# Patient Record
Sex: MALE | Race: WHITE | NOT HISPANIC OR LATINO | URBAN - METROPOLITAN AREA
[De-identification: names, ages, dates, MRNs, and addresses within clinical notes are randomized per-mention and may not be internally consistent; named-entity substitution may affect disease eponyms.]

---

## 2017-02-01 ENCOUNTER — OUTPATIENT (OUTPATIENT)
Dept: OUTPATIENT SERVICES | Facility: HOSPITAL | Age: 37
LOS: 1 days | End: 2017-02-01
Payer: MEDICAID

## 2017-02-01 DIAGNOSIS — Z96.653 PRESENCE OF ARTIFICIAL KNEE JOINT, BILATERAL: Chronic | ICD-10-CM

## 2017-02-01 DIAGNOSIS — Z96.649 PRESENCE OF UNSPECIFIED ARTIFICIAL HIP JOINT: Chronic | ICD-10-CM

## 2017-02-13 DIAGNOSIS — R69 ILLNESS, UNSPECIFIED: ICD-10-CM

## 2017-03-01 ENCOUNTER — OUTPATIENT (OUTPATIENT)
Dept: OUTPATIENT SERVICES | Facility: HOSPITAL | Age: 37
LOS: 1 days | End: 2017-03-01

## 2017-03-01 DIAGNOSIS — Z96.653 PRESENCE OF ARTIFICIAL KNEE JOINT, BILATERAL: Chronic | ICD-10-CM

## 2017-03-01 DIAGNOSIS — Z96.649 PRESENCE OF UNSPECIFIED ARTIFICIAL HIP JOINT: Chronic | ICD-10-CM

## 2017-03-09 DIAGNOSIS — R69 ILLNESS, UNSPECIFIED: ICD-10-CM

## 2018-05-11 ENCOUNTER — OUTPATIENT (OUTPATIENT)
Dept: OUTPATIENT SERVICES | Facility: HOSPITAL | Age: 38
LOS: 1 days | End: 2018-05-11
Payer: MEDICARE

## 2018-05-11 ENCOUNTER — RESULT REVIEW (OUTPATIENT)
Age: 38
End: 2018-05-11

## 2018-05-11 ENCOUNTER — APPOINTMENT (OUTPATIENT)
Dept: ULTRASOUND IMAGING | Facility: CLINIC | Age: 38
End: 2018-05-11
Payer: MEDICARE

## 2018-05-11 DIAGNOSIS — Z96.649 PRESENCE OF UNSPECIFIED ARTIFICIAL HIP JOINT: Chronic | ICD-10-CM

## 2018-05-11 DIAGNOSIS — Z96.653 PRESENCE OF ARTIFICIAL KNEE JOINT, BILATERAL: Chronic | ICD-10-CM

## 2018-05-11 DIAGNOSIS — E04.1 NONTOXIC SINGLE THYROID NODULE: ICD-10-CM

## 2018-05-11 PROCEDURE — 88173 CYTOPATH EVAL FNA REPORT: CPT

## 2018-05-11 PROCEDURE — 76942 ECHO GUIDE FOR BIOPSY: CPT | Mod: 26

## 2018-05-11 PROCEDURE — 10022: CPT | Mod: XS

## 2018-05-11 PROCEDURE — 88173 CYTOPATH EVAL FNA REPORT: CPT | Mod: 26

## 2018-05-11 PROCEDURE — 60100 BIOPSY OF THYROID: CPT

## 2018-05-11 PROCEDURE — 76942 ECHO GUIDE FOR BIOPSY: CPT

## 2018-05-11 PROCEDURE — 10022: CPT

## 2018-05-30 ENCOUNTER — EMERGENCY (EMERGENCY)
Facility: HOSPITAL | Age: 38
LOS: 1 days | Discharge: HOME | End: 2018-05-30
Attending: EMERGENCY MEDICINE
Payer: MEDICARE

## 2018-05-30 VITALS
HEART RATE: 74 BPM | HEIGHT: 69 IN | DIASTOLIC BLOOD PRESSURE: 113 MMHG | OXYGEN SATURATION: 97 % | RESPIRATION RATE: 18 BRPM | SYSTOLIC BLOOD PRESSURE: 160 MMHG | TEMPERATURE: 99 F | WEIGHT: 240.08 LBS

## 2018-05-30 DIAGNOSIS — Z96.653 PRESENCE OF ARTIFICIAL KNEE JOINT, BILATERAL: Chronic | ICD-10-CM

## 2018-05-30 DIAGNOSIS — Z96.649 PRESENCE OF UNSPECIFIED ARTIFICIAL HIP JOINT: Chronic | ICD-10-CM

## 2018-05-30 PROCEDURE — 99283 EMERGENCY DEPT VISIT LOW MDM: CPT

## 2018-05-30 RX ORDER — VALSARTAN 80 MG/1
1 TABLET ORAL
Qty: 10 | Refills: 0
Start: 2018-05-30 | End: 2018-06-08

## 2018-05-30 RX ORDER — VALSARTAN 80 MG/1
320 TABLET ORAL ONCE
Qty: 0 | Refills: 0 | Status: COMPLETED | OUTPATIENT
Start: 2018-05-30 | End: 2018-05-30

## 2018-05-30 RX ORDER — VALSARTAN 80 MG/1
160 TABLET ORAL ONCE
Qty: 0 | Refills: 0 | Status: COMPLETED | OUTPATIENT
Start: 2018-05-30 | End: 2018-05-30

## 2018-05-30 RX ADMIN — Medication 0.3 MILLIGRAM(S): at 20:09

## 2018-05-30 RX ADMIN — VALSARTAN 320 MILLIGRAM(S): 80 TABLET ORAL at 20:09

## 2018-05-30 RX ADMIN — VALSARTAN 160 MILLIGRAM(S): 80 TABLET ORAL at 20:09

## 2018-05-30 NOTE — ED PROVIDER NOTE - OBJECTIVE STATEMENT
39 y/o M c/o high blood pressure.  Patient states that he normally takes 320 mg qd of valsartan and 0.3mg tid of clonidine.  Patient has an appointment with Dr. Araujo for next Thursday.  He states that he ran out of his medications because he was handling a family emergency in [place] and couldn't get to the doctor.  Patient's last dose of medications was yesterday.  Patient c/o slight headache, 4/10.  Denies chest pain, visual changes, nausea/vomiting or any neurological deficits. 37 y/o M c/o high blood pressure.  Patient states that he normally takes 320 mg qd of valsartan and 0.3mg tid of clonidine.  Patient has an appointment with Dr. Araujo for next Thursday.  He states that he ran out of his medications because he was handling a family emergency in [place] and couldn't get to the doctor.  Patient's last dose of medications was yesterday.  Patient c/o slight headache, 4/10, not unusual for him with or without highblood pressure and unrelated to medications.  Denies chest pain, visual changes, nausea/vomiting or any neurological deficits. No edema, dyspnea, NIX, orthopnea, changes in urinary quality or frequency. Declining pain rx.

## 2018-05-30 NOTE — ED PROVIDER NOTE - PROGRESS NOTE DETAILS
feels improved after receiving BP meds. I have advised the patient on the usual course of this illness, an appropriate schedule for follow-up, and concerning signs and symptoms that should prompt return to the emergency department. I answered all questions to the best of my ability. The patient is stable for discharge.

## 2018-05-30 NOTE — ED PROVIDER NOTE - ATTENDING CONTRIBUTION TO CARE
ATTENDING MD: I, Karl Figueredo, have seen and evaluated this patient with the advance practice clinician.  I have discussed the history, exam ,and aspects of care with the APC.  I have reviewed the APC's note. I agree with the findings  unless other wise stated in the HPI, PE, MDM, and progress notes.     GEN: NAD, A & O x 4  HEAD/EYES: NCAT, PERRL, EOMI, anicteric sclerae, no conjunctival pallor  ENT: mucus membranes moist, oropharynx WNL, trachea midline, no JVD  RESP: lungs CTA with equal breath sounds bilaterally, chest wall nontender and atraumatic  CV: heart with reg rhythm S1, S2, no murmur; distal pulses intact and symmetric bilaterally  ABDOMEN: normoactive bowel sounds, soft, nondistended, nontender, no palpable masses  MSK: extremities atraumatic and nontender, no edema, no asymmetry.   SKIN: warm, dry, no rash, no bruising, no cyanosis. color appropriate for ethnicity  NEURO: Normal mentation, GCS15, CNII-XII are intact, strength is 5/5 throughout upper and lower extremities symmetric bilaterally, Sensation is intact to light touch throughout trunk and extremities symmetric bilaterally, Cerebellar function is intact by finger-nose-finger, 2+DTRs symmetric bilaterally, Babinski is downgoing bilaterally, Negative Romberg, Normal Gait  PSYCH: Affect appropriate     MDM: Pt presents for asymptomatic high blood pressure. reassuring exam. will givehome rx and prescribe for f/u. no concern for mild headache typical of usual headaches and not bothering patient in absence of other neurologic findings, siobhan. as pt states he would not have presented for it.

## 2018-06-19 ENCOUNTER — EMERGENCY (EMERGENCY)
Facility: HOSPITAL | Age: 38
LOS: 1 days | Discharge: HOME | End: 2018-06-19
Attending: EMERGENCY MEDICINE
Payer: MEDICARE

## 2018-06-19 VITALS
TEMPERATURE: 98 F | HEART RATE: 87 BPM | RESPIRATION RATE: 16 BRPM | DIASTOLIC BLOOD PRESSURE: 61 MMHG | OXYGEN SATURATION: 95 % | SYSTOLIC BLOOD PRESSURE: 101 MMHG

## 2018-06-19 VITALS — HEIGHT: 69 IN | WEIGHT: 229.94 LBS

## 2018-06-19 DIAGNOSIS — Z96.653 PRESENCE OF ARTIFICIAL KNEE JOINT, BILATERAL: Chronic | ICD-10-CM

## 2018-06-19 DIAGNOSIS — Z96.649 PRESENCE OF UNSPECIFIED ARTIFICIAL HIP JOINT: Chronic | ICD-10-CM

## 2018-06-19 PROCEDURE — 99283 EMERGENCY DEPT VISIT LOW MDM: CPT

## 2018-06-19 NOTE — ED PROVIDER NOTE - MEDICAL DECISION MAKING DETAILS
counseled pt on need for bp check before taking clinidine reviewed charts does have h/o htn return to ed for intractable HA, persistent vomiting, or new onset motor/sensory deficits stressed improtance pcp follow up

## 2018-06-19 NOTE — ED PROVIDER NOTE - OBJECTIVE STATEMENT
pt presents requesting bp medications denies fever. denies HA or neck pain. no chest pain or sob. no abd pain. no n/v/d. no urinary f/u/d. no back pain. no motor or sensory deficits. denies illicit drug use. no recent travel. no rash. no other acute issues symptoms or concerns

## 2018-07-01 ENCOUNTER — OUTPATIENT (OUTPATIENT)
Dept: OUTPATIENT SERVICES | Facility: HOSPITAL | Age: 38
LOS: 1 days | End: 2018-07-01
Payer: MEDICARE

## 2018-07-01 DIAGNOSIS — Z96.653 PRESENCE OF ARTIFICIAL KNEE JOINT, BILATERAL: Chronic | ICD-10-CM

## 2018-07-01 DIAGNOSIS — Z96.649 PRESENCE OF UNSPECIFIED ARTIFICIAL HIP JOINT: Chronic | ICD-10-CM

## 2018-07-13 DIAGNOSIS — Z71.89 OTHER SPECIFIED COUNSELING: ICD-10-CM

## 2018-10-02 ENCOUNTER — APPOINTMENT (OUTPATIENT)
Dept: ENDOCRINOLOGY | Facility: CLINIC | Age: 38
End: 2018-10-02

## 2018-11-07 ENCOUNTER — EMERGENCY (EMERGENCY)
Facility: HOSPITAL | Age: 38
LOS: 1 days | Discharge: HOME | End: 2018-11-07
Attending: EMERGENCY MEDICINE
Payer: MEDICARE

## 2018-11-07 VITALS
DIASTOLIC BLOOD PRESSURE: 83 MMHG | TEMPERATURE: 99 F | HEART RATE: 85 BPM | RESPIRATION RATE: 18 BRPM | SYSTOLIC BLOOD PRESSURE: 171 MMHG | OXYGEN SATURATION: 100 % | HEIGHT: 69 IN | WEIGHT: 220.02 LBS

## 2018-11-07 DIAGNOSIS — Z96.653 PRESENCE OF ARTIFICIAL KNEE JOINT, BILATERAL: Chronic | ICD-10-CM

## 2018-11-07 DIAGNOSIS — Z96.649 PRESENCE OF UNSPECIFIED ARTIFICIAL HIP JOINT: Chronic | ICD-10-CM

## 2018-11-07 PROCEDURE — 99283 EMERGENCY DEPT VISIT LOW MDM: CPT

## 2018-11-07 RX ORDER — VALSARTAN 80 MG/1
1 TABLET ORAL
Qty: 14 | Refills: 0
Start: 2018-11-07 | End: 2018-11-20

## 2018-11-07 NOTE — ED PROVIDER NOTE - CHPI ED SYMPTOMS NEG
no chills/no chest pain/no back pain/no fever/no diaphoresis/no syncope/no shortness of breath/no vomiting/no cough/no nausea/no dizziness

## 2018-11-07 NOTE — ED PROVIDER NOTE - OBJECTIVE STATEMENT
The patient is a 38 year old male presents with asymptomatic hypertension requesting BP refill meds.  The patient states that he has appointment with PMD in 2 weeks. No HA, No blurred vision, No CP, No SOB, No ABD Pain, No motor No sensory loss

## 2018-11-14 ENCOUNTER — APPOINTMENT (OUTPATIENT)
Dept: FAMILY MEDICINE | Facility: CLINIC | Age: 38
End: 2018-11-14

## 2018-11-15 ENCOUNTER — RX RENEWAL (OUTPATIENT)
Age: 38
End: 2018-11-15

## 2018-11-16 ENCOUNTER — RX CHANGE (OUTPATIENT)
Age: 38
End: 2018-11-16

## 2019-01-17 ENCOUNTER — APPOINTMENT (OUTPATIENT)
Dept: ENDOCRINOLOGY | Facility: CLINIC | Age: 39
End: 2019-01-17
Payer: MEDICARE

## 2019-01-17 VITALS
WEIGHT: 227 LBS | BODY MASS INDEX: 33.62 KG/M2 | HEART RATE: 70 BPM | HEIGHT: 69 IN | DIASTOLIC BLOOD PRESSURE: 70 MMHG | SYSTOLIC BLOOD PRESSURE: 100 MMHG

## 2019-01-17 DIAGNOSIS — Z80.9 FAMILY HISTORY OF MALIGNANT NEOPLASM, UNSPECIFIED: ICD-10-CM

## 2019-01-17 DIAGNOSIS — F17.200 NICOTINE DEPENDENCE, UNSPECIFIED, UNCOMPLICATED: ICD-10-CM

## 2019-01-17 PROCEDURE — 99214 OFFICE O/P EST MOD 30 MIN: CPT | Mod: 25

## 2019-01-17 PROCEDURE — 83036 HEMOGLOBIN GLYCOSYLATED A1C: CPT | Mod: QW

## 2019-01-22 LAB — HBA1C MFR BLD HPLC: 5.8

## 2019-01-22 NOTE — ASSESSMENT
[Carbohydrate Consistent Diet] : carbohydrate consistent diet [Importance of Diet and Exercise] : importance of diet and exercise to improve glycemic control, achieve weight loss and improve cardiovascular health [Self Monitoring of Blood Glucose] : self monitoring of blood glucose [FreeTextEntry1] : Dm2 with a1 c5.8 now after loosing 40 lbs \par continue MF 1000 mg BID \par check labs \par flushot declines \par eye exam referral \par check xander/c ratio normal. \par discussed diet and exercise\par encouraged more exercise walking 30 min 3 x week\par bgs 2x day \par \par HTN :  bp at target on meds. Continue current management.\par \par HLD: check lipids\par continue atorvastatin 10 mg qd \par \par obesity: discussed diet and exercise\par encouraged more exercise walking 30 min 3 x week\par \par NTMNG : has large nodule isthmus 2.2 cm and R middle lobe 1.3 cm FNA May 2019 \par check thyroid US next appt \par \par hypoT : check tfts next appt

## 2019-01-22 NOTE — PHYSICAL EXAM
[Alert] : alert [No Acute Distress] : no acute distress [Well Nourished] : well nourished [Well Developed] : well developed [Normal Sclera/Conjunctiva] : normal sclera/conjunctiva [EOMI] : extra ocular movement intact [No Proptosis] : no proptosis [Normal Oropharynx] : the oropharynx was normal [Thyroid Not Enlarged] : the thyroid was not enlarged [No Thyroid Nodules] : there were no palpable thyroid nodules [No Respiratory Distress] : no respiratory distress [No Accessory Muscle Use] : no accessory muscle use [Clear to Auscultation] : lungs were clear to auscultation bilaterally [Normal Rate] : heart rate was normal  [Normal S1, S2] : normal S1 and S2 [Regular Rhythm] : with a regular rhythm [Pedal Pulses Normal] : the pedal pulses are present [No Edema] : there was no peripheral edema [Normal Bowel Sounds] : normal bowel sounds [Not Tender] : non-tender [Soft] : abdomen soft [Not Distended] : not distended [Post Cervical Nodes] : posterior cervical nodes [Anterior Cervical Nodes] : anterior cervical nodes [Normal] : normal and non tender [Spine Straight] : spine straight [No Stigmata of Cushings Syndrome] : no stigmata of cushings syndrome [Normal Gait] : normal gait [Normal Strength/Tone] : muscle strength and tone were normal [No Rash] : no rash [Normal Reflexes] : deep tendon reflexes were 2+ and symmetric [No Tremors] : no tremors [Oriented x3] : oriented to person, place, and time [Acanthosis Nigricans] : no acanthosis nigricans

## 2019-03-01 ENCOUNTER — EMERGENCY (EMERGENCY)
Facility: HOSPITAL | Age: 39
LOS: 1 days | Discharge: HOME | End: 2019-03-01
Attending: STUDENT IN AN ORGANIZED HEALTH CARE EDUCATION/TRAINING PROGRAM
Payer: COMMERCIAL

## 2019-03-01 DIAGNOSIS — Z96.653 PRESENCE OF ARTIFICIAL KNEE JOINT, BILATERAL: Chronic | ICD-10-CM

## 2019-03-01 DIAGNOSIS — Z96.649 PRESENCE OF UNSPECIFIED ARTIFICIAL HIP JOINT: Chronic | ICD-10-CM

## 2019-03-01 LAB
APTT BLD: 35.5 SEC — SIGNIFICANT CHANGE UP (ref 27.5–36.3)
BASOPHILS # BLD AUTO: 0 K/UL — SIGNIFICANT CHANGE UP (ref 0–0.2)
BASOPHILS NFR BLD AUTO: 0.7 % — SIGNIFICANT CHANGE UP (ref 0–2)
EOSINOPHIL # BLD AUTO: 0.4 K/UL — SIGNIFICANT CHANGE UP (ref 0–0.5)
EOSINOPHIL NFR BLD AUTO: 6.5 % — HIGH (ref 0–5)
HCT VFR BLD CALC: 34.6 % — LOW (ref 42–52)
HGB BLD-MCNC: 11.5 G/DL — LOW (ref 14–18)
INR BLD: 0.97 RATIO — SIGNIFICANT CHANGE UP (ref 0.88–1.16)
LYMPHOCYTES # BLD AUTO: 2.2 K/UL — SIGNIFICANT CHANGE UP (ref 1–4.8)
LYMPHOCYTES # BLD AUTO: 37.8 % — SIGNIFICANT CHANGE UP (ref 20–55)
MCHC RBC-ENTMCNC: 28.5 PG — SIGNIFICANT CHANGE UP (ref 27–31)
MCHC RBC-ENTMCNC: 33.2 G/DL — SIGNIFICANT CHANGE UP (ref 32–36)
MCV RBC AUTO: 85.9 FL — SIGNIFICANT CHANGE UP (ref 80–94)
MONOCYTES # BLD AUTO: 0.4 K/UL — SIGNIFICANT CHANGE UP (ref 0–0.8)
MONOCYTES NFR BLD AUTO: 6.8 % — SIGNIFICANT CHANGE UP (ref 3–10)
NEUTROPHILS # BLD AUTO: 2.8 K/UL — SIGNIFICANT CHANGE UP (ref 1.8–8)
NEUTROPHILS NFR BLD AUTO: 47.7 % — SIGNIFICANT CHANGE UP (ref 37–73)
PLATELET # BLD AUTO: 169 K/UL — SIGNIFICANT CHANGE UP (ref 150–400)
PROTHROM AB SERPL-ACNC: 11.2 SEC — SIGNIFICANT CHANGE UP (ref 10–12.9)
RBC # BLD: 4.03 M/UL — LOW (ref 4.6–6.2)
RBC # FLD: 13.7 % — SIGNIFICANT CHANGE UP (ref 11–15.6)
WBC # BLD: 5.9 K/UL — SIGNIFICANT CHANGE UP (ref 4.8–10.8)
WBC # FLD AUTO: 5.9 K/UL — SIGNIFICANT CHANGE UP (ref 4.8–10.8)

## 2019-03-01 PROCEDURE — 71045 X-RAY EXAM CHEST 1 VIEW: CPT | Mod: 26

## 2019-03-01 PROCEDURE — 99284 EMERGENCY DEPT VISIT MOD MDM: CPT

## 2019-03-01 PROCEDURE — 99283 EMERGENCY DEPT VISIT LOW MDM: CPT

## 2019-03-01 RX ORDER — TETANUS TOXOID, REDUCED DIPHTHERIA TOXOID AND ACELLULAR PERTUSSIS VACCINE, ADSORBED 5; 2.5; 8; 8; 2.5 [IU]/.5ML; [IU]/.5ML; UG/.5ML; UG/.5ML; UG/.5ML
0.5 SUSPENSION INTRAMUSCULAR ONCE
Qty: 0 | Refills: 0 | Status: COMPLETED | OUTPATIENT
Start: 2019-03-01 | End: 2019-03-01

## 2019-03-01 RX ORDER — SODIUM CHLORIDE 9 MG/ML
1000 INJECTION INTRAMUSCULAR; INTRAVENOUS; SUBCUTANEOUS ONCE
Qty: 0 | Refills: 0 | Status: COMPLETED | OUTPATIENT
Start: 2019-03-01 | End: 2019-03-01

## 2019-03-01 NOTE — H&P ADULT - ENMT COMMENTS
see hpi Ear canals clear BL.  No Hemotympanum. L nare with through and through lac of ala.  + Bridge edema.  No septal HT.
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
## 2019-03-01 NOTE — ED PROVIDER NOTE - PMH
Anxiety    Bipolar disorder    GERD (gastroesophageal reflux disease)    HTN (hypertension)    Substance abuse

## 2019-03-01 NOTE — ED PROVIDER NOTE - ENMT, MLM
Airway patent, no facial tenderness. laceration to R naris. Airway patent, no facial tenderness. laceration to R anterior medial naris, no septal hematoma.
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
## 2019-03-01 NOTE — ED PROVIDER NOTE - PHYSICAL EXAMINATION
femoral pulses 2+  3mm pupils b/l, EOMI  GCS 15  laceration to R nairs  no chest wall tenderness  no facial tenderness  abdomen soft, NT/ND  pelvis stable  old scar to medial R knee  no spinal tenderness, no stepoff  no blood or stool in rectal vault
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
## 2019-03-01 NOTE — ED PROVIDER NOTE - PROGRESS NOTE DETAILS
PT seen and reassessed.  Patient symptomatically improved.   AAOX3, NAD, VSS.  Discussed test results w/ patient, given copy of results. Patient verbalized understanding of hospital course and outpatient plans, has decisional making capacity.  Will follow-up with Primary care doctor in the next 2 days; patient will call for an appointment. Will return to the ED if there is any worsening of symptoms.  Patient able to ambulate w/o difficulty, is tolerating PO intake  Pt refused adacel since he's had it before
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
## 2019-03-01 NOTE — ED PROVIDER NOTE - MUSCULOSKELETAL, MLM
Moving all extremities spontaneously. no spinal tenderness, no stepoff. old scar to medial R knee. Moving all extremities spontaneously. no spinal tenderness, no stepoff.  old scar to medial R knee.
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
## 2019-03-02 VITALS
DIASTOLIC BLOOD PRESSURE: 74 MMHG | SYSTOLIC BLOOD PRESSURE: 124 MMHG | TEMPERATURE: 98 F | HEART RATE: 69 BPM | RESPIRATION RATE: 18 BRPM | OXYGEN SATURATION: 97 %
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
## 2019-03-02 DIAGNOSIS — S01.21XA LACERATION WITHOUT FOREIGN BODY OF NOSE, INITIAL ENCOUNTER: ICD-10-CM

## 2019-03-02 LAB
ALBUMIN SERPL ELPH-MCNC: 4.3 G/DL — SIGNIFICANT CHANGE UP (ref 3.3–5.2)
ALP SERPL-CCNC: 98 U/L — SIGNIFICANT CHANGE UP (ref 40–120)
ALT FLD-CCNC: 29 U/L — SIGNIFICANT CHANGE UP
ANION GAP SERPL CALC-SCNC: 12 MMOL/L — SIGNIFICANT CHANGE UP (ref 5–17)
AST SERPL-CCNC: 27 U/L — SIGNIFICANT CHANGE UP
BILIRUB SERPL-MCNC: <0.2 MG/DL — LOW (ref 0.4–2)
BLD GP AB SCN SERPL QL: SIGNIFICANT CHANGE UP
BUN SERPL-MCNC: 16 MG/DL — SIGNIFICANT CHANGE UP (ref 8–20)
CALCIUM SERPL-MCNC: 9 MG/DL — SIGNIFICANT CHANGE UP (ref 8.6–10.2)
CHLORIDE SERPL-SCNC: 106 MMOL/L — SIGNIFICANT CHANGE UP (ref 98–107)
CO2 SERPL-SCNC: 24 MMOL/L — SIGNIFICANT CHANGE UP (ref 22–29)
CREAT SERPL-MCNC: 1.31 MG/DL — HIGH (ref 0.5–1.3)
ETHANOL SERPL-MCNC: <10 MG/DL — SIGNIFICANT CHANGE UP
GLUCOSE SERPL-MCNC: 140 MG/DL — HIGH (ref 70–115)
LIDOCAIN IGE QN: 20 U/L — LOW (ref 22–51)
POTASSIUM SERPL-MCNC: 4.1 MMOL/L — SIGNIFICANT CHANGE UP (ref 3.5–5.3)
POTASSIUM SERPL-SCNC: 4.1 MMOL/L — SIGNIFICANT CHANGE UP (ref 3.5–5.3)
PROT SERPL-MCNC: 6.6 G/DL — SIGNIFICANT CHANGE UP (ref 6.6–8.7)
SODIUM SERPL-SCNC: 142 MMOL/L — SIGNIFICANT CHANGE UP (ref 135–145)
TYPE + AB SCN PNL BLD: SIGNIFICANT CHANGE UP

## 2019-03-02 PROCEDURE — 85027 COMPLETE CBC AUTOMATED: CPT

## 2019-03-02 PROCEDURE — 85610 PROTHROMBIN TIME: CPT

## 2019-03-02 PROCEDURE — 86901 BLOOD TYPING SEROLOGIC RH(D): CPT

## 2019-03-02 PROCEDURE — 86900 BLOOD TYPING SEROLOGIC ABO: CPT

## 2019-03-02 PROCEDURE — 12011 RPR F/E/E/N/L/M 2.5 CM/<: CPT

## 2019-03-02 PROCEDURE — 71260 CT THORAX DX C+: CPT | Mod: 26

## 2019-03-02 PROCEDURE — 72125 CT NECK SPINE W/O DYE: CPT | Mod: 26

## 2019-03-02 PROCEDURE — 83690 ASSAY OF LIPASE: CPT

## 2019-03-02 PROCEDURE — 71260 CT THORAX DX C+: CPT

## 2019-03-02 PROCEDURE — 74177 CT ABD & PELVIS W/CONTRAST: CPT | Mod: 26

## 2019-03-02 PROCEDURE — 74177 CT ABD & PELVIS W/CONTRAST: CPT

## 2019-03-02 PROCEDURE — 71045 X-RAY EXAM CHEST 1 VIEW: CPT

## 2019-03-02 PROCEDURE — 99285 EMERGENCY DEPT VISIT HI MDM: CPT | Mod: 25

## 2019-03-02 PROCEDURE — 80307 DRUG TEST PRSMV CHEM ANLYZR: CPT

## 2019-03-02 PROCEDURE — 70486 CT MAXILLOFACIAL W/O DYE: CPT

## 2019-03-02 PROCEDURE — 80053 COMPREHEN METABOLIC PANEL: CPT

## 2019-03-02 PROCEDURE — 70486 CT MAXILLOFACIAL W/O DYE: CPT | Mod: 26

## 2019-03-02 PROCEDURE — 70450 CT HEAD/BRAIN W/O DYE: CPT | Mod: 26

## 2019-03-02 PROCEDURE — 72125 CT NECK SPINE W/O DYE: CPT

## 2019-03-02 PROCEDURE — 83605 ASSAY OF LACTIC ACID: CPT

## 2019-03-02 PROCEDURE — 70450 CT HEAD/BRAIN W/O DYE: CPT

## 2019-03-02 PROCEDURE — 36415 COLL VENOUS BLD VENIPUNCTURE: CPT

## 2019-03-02 PROCEDURE — 86850 RBC ANTIBODY SCREEN: CPT

## 2019-03-02 PROCEDURE — 85730 THROMBOPLASTIN TIME PARTIAL: CPT

## 2019-03-02 RX ADMIN — SODIUM CHLORIDE 1000 MILLILITER(S): 9 INJECTION INTRAMUSCULAR; INTRAVENOUS; SUBCUTANEOUS at 00:55

## 2019-03-02 NOTE — PROCEDURE NOTE - PROCEDURE
<<-----Click on this checkbox to enter Procedure Laceration repair of face  03/02/2019    [id number]

## 2019-03-07 ENCOUNTER — EMERGENCY (EMERGENCY)
Facility: HOSPITAL | Age: 39
LOS: 1 days | Discharge: HOME | End: 2019-03-07
Attending: STUDENT IN AN ORGANIZED HEALTH CARE EDUCATION/TRAINING PROGRAM
Payer: COMMERCIAL

## 2019-03-07 VITALS
OXYGEN SATURATION: 97 % | DIASTOLIC BLOOD PRESSURE: 102 MMHG | RESPIRATION RATE: 18 BRPM | HEIGHT: 69 IN | SYSTOLIC BLOOD PRESSURE: 156 MMHG | WEIGHT: 220.02 LBS | TEMPERATURE: 97 F | HEART RATE: 64 BPM

## 2019-03-07 DIAGNOSIS — Z96.649 PRESENCE OF UNSPECIFIED ARTIFICIAL HIP JOINT: Chronic | ICD-10-CM

## 2019-03-07 DIAGNOSIS — Z96.653 PRESENCE OF ARTIFICIAL KNEE JOINT, BILATERAL: Chronic | ICD-10-CM

## 2019-03-07 PROCEDURE — 99283 EMERGENCY DEPT VISIT LOW MDM: CPT

## 2019-03-07 PROCEDURE — 99282 EMERGENCY DEPT VISIT SF MDM: CPT

## 2019-03-07 NOTE — ED PROVIDER NOTE - PHYSICAL EXAMINATION
MSK: No midline tenderness, 5/5 str b/l upper and lower extrems. No bony deformities. Full ROM head and neck    Neuro: Nonfocal, PERRLA, EOMI.

## 2019-03-07 NOTE — ED PROVIDER NOTE - OBJECTIVE STATEMENT
The patient is a 38y Male complaining of medication refill. The patient is a 38y Male, PMHx anxiety, bipolar, HTN, DM, GERD, presents to ED complaining of medication refill. Pt requesting xanax refill for generalized myalgias. Pt states he is followed by Dr. Omi Leahy for pain management and was unable to be seen in the office until next week. Pt states he has generalized pains that radiates from his neck down into his lower back. Pt denies recent trauma, falls, numbness, saddle anesthesia, and urinary incontinence.

## 2019-03-07 NOTE — ED PROVIDER NOTE - CLINICAL SUMMARY MEDICAL DECISION MAKING FREE TEXT BOX
37 y/o male presenting to ED requesting xanax refill. ISTOP checked, pt with active xanax prescription ( [facility]STOP reference # : [id number]). Pt physical exam unremarkable. Pt offered lidocaine patch and NSAIDS for pain. Pt refusing medication at this time, states he will follow up with PMD in AM. Pt left w/o receiving discharge paperwork.

## 2019-03-07 NOTE — ED ADULT TRIAGE NOTE - PAIN RATING/NUMBER SCALE (0-10): ACTIVITY
Spoke with patient, urine darkness resolved. Note printed, please leave at .    Eze Damon MD  Family Medicine Physician    9

## 2019-05-09 ENCOUNTER — RX RENEWAL (OUTPATIENT)
Age: 39
End: 2019-05-09

## 2019-06-25 ENCOUNTER — EMERGENCY (EMERGENCY)
Facility: HOSPITAL | Age: 39
LOS: 1 days | Discharge: HOME | End: 2019-06-25
Attending: EMERGENCY MEDICINE
Payer: COMMERCIAL

## 2019-06-25 VITALS
HEART RATE: 80 BPM | SYSTOLIC BLOOD PRESSURE: 125 MMHG | DIASTOLIC BLOOD PRESSURE: 66 MMHG | TEMPERATURE: 98 F | RESPIRATION RATE: 20 BRPM

## 2019-06-25 DIAGNOSIS — Z79.899 OTHER LONG TERM (CURRENT) DRUG THERAPY: ICD-10-CM

## 2019-06-25 DIAGNOSIS — Z96.653 PRESENCE OF ARTIFICIAL KNEE JOINT, BILATERAL: Chronic | ICD-10-CM

## 2019-06-25 DIAGNOSIS — K21.9 GASTRO-ESOPHAGEAL REFLUX DISEASE WITHOUT ESOPHAGITIS: ICD-10-CM

## 2019-06-25 DIAGNOSIS — R42 DIZZINESS AND GIDDINESS: ICD-10-CM

## 2019-06-25 DIAGNOSIS — F41.9 ANXIETY DISORDER, UNSPECIFIED: ICD-10-CM

## 2019-06-25 DIAGNOSIS — Z96.649 PRESENCE OF UNSPECIFIED ARTIFICIAL HIP JOINT: Chronic | ICD-10-CM

## 2019-06-25 DIAGNOSIS — I10 ESSENTIAL (PRIMARY) HYPERTENSION: ICD-10-CM

## 2019-06-25 PROCEDURE — 99282 EMERGENCY DEPT VISIT SF MDM: CPT

## 2019-06-25 PROCEDURE — 99283 EMERGENCY DEPT VISIT LOW MDM: CPT

## 2019-06-25 NOTE — ED ADULT TRIAGE NOTE - CHIEF COMPLAINT QUOTE
takes clonidine and cant get meds, has anxiety attack and panic.  wants medication refill. No suicide thoughts, no homicidal.

## 2019-06-25 NOTE — ED STATDOCS - PHYSICAL EXAMINATION
Gen: NAD, AOx3  Head: NCAT  HEENT: EOMI, oral mucosa moist, normal conjunctiva, neck supple  Lung: CTAB, no respiratory distress  CV: rrr, no murmur, Normal perfusion  MSK: No edema, no visible deformities  Neuro: No focal neurologic deficits  Skin: No rash   Psych: normal affect Gen: NAD, AOx3, poor eye contact  Head: NCAT  HEENT: EOMI, oral mucosa moist, normal conjunctiva, neck supple  Lung: CTAB, no respiratory distress  CV: rrr, no murmur, Normal perfusion  MSK: No edema, no visible deformities  Neuro: No focal neurologic deficits  Skin: No rash   Psych: normal affect

## 2019-06-25 NOTE — ED STATDOCS - NS_ ATTENDINGSCRIBEDETAILS _ED_A_ED_FT
I, Rubia Gomez, performed the initial face to face bedside interview with this patient regarding history of present illness, review of symptoms and relevant past medical, social and family history.  I completed an independent physical examination.  The history, relevant review of systems, past medical and surgical history, medical decision making, and physical examination was documented by the scribe in my presence and I attest to the accuracy of the documentation.

## 2019-06-25 NOTE — ED STATDOCS - OBJECTIVE STATEMENT
38 y/o M pt with significant PMHx of Anxiety, Bipolar disorder, GERD, HTN and Substance abuse presents to the ED c/o a panic attack accompanied by trembles, palpitations and feeling light headed. Pt is currently on Clonopin and is unable to get a refill, so he came into the ED hoping to get a prescription refill. Denies fever, chills, HA, neck pain, back pain, SOB, abd pain, N/V/D, dysuria, rash, LOC, suicidal thoughts. No further complaints at this time.

## 2019-06-25 NOTE — ED STATDOCS - NS ED ROS FT
ROS: no CP/SOB. no cough. no fever. no n/v/d/c. no abd pain. no rash. no bleeding. no urinary complaints. no weakness. no vision changes. no HA. no neck/back pain. no extremity swelling/deformity. + anxiety, +trembles, +panic attack, +palpitations, +light headed. No change in mental status.

## 2019-06-25 NOTE — ED STATDOCS - CHPI ED SYMPTOM NEG
chills, ha, neck pain, back pain, sob, abd pain, rash, loc, suicidal thought/no nausea/no dysuria/no diarrhea/no vomiting/no palpitations/no fever

## 2019-06-25 NOTE — ED STATDOCS - CLINICAL SUMMARY MEDICAL DECISION MAKING FREE TEXT BOX
40 y/o male pt with hx of anxiety and HTN states he has been on anti anxiety medication in the past. [facility] reference number [id number]. Pt is currently on Suboxone and has not been on benzodiazapine since February 2019 and has not been routinely prescribed them. No acute distress , cp ,sob, normal vital signs. Pt is offered outpatient services and will give referral form and discharge

## 2019-08-20 ENCOUNTER — MEDICATION RENEWAL (OUTPATIENT)
Age: 39
End: 2019-08-20

## 2019-08-20 ENCOUNTER — RX RENEWAL (OUTPATIENT)
Age: 39
End: 2019-08-20

## 2019-10-18 ENCOUNTER — APPOINTMENT (OUTPATIENT)
Dept: PSYCHIATRY | Facility: CLINIC | Age: 39
End: 2019-10-18
Payer: MEDICARE

## 2019-10-18 DIAGNOSIS — E11.9 TYPE 2 DIABETES MELLITUS W/OUT COMPLICATIONS: ICD-10-CM

## 2019-10-18 PROCEDURE — 99205 OFFICE O/P NEW HI 60 MIN: CPT

## 2019-10-24 ENCOUNTER — APPOINTMENT (OUTPATIENT)
Dept: PSYCHIATRY | Facility: CLINIC | Age: 39
End: 2019-10-24

## 2019-10-28 ENCOUNTER — APPOINTMENT (OUTPATIENT)
Dept: PSYCHIATRY | Facility: CLINIC | Age: 39
End: 2019-10-28

## 2019-11-01 ENCOUNTER — APPOINTMENT (OUTPATIENT)
Dept: PSYCHIATRY | Facility: CLINIC | Age: 39
End: 2019-11-01
Payer: MEDICARE

## 2019-11-01 DIAGNOSIS — F32.3 MAJOR DEPRESSIVE DISORDER, SINGLE EPISODE, SEVERE WITH PSYCHOTIC FEATURES: ICD-10-CM

## 2019-11-01 DIAGNOSIS — F39 UNSPECIFIED MOOD [AFFECTIVE] DISORDER: ICD-10-CM

## 2019-11-01 PROCEDURE — 99214 OFFICE O/P EST MOD 30 MIN: CPT

## 2019-11-06 ENCOUNTER — APPOINTMENT (OUTPATIENT)
Dept: PSYCHIATRY | Facility: CLINIC | Age: 39
End: 2019-11-06
Payer: SELF-PAY

## 2019-11-06 PROCEDURE — NSD00D NO SHOW FEE: CUSTOM

## 2019-11-13 ENCOUNTER — APPOINTMENT (OUTPATIENT)
Dept: PSYCHIATRY | Facility: CLINIC | Age: 39
End: 2019-11-13
Payer: SELF-PAY

## 2019-11-13 PROCEDURE — NSD00D NO SHOW FEE: CUSTOM

## 2019-11-22 ENCOUNTER — APPOINTMENT (OUTPATIENT)
Dept: PSYCHIATRY | Facility: CLINIC | Age: 39
End: 2019-11-22

## 2019-12-04 ENCOUNTER — APPOINTMENT (OUTPATIENT)
Dept: ENDOCRINOLOGY | Facility: CLINIC | Age: 39
End: 2019-12-04

## 2019-12-06 ENCOUNTER — EMERGENCY (EMERGENCY)
Facility: HOSPITAL | Age: 39
LOS: 1 days | End: 2019-12-06
Admitting: EMERGENCY MEDICINE

## 2019-12-06 DIAGNOSIS — Z96.653 PRESENCE OF ARTIFICIAL KNEE JOINT, BILATERAL: Chronic | ICD-10-CM

## 2019-12-06 DIAGNOSIS — Z96.649 PRESENCE OF UNSPECIFIED ARTIFICIAL HIP JOINT: Chronic | ICD-10-CM

## 2020-01-01 PROCEDURE — G9005: CPT

## 2020-02-04 NOTE — ED PROVIDER NOTE - PMH
Anxiety    Bipolar disorder    GERD (gastroesophageal reflux disease)    HTN (hypertension)    Substance abuse Digestive

## 2020-03-02 ENCOUNTER — APPOINTMENT (OUTPATIENT)
Dept: ENDOCRINOLOGY | Facility: CLINIC | Age: 40
End: 2020-03-02

## 2020-03-02 ENCOUNTER — APPOINTMENT (OUTPATIENT)
Dept: ENDOCRINOLOGY | Facility: CLINIC | Age: 40
End: 2020-03-02
Payer: MEDICARE

## 2020-03-02 ENCOUNTER — RESULT CHARGE (OUTPATIENT)
Age: 40
End: 2020-03-02

## 2020-03-02 VITALS
BODY MASS INDEX: 34.8 KG/M2 | DIASTOLIC BLOOD PRESSURE: 64 MMHG | HEIGHT: 69 IN | WEIGHT: 235 LBS | SYSTOLIC BLOOD PRESSURE: 98 MMHG

## 2020-03-02 DIAGNOSIS — Z79.84 LONG TERM (CURRENT) USE OF ORAL HYPOGLYCEMIC DRUGS: ICD-10-CM

## 2020-03-02 LAB — HBA1C MFR BLD HPLC: 5.4

## 2020-03-02 PROCEDURE — 83036 HEMOGLOBIN GLYCOSYLATED A1C: CPT | Mod: QW

## 2020-03-02 PROCEDURE — 82962 GLUCOSE BLOOD TEST: CPT

## 2020-03-02 PROCEDURE — 99214 OFFICE O/P EST MOD 30 MIN: CPT | Mod: 25

## 2020-03-02 RX ORDER — GABAPENTIN 300 MG/1
300 CAPSULE ORAL
Qty: 30 | Refills: 0 | Status: DISCONTINUED | COMMUNITY
Start: 2019-10-18 | End: 2020-03-02

## 2020-03-02 NOTE — PHYSICAL EXAM
[Alert] : alert [No Acute Distress] : no acute distress [Well Nourished] : well nourished [Well Developed] : well developed [Normal Hearing] : hearing was normal [Normal Sclera/Conjunctiva] : normal sclera/conjunctiva [Normal Rate and Effort] : normal respiratory rhythm and effort [Supple] : the neck was supple [Clear to Auscultation] : lungs were clear to auscultation bilaterally [No Accessory Muscle Use] : no accessory muscle use [Normal S1, S2] : normal S1 and S2 [Normal Rate] : heart rate was normal  [Regular Rhythm] : with a regular rhythm [No Edema] : there was no peripheral edema [Post Cervical Nodes] : posterior cervical nodes [Soft] : abdomen soft [Not Tender] : non-tender [Anterior Cervical Nodes] : anterior cervical nodes [Normal Gait] : normal gait [Normal] : normal and non tender [Acanthosis Nigricans] : no acanthosis nigricans [No Rash] : no rash [de-identified] : flat affect [de-identified] : slight hand tremor

## 2020-03-02 NOTE — ASSESSMENT
[FreeTextEntry1] : T2DM\par -Decrease MFN to 500 mg BID\par -Increase exercise as tolerated, goal of 30 mins a day 5x a week \par -Patient should eliminate soda and sweetened drinks from diet\par -Advised to quit smoking/vaping\par -Make apt with ophtho and podiatry for yearly exam\par \par HLD\par -On no medication for cholesterol, lipid pannel to be done with labs \par \par HTN\par -Continue Hydralazine, BP stable in office\par \par Hypothyroid\par -Will check TFTs with labs today, patient unsure of LT4 dose but will discuss dose if we need to make changes \par \par MNG\par Follow up thyroid ultrasound rx given today\par \par RTO 6 Months

## 2020-03-02 NOTE — REVIEW OF SYSTEMS
[Constipation] : constipation [Polyuria] : polyuria [Nocturia] : nocturia [Joint Pain] : joint pain [Dry Skin] : dry skin [Joint Stiffness] : joint stiffness [Tremors] : tremors [Depression] : depression [Anxiety] : anxiety [Polydipsia] : polydipsia [Fatigue] : no fatigue [Recent Weight Gain (___ Lbs)] : no recent weight gain [Recent Weight Loss (___ Lbs)] : no recent weight loss [Visual Field Defect] : no visual field defect [Dysphagia] : no dysphagia [Blurry Vision] : no blurred vision [Dysphonia] : no dysphonia [Neck Pain] : no neck pain [Palpitations] : no palpitations [Chest Pain] : no chest pain [Shortness Of Breath] : no shortness of breath [Nausea] : no nausea [Vomiting] : no vomiting was observed [Acanthosis] : no acanthosis  [Diarrhea] : no diarrhea [Headache] : no headaches [Cold Intolerance] : cold tolerant [Heat Intolerance] : heat tolerant

## 2020-03-02 NOTE — HISTORY OF PRESENT ILLNESS
[FreeTextEntry1] : T2DM\par Severity: well controlled \par Duration: since 2017\par Modifying Factors: better with diet \par \par SMBG\par Does not check BS at home\par \par Sometimes feels lethargic and unsure if its because BS is high/low\par \par HGA1C 5.4 \par \par Current drug regimen\par MFN 1000 MG BID\par \par Eye exam: needs follow up, has not been in 5 years \par Foot exam: does not see podiatry- endorses rare neuropathy in top of feet but has also had hip replacement\par Kidney disease: denies\par Heart disease: denies \par \par Weight: stable\par Diet: pt cooks and eats out a lot\par B: cereal, eggs\par L: does not eat lunch \par D: pasta, meat, veggie, starch\par S: chips, drinks regular soda sometimes, iced tea (sweetened) \par Exercise: cardio/weights a few times a week\par Smoking : pack a day smoker, motivated to quit \par \par HLD\par -On no medication for cholesterol\par \par HTN\par -On hydralazine but unsure of dose\par -BP in office 98/64\par \par NTMNG\par large nodule isthmus 2.2 cm and R middle lobe 1.3 cm FNA May 2019 \par \par HypoT\par Levothyroxine unsure of dose- controlled by PCP\par Patient advised to take every day in the morning, on an empty stomach, and with no other medications.

## 2020-05-10 ENCOUNTER — INPATIENT (INPATIENT)
Facility: HOSPITAL | Age: 40
LOS: 0 days | Discharge: LEFT BEFORE DISCHARGE | DRG: 90 | End: 2020-05-11
Attending: INTERNAL MEDICINE | Admitting: INTERNAL MEDICINE
Payer: MEDICARE

## 2020-05-10 VITALS
SYSTOLIC BLOOD PRESSURE: 137 MMHG | OXYGEN SATURATION: 93 % | HEIGHT: 69 IN | DIASTOLIC BLOOD PRESSURE: 61 MMHG | WEIGHT: 220.02 LBS | HEART RATE: 88 BPM | RESPIRATION RATE: 18 BRPM | TEMPERATURE: 99 F

## 2020-05-10 DIAGNOSIS — R41.82 ALTERED MENTAL STATUS, UNSPECIFIED: ICD-10-CM

## 2020-05-10 DIAGNOSIS — Z96.649 PRESENCE OF UNSPECIFIED ARTIFICIAL HIP JOINT: Chronic | ICD-10-CM

## 2020-05-10 DIAGNOSIS — Z96.653 PRESENCE OF ARTIFICIAL KNEE JOINT, BILATERAL: Chronic | ICD-10-CM

## 2020-05-10 LAB
ALBUMIN SERPL ELPH-MCNC: 4.6 G/DL — SIGNIFICANT CHANGE UP (ref 3.3–5.2)
ALP SERPL-CCNC: 109 U/L — SIGNIFICANT CHANGE UP (ref 40–120)
ALT FLD-CCNC: 40 U/L — SIGNIFICANT CHANGE UP
ANION GAP SERPL CALC-SCNC: 11 MMOL/L — SIGNIFICANT CHANGE UP (ref 5–17)
ANION GAP SERPL CALC-SCNC: 14 MMOL/L — SIGNIFICANT CHANGE UP (ref 5–17)
ANION GAP SERPL CALC-SCNC: 8 MMOL/L — SIGNIFICANT CHANGE UP (ref 5–17)
AST SERPL-CCNC: 44 U/L — HIGH
BILIRUB SERPL-MCNC: <0.2 MG/DL — LOW (ref 0.4–2)
BUN SERPL-MCNC: 32 MG/DL — HIGH (ref 8–20)
BUN SERPL-MCNC: 33 MG/DL — HIGH (ref 8–20)
BUN SERPL-MCNC: 33 MG/DL — HIGH (ref 8–20)
CALCIUM SERPL-MCNC: 10 MG/DL — SIGNIFICANT CHANGE UP (ref 8.6–10.2)
CALCIUM SERPL-MCNC: 9.4 MG/DL — SIGNIFICANT CHANGE UP (ref 8.6–10.2)
CALCIUM SERPL-MCNC: 9.4 MG/DL — SIGNIFICANT CHANGE UP (ref 8.6–10.2)
CHLORIDE SERPL-SCNC: 104 MMOL/L — SIGNIFICANT CHANGE UP (ref 98–107)
CHLORIDE SERPL-SCNC: 104 MMOL/L — SIGNIFICANT CHANGE UP (ref 98–107)
CHLORIDE SERPL-SCNC: 107 MMOL/L — SIGNIFICANT CHANGE UP (ref 98–107)
CO2 SERPL-SCNC: 24 MMOL/L — SIGNIFICANT CHANGE UP (ref 22–29)
CO2 SERPL-SCNC: 24 MMOL/L — SIGNIFICANT CHANGE UP (ref 22–29)
CO2 SERPL-SCNC: 26 MMOL/L — SIGNIFICANT CHANGE UP (ref 22–29)
CREAT SERPL-MCNC: 1.61 MG/DL — HIGH (ref 0.5–1.3)
CREAT SERPL-MCNC: 1.67 MG/DL — HIGH (ref 0.5–1.3)
CREAT SERPL-MCNC: 1.68 MG/DL — HIGH (ref 0.5–1.3)
ETHANOL SERPL-MCNC: <10 MG/DL — SIGNIFICANT CHANGE UP
GLUCOSE BLDC GLUCOMTR-MCNC: 127 MG/DL — HIGH (ref 70–99)
GLUCOSE SERPL-MCNC: 112 MG/DL — HIGH (ref 70–99)
GLUCOSE SERPL-MCNC: 114 MG/DL — HIGH (ref 70–99)
GLUCOSE SERPL-MCNC: 78 MG/DL — SIGNIFICANT CHANGE UP (ref 70–99)
HCT VFR BLD CALC: 33.7 % — LOW (ref 39–50)
HGB BLD-MCNC: 11 G/DL — LOW (ref 13–17)
MCHC RBC-ENTMCNC: 28.9 PG — SIGNIFICANT CHANGE UP (ref 27–34)
MCHC RBC-ENTMCNC: 32.6 GM/DL — SIGNIFICANT CHANGE UP (ref 32–36)
MCV RBC AUTO: 88.5 FL — SIGNIFICANT CHANGE UP (ref 80–100)
PLATELET # BLD AUTO: 194 K/UL — SIGNIFICANT CHANGE UP (ref 150–400)
POTASSIUM SERPL-MCNC: 5.4 MMOL/L — HIGH (ref 3.5–5.3)
POTASSIUM SERPL-MCNC: 6.6 MMOL/L — CRITICAL HIGH (ref 3.5–5.3)
POTASSIUM SERPL-MCNC: 6.7 MMOL/L — CRITICAL HIGH (ref 3.5–5.3)
POTASSIUM SERPL-SCNC: 5.4 MMOL/L — HIGH (ref 3.5–5.3)
POTASSIUM SERPL-SCNC: 6.6 MMOL/L — CRITICAL HIGH (ref 3.5–5.3)
POTASSIUM SERPL-SCNC: 6.7 MMOL/L — CRITICAL HIGH (ref 3.5–5.3)
PROT SERPL-MCNC: 6.8 G/DL — SIGNIFICANT CHANGE UP (ref 6.6–8.7)
RBC # BLD: 3.81 M/UL — LOW (ref 4.2–5.8)
RBC # FLD: 14.6 % — HIGH (ref 10.3–14.5)
SODIUM SERPL-SCNC: 138 MMOL/L — SIGNIFICANT CHANGE UP (ref 135–145)
SODIUM SERPL-SCNC: 142 MMOL/L — SIGNIFICANT CHANGE UP (ref 135–145)
SODIUM SERPL-SCNC: 142 MMOL/L — SIGNIFICANT CHANGE UP (ref 135–145)
TROPONIN T SERPL-MCNC: <0.01 NG/ML — SIGNIFICANT CHANGE UP (ref 0–0.06)
WBC # BLD: 7.75 K/UL — SIGNIFICANT CHANGE UP (ref 3.8–10.5)
WBC # FLD AUTO: 7.75 K/UL — SIGNIFICANT CHANGE UP (ref 3.8–10.5)

## 2020-05-10 PROCEDURE — 93010 ELECTROCARDIOGRAM REPORT: CPT

## 2020-05-10 PROCEDURE — 71045 X-RAY EXAM CHEST 1 VIEW: CPT | Mod: 26

## 2020-05-10 PROCEDURE — 99223 1ST HOSP IP/OBS HIGH 75: CPT | Mod: AI

## 2020-05-10 PROCEDURE — 99223 1ST HOSP IP/OBS HIGH 75: CPT

## 2020-05-10 PROCEDURE — 99285 EMERGENCY DEPT VISIT HI MDM: CPT | Mod: GC

## 2020-05-10 PROCEDURE — 70450 CT HEAD/BRAIN W/O DYE: CPT | Mod: 26

## 2020-05-10 PROCEDURE — 72125 CT NECK SPINE W/O DYE: CPT | Mod: 26

## 2020-05-10 RX ORDER — QUETIAPINE FUMARATE 200 MG/1
2 TABLET, FILM COATED ORAL
Qty: 0 | Refills: 0 | DISCHARGE

## 2020-05-10 RX ORDER — AMLODIPINE BESYLATE 2.5 MG/1
10 TABLET ORAL DAILY
Refills: 0 | Status: DISCONTINUED | OUTPATIENT
Start: 2020-05-10 | End: 2020-05-11

## 2020-05-10 RX ORDER — SODIUM CHLORIDE 9 MG/ML
1000 INJECTION, SOLUTION INTRAVENOUS
Refills: 0 | Status: DISCONTINUED | OUTPATIENT
Start: 2020-05-10 | End: 2020-05-11

## 2020-05-10 RX ORDER — INSULIN LISPRO 100/ML
VIAL (ML) SUBCUTANEOUS
Refills: 0 | Status: DISCONTINUED | OUTPATIENT
Start: 2020-05-10 | End: 2020-05-11

## 2020-05-10 RX ORDER — DEXTROSE 50 % IN WATER 50 %
50 SYRINGE (ML) INTRAVENOUS ONCE
Refills: 0 | Status: COMPLETED | OUTPATIENT
Start: 2020-05-10 | End: 2020-05-10

## 2020-05-10 RX ORDER — SODIUM CHLORIDE 9 MG/ML
1000 INJECTION INTRAMUSCULAR; INTRAVENOUS; SUBCUTANEOUS ONCE
Refills: 0 | Status: COMPLETED | OUTPATIENT
Start: 2020-05-10 | End: 2020-05-10

## 2020-05-10 RX ORDER — LABETALOL HCL 100 MG
200 TABLET ORAL
Refills: 0 | Status: DISCONTINUED | OUTPATIENT
Start: 2020-05-10 | End: 2020-05-11

## 2020-05-10 RX ORDER — QUETIAPINE FUMARATE 200 MG/1
400 TABLET, FILM COATED ORAL
Refills: 0 | Status: DISCONTINUED | OUTPATIENT
Start: 2020-05-10 | End: 2020-05-11

## 2020-05-10 RX ORDER — CALCIUM GLUCONATE 100 MG/ML
1 VIAL (ML) INTRAVENOUS ONCE
Refills: 0 | Status: COMPLETED | OUTPATIENT
Start: 2020-05-10 | End: 2020-05-10

## 2020-05-10 RX ORDER — DEXTROSE 50 % IN WATER 50 %
25 SYRINGE (ML) INTRAVENOUS ONCE
Refills: 0 | Status: DISCONTINUED | OUTPATIENT
Start: 2020-05-10 | End: 2020-05-11

## 2020-05-10 RX ORDER — DEXTROSE 50 % IN WATER 50 %
15 SYRINGE (ML) INTRAVENOUS ONCE
Refills: 0 | Status: DISCONTINUED | OUTPATIENT
Start: 2020-05-10 | End: 2020-05-11

## 2020-05-10 RX ORDER — MIRTAZAPINE 45 MG/1
60 TABLET, ORALLY DISINTEGRATING ORAL AT BEDTIME
Refills: 0 | Status: DISCONTINUED | OUTPATIENT
Start: 2020-05-10 | End: 2020-05-11

## 2020-05-10 RX ORDER — BUPROPION HYDROCHLORIDE 150 MG/1
300 TABLET, EXTENDED RELEASE ORAL DAILY
Refills: 0 | Status: DISCONTINUED | OUTPATIENT
Start: 2020-05-10 | End: 2020-05-11

## 2020-05-10 RX ORDER — BUPRENORPHINE AND NALOXONE 2; .5 MG/1; MG/1
1 TABLET SUBLINGUAL
Qty: 0 | Refills: 0 | DISCHARGE

## 2020-05-10 RX ORDER — ASPIRIN/CALCIUM CARB/MAGNESIUM 324 MG
81 TABLET ORAL DAILY
Refills: 0 | Status: DISCONTINUED | OUTPATIENT
Start: 2020-05-10 | End: 2020-05-11

## 2020-05-10 RX ORDER — BUPRENORPHINE AND NALOXONE 2; .5 MG/1; MG/1
1 TABLET SUBLINGUAL
Refills: 0 | Status: DISCONTINUED | OUTPATIENT
Start: 2020-05-10 | End: 2020-05-11

## 2020-05-10 RX ORDER — ACETAMINOPHEN 500 MG
975 TABLET ORAL ONCE
Refills: 0 | Status: COMPLETED | OUTPATIENT
Start: 2020-05-10 | End: 2020-05-10

## 2020-05-10 RX ORDER — INSULIN HUMAN 100 [IU]/ML
10 INJECTION, SOLUTION SUBCUTANEOUS ONCE
Refills: 0 | Status: COMPLETED | OUTPATIENT
Start: 2020-05-10 | End: 2020-05-10

## 2020-05-10 RX ORDER — DEXTROSE 50 % IN WATER 50 %
12.5 SYRINGE (ML) INTRAVENOUS ONCE
Refills: 0 | Status: DISCONTINUED | OUTPATIENT
Start: 2020-05-10 | End: 2020-05-11

## 2020-05-10 RX ORDER — PANTOPRAZOLE SODIUM 20 MG/1
40 TABLET, DELAYED RELEASE ORAL
Refills: 0 | Status: DISCONTINUED | OUTPATIENT
Start: 2020-05-10 | End: 2020-05-11

## 2020-05-10 RX ORDER — ACETAMINOPHEN 500 MG
650 TABLET ORAL EVERY 6 HOURS
Refills: 0 | Status: DISCONTINUED | OUTPATIENT
Start: 2020-05-10 | End: 2020-05-11

## 2020-05-10 RX ORDER — GLUCAGON INJECTION, SOLUTION 0.5 MG/.1ML
1 INJECTION, SOLUTION SUBCUTANEOUS ONCE
Refills: 0 | Status: DISCONTINUED | OUTPATIENT
Start: 2020-05-10 | End: 2020-05-11

## 2020-05-10 RX ORDER — LEVOTHYROXINE SODIUM 125 MCG
50 TABLET ORAL DAILY
Refills: 0 | Status: DISCONTINUED | OUTPATIENT
Start: 2020-05-10 | End: 2020-05-11

## 2020-05-10 RX ORDER — QUETIAPINE FUMARATE 200 MG/1
800 TABLET, FILM COATED ORAL AT BEDTIME
Refills: 0 | Status: DISCONTINUED | OUTPATIENT
Start: 2020-05-10 | End: 2020-05-10

## 2020-05-10 RX ORDER — HYDRALAZINE HCL 50 MG
50 TABLET ORAL
Refills: 0 | Status: DISCONTINUED | OUTPATIENT
Start: 2020-05-10 | End: 2020-05-11

## 2020-05-10 RX ORDER — LAMOTRIGINE 25 MG/1
200 TABLET, ORALLY DISINTEGRATING ORAL DAILY
Refills: 0 | Status: DISCONTINUED | OUTPATIENT
Start: 2020-05-10 | End: 2020-05-11

## 2020-05-10 RX ORDER — GABAPENTIN 400 MG/1
800 CAPSULE ORAL
Refills: 0 | Status: DISCONTINUED | OUTPATIENT
Start: 2020-05-10 | End: 2020-05-11

## 2020-05-10 RX ORDER — GABAPENTIN 400 MG/1
800 CAPSULE ORAL ONCE
Refills: 0 | Status: COMPLETED | OUTPATIENT
Start: 2020-05-10 | End: 2020-05-10

## 2020-05-10 RX ADMIN — SODIUM CHLORIDE 1000 MILLILITER(S): 9 INJECTION INTRAMUSCULAR; INTRAVENOUS; SUBCUTANEOUS at 12:20

## 2020-05-10 RX ADMIN — Medication 975 MILLIGRAM(S): at 12:06

## 2020-05-10 RX ADMIN — GABAPENTIN 800 MILLIGRAM(S): 400 CAPSULE ORAL at 18:02

## 2020-05-10 RX ADMIN — INSULIN HUMAN 10 UNIT(S): 100 INJECTION, SOLUTION SUBCUTANEOUS at 15:15

## 2020-05-10 RX ADMIN — SODIUM CHLORIDE 1000 MILLILITER(S): 9 INJECTION INTRAMUSCULAR; INTRAVENOUS; SUBCUTANEOUS at 14:31

## 2020-05-10 RX ADMIN — Medication 50 MILLIGRAM(S): at 22:36

## 2020-05-10 RX ADMIN — QUETIAPINE FUMARATE 400 MILLIGRAM(S): 200 TABLET, FILM COATED ORAL at 22:35

## 2020-05-10 RX ADMIN — Medication 200 MILLIGRAM(S): at 22:35

## 2020-05-10 RX ADMIN — Medication 50 MILLILITER(S): at 15:15

## 2020-05-10 RX ADMIN — Medication 0.6 MILLIGRAM(S): at 22:36

## 2020-05-10 RX ADMIN — SODIUM CHLORIDE 1000 MILLILITER(S): 9 INJECTION INTRAMUSCULAR; INTRAVENOUS; SUBCUTANEOUS at 14:33

## 2020-05-10 RX ADMIN — BUPRENORPHINE AND NALOXONE 1 TABLET(S): 2; .5 TABLET SUBLINGUAL at 17:22

## 2020-05-10 RX ADMIN — Medication 100 GRAM(S): at 15:16

## 2020-05-10 RX ADMIN — MIRTAZAPINE 60 MILLIGRAM(S): 45 TABLET, ORALLY DISINTEGRATING ORAL at 22:35

## 2020-05-10 RX ADMIN — GABAPENTIN 800 MILLIGRAM(S): 400 CAPSULE ORAL at 22:36

## 2020-05-10 NOTE — H&P ADULT - NSICDXPASTMEDICALHX_GEN_ALL_CORE_FT
PAST MEDICAL HISTORY:  Anxiety     Bipolar disorder     GERD (gastroesophageal reflux disease)     HTN (hypertension)     Substance abuse
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
## 2020-05-10 NOTE — CHART NOTE - NSCHARTNOTEFT_GEN_A_CORE
called by RN that pt is having constant whole body jerks  - evaluated pt by bedside. pt is having frequent bilateral LE 1-2 seconds jerks, pt is conscious, no post-ictal state, no tongue biting.   - spoke to neurologist, Dr. Hubbard, less likely seizure, check EEG, hold off AED. called by RN around 5pm that pt is having frequent whole body jerks  - evaluated pt by bedside. pt is having frequent bilateral LE 1-2 seconds jerks, pt is conscious, no post-ictal state, no tongue biting.   - spoke to neurologist, Dr. Hubbard, less likely seizure, check EEG, hold off AED. called by RN around 5pm that pt is having frequent whole body jerks  - evaluated pt by bedside. pt is having frequent bilateral LE 1-2 seconds jerks, pt is conscious, able to talk during incidence, no post-ictal state, no tongue biting.   - spoke to neurologist, Dr. Hubbard, less likely seizure, check EEG, hold off AED.

## 2020-05-10 NOTE — ED ADULT NURSE NOTE - CHPI ED NUR SYMPTOMS NEG
no bleeding/no deformity/no fever/no confusion/no numbness/no tingling/no vomiting/no weakness/no loss of consciousness
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
## 2020-05-10 NOTE — H&P ADULT - NSHPPHYSICALEXAM_GEN_ALL_CORE
T(C): 37.2 (05-10-20 @ 11:42), Max: 37.2 (05-10-20 @ 11:42)  HR: 75 (05-10-20 @ 14:30) (75 - 88)  BP: 111/61 (05-10-20 @ 14:30) (111/61 - 137/61)  RR: 14 (05-10-20 @ 14:30) (14 - 18)  SpO2: 95% (05-10-20 @ 14:30) (93% - 95%)  Wt(kg): --Vital Signs Last 24 Hrs  T(C): 37.2 (10 May 2020 11:42), Max: 37.2 (10 May 2020 11:42)  T(F): 99 (10 May 2020 11:42), Max: 99 (10 May 2020 11:42)  HR: 75 (10 May 2020 14:30) (75 - 88)  BP: 111/61 (10 May 2020 14:30) (111/61 - 137/61)  BP(mean): --  RR: 14 (10 May 2020 14:30) (14 - 18)  SpO2: 95% (10 May 2020 14:30) (93% - 95%)    PHYSICAL EXAM:  GENERAL: NAD, well-groomed, well-developed  HEAD:  Atraumatic, Normocephalic  EYES: EOMI, PERRLA, conjunctiva and sclera clear  ENMT: No tonsillar erythema, exudates, or enlargement; Moist mucous membranes, Good dentition, No lesions  NECK: Supple, No JVD, Normal thyroid  NERVOUS SYSTEM:  Alert & Oriented X3, Good concentration; Motor Strength 5/5 B/L upper and lower extremities; DTRs 2+ intact and symmetric  CHEST/LUNG: Clear to percussion bilaterally; No rales, rhonchi, wheezing, or rubs  HEART: Regular rate and rhythm; No murmurs, rubs, or gallops  ABDOMEN: Soft, Nontender, Nondistended; Bowel sounds present  EXTREMITIES:  2+ Peripheral Pulses, No clubbing, cyanosis, or edema  LYMPH: No lymphadenopathy noted  SKIN: No rashes or lesions T(C): 37.2 (05-10-20 @ 11:42), Max: 37.2 (05-10-20 @ 11:42)  HR: 75 (05-10-20 @ 14:30) (75 - 88)  BP: 111/61 (05-10-20 @ 14:30) (111/61 - 137/61)  RR: 14 (05-10-20 @ 14:30) (14 - 18)  SpO2: 95% (05-10-20 @ 14:30) (93% - 95%)  Wt(kg): --Vital Signs Last 24 Hrs  T(C): 37.2 (10 May 2020 11:42), Max: 37.2 (10 May 2020 11:42)  T(F): 99 (10 May 2020 11:42), Max: 99 (10 May 2020 11:42)  HR: 75 (10 May 2020 14:30) (75 - 88)  BP: 111/61 (10 May 2020 14:30) (111/61 - 137/61)  BP(mean): --  RR: 14 (10 May 2020 14:30) (14 - 18)  SpO2: 95% (10 May 2020 14:30) (93% - 95%)    PHYSICAL EXAM:  GENERAL: NAD, well-groomed, well-developed  HEAD:  Atraumatic, Normocephalic  EYES: EOMI, PERRLA, conjunctiva and sclera clear  ENMT: No tonsillar erythema, exudates, or enlargement; Moist mucous membranes, Good dentition, No lesions  NECK: Supple, No JVD  NERVOUS SYSTEM:  Alert & Oriented X3, appears mildly drowsy, left pupil mildly dilated compared to right, reactive to light. Motor Strength 5/5 B/L upper and lower extremities  CHEST/LUNG: Clear to percussion bilaterally; No rales, rhonchi, wheezing, or rubs  HEART: Regular rate and rhythm; No murmurs, rubs, or gallops  ABDOMEN: Soft, Nontender, Nondistended; Bowel sounds present  EXTREMITIES:   No clubbing, cyanosis, or edema  SKIN: No rashes

## 2020-05-10 NOTE — H&P ADULT - NSHPLABSRESULTS_GEN_ALL_CORE
LABS:                        11.0   7.75  )-----------( 194      ( 10 May 2020 12:23 )             33.7     05-10    138  |  104  |  33.0<H>  ----------------------------<  114<H>  6.7<HH>   |  26.0  |  1.68<H>    Ca    9.4      10 May 2020 13:24    TPro  6.8  /  Alb  4.6  /  TBili  <0.2<L>  /  DBili  x   /  AST  44<H>  /  ALT  40  /  AlkPhos  109  05-10         CAPILLARY BLOOD GLUCOSE      POCT Blood Glucose.: 120 mg/dL (10 May 2020 11:40)        RADIOLOGY & ADDITIONAL TESTS:    Consultant(s) Notes Reviewed:  [x ] YES  [ ] NO  Care Discussed with Consultants/Other Providers [ x] YES  [ ] NO  Imaging Personally Reviewed:  [ ] YES  [ ] NO

## 2020-05-10 NOTE — ED PROVIDER NOTE - NS ED ATTENDING STATEMENT MOD
(0) independent
I have personally seen and examined this patient.  I have fully participated in the care of this patient. I have reviewed all pertinent clinical information, including history, physical exam, plan and the Resident’s note and agree except as noted.

## 2020-05-10 NOTE — ED PROVIDER NOTE - CARE PLAN
Principal Discharge DX:	Altered mental status  Secondary Diagnosis:	Closed head injury  Secondary Diagnosis:	Hyperkalemia

## 2020-05-10 NOTE — H&P ADULT - ASSESSMENT
39M h/o bipolar, opioid abuse (clean for 3 months), DM, hypothyroid, HTN presents s/p fall.     # confusion post- head trauma. likely mild concussion  - no ICH on CTH  - incidental left parietal lobe infarct  - appreciate neurology rec  - started ASA, f/u lipid panel  - MRI brain and EEG    # Hyperkalemia   - no EKG changes  - tele monitor  - s/p Insulin, Dextrose in ED. monitor BMP  - consider Lokelma if hyperkalemia persists    # LIV  - last known Cr 1.3 GFR 68 03/2019  - s/p IVF bolus in ED  - monitor BMP  - hold losartan and hold nephrotoxic meds  - hold meloxicam     # HTN  - cont home med with parameters: labetalol, amlodipine, hydralazine and clonidine   - hold losartan due to hyperkalemia    # Bipolar disorder  - cont home med: lamotrigine, wellbutrin, mirtazapine, Seroquel     # h/o opiod abuse  - cont Suboxone    # Neuropathy  - cont Gabapentin 800mg QID    # Hypothyroid  - cont synthroid.  - incidental thyroid nodule. follow up thyroid US as outpatient     # GERD  - cont PPI    # DVT ppx: lovenox 39M h/o bipolar, opioid abuse (clean for 3 months), DM, hypothyroid, HTN presents s/p fall.     # confusion post- head trauma. likely mild concussion  - no ICH on CTH  - incidental left parietal lobe infarct  - appreciate neurology rec  - started ASA, f/u lipid panel  - MRI brain and EEG    # Hyperkalemia   - no EKG changes  - tele monitor  - s/p Insulin, Dextrose in ED. monitor BMP  - consider Lokelma if hyperkalemia persists    # LIV  - last known Cr 1.3 GFR 68 03/2019  - s/p IVF bolus in ED  - monitor BMP  - hold losartan and hold nephrotoxic meds  - hold meloxicam     # HTN  - cont home med with parameters: labetalol, amlodipine, hydralazine and clonidine   - hold losartan due to hyperkalemia    # Neuropathy  - spoke to neuro, ok to cont Gabapentin 800mg QID    # Bipolar disorder  - cont home med: lamotrigine, wellbutrin, mirtazapine, Seroquel     # h/o opiod abuse  - cont Suboxone    # Hypothyroid  - cont synthroid.  - incidental thyroid nodule. follow up thyroid US as outpatient     # GERD  - cont PPI    # DVT ppx: lovenox 39M h/o bipolar, opioid abuse (clean for 3 months), DM, hypothyroid, HTN presents s/p fall.     # confusion post- head trauma. likely mild concussion  - no ICH on CTH  - incidental left parietal lobe infarct  - appreciate neurology rec  - started ASA, f/u lipid panel  - MRI brain and EEG    # Hyperkalemia   - no EKG changes  - tele monitor  - s/p Insulin, Dextrose in ED. monitor BMP  - consider Lokelma if hyperkalemia persists    # LIV  - last known Cr 1.3 GFR 68 03/2019  - s/p IVF bolus in ED  - monitor BMP  - hold losartan and hold nephrotoxic meds  - hold meloxicam     # HTN  - cont home med with parameters: labetalol, amlodipine, hydralazine and clonidine   - hold losartan due to hyperkalemia    # Neuropathy  - spoke to neuro, ok to cont Gabapentin 800mg QID    # Bipolar disorder  - cont home med: lamotrigine, wellbutrin, mirtazapine, Seroquel     # h/o opiod abuse  - cont Suboxone    # Hypothyroid  - cont synthroid.  - incidental thyroid nodule. follow up thyroid US as outpatient     # GERD  - cont PPI    # DVT ppx: lovenox    case discussed with neurology    IMPROVE VTE Individual Risk Assessment    RISK                                                                Points    [  ] Previous VTE                                                  3    [  ] Thrombophilia                                               2    [  ] Lower limb paralysis                                      2        (unable to hold up >15 seconds)      [  ] Current Cancer                                              2         (within 6 months)    [  ] Immobilization > 24 hrs                                1    [  ] ICU/CCU stay > 24 hours                              1    [  ] Age > 60                                                      1    IMPROVE VTE Score ____0_____    IMPROVE Score 0-1: Low Risk, No VTE prophylaxis required for most patients, encourage ambulation.   IMPROVE Score 2-3: At risk, pharmacologic VTE prophylaxis is indicated for most patients (in the absence of a contraindication)  IMPROVE Score > or = 4: High Risk, pharmacologic VTE prophylaxis is indicated for most patients (in the absence of a contraindication)

## 2020-05-10 NOTE — ED ADULT TRIAGE NOTE - CHIEF COMPLAINT QUOTE
Pt. BIBA complaining of head pain s/p fall and possible LOC.  Pt. also states locker fell on head.  Negative blood thinners.  Pt. sleepy on arrival with dried blood to right nare.  MD Ramirez called to bedside and priority CT called on pt.

## 2020-05-10 NOTE — ED PROVIDER NOTE - PHYSICAL EXAMINATION
Gen: Well appearing in NAD  Head: NC/AT, dried blood in R nare  Neck: trachea midline  Resp:  No distress, CTAB  CV: RRR  GI: soft, NTND  Ext: no deformities, no bony ttp, FROM  Neuro:  CN 2-12 intact, No tremors. No fasciculations. No nystagmus. Normal strength/sensation.  Skin:  Warm and dry as visualized  Psych:  Normal affect and mood Gen: Well appearing in NAD  Head: NC/AT, dried blood in R nare, no septal hematoma, no nasal tenderness  Neck: trachea midline  Resp:  No distress, CTAB  CV: RRR  GI: soft, NTND  Ext: no deformities, no bony ttp, FROM  Neuro:  CN 2-12 intact, No tremors. No fasciculations. No nystagmus. Normal strength/sensation.  Skin:  Warm and dry as visualized  Psych:  Normal affect and mood

## 2020-05-10 NOTE — ED PROVIDER NOTE - PROGRESS NOTE DETAILS
Jose Maria BARROS: On reevaluation, pt reports having intermittent L chest wall pain for the past month.  +Reproducible tenderness on exam.  Will obtain CXR. James BARROS: ECG and CXR WNL. Potassium elevated but hemolyzed, no ecg changes, will repeat BMP. Jose Maria BARROS: Rpt BMP still showing elevated potassium, mild hemolysis noted.  Will give calcium gluconate, insulin and 1 amp D50.  Pt also noted by nursing staff to be persistently confused; pt himself complains of feeling confused as well.  Will check CK level, UA, tox screen. Jose Maria BARROS: Rpt BMP still showing elevated potassium, mild hemolysis noted.  Will give calcium gluconate, insulin and 1 amp D50.  Pt also noted by nursing staff to be persistently confused; pt himself complains of feeling confused as well.  Will check CK level, UA, tox screen.  Will admit.  Dr. Hubbard of neurology consulted.
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
## 2020-05-10 NOTE — CONSULT NOTE ADULT - ASSESSMENT
The patient is a 39y Male who is followed by neurology because of confusion    Concussion  he most likely suffered a mild concussion.  He is AAOx3 but says that he feels confused  given his self reported history of seizure and stroke (with hypodensity on CT head) I would like to get EEG and MRI brain.  He is admitted for concussion and confusion    opioid dependance  he has been sober for 3 months  to return to sober house on discharge    h/o CVA  should be on ASA  will check lipid panel    will follow with you    Jorge Hubbard MD PhD   [id number]

## 2020-05-10 NOTE — ED PROVIDER NOTE - ATTENDING CONTRIBUTION TO CARE
James: I performed a face to face bedside interview with patient regarding history of present illness, review of symptoms and past medical history. I completed an independent physical exam and ordered tests/medications as needed.  I have discussed patient's plan of care with the resident. The resident assisted in  executing the discussed plan. I was available for any questions or issues that may have arose during the execution of the plan of care.
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
## 2020-05-11 VITALS
DIASTOLIC BLOOD PRESSURE: 77 MMHG | SYSTOLIC BLOOD PRESSURE: 123 MMHG | OXYGEN SATURATION: 95 % | RESPIRATION RATE: 14 BRPM | TEMPERATURE: 98 F | HEART RATE: 75 BPM

## 2020-05-11 LAB — SARS-COV-2 RNA SPEC QL NAA+PROBE: SIGNIFICANT CHANGE UP

## 2020-05-11 RX ADMIN — Medication 50 MICROGRAM(S): at 04:18

## 2020-05-11 RX ADMIN — AMLODIPINE BESYLATE 10 MILLIGRAM(S): 2.5 TABLET ORAL at 04:18

## 2020-05-11 RX ADMIN — Medication 50 MILLIGRAM(S): at 00:20

## 2020-05-11 RX ADMIN — GABAPENTIN 800 MILLIGRAM(S): 400 CAPSULE ORAL at 04:18

## 2020-05-11 RX ADMIN — BUPRENORPHINE AND NALOXONE 1 TABLET(S): 2; .5 TABLET SUBLINGUAL at 04:18

## 2020-05-11 RX ADMIN — Medication 0.6 MILLIGRAM(S): at 04:18

## 2020-05-11 RX ADMIN — Medication 200 MILLIGRAM(S): at 04:18

## 2020-05-11 RX ADMIN — GABAPENTIN 800 MILLIGRAM(S): 400 CAPSULE ORAL at 00:20

## 2020-05-11 RX ADMIN — QUETIAPINE FUMARATE 400 MILLIGRAM(S): 200 TABLET, FILM COATED ORAL at 04:19

## 2020-05-11 RX ADMIN — Medication 50 MILLIGRAM(S): at 04:18

## 2020-05-11 NOTE — CHART NOTE - NSCHARTNOTEFT_GEN_A_CORE
Called by RN for patient who wanted to leave AMA.   Discussed risks of leaving AMA, including but not limited to death, serious injury, and worsening condition.   Patient was not able to be convinced to stay and signed all paperwork to leave AMA.

## 2020-06-02 PROCEDURE — 96360 HYDRATION IV INFUSION INIT: CPT

## 2020-06-02 PROCEDURE — 71045 X-RAY EXAM CHEST 1 VIEW: CPT

## 2020-06-02 PROCEDURE — 82550 ASSAY OF CK (CPK): CPT

## 2020-06-02 PROCEDURE — 72125 CT NECK SPINE W/O DYE: CPT

## 2020-06-02 PROCEDURE — 85027 COMPLETE CBC AUTOMATED: CPT

## 2020-06-02 PROCEDURE — 84484 ASSAY OF TROPONIN QUANT: CPT

## 2020-06-02 PROCEDURE — 99285 EMERGENCY DEPT VISIT HI MDM: CPT | Mod: 25

## 2020-06-02 PROCEDURE — 82962 GLUCOSE BLOOD TEST: CPT

## 2020-06-02 PROCEDURE — 96361 HYDRATE IV INFUSION ADD-ON: CPT

## 2020-06-02 PROCEDURE — 80053 COMPREHEN METABOLIC PANEL: CPT

## 2020-06-02 PROCEDURE — 80048 BASIC METABOLIC PNL TOTAL CA: CPT

## 2020-06-02 PROCEDURE — 87635 SARS-COV-2 COVID-19 AMP PRB: CPT

## 2020-06-02 PROCEDURE — 80307 DRUG TEST PRSMV CHEM ANLYZR: CPT

## 2020-06-02 PROCEDURE — 70450 CT HEAD/BRAIN W/O DYE: CPT

## 2020-06-02 PROCEDURE — 36415 COLL VENOUS BLD VENIPUNCTURE: CPT

## 2020-06-02 PROCEDURE — 93005 ELECTROCARDIOGRAM TRACING: CPT

## 2020-06-02 PROCEDURE — 82553 CREATINE MB FRACTION: CPT

## 2020-08-05 ENCOUNTER — INPATIENT (INPATIENT)
Facility: HOSPITAL | Age: 40
LOS: 0 days | Discharge: LEFT BEFORE DISCHARGE | DRG: 565 | End: 2020-08-06
Attending: INTERNAL MEDICINE | Admitting: INTERNAL MEDICINE
Payer: MEDICARE

## 2020-08-05 VITALS
TEMPERATURE: 100 F | HEIGHT: 69 IN | OXYGEN SATURATION: 92 % | WEIGHT: 229.94 LBS | SYSTOLIC BLOOD PRESSURE: 121 MMHG | DIASTOLIC BLOOD PRESSURE: 71 MMHG | HEART RATE: 93 BPM | RESPIRATION RATE: 18 BRPM

## 2020-08-05 DIAGNOSIS — M62.82 RHABDOMYOLYSIS: ICD-10-CM

## 2020-08-05 DIAGNOSIS — Z96.649 PRESENCE OF UNSPECIFIED ARTIFICIAL HIP JOINT: Chronic | ICD-10-CM

## 2020-08-05 DIAGNOSIS — Z96.653 PRESENCE OF ARTIFICIAL KNEE JOINT, BILATERAL: Chronic | ICD-10-CM

## 2020-08-05 LAB
ALBUMIN SERPL ELPH-MCNC: 4.6 G/DL — SIGNIFICANT CHANGE UP (ref 3.3–5.2)
ALP SERPL-CCNC: 139 U/L — HIGH (ref 40–120)
ALT FLD-CCNC: 56 U/L — HIGH
ANION GAP SERPL CALC-SCNC: 22 MMOL/L — HIGH (ref 5–17)
APTT BLD: 33.7 SEC — SIGNIFICANT CHANGE UP (ref 27.5–35.5)
AST SERPL-CCNC: 97 U/L — HIGH
BASOPHILS # BLD AUTO: 0.04 K/UL — SIGNIFICANT CHANGE UP (ref 0–0.2)
BASOPHILS NFR BLD AUTO: 0.3 % — SIGNIFICANT CHANGE UP (ref 0–2)
BILIRUB SERPL-MCNC: 0.3 MG/DL — LOW (ref 0.4–2)
BLD GP AB SCN SERPL QL: SIGNIFICANT CHANGE UP
BUN SERPL-MCNC: 66 MG/DL — HIGH (ref 8–20)
CALCIUM SERPL-MCNC: 9.7 MG/DL — SIGNIFICANT CHANGE UP (ref 8.6–10.2)
CHLORIDE SERPL-SCNC: 101 MMOL/L — SIGNIFICANT CHANGE UP (ref 98–107)
CO2 SERPL-SCNC: 18 MMOL/L — LOW (ref 22–29)
CREAT SERPL-MCNC: 5.48 MG/DL — HIGH (ref 0.5–1.3)
EOSINOPHIL # BLD AUTO: 0.14 K/UL — SIGNIFICANT CHANGE UP (ref 0–0.5)
EOSINOPHIL NFR BLD AUTO: 1.2 % — SIGNIFICANT CHANGE UP (ref 0–6)
GLUCOSE SERPL-MCNC: 133 MG/DL — HIGH (ref 70–99)
HCT VFR BLD CALC: 30.8 % — LOW (ref 39–50)
HGB BLD-MCNC: 10 G/DL — LOW (ref 13–17)
IMM GRANULOCYTES NFR BLD AUTO: 0.4 % — SIGNIFICANT CHANGE UP (ref 0–1.5)
INR BLD: 1.08 RATIO — SIGNIFICANT CHANGE UP (ref 0.88–1.16)
LYMPHOCYTES # BLD AUTO: 1.18 K/UL — SIGNIFICANT CHANGE UP (ref 1–3.3)
LYMPHOCYTES # BLD AUTO: 9.9 % — LOW (ref 13–44)
MCHC RBC-ENTMCNC: 29.3 PG — SIGNIFICANT CHANGE UP (ref 27–34)
MCHC RBC-ENTMCNC: 32.5 GM/DL — SIGNIFICANT CHANGE UP (ref 32–36)
MCV RBC AUTO: 90.3 FL — SIGNIFICANT CHANGE UP (ref 80–100)
MONOCYTES # BLD AUTO: 0.64 K/UL — SIGNIFICANT CHANGE UP (ref 0–0.9)
MONOCYTES NFR BLD AUTO: 5.4 % — SIGNIFICANT CHANGE UP (ref 2–14)
NEUTROPHILS # BLD AUTO: 9.85 K/UL — HIGH (ref 1.8–7.4)
NEUTROPHILS NFR BLD AUTO: 82.8 % — HIGH (ref 43–77)
PLATELET # BLD AUTO: 211 K/UL — SIGNIFICANT CHANGE UP (ref 150–400)
POTASSIUM SERPL-MCNC: 5.7 MMOL/L — HIGH (ref 3.5–5.3)
POTASSIUM SERPL-SCNC: 5.7 MMOL/L — HIGH (ref 3.5–5.3)
PROT SERPL-MCNC: 7.2 G/DL — SIGNIFICANT CHANGE UP (ref 6.6–8.7)
PROTHROM AB SERPL-ACNC: 12.5 SEC — SIGNIFICANT CHANGE UP (ref 10.6–13.6)
RBC # BLD: 3.41 M/UL — LOW (ref 4.2–5.8)
RBC # FLD: 14 % — SIGNIFICANT CHANGE UP (ref 10.3–14.5)
SODIUM SERPL-SCNC: 141 MMOL/L — SIGNIFICANT CHANGE UP (ref 135–145)
WBC # BLD: 11.9 K/UL — HIGH (ref 3.8–10.5)
WBC # FLD AUTO: 11.9 K/UL — HIGH (ref 3.8–10.5)

## 2020-08-05 PROCEDURE — 70450 CT HEAD/BRAIN W/O DYE: CPT | Mod: 26

## 2020-08-05 PROCEDURE — 72125 CT NECK SPINE W/O DYE: CPT | Mod: 26

## 2020-08-05 PROCEDURE — 73502 X-RAY EXAM HIP UNI 2-3 VIEWS: CPT | Mod: 26,LT

## 2020-08-05 PROCEDURE — 93010 ELECTROCARDIOGRAM REPORT: CPT

## 2020-08-05 PROCEDURE — 71045 X-RAY EXAM CHEST 1 VIEW: CPT | Mod: 26

## 2020-08-05 PROCEDURE — 99223 1ST HOSP IP/OBS HIGH 75: CPT

## 2020-08-05 PROCEDURE — 99291 CRITICAL CARE FIRST HOUR: CPT | Mod: CS,GC

## 2020-08-05 RX ORDER — DEXTROSE 50 % IN WATER 50 %
50 SYRINGE (ML) INTRAVENOUS ONCE
Refills: 0 | Status: COMPLETED | OUTPATIENT
Start: 2020-08-05 | End: 2020-08-05

## 2020-08-05 RX ORDER — SODIUM CHLORIDE 9 MG/ML
1000 INJECTION INTRAMUSCULAR; INTRAVENOUS; SUBCUTANEOUS ONCE
Refills: 0 | Status: COMPLETED | OUTPATIENT
Start: 2020-08-05 | End: 2020-08-05

## 2020-08-05 RX ORDER — MORPHINE SULFATE 50 MG/1
4 CAPSULE, EXTENDED RELEASE ORAL ONCE
Refills: 0 | Status: DISCONTINUED | OUTPATIENT
Start: 2020-08-05 | End: 2020-08-05

## 2020-08-05 RX ORDER — SODIUM CHLORIDE 9 MG/ML
1000 INJECTION, SOLUTION INTRAVENOUS ONCE
Refills: 0 | Status: COMPLETED | OUTPATIENT
Start: 2020-08-05 | End: 2020-08-05

## 2020-08-05 RX ORDER — LEVETIRACETAM 250 MG/1
1500 TABLET, FILM COATED ORAL ONCE
Refills: 0 | Status: COMPLETED | OUTPATIENT
Start: 2020-08-05 | End: 2020-08-05

## 2020-08-05 RX ORDER — INSULIN HUMAN 100 [IU]/ML
6 INJECTION, SOLUTION SUBCUTANEOUS ONCE
Refills: 0 | Status: COMPLETED | OUTPATIENT
Start: 2020-08-05 | End: 2020-08-05

## 2020-08-05 RX ORDER — ONDANSETRON 8 MG/1
4 TABLET, FILM COATED ORAL ONCE
Refills: 0 | Status: COMPLETED | OUTPATIENT
Start: 2020-08-05 | End: 2020-08-05

## 2020-08-05 RX ADMIN — LEVETIRACETAM 400 MILLIGRAM(S): 250 TABLET, FILM COATED ORAL at 21:10

## 2020-08-05 RX ADMIN — ONDANSETRON 4 MILLIGRAM(S): 8 TABLET, FILM COATED ORAL at 20:52

## 2020-08-05 RX ADMIN — SODIUM CHLORIDE 1000 MILLILITER(S): 9 INJECTION INTRAMUSCULAR; INTRAVENOUS; SUBCUTANEOUS at 20:54

## 2020-08-05 RX ADMIN — SODIUM CHLORIDE 1000 MILLILITER(S): 9 INJECTION, SOLUTION INTRAVENOUS at 22:16

## 2020-08-05 RX ADMIN — MORPHINE SULFATE 4 MILLIGRAM(S): 50 CAPSULE, EXTENDED RELEASE ORAL at 20:52

## 2020-08-05 RX ADMIN — INSULIN HUMAN 6 UNIT(S): 100 INJECTION, SOLUTION SUBCUTANEOUS at 23:30

## 2020-08-05 RX ADMIN — Medication 50 MILLILITER(S): at 23:30

## 2020-08-05 RX ADMIN — Medication 2 MILLIGRAM(S): at 20:15

## 2020-08-05 NOTE — ED ADULT NURSE REASSESSMENT NOTE - NS ED NURSE REASSESS COMMENT FT1
rapid response called by X-ray staff for seizure like activity, pt noted with twitch like movements to arms and legs for ~30 seconds. now A+Ox4, no post-ictal state noted. "I don't get seizures" pt states. placed on cardiac monitor and , finger stick blood sugar 142, all safety measures maintained. rapid response cancelled, MD Garrett remains at bedside

## 2020-08-05 NOTE — ED PROVIDER NOTE - CLINICAL SUMMARY MEDICAL DECISION MAKING FREE TEXT BOX
40y M w/ hx stroke, ? seizures, L hip replacement, presenting for frequent falls and possible L hip dislocation.  Will obtain CT head/cspine, Xrays hip/pelvis, EKG, labs.  Treat with fluids and reassess.

## 2020-08-05 NOTE — ED PROVIDER NOTE - PHYSICAL EXAMINATION
Constitutional: Awake, alert, in no acute distress  Eyes: no scleral icterus  HENT: normocephalic, +contusion noted to L forehead, moist oral mucosa  Neck: no tenderness or stepoff  CV: RRR, no murmur  Pulm: non-labored respirations, CTAB  Abdomen: soft, non-tender, non-distended  Extremities: no edema, +deformity noted to L hip  Skin: no rash, no jaundice  Neuro: AAOx3, CNs II-XII intact, no facial asymmetry, 5/5 strength and sensation in all extremities, no finger-to-nose dysmetria.

## 2020-08-05 NOTE — H&P ADULT - NSHPLABSRESULTS_GEN_ALL_CORE
10.0   11.90 )-----------( 211      ( 05 Aug 2020 21:08 )             30.8       08-05    141  |  101  |  66.0<H>  ----------------------------<  133<H>  5.7<H>   |  18.0<L>  |  5.48<H>    Ca    9.7      05 Aug 2020 21:08    TPro  7.2  /  Alb  4.6  /  TBili  0.3<L>  /  DBili  x   /  AST  97<H>  /  ALT  56<H>  /  AlkPhos  139<H>  08-05    EKG: NSR, HR 86

## 2020-08-05 NOTE — ED PROVIDER NOTE - DOMESTIC TRAVEL HIGH RISK QUESTION
Patient Education     Medroxyprogesterone injection [Contraceptive]  Brand Names: Depo-Provera, Depo-subQ Provera 104  What is this medicine?  MEDROXYPROGESTERONE (me DROX ee proe JOYCE te kendall) contraceptive injections prevent pregnancy. They provide effective birth control for 3 months. Depo-subQ Provera 104 is also used for treating pain related to endometriosis.  How should I use this medicine?  Depo-Provera Contraceptive injection is given into a muscle. Depo-subQ Provera 104 injection is given under the skin. These injections are given by a health care professional. You must not be pregnant before getting an injection. The injection is usually given during the first 5 days after the start of a menstrual period or 6 weeks after delivery of a baby.  Talk to your pediatrician regarding the use of this medicine in children. Special care may be needed. These injections have been used in female children who have started having menstrual periods.  What side effects may I notice from receiving this medicine?  Side effects that you should report to your doctor or health care professional as soon as possible:  · allergic reactions like skin rash, itching or hives, swelling of the face, lips, or tongue  · breast tenderness or discharge  · breathing problems  · changes in vision  · depression  · feeling faint or lightheaded, falls  · fever  · pain in the abdomen, chest, groin, or leg  · problems with balance, talking, walking  · unusually weak or tired  · yellowing of the eyes or skin  Side effects that usually do not require medical attention (report to your doctor or health care professional if they continue or are bothersome):  · acne  · fluid retention and swelling  · headache  · irregular periods, spotting, or absent periods  · temporary pain, itching, or skin reaction at site where injected  · weight gain  What may interact with this medicine?  Do not take this medicine with any of the following  medications:  · bosentan  This medicine may also interact with the following medications:  · aminoglutethimide  · antibiotics or medicines for infections, especially rifampin, rifabutin, rifapentine, and griseofulvin  · aprepitant  · barbiturate medicines such as phenobarbital or primidone  · bexarotene  · carbamazepine  · medicines for seizures like ethotoin, felbamate, oxcarbazepine, phenytoin, topiramate  · modafinil  · [place] Y's wort  What if I miss a dose?  Try not to miss a dose. You must get an injection once every 3 months to maintain birth control. If you cannot keep an appointment, call and reschedule it. If you wait longer than 13 weeks between Depo-Provera contraceptive injections or longer than 14 weeks between Depo-subQ Provera 104 injections, you could get pregnant. Use another method for birth control if you miss your appointment. You may also need a pregnancy test before receiving another injection.  Where should I keep my medicine?  This does not apply. The injection will be given to you by a health care professional.  What should I tell my health care provider before I take this medicine?  They need to know if you have any of these conditions:  · frequently drink alcohol  · asthma  · blood vessel disease or a history of a blood clot in the lungs or legs  · bone disease such as osteoporosis  · breast cancer  · diabetes  · eating disorder (anorexia nervosa or bulimia)  · high blood pressure  · HIV infection or AIDS  · kidney disease  · liver disease  · mental depression  · migraine  · seizures (convulsions)  · stroke  · tobacco smoker  · vaginal bleeding  · an unusual or allergic reaction to medroxyprogesterone, other hormones, medicines, foods, dyes, or preservatives  · pregnant or trying to get pregnant  · breast-feeding  What should I watch for while using this medicine?  This drug does not protect you against HIV infection (AIDS) or other sexually transmitted diseases.  Use of this product may  cause you to lose calcium from your bones. Loss of calcium may cause weak bones (osteoporosis). Only use this product for more than 2 years if other forms of birth control are not right for you. The longer you use this product for birth control the more likely you will be at risk for weak bones. Ask your health care professional how you can keep strong bones.  You may have a change in bleeding pattern or irregular periods. Many females stop having periods while taking this drug.  If you have received your injections on time, your chance of being pregnant is very low. If you think you may be pregnant, see your health care professional as soon as possible.  Tell your health care professional if you want to get pregnant within the next year. The effect of this medicine may last a long time after you get your last injection.  NOTE:This sheet is a summary. It may not cover all possible information. If you have questions about this medicine, talk to your doctor, pharmacist, or health care provider. Copyright© 2018 [employer]            No

## 2020-08-05 NOTE — ED ADULT NURSE NOTE - PMH
Anxiety    Bipolar disorder    GERD (gastroesophageal reflux disease)    HTN (hypertension)    Stroke    Substance abuse

## 2020-08-05 NOTE — ED PROVIDER NOTE - PMH
Anxiety    Bipolar disorder    GERD (gastroesophageal reflux disease)    HTN (hypertension)    Substance abuse Anxiety    Bipolar disorder    GERD (gastroesophageal reflux disease)    HTN (hypertension)    Stroke    Substance abuse

## 2020-08-05 NOTE — H&P ADULT - NSHPSOCIALHISTORY_GEN_ALL_CORE
Lives in sober house  Hx opioid abuse (with heroin- IV and inhaled)  Active smoker- 1 PPD  Denies heavy EtOH use
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
## 2020-08-05 NOTE — ED PROVIDER NOTE - CARE PLAN
Principal Discharge DX:	Frequent falls  Secondary Diagnosis:	Closed head injury  Secondary Diagnosis:	Dislocation of hip prosthesis Principal Discharge DX:	LIV (acute kidney injury)  Secondary Diagnosis:	Closed head injury  Secondary Diagnosis:	Dislocation of hip prosthesis  Secondary Diagnosis:	Frequent falls Principal Discharge DX:	Rhabdomyolysis  Secondary Diagnosis:	Closed head injury  Secondary Diagnosis:	Dislocation of hip prosthesis  Secondary Diagnosis:	Frequent falls  Secondary Diagnosis:	LIV (acute kidney injury)

## 2020-08-05 NOTE — ED ADULT TRIAGE NOTE - CHIEF COMPLAINT QUOTE
as per patient " I fell 20xs today, I hit my head multiple times I think I was dehydrated I don't know"  EMS stated parents reports patient was lethargic and possibly had a seizure, Pt is unsure If he has a seizure hx. C/o left hip pain. Dr Garrett called to bedside. as per patient " I fell 20xs today, I hit my head multiple times I think I was dehydrated I don't know"  EMS stated parents reports patient was lethargic and possibly had a seizure, Pt is unsure If he has a seizure hx. C/o left hip pain. Dr Garrett called to bedside. alert and oriented x2, confused to time.
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
## 2020-08-05 NOTE — ED PROVIDER NOTE - PROGRESS NOTE DETAILS
On reevaluation, pt had brief seizure-like episode.  Resolved spontaneously, no apparent post-ictal period afterwards.  Ativan IV given.  Ortho initially called regarding hip prosthesis dislocation, but then pt was able to self-reduce by standing up.  Will treat with keppra, fluids.  Morphine for pain.  Prior medical records reviewed; pt was admitted for similar episode in May 2020, and was evaluated by neurology, who recommended MRI and EEG.  Pt signed out AMA at that time.  Pt says he is now willing to stay for further workup given frequent falls. Labs showing rhabdomyolysis and LIV.  Will hydrate with fluids.  Pt to be admitted.

## 2020-08-05 NOTE — ED ADULT NURSE NOTE - OBJECTIVE STATEMENT
pt care assumed at 2000, s/p rapid response call to xray for seizure like activity for 30 seconds, rapid response then cancelled, MD Garrett at bedside. pt denies any seizure history. pt is A+Ox4, in no apparent distress. states he fell 20x today "I am dehydrated and weak from work and keep falling." PSHx of L hip replacement 10 yrs ago "it pops out all the time and I just pop it back in myself." c/o pain to L hip and b/l hands from fall, also states +headstrike, -blood thinners. pt s/p priority head CT, denies headache or blurry vision. moving all extremities well at this time, pt breathing even and unlabored. placed on cardiac monitor and , seizure precautions maintained, all safety measures maintained.

## 2020-08-05 NOTE — ED ADULT NURSE REASSESSMENT NOTE - NS ED NURSE REASSESS COMMENT FT1
Ortho MD and PA x2 at bedside side attempting to reduce L hip at this time, no conscious sedation on board. pt calm and cooperative at this time, to be medicated for pain as ordered.
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
## 2020-08-05 NOTE — ED PROVIDER NOTE - ATTENDING CONTRIBUTION TO CARE
I personally saw the patient with the resident, and completed the key components of the history and physical exam. I then discussed the management plan with the resident  hip dislocation - pt self reduced hip   pt found to be in rhabdo, isac, also concern for seizure

## 2020-08-05 NOTE — ED ADULT NURSE REASSESSMENT NOTE - NS ED NURSE REASSESS COMMENT FT1
pt states he felt hip pop back in place after moving L leg around. post hip reduction xray done at this time, L hip in place, pt moving all extremities well, +distal pulses.

## 2020-08-05 NOTE — H&P ADULT - NSICDXPASTMEDICALHX_GEN_ALL_CORE_FT
PAST MEDICAL HISTORY:  Anxiety     Bipolar disorder     GERD (gastroesophageal reflux disease)     HTN (hypertension)     Seizure-like activity     Stroke     Substance abuse opioid use

## 2020-08-05 NOTE — ED ADULT NURSE NOTE - CHIEF COMPLAINT QUOTE
as per patient " I fell 20xs today, I hit my head multiple times I think I was dehydrated I don't know"  EMS stated parents reports patient was lethargic and possibly had a seizure, Pt is unsure If he has a seizure hx. C/o left hip pain. Dr Garrett called to bedside. alert and oriented x2, confused to time.

## 2020-08-06 VITALS
TEMPERATURE: 98 F | RESPIRATION RATE: 16 BRPM | DIASTOLIC BLOOD PRESSURE: 99 MMHG | OXYGEN SATURATION: 96 % | HEART RATE: 56 BPM | SYSTOLIC BLOOD PRESSURE: 141 MMHG

## 2020-08-06 LAB — SARS-COV-2 RNA SPEC QL NAA+PROBE: SIGNIFICANT CHANGE UP

## 2020-08-06 PROCEDURE — 82553 CREATINE MB FRACTION: CPT

## 2020-08-06 PROCEDURE — 86901 BLOOD TYPING SEROLOGIC RH(D): CPT

## 2020-08-06 PROCEDURE — 36415 COLL VENOUS BLD VENIPUNCTURE: CPT

## 2020-08-06 PROCEDURE — 85610 PROTHROMBIN TIME: CPT

## 2020-08-06 PROCEDURE — 99285 EMERGENCY DEPT VISIT HI MDM: CPT | Mod: 25

## 2020-08-06 PROCEDURE — 80053 COMPREHEN METABOLIC PANEL: CPT

## 2020-08-06 PROCEDURE — 73130 X-RAY EXAM OF HAND: CPT

## 2020-08-06 PROCEDURE — 86900 BLOOD TYPING SEROLOGIC ABO: CPT

## 2020-08-06 PROCEDURE — 86850 RBC ANTIBODY SCREEN: CPT

## 2020-08-06 PROCEDURE — 82550 ASSAY OF CK (CPK): CPT

## 2020-08-06 PROCEDURE — 85027 COMPLETE CBC AUTOMATED: CPT

## 2020-08-06 PROCEDURE — 70450 CT HEAD/BRAIN W/O DYE: CPT

## 2020-08-06 PROCEDURE — 73502 X-RAY EXAM HIP UNI 2-3 VIEWS: CPT

## 2020-08-06 PROCEDURE — 82962 GLUCOSE BLOOD TEST: CPT

## 2020-08-06 PROCEDURE — 80307 DRUG TEST PRSMV CHEM ANLYZR: CPT

## 2020-08-06 PROCEDURE — U0003: CPT

## 2020-08-06 PROCEDURE — 96375 TX/PRO/DX INJ NEW DRUG ADDON: CPT

## 2020-08-06 PROCEDURE — 71045 X-RAY EXAM CHEST 1 VIEW: CPT

## 2020-08-06 PROCEDURE — 96374 THER/PROPH/DIAG INJ IV PUSH: CPT

## 2020-08-06 PROCEDURE — 12345: CPT | Mod: NC

## 2020-08-06 PROCEDURE — 84484 ASSAY OF TROPONIN QUANT: CPT

## 2020-08-06 PROCEDURE — 85730 THROMBOPLASTIN TIME PARTIAL: CPT

## 2020-08-06 PROCEDURE — 93005 ELECTROCARDIOGRAM TRACING: CPT

## 2020-08-06 PROCEDURE — 73130 X-RAY EXAM OF HAND: CPT | Mod: 26,50

## 2020-08-06 PROCEDURE — 72125 CT NECK SPINE W/O DYE: CPT

## 2020-08-06 RX ORDER — SODIUM POLYSTYRENE SULFONATE 4.1 MEQ/G
30 POWDER, FOR SUSPENSION ORAL ONCE
Refills: 0 | Status: COMPLETED | OUTPATIENT
Start: 2020-08-06 | End: 2020-08-06

## 2020-08-06 RX ORDER — BUPROPION HYDROCHLORIDE 150 MG/1
300 TABLET, EXTENDED RELEASE ORAL
Qty: 0 | Refills: 0 | DISCHARGE

## 2020-08-06 RX ORDER — TRAMADOL HYDROCHLORIDE 50 MG/1
25 TABLET ORAL ONCE
Refills: 0 | Status: DISCONTINUED | OUTPATIENT
Start: 2020-08-06 | End: 2020-08-06

## 2020-08-06 RX ORDER — SODIUM CHLORIDE 9 MG/ML
1000 INJECTION INTRAMUSCULAR; INTRAVENOUS; SUBCUTANEOUS
Refills: 0 | Status: DISCONTINUED | OUTPATIENT
Start: 2020-08-06 | End: 2020-08-06

## 2020-08-06 RX ORDER — BUPROPION HYDROCHLORIDE 150 MG/1
300 TABLET, EXTENDED RELEASE ORAL DAILY
Refills: 0 | Status: DISCONTINUED | OUTPATIENT
Start: 2020-08-06 | End: 2020-08-06

## 2020-08-06 RX ORDER — LEVETIRACETAM 250 MG/1
500 TABLET, FILM COATED ORAL
Refills: 0 | Status: DISCONTINUED | OUTPATIENT
Start: 2020-08-06 | End: 2020-08-06

## 2020-08-06 RX ORDER — BUPRENORPHINE AND NALOXONE 2; .5 MG/1; MG/1
1 TABLET SUBLINGUAL
Refills: 0 | Status: DISCONTINUED | OUTPATIENT
Start: 2020-08-06 | End: 2020-08-06

## 2020-08-06 RX ORDER — QUETIAPINE FUMARATE 200 MG/1
800 TABLET, FILM COATED ORAL AT BEDTIME
Refills: 0 | Status: DISCONTINUED | OUTPATIENT
Start: 2020-08-06 | End: 2020-08-06

## 2020-08-06 RX ORDER — GABAPENTIN 400 MG/1
300 CAPSULE ORAL
Refills: 0 | Status: DISCONTINUED | OUTPATIENT
Start: 2020-08-06 | End: 2020-08-06

## 2020-08-06 RX ORDER — HYDRALAZINE HCL 50 MG
50 TABLET ORAL
Refills: 0 | Status: DISCONTINUED | OUTPATIENT
Start: 2020-08-06 | End: 2020-08-06

## 2020-08-06 RX ORDER — ASPIRIN/CALCIUM CARB/MAGNESIUM 324 MG
81 TABLET ORAL DAILY
Refills: 0 | Status: DISCONTINUED | OUTPATIENT
Start: 2020-08-06 | End: 2020-08-06

## 2020-08-06 RX ORDER — LAMOTRIGINE 25 MG/1
200 TABLET, ORALLY DISINTEGRATING ORAL DAILY
Refills: 0 | Status: DISCONTINUED | OUTPATIENT
Start: 2020-08-06 | End: 2020-08-06

## 2020-08-06 RX ORDER — LABETALOL HCL 100 MG
200 TABLET ORAL
Refills: 0 | Status: DISCONTINUED | OUTPATIENT
Start: 2020-08-06 | End: 2020-08-06

## 2020-08-06 RX ORDER — LEVOTHYROXINE SODIUM 125 MCG
50 TABLET ORAL DAILY
Refills: 0 | Status: DISCONTINUED | OUTPATIENT
Start: 2020-08-06 | End: 2020-08-06

## 2020-08-06 RX ORDER — ATORVASTATIN CALCIUM 80 MG/1
40 TABLET, FILM COATED ORAL AT BEDTIME
Refills: 0 | Status: DISCONTINUED | OUTPATIENT
Start: 2020-08-06 | End: 2020-08-06

## 2020-08-06 RX ORDER — BUPROPION HYDROCHLORIDE 150 MG/1
0 TABLET, EXTENDED RELEASE ORAL
Qty: 0 | Refills: 0 | DISCHARGE

## 2020-08-06 RX ORDER — AMLODIPINE BESYLATE 2.5 MG/1
5 TABLET ORAL DAILY
Refills: 0 | Status: DISCONTINUED | OUTPATIENT
Start: 2020-08-06 | End: 2020-08-06

## 2020-08-06 RX ORDER — MIRTAZAPINE 45 MG/1
60 TABLET, ORALLY DISINTEGRATING ORAL AT BEDTIME
Refills: 0 | Status: DISCONTINUED | OUTPATIENT
Start: 2020-08-06 | End: 2020-08-06

## 2020-08-06 RX ADMIN — SODIUM POLYSTYRENE SULFONATE 30 GRAM(S): 4.1 POWDER, FOR SUSPENSION ORAL at 01:40

## 2020-08-06 RX ADMIN — SODIUM CHLORIDE 150 MILLILITER(S): 9 INJECTION INTRAMUSCULAR; INTRAVENOUS; SUBCUTANEOUS at 02:09

## 2020-08-06 RX ADMIN — TRAMADOL HYDROCHLORIDE 25 MILLIGRAM(S): 50 TABLET ORAL at 01:36

## 2020-08-06 RX ADMIN — LEVETIRACETAM 500 MILLIGRAM(S): 250 TABLET, FILM COATED ORAL at 05:25

## 2020-08-06 RX ADMIN — Medication 50 MILLIGRAM(S): at 05:24

## 2020-08-06 RX ADMIN — Medication 200 MILLIGRAM(S): at 05:24

## 2020-08-06 RX ADMIN — AMLODIPINE BESYLATE 5 MILLIGRAM(S): 2.5 TABLET ORAL at 05:24

## 2020-08-06 RX ADMIN — GABAPENTIN 300 MILLIGRAM(S): 400 CAPSULE ORAL at 05:25

## 2020-08-06 RX ADMIN — Medication 1 MILLIGRAM(S): at 01:36

## 2020-08-06 RX ADMIN — BUPRENORPHINE AND NALOXONE 1 TABLET(S): 2; .5 TABLET SUBLINGUAL at 05:24

## 2020-08-06 RX ADMIN — Medication 50 MICROGRAM(S): at 05:24

## 2020-08-06 RX ADMIN — Medication 0.6 MILLIGRAM(S): at 05:24

## 2020-08-06 RX ADMIN — TRAMADOL HYDROCHLORIDE 25 MILLIGRAM(S): 50 TABLET ORAL at 02:18

## 2020-08-06 NOTE — CHART NOTE - NSCHARTNOTEFT_GEN_A_CORE
Called by CDU RN b/c pt left bedside around 6AM because [facility] out of CDU [facility] without notifying staff for the 3rd time and security had to be called again. I arrived at bedside with 2 security guards and RN Rebeca Beasley present.  Per RN, pt has been hostile toward staff and non-compliant with management throughout the night.  He ripped off his cardiac monitor, then refused to wear it, refused to for additional maintenance IVF to be hung, refused to provide urine sample despite RNs observing him getting up to go to the bathroom. RNs concerned about pt eloping with IV in place. IV access was reportedly hanging out of arm, so it was removed.  When I came to bedside, pt sitting on bed with no IV access in place, I inquired about his concerns and why he keeps moving from his bed and pt saying ‘that he is not being treated here and he wasn’t allowed to eat’. Pt with soda and snacks at bedside and per staff, pt has been eating throughout the night.  Pt also requesting benzos for ‘hand pain’ and I explained that benzo are not indicated for acute pain but was given earlier when he stated that he had anxiety/panic attack.      I explained to pt the various reasons for admission to hospital including further work up and management of seizure activity, muscle breakdown after fall (rhabdomyolysis), renal failure, elevated potassium.  I also explained that partially treated high potassium puts him at risk for life threatening arrhythmia and untreated seizures with fall puts him at risk for fractures and internal bleeding.  I explained our management included cardiac monitoring, IVF, treatment for high potassium, further neurological testing with EEG, MRI and consultant evaluation with neurology, nephrology and also psychiatry as pt told me he was having panic attacks earlier.  I explained the above to pt multiple times.  Pt appears to have poor insight into severity of his acute medical illnesses and why hospitalization is necessary. He verbalized to me with security and RN present as witnesses that ‘he wanted to leave the hospital and go elsewhere because he’s not getting treated here’.  When I came to bedside with AMA document, pt then refused to sign it.  Pt escorted out of ED by security.  Consultant answering services notified that pt left AMA. Called by CDU RN b/c pt left bedside around 6AM because pt [facility] out of CD[facility] notifying staff for the 3rd time and security had to be called again. I arrived at bedside with 2 security guards and RN Rebeca Beasley present.  Per RN, pt has been hostile toward staff and non-compliant with management throughout the night.  He ripped off his cardiac monitor, then refused to wear it, refused to for additional maintenance IVF to be hung, refused to provide urine sample despite RNs observing him getting up to go to the bathroom. RNs concerned about pt eloping with IV in place. IV access was reportedly hanging out of arm, so it was removed.  When I came to bedside, pt sitting on bed with no IV access in place, I inquired about his concerns and why he keeps moving from his bed and pt saying ‘that he is not being treated here and he wasn’t allowed to eat’. Pt with soda and snacks at bedside and per staff, pt has been drinking soda and eating throughout the night.  Pt also requesting additional Ativan for ‘hand pain’. I explained that benzos are not indicated for acute pain but it was given earlier when he stated that he had anxiety/panic attack.     I explained to pt the various reasons for admission to hospital including further work up and management of seizure activity, muscle breakdown after fall (rhabdomyolysis), renal failure, elevated potassium.  I also explained that partially treated high potassium puts him at risk for life threatening arrhythmia and untreated seizures with fall puts him at risk for trauma, fractures and internal bleeding.  I explained our management included cardiac monitoring, IVF, treatment for high potassium, further neurological testing with EEG, MRI and consultant evaluations with neurology, nephrology and also psychiatry as pt told me he was having panic attacks earlier.      I explained the above to pt multiple times and pt repeatedly telling me 'nothing is happening and he's not being treated here'.  Pt appears to have poor insight and understanding into severity of his acute medical illnesses and why hospitalization is necessary. He verbalized to me with security guards and RN present as witnesses that ‘he wanted to leave the hospital and go elsewhere because he’s not getting treated here’.  When I came to bedside with AMA document, pt then refused to sign it.  Pt escorted out of ED by security.  Consultant answering services notified that pt left AMA. Called by CDU RN around 6AM b/c pt left bedside, wandered out of CDU area into hospital hallways toward [facility] area without notifying staff for the 3rd time and security had to be called again. I arrived at bedside with 2 security guards and RN Rebeca Beasley present.  Per RN, pt has been hostile toward staff and non-compliant with management throughout the night.  He ripped off his cardiac monitor, then refused to wear it, refused to for additional maintenance IVF to be hung, refused to provide urine sample despite RNs observing him getting up to go to the bathroom. RNs concerned about pt eloping with IV in place. IV access was reportedly hanging out of arm, so it was removed.  When I came to bedside, pt sitting on bed with no IV access in place, I inquired about his concerns and why he keeps moving from his bed despite agreeing to stay earlier during the code grey.  Pt saying ‘that he is not being treated here and he wasn’t allowed to eat’. Pt with soda and snacks at bedside and per staff, pt has been drinking soda and eating throughout the night.  Pt also requesting additional Ativan for hand pain. I explained that benzos are not indicated for acute pain but it was given earlier when he stated that he had anxiety/panic attack.     I explained to pt the various reasons for admission to hospital including further work up and management of seizure activity, muscle breakdown after fall (rhabdomyolysis), renal failure, elevated potassium.  I also explained that partially treated high potassium puts him at risk for life threatening arrhythmia and untreated seizures with falls puts him at risk for trauma, fractures and internal bleeding.  I explained our management included cardiac monitoring, IVF, treatment for high potassium, further neurological testing with EEG, MRI and consultant evaluations with neurology, nephrology and psychiatry as pt told me he was having panic attacks earlier.      I explained the above to pt multiple times and pt repeatedly telling me 'nothing is happening and he's not being treated here'.  Pt appears to have poor insight and understanding into severity of his acute medical illnesses and why hospitalization is necessary. He verbalized to me with security guards and RN present as witnesses that ‘he wanted to leave the hospital and go elsewhere because he’s not getting treated here’.  When I came to bedside with AMA document, pt then refused to sign it.  Pt escorted out of ED by security.  Consultant answering services notified that pt left AMA.

## 2020-09-10 NOTE — ED PROVIDER NOTE - NEURO NEGATIVE STATEMENT, MLM
PHYSICAL THERAPY - DAILY TREATMENT NOTE    Patient Name: Shelly Coleman        Date: 9/10/2020  : 1943   YES Patient  Verified  Visit #:     Insurance: Payor: Justine Hind / Plan: [place] MEDICARE PART A & B / Product Type: Medicare /      In time: 9:09 early Out time: 10:12   Total Treatment Time: 63     Medicare/BCBS Time Tracking (below)   Total Timed Codes (min):  63 1:1 Treatment Time:  53     TREATMENT AREA = Low back pain [M54.5]  Status post lumbar spine operation [Z98.890]    SUBJECTIVE    Pain Level (on 0 to 10 scale):  1  / 10   Medication Changes/New allergies or changes in medical history, any new surgeries or procedures? NO    If yes, update Summary List   Subjective Functional Status/Changes:  []  No changes reported     \"Back is fine, muscles in my legs a little sore from the exercises but I'm fine. \"          OBJECTIVE     Therapeutic Procedures:  Min Procedure Specifics + Rationale   n/a [x]  Patient Education (performed throughout session) [x] Review HEP    [] Progressed/Changed HEP based on:   [] proper performance and advancement of Therex/TA   [] reduction in pain level    [] increased functional capacity       [] change in directional preference   43(43) [x] Therapeutic Exercise   [x]  See Flowsheet   Rationale: increase ROM and increase strength to improve the patients ability to participate in ADL's    10(10) [x] Therapeutic Activity   [x]  See Flowsheet  Ambulation activities, transfers. Rationale:  To improve safety, proprioception, coordination, and efficiency with tasks     Modality rationale: decrease inflammation, decrease pain, increase tissue extensibility and increase muscle contraction/control to improve the patients ability to perform ADL's with greater ease       Min Type Additional Details   10 [x]  Heat         [] pre-FABIAN      [x] post-FABIAN Location:L/S    [x] supine              [] prone     [x] legs elevated    [] legs flat   [] sitting   [x] Skin assessment post-treatment:  [x]intact [x]redness- no adverse reaction       []redness  adverse reaction:     Other Objective/Functional Measures:    Increased reps/sets/resistance per flow sheet. Post Treatment Pain Level (on 0 to 10) scale:   0  / 10     ASSESSMENT    Assessment/Changes in Function:       Demonstrates fair balance with ambulation activity. Patient will continue to benefit from skilled PT services to modify and progress therapeutic interventions, address functional mobility deficits, address ROM deficits, address strength deficits, analyze and address soft tissue restrictions, analyze and cue movement patterns, analyze and modify body mechanics/ergonomics, address imbalance/dizziness and instruct in home and community integration  to attain remaining goals   Progress toward goals / Updated goals:    Compliant towards HEP, STG Met     PLAN    [x]  Upgrade activities as tolerated  [x]  Update interventions per flow sheet YES Continue plan of care   []  Discharge due to :    []  Other:      Therapist: Mahogany Webster \"BJ\" JAN Myers, Cert. MDT, Cert. DN, Cert. SMT, Dip.  Osteopractic    Date: 9/10/2020 Time: 9:12 AM     Future Appointments   Date Time Provider Napoleon Ahmadi   9/10/2020  9:30 AM Angeles Mccray PT [facility]   9/15/2020  9:30 AM Angeles Mccray PT [facility]   9/17/2020  9:30 AM Angeles Mccray PT [facility]   9/22/2020  9:30 AM Jose Henderson PTA [facility]   9/24/2020  9:30 AM Jose Henderson, ANA PAULA [facility]   9/29/2020  9:30 AM Angeles Mccray PT [facility] no loss of consciousness, no gait abnormality, no headache, no sensory deficits, and no weakness.

## 2020-10-05 ENCOUNTER — EMERGENCY (EMERGENCY)
Facility: HOSPITAL | Age: 40
LOS: 1 days | Discharge: HOME | End: 2020-10-05
Attending: STUDENT IN AN ORGANIZED HEALTH CARE EDUCATION/TRAINING PROGRAM
Payer: MEDICARE

## 2020-10-05 VITALS
DIASTOLIC BLOOD PRESSURE: 88 MMHG | OXYGEN SATURATION: 100 % | HEART RATE: 88 BPM | SYSTOLIC BLOOD PRESSURE: 133 MMHG | RESPIRATION RATE: 18 BRPM

## 2020-10-05 VITALS — TEMPERATURE: 99 F

## 2020-10-05 DIAGNOSIS — Z96.653 PRESENCE OF ARTIFICIAL KNEE JOINT, BILATERAL: Chronic | ICD-10-CM

## 2020-10-05 DIAGNOSIS — Z96.649 PRESENCE OF UNSPECIFIED ARTIFICIAL HIP JOINT: Chronic | ICD-10-CM

## 2020-10-05 LAB
ALBUMIN SERPL ELPH-MCNC: 4.6 G/DL — SIGNIFICANT CHANGE UP (ref 3.3–5.2)
ALP SERPL-CCNC: 136 U/L — HIGH (ref 40–120)
ALT FLD-CCNC: 42 U/L — HIGH
ANION GAP SERPL CALC-SCNC: 12 MMOL/L — SIGNIFICANT CHANGE UP (ref 5–17)
AST SERPL-CCNC: 38 U/L — SIGNIFICANT CHANGE UP
BASOPHILS # BLD AUTO: 0.07 K/UL — SIGNIFICANT CHANGE UP (ref 0–0.2)
BASOPHILS NFR BLD AUTO: 1.1 % — SIGNIFICANT CHANGE UP (ref 0–2)
BILIRUB SERPL-MCNC: 0.2 MG/DL — LOW (ref 0.4–2)
BUN SERPL-MCNC: 14 MG/DL — SIGNIFICANT CHANGE UP (ref 8–20)
CALCIUM SERPL-MCNC: 9.7 MG/DL — SIGNIFICANT CHANGE UP (ref 8.6–10.2)
CHLORIDE SERPL-SCNC: 103 MMOL/L — SIGNIFICANT CHANGE UP (ref 98–107)
CO2 SERPL-SCNC: 26 MMOL/L — SIGNIFICANT CHANGE UP (ref 22–29)
CREAT SERPL-MCNC: 1.19 MG/DL — SIGNIFICANT CHANGE UP (ref 0.5–1.3)
D DIMER BLD IA.RAPID-MCNC: 312 NG/ML DDU — HIGH
EOSINOPHIL # BLD AUTO: 0.33 K/UL — SIGNIFICANT CHANGE UP (ref 0–0.5)
EOSINOPHIL NFR BLD AUTO: 5.4 % — SIGNIFICANT CHANGE UP (ref 0–6)
ETHANOL SERPL-MCNC: <10 MG/DL — SIGNIFICANT CHANGE UP
GLUCOSE SERPL-MCNC: 113 MG/DL — HIGH (ref 70–99)
HCT VFR BLD CALC: 36.8 % — LOW (ref 39–50)
HGB BLD-MCNC: 12.2 G/DL — LOW (ref 13–17)
IMM GRANULOCYTES NFR BLD AUTO: 0.5 % — SIGNIFICANT CHANGE UP (ref 0–1.5)
LIDOCAIN IGE QN: 23 U/L — SIGNIFICANT CHANGE UP (ref 22–51)
LYMPHOCYTES # BLD AUTO: 2.31 K/UL — SIGNIFICANT CHANGE UP (ref 1–3.3)
LYMPHOCYTES # BLD AUTO: 37.8 % — SIGNIFICANT CHANGE UP (ref 13–44)
MAGNESIUM SERPL-MCNC: 2.2 MG/DL — SIGNIFICANT CHANGE UP (ref 1.6–2.6)
MCHC RBC-ENTMCNC: 29.5 PG — SIGNIFICANT CHANGE UP (ref 27–34)
MCHC RBC-ENTMCNC: 33.2 GM/DL — SIGNIFICANT CHANGE UP (ref 32–36)
MCV RBC AUTO: 88.9 FL — SIGNIFICANT CHANGE UP (ref 80–100)
MONOCYTES # BLD AUTO: 0.56 K/UL — SIGNIFICANT CHANGE UP (ref 0–0.9)
MONOCYTES NFR BLD AUTO: 9.2 % — SIGNIFICANT CHANGE UP (ref 2–14)
NEUTROPHILS # BLD AUTO: 2.81 K/UL — SIGNIFICANT CHANGE UP (ref 1.8–7.4)
NEUTROPHILS NFR BLD AUTO: 46 % — SIGNIFICANT CHANGE UP (ref 43–77)
NT-PROBNP SERPL-SCNC: 29 PG/ML — SIGNIFICANT CHANGE UP (ref 0–300)
PLATELET # BLD AUTO: 217 K/UL — SIGNIFICANT CHANGE UP (ref 150–400)
POTASSIUM SERPL-MCNC: 4.3 MMOL/L — SIGNIFICANT CHANGE UP (ref 3.5–5.3)
POTASSIUM SERPL-SCNC: 4.3 MMOL/L — SIGNIFICANT CHANGE UP (ref 3.5–5.3)
PROT SERPL-MCNC: 7.3 G/DL — SIGNIFICANT CHANGE UP (ref 6.6–8.7)
RBC # BLD: 4.14 M/UL — LOW (ref 4.2–5.8)
RBC # FLD: 13.1 % — SIGNIFICANT CHANGE UP (ref 10.3–14.5)
SODIUM SERPL-SCNC: 141 MMOL/L — SIGNIFICANT CHANGE UP (ref 135–145)
TROPONIN T SERPL-MCNC: <0.01 NG/ML — SIGNIFICANT CHANGE UP (ref 0–0.06)
WBC # BLD: 6.11 K/UL — SIGNIFICANT CHANGE UP (ref 3.8–10.5)
WBC # FLD AUTO: 6.11 K/UL — SIGNIFICANT CHANGE UP (ref 3.8–10.5)

## 2020-10-05 PROCEDURE — 99284 EMERGENCY DEPT VISIT MOD MDM: CPT | Mod: 25

## 2020-10-05 PROCEDURE — U0003: CPT

## 2020-10-05 PROCEDURE — 83735 ASSAY OF MAGNESIUM: CPT

## 2020-10-05 PROCEDURE — 71045 X-RAY EXAM CHEST 1 VIEW: CPT

## 2020-10-05 PROCEDURE — 36415 COLL VENOUS BLD VENIPUNCTURE: CPT

## 2020-10-05 PROCEDURE — 96374 THER/PROPH/DIAG INJ IV PUSH: CPT | Mod: XU

## 2020-10-05 PROCEDURE — 93010 ELECTROCARDIOGRAM REPORT: CPT | Mod: 76

## 2020-10-05 PROCEDURE — 83880 ASSAY OF NATRIURETIC PEPTIDE: CPT

## 2020-10-05 PROCEDURE — 71045 X-RAY EXAM CHEST 1 VIEW: CPT | Mod: 26

## 2020-10-05 PROCEDURE — 83690 ASSAY OF LIPASE: CPT

## 2020-10-05 PROCEDURE — 84484 ASSAY OF TROPONIN QUANT: CPT

## 2020-10-05 PROCEDURE — 85379 FIBRIN DEGRADATION QUANT: CPT

## 2020-10-05 PROCEDURE — 96375 TX/PRO/DX INJ NEW DRUG ADDON: CPT | Mod: XU

## 2020-10-05 PROCEDURE — 99285 EMERGENCY DEPT VISIT HI MDM: CPT | Mod: CS

## 2020-10-05 PROCEDURE — 93005 ELECTROCARDIOGRAM TRACING: CPT

## 2020-10-05 PROCEDURE — 85025 COMPLETE CBC W/AUTO DIFF WBC: CPT

## 2020-10-05 PROCEDURE — 94640 AIRWAY INHALATION TREATMENT: CPT | Mod: XU

## 2020-10-05 PROCEDURE — 80053 COMPREHEN METABOLIC PANEL: CPT

## 2020-10-05 PROCEDURE — 71275 CT ANGIOGRAPHY CHEST: CPT | Mod: 26

## 2020-10-05 PROCEDURE — 80307 DRUG TEST PRSMV CHEM ANLYZR: CPT

## 2020-10-05 PROCEDURE — 71275 CT ANGIOGRAPHY CHEST: CPT

## 2020-10-05 RX ORDER — IPRATROPIUM/ALBUTEROL SULFATE 18-103MCG
3 AEROSOL WITH ADAPTER (GRAM) INHALATION ONCE
Refills: 0 | Status: COMPLETED | OUTPATIENT
Start: 2020-10-05 | End: 2020-10-05

## 2020-10-05 RX ORDER — FAMOTIDINE 10 MG/ML
20 INJECTION INTRAVENOUS ONCE
Refills: 0 | Status: COMPLETED | OUTPATIENT
Start: 2020-10-05 | End: 2020-10-05

## 2020-10-05 RX ORDER — KETOROLAC TROMETHAMINE 30 MG/ML
15 SYRINGE (ML) INJECTION ONCE
Refills: 0 | Status: DISCONTINUED | OUTPATIENT
Start: 2020-10-05 | End: 2020-10-05

## 2020-10-05 RX ORDER — MORPHINE SULFATE 50 MG/1
4 CAPSULE, EXTENDED RELEASE ORAL ONCE
Refills: 0 | Status: DISCONTINUED | OUTPATIENT
Start: 2020-10-05 | End: 2020-10-05

## 2020-10-05 RX ADMIN — Medication 3 MILLILITER(S): at 22:13

## 2020-10-05 RX ADMIN — FAMOTIDINE 20 MILLIGRAM(S): 10 INJECTION INTRAVENOUS at 22:15

## 2020-10-05 RX ADMIN — MORPHINE SULFATE 4 MILLIGRAM(S): 50 CAPSULE, EXTENDED RELEASE ORAL at 22:05

## 2020-10-05 RX ADMIN — Medication 15 MILLIGRAM(S): at 22:15

## 2020-10-05 NOTE — ED PROVIDER NOTE - PATIENT PORTAL LINK FT
You can access the FollowMyHealth Patient Portal offered by [facility] by registering at the following website: [email or web address]. By joining [facility] [employer]’s FollowMyHealth portal, you will also be able to view your health information using other applications (apps) compatible with our system.

## 2020-10-05 NOTE — ED PROVIDER NOTE - CLINICAL SUMMARY MEDICAL DECISION MAKING FREE TEXT BOX
Pt with acute CP to the left chest, will evaluate for cause of acute CP. Pt with acute CP to the left chest, will check labs, EKG, d-dimer and evaluate for cause of acute CP.

## 2020-10-05 NOTE — ED ADULT TRIAGE NOTE - CHIEF COMPLAINT QUOTE
pt presented to ED stating he was having severe chest pain and throwing self on floor in waiting room, not following instructions, unable to get vitals or subjective data. PT brought back to critical care, DR Villafuerte at bedside with CC rNS.

## 2020-10-05 NOTE — ED PROVIDER NOTE - NSFOLLOWUPINSTRUCTIONS_ED_ALL_ED_FT
1) Follow up with your doctor in 1-2 days  2) Return to the ER for worsening or concerning symptoms  3) Consider ibuprofen and acetaminophen for pain control      Chest Pain    WHAT YOU NEED TO KNOW:    Chest pain can be caused by a range of conditions, from not serious to life-threatening. Chest pain can be a symptom of a digestive problem, such as acid reflux or a stomach ulcer. An anxiety attack or a strong emotion, such as anger, can also cause chest pain. Infection, inflammation, or a fracture in the bones or cartilage in your chest can cause pain or discomfort. Sometimes chest pain or pressure is caused by poor blood flow to your heart (angina). Chest pain may also be caused by life-threatening conditions such as a heart attack or blood clot in your lungs.     DISCHARGE INSTRUCTIONS:    Call 911 if:   •You have any of the following signs of a heart attack: ?Squeezing, pressure, or pain in your chest      ?You may also have any of the following: ?Discomfort or pain in your back, neck, jaw, stomach, or arm      ?Shortness of breath      ?Nausea or vomiting      ?Lightheadedness or a sudden cold sweat            Return to the emergency department if:   •You have chest discomfort that gets worse, even with medicine.      •You cough or vomit blood.       •Your bowel movements are black or bloody.       •You cannot stop vomiting, or it hurts to swallow.       Contact your healthcare provider if:   •You have questions or concerns about your condition or care.          Medicines:   •Medicines may be given to treat the cause of your chest pain. Examples include pain medicine, anxiety medicine, or medicines to increase blood flow to your heart.       •Do not take certain medicines without asking your healthcare provider first. These include NSAIDs, herbal or vitamin supplements, or hormones (estrogen or progestin).       •Take your medicine as directed. Contact your healthcare provider if you think your medicine is not helping or if you have side effects. Tell him or her if you are allergic to any medicine. Keep a list of the medicines, vitamins, and herbs you take. Include the amounts, and when and why you take them. Bring the list or the pill bottles to follow-up visits. Carry your medicine list with you in case of an emergency.      Follow up with your healthcare provider within 72 hours, or as directed: You may need to return for more tests to find the cause of your chest pain. You may be referred to a specialist, such as a cardiologist or gastroenterologist. Write down your questions so you remember to ask them during your visits.     Healthy living tips: The following are general healthy guidelines. If your chest pain is caused by a heart problem, your healthcare provider will give you specific guidelines to follow.  •Do not smoke. Nicotine and other chemicals in cigarettes and cigars can cause lung and heart damage. Ask your healthcare provider for information if you currently smoke and need help to quit. E-cigarettes or smokeless tobacco still contain nicotine. Talk to your healthcare provider before you use these products.       •Eat a variety of healthy, low-fat, low-salt foods. Healthy foods include fruits, vegetables, whole-grain breads, low-fat dairy products, beans, lean meats, and fish. Ask for more information about a heart healthy diet.      •Drink plenty of water every day. Your body is made of mostly water. Water helps your body to control your temperature and blood pressure. Ask your healthcare provider how much water you should drink every day.      •Ask about activity. Your healthcare provider will tell you which activities to limit or avoid. Ask when you can drive, return to work, and have sex. Ask about the best exercise plan for you.      •Maintain a healthy weight. Ask your healthcare provider how much you should weigh. Ask him or her to help you create a weight loss plan if you are overweight.       •Get the flu and pneumonia vaccines. All adults should get the influenza (flu) vaccine. Get it every year as soon as it becomes available. The pneumococcal vaccine is given to adults aged 65 years or older. The vaccine is given every 5 years to prevent pneumococcal disease, such as pneumonia.      If you have a stent:   •Carry your stent card with you at all times.       •Let all healthcare providers know that you have a stent.

## 2020-10-05 NOTE — ED PROVIDER NOTE - NSFOLLOWUPCLINICS_GEN_ALL_ED_FT
[facility]  Cardiology  [address] [place] [id number]  Phone: [phone]  Fax:   Follow Up Time:

## 2020-10-05 NOTE — ED ADULT NURSE NOTE - NSIMPLEMENTINTERV_GEN_ALL_ED
Implemented All Fall Risk Interventions:  [place] to call system. Call bell, personal items and telephone within reach. Instruct patient to call for assistance. Room bathroom lighting operational. Non-slip footwear when patient is off stretcher. Physically safe environment: no spills, clutter or unnecessary equipment. Stretcher in lowest position, wheels locked, appropriate side rails in place. Provide visual cue, wrist band, yellow gown, etc. Monitor gait and stability. Monitor for mental status changes and reorient to person, place, and time. Review medications for side effects contributing to fall risk. Reinforce activity limits and safety measures with patient and family.

## 2020-10-05 NOTE — ED ADULT NURSE NOTE - OBJECTIVE STATEMENT
pt arrive to ED c/o 9/10 crushing left sided chest pain with increased pain on inspiration. denies cardiac hx. event started a few hours while sitting at home watching Tv. pt did work during the day doing [employer]. pt anxious and tachypneic assisted from wheel chair to stretcher. MD at bedside, pt uncooperative for ekg. mediated for pain. pt arrive to ED c/o 9/10 crushing left sided chest pain with increased pain on inspiration. denies cardiac hx. has hx of htn. event started a few hours while sitting at home watching Tv. pt did work during the day doing [employer]. pt anxious and tachypneic assisted from wheel chair to stretcher. MD at bedside, pt uncooperative for ekg. mediated for pain.

## 2020-10-05 NOTE — ED PROVIDER NOTE - OBJECTIVE STATEMENT
50 y/o M with PMHx of HTN presents to the ED c/o sudden onset, severe CP onset x1 hour PTA that radiates to the neck and let arm. Pt states that he was watching TV when symptoms started. Pt is a smoker and reports difficulty breathing.   Denies n/v

## 2020-10-05 NOTE — ED PROVIDER NOTE - PROGRESS NOTE DETAILS
Patient with significant improvement of pain. Repeat ekg noted to be normal. unclear cause of left chest wall pain. Patient to follow-up with PMD. Pt still has some chest discomfort. After discussion for evaluation, he declined. States that he will follow-up with his PMD and he has an appointment at 12 pm today. Pt still has some chest discomfort. After discussion for evaluation, he declined repeat troponin and medication. States that he will follow-up with his PMD and he has an appointment at 12 pm today.

## 2020-10-05 NOTE — ED PROVIDER NOTE - CONSTITUTIONAL, MLM
normal... Well appearing, awake, alert, oriented to person, place, time/situation and in severe painful distress. Well appearing, awake, alert, oriented to person, place, time/situation and in severe painful distress. Somewhat abnl behavioral; jumping around in the stretcher

## 2020-10-06 PROBLEM — I63.9 CEREBRAL INFARCTION, UNSPECIFIED: Chronic | Status: ACTIVE | Noted: 2020-08-05

## 2020-10-06 PROBLEM — R56.9 UNSPECIFIED CONVULSIONS: Chronic | Status: ACTIVE | Noted: 2020-08-06

## 2020-10-06 LAB — SARS-COV-2 RNA SPEC QL NAA+PROBE: SIGNIFICANT CHANGE UP

## 2021-02-08 RX ORDER — QUETIAPINE FUMARATE 100 MG/1
100 TABLET ORAL
Qty: 30 | Refills: 0 | Status: DISCONTINUED | COMMUNITY
Start: 2019-10-18 | End: 2021-02-08

## 2021-02-08 RX ORDER — METFORMIN HYDROCHLORIDE 1000 MG/1
1000 TABLET, COATED ORAL
Qty: 60 | Refills: 0 | Status: DISCONTINUED | COMMUNITY
Start: 2019-05-09 | End: 2021-02-08

## 2021-02-09 ENCOUNTER — APPOINTMENT (OUTPATIENT)
Dept: ENDOCRINOLOGY | Facility: CLINIC | Age: 41
End: 2021-02-09

## 2021-03-16 ENCOUNTER — APPOINTMENT (OUTPATIENT)
Dept: FAMILY MEDICINE | Facility: CLINIC | Age: 41
End: 2021-03-16

## 2021-04-09 ENCOUNTER — NON-APPOINTMENT (OUTPATIENT)
Age: 41
End: 2021-04-09

## 2021-04-09 ENCOUNTER — APPOINTMENT (OUTPATIENT)
Dept: FAMILY MEDICINE | Facility: CLINIC | Age: 41
End: 2021-04-09
Payer: MEDICARE

## 2021-04-09 VITALS
SYSTOLIC BLOOD PRESSURE: 134 MMHG | BODY MASS INDEX: 34.07 KG/M2 | RESPIRATION RATE: 16 BRPM | TEMPERATURE: 97.6 F | WEIGHT: 230 LBS | HEIGHT: 69 IN | DIASTOLIC BLOOD PRESSURE: 94 MMHG | HEART RATE: 79 BPM

## 2021-04-09 DIAGNOSIS — F51.02 ADJUSTMENT INSOMNIA: ICD-10-CM

## 2021-04-09 DIAGNOSIS — Z11.3 ENCOUNTER FOR SCREENING FOR INFECTIONS WITH A PREDOMINANTLY SEXUAL MODE OF TRANSMISSION: ICD-10-CM

## 2021-04-09 DIAGNOSIS — Z11.4 ENCOUNTER FOR SCREENING FOR HUMAN IMMUNODEFICIENCY VIRUS [HIV]: ICD-10-CM

## 2021-04-09 DIAGNOSIS — E03.9 HYPOTHYROIDISM, UNSPECIFIED: ICD-10-CM

## 2021-04-09 DIAGNOSIS — Z23 ENCOUNTER FOR IMMUNIZATION: ICD-10-CM

## 2021-04-09 DIAGNOSIS — Z00.00 ENCOUNTER FOR GENERAL ADULT MEDICAL EXAMINATION W/OUT ABNORMAL FINDINGS: ICD-10-CM

## 2021-04-09 DIAGNOSIS — Z11.59 ENCOUNTER FOR SCREENING FOR OTHER VIRAL DISEASES: ICD-10-CM

## 2021-04-09 DIAGNOSIS — Z02.83 ENCOUNTER FOR BLOOD-ALCOHOL AND BLOOD-DRUG TEST: ICD-10-CM

## 2021-04-09 PROCEDURE — 36415 COLL VENOUS BLD VENIPUNCTURE: CPT

## 2021-04-09 PROCEDURE — 93000 ELECTROCARDIOGRAM COMPLETE: CPT

## 2021-04-09 PROCEDURE — G0439: CPT

## 2021-04-09 PROCEDURE — 99204 OFFICE O/P NEW MOD 45 MIN: CPT | Mod: 25

## 2021-04-09 RX ORDER — HYDRALAZINE HYDROCHLORIDE 50 MG/1
TABLET ORAL
Refills: 0 | Status: DISCONTINUED | COMMUNITY
End: 2021-04-09

## 2021-04-09 RX ORDER — CLONAZEPAM 0.5 MG/1
0.5 TABLET ORAL TWICE DAILY
Qty: 60 | Refills: 0 | Status: DISCONTINUED | COMMUNITY
Start: 2019-10-18 | End: 2021-04-09

## 2021-04-09 RX ORDER — ESCITALOPRAM OXALATE 10 MG/1
10 TABLET ORAL
Qty: 30 | Refills: 0 | Status: DISCONTINUED | COMMUNITY
Start: 2019-11-01 | End: 2021-04-09

## 2021-04-12 ENCOUNTER — NON-APPOINTMENT (OUTPATIENT)
Age: 41
End: 2021-04-12

## 2021-04-16 LAB
ALBUMIN SERPL ELPH-MCNC: 5 G/DL
ALP BLD-CCNC: 139 U/L
ALT SERPL-CCNC: 25 U/L
ANION GAP SERPL CALC-SCNC: 13 MMOL/L
AST SERPL-CCNC: 27 U/L
BASOPHILS # BLD AUTO: 0.05 K/UL
BASOPHILS NFR BLD AUTO: 0.8 %
BILIRUB SERPL-MCNC: 0.2 MG/DL
BUN SERPL-MCNC: 28 MG/DL
C TRACH RRNA SPEC QL NAA+PROBE: NOT DETECTED
CALCIUM SERPL-MCNC: 9.7 MG/DL
CHLORIDE SERPL-SCNC: 106 MMOL/L
CHOLEST SERPL-MCNC: 214 MG/DL
CO2 SERPL-SCNC: 22 MMOL/L
CREAT SERPL-MCNC: 1.23 MG/DL
EOSINOPHIL # BLD AUTO: 0.46 K/UL
EOSINOPHIL NFR BLD AUTO: 7.1 %
ESTIMATED AVERAGE GLUCOSE: 120 MG/DL
GLUCOSE SERPL-MCNC: 120 MG/DL
HBA1C MFR BLD HPLC: 5.8 %
HCT VFR BLD CALC: 34.6 %
HCV AB SER QL: NONREACTIVE
HCV S/CO RATIO: 0.24 S/CO
HDLC SERPL-MCNC: 49 MG/DL
HGB BLD-MCNC: 10.9 G/DL
HIV1+2 AB SPEC QL IA.RAPID: NONREACTIVE
IMM GRANULOCYTES NFR BLD AUTO: 0.3 %
LDLC SERPL CALC-MCNC: 132 MG/DL
LYMPHOCYTES # BLD AUTO: 1.61 K/UL
LYMPHOCYTES NFR BLD AUTO: 24.9 %
MAN DIFF?: NORMAL
MCHC RBC-ENTMCNC: 28 PG
MCHC RBC-ENTMCNC: 31.5 GM/DL
MCV RBC AUTO: 88.9 FL
MONOCYTES # BLD AUTO: 0.39 K/UL
MONOCYTES NFR BLD AUTO: 6 %
N GONORRHOEA RRNA SPEC QL NAA+PROBE: NOT DETECTED
NEUTROPHILS # BLD AUTO: 3.94 K/UL
NEUTROPHILS NFR BLD AUTO: 60.9 %
NONHDLC SERPL-MCNC: 165 MG/DL
PLATELET # BLD AUTO: 242 K/UL
POTASSIUM SERPL-SCNC: 4.9 MMOL/L
PROT SERPL-MCNC: 7.3 G/DL
RBC # BLD: 3.89 M/UL
RBC # FLD: 13.9 %
SODIUM SERPL-SCNC: 141 MMOL/L
SOURCE AMPLIFICATION: NORMAL
T PALLIDUM AB SER QL IA: NEGATIVE
TRIGL SERPL-MCNC: 163 MG/DL
TSH SERPL-ACNC: 2.79 UIU/ML
WBC # FLD AUTO: 6.47 K/UL

## 2021-04-16 NOTE — PHYSICAL EXAM
[No Acute Distress] : no acute distress [Well Nourished] : well nourished [Well Developed] : well developed [Well-Appearing] : well-appearing [Normal Voice/Communication] : normal voice/communication [Normal Sclera/Conjunctiva] : normal sclera/conjunctiva [PERRL] : pupils equal round and reactive to light [EOMI] : extraocular movements intact [Normal Outer Ear/Nose] : the outer ears and nose were normal in appearance [Normal Oropharynx] : the oropharynx was normal [Normal TMs] : both tympanic membranes were normal [Normal Nasal Mucosa] : the nasal mucosa was normal [No JVD] : no jugular venous distention [No Lymphadenopathy] : no lymphadenopathy [Supple] : supple [Thyroid Normal, No Nodules] : the thyroid was normal and there were no nodules present [No Respiratory Distress] : no respiratory distress  [No Accessory Muscle Use] : no accessory muscle use [Clear to Auscultation] : lungs were clear to auscultation bilaterally [Normal Rate] : normal rate  [Regular Rhythm] : with a regular rhythm [Normal S1, S2] : normal S1 and S2 [No Murmur] : no murmur heard [No Carotid Bruits] : no carotid bruits [No Abdominal Bruit] : a ~M bruit was not heard ~T in the abdomen [No Varicosities] : no varicosities [Pedal Pulses Present] : the pedal pulses are present [No Edema] : there was no peripheral edema [No Palpable Aorta] : no palpable aorta [No Extremity Clubbing/Cyanosis] : no extremity clubbing/cyanosis [Soft] : abdomen soft [Non Tender] : non-tender [Non-distended] : non-distended [No Masses] : no abdominal mass palpated [No HSM] : no HSM [Normal Bowel Sounds] : normal bowel sounds [Normal Posterior Cervical Nodes] : no posterior cervical lymphadenopathy [Normal Anterior Cervical Nodes] : no anterior cervical lymphadenopathy [No CVA Tenderness] : no CVA  tenderness [No Spinal Tenderness] : no spinal tenderness [No Joint Swelling] : no joint swelling [Grossly Normal Strength/Tone] : grossly normal strength/tone [No Rash] : no rash [Coordination Grossly Intact] : coordination grossly intact [No Focal Deficits] : no focal deficits [Normal Gait] : normal gait [Cranial Nerves Oculomotor (III)] : the extraocular motions were intact [Cranial Nerves Trigeminal (V)] : sensation to the face and masseter strength were intact [Cranial Nerves Accessory (XI - Cranial And Spinal)] : shoulder shrug was intact bilaterally [Cranial Nerves Facial (VII)] : facial strength was intact bilaterally [2+] : left 2+ [Speech Grossly Normal] : speech grossly normal [Normal Affect] : the affect was normal [Normal Mood] : the mood was normal [Normal Insight/Judgement] : insight and judgment were intact [de-identified] : declines
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
## 2021-04-16 NOTE — HISTORY OF PRESENT ILLNESS
[FreeTextEntry1] : establish care [de-identified] : Mr. ZAKIYA BRICEÑO is a 40 year old male with PMH of asthma, last crisis 5 years ago, never in the ICU, DM, HLD, HTN, hypothyroidism, insomnia, panic disorder? who comes in to the office to establish are. Would like a refill on his alprazolam. No complaints today. \par Denies suicidal or homicidal thoughts\par \humza Sees endocrinology Dr Jacobs\humza Has not followed up with psych since 1 year ago.

## 2021-04-16 NOTE — ADDENDUM
[FreeTextEntry1] : 3:35 PM04/12/2021 Patient called the office a few times reporting that his alprazolam was sent to the wrong pharmacy and would like it to be sent to [facility] in [place]. As per conversation with the pharmacist at [facility] where all his medications from 04/09/2021 visit were sent as per patient's request, patient's prescription went through but was not filled because he normally gets the controlled substances including Suboxone at [facility].  Pharmacists also reports that patient was paying cash for which the pharmacists advised him to give the insurance information as he used to do before when he filled medications. Pharmacists reports that advised the patient to get his controlled substances at one pharmacy. \par I asked the pharmacist to cancel this prescription at [facility] and will send a new prescription to [facility]\par iStop reference [id number]\par \par \par 2nd ADDENDUM 3:51 PM04/16/2021  Test results from 04/09/2021 reviewed and discussed with patient over the phone. Elevated Alk phosp: Advised. Will repeat on next blood drawn. Denies history of gallstones.\par HbA1C 5.8%: DM controlled, unclear if takes metformin 500 mg QD as per patient or 100 mg BID as per endocrino note. Will clarify dose on next appointment\par Lipid panel: HLD, ASCVD risk > 7/5% and also has DM, will advised regarding statins use on next appointment.\par Advised to take iron and vit C with as needed Miralax for constipation. Will repeat within 1 month\par Rest of labs unremarkable but still pending Drug screen.\par Patient reports that tested positive for COVID-19 a few days ago, his symptoms started around  04/06/2021, denies shortness of breath, reports that is tolerating the oral diet. Has only mild fatigue and mild dyspnea on exertion. I advised him to monitor his symptoms, increase his oral hydration and to go to the emergency department if they worsen. Patient will call the office next week for follow up

## 2021-04-16 NOTE — HEALTH RISK ASSESSMENT
[] : Yes [30 or more] : 30 or more [Monthly or less (1 pt)] : Monthly or less (1 point) [1 or 2 (0 pts)] : 1 or 2 (0 points) [Never (0 pts)] : Never (0 points) [No] : In the past 12 months have you used drugs other than those required for medical reasons? 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

## 2021-04-20 ENCOUNTER — TRANSCRIPTION ENCOUNTER (OUTPATIENT)
Age: 41
End: 2021-04-20

## 2021-04-22 LAB
AMPHET UR-MCNC: NEGATIVE
BARBITURATES UR-MCNC: NEGATIVE
BENZODIAZ UR-MCNC: NEGATIVE
COCAINE METAB.OTHER UR-MCNC: NEGATIVE
CREATININE, URINE: 226.1 MG/DL
METHADONE UR-MCNC: NEGATIVE
METHAQUALONE UR-MCNC: NEGATIVE
OPIATES UR-MCNC: NEGATIVE
PCP UR-MCNC: NEGATIVE
PROPOXYPH UR QL: NEGATIVE
THC UR QL: NEGATIVE

## 2021-04-23 ENCOUNTER — APPOINTMENT (OUTPATIENT)
Dept: FAMILY MEDICINE | Facility: CLINIC | Age: 41
End: 2021-04-23
Payer: MEDICARE

## 2021-04-23 DIAGNOSIS — J45.909 UNSPECIFIED ASTHMA, UNCOMPLICATED: ICD-10-CM

## 2021-04-23 DIAGNOSIS — J06.9 ACUTE UPPER RESPIRATORY INFECTION, UNSPECIFIED: ICD-10-CM

## 2021-04-23 PROCEDURE — 99442: CPT | Mod: 95

## 2021-04-23 RX ORDER — ALBUTEROL SULFATE 90 UG/1
108 (90 BASE) INHALANT RESPIRATORY (INHALATION)
Qty: 2 | Refills: 1 | Status: ACTIVE | COMMUNITY
Start: 1900-01-01 | End: 1900-01-01

## 2021-04-23 NOTE — ASSESSMENT
[FreeTextEntry1] : \par In regards to asthma complicated by upper respiratory infection: likely 2/2 COVID-19. \par Patient reports cough and dyspnea on exertion which is now improving. He is speaking in full sentences, denies wheezing. Reports that feels better now.\par I advised the patient to take acetaminophen to control the fever, her oral hydration. Also to monitor for worsening symptoms including but not limited to uncontrolled fever, shortness of breath, O2 sat  < 95% worsening sore throat, weakness, being unable to tolerate the oral intake and to go to the Emergency department if the symptoms worsen. \par Advised to social distance, wear a mask and continue to follow up CDC guidelines for infectious diseases including but not limited to COVID-19\par Renewing his albuterol inhaler since the one he has "is old"\par Starting Medrol pack\par \par Call or return for any questions

## 2021-04-23 NOTE — HISTORY OF PRESENT ILLNESS
[Home] : at home, [unfilled] , at the time of the visit. [Medical Office: ([facility])___] : at the medical office located in  [Verbal consent obtained from patient] : the patient, [unfilled] [FreeTextEntry8] : Mr. ZAKIYA BRICEÑO is a 40 year old male with PMH of asthma, last crisis 5 years ago, never in the ICU, DM, HLD, HTN, hypothyroidism, insomnia, panic disorder? who is being seen via telephone for respiratory symptoms. He reports that his respiratory symptom started on 04/07/2021, tested positive for COVI-19 on 04/09/2021. Currently complaints of dry cough, shortness of breath on exertion, chest pain or palpation. Denies any wheezing. Reports that 4-5 days ago was feeling worse and now is improving. He still has some sweats but denies having fever since his symptoms started. Patient is eating and drinking, denies feeling weak or fatigue at this time.\humza \humza Sees endocrinology Dr Jacobs\humza Has not followed up with psych since 1 year ago.

## 2021-05-11 ENCOUNTER — APPOINTMENT (OUTPATIENT)
Dept: FAMILY MEDICINE | Facility: CLINIC | Age: 41
End: 2021-05-11

## 2021-05-19 ENCOUNTER — APPOINTMENT (OUTPATIENT)
Dept: FAMILY MEDICINE | Facility: CLINIC | Age: 41
End: 2021-05-19

## 2021-06-01 ENCOUNTER — EMERGENCY (EMERGENCY)
Facility: HOSPITAL | Age: 41
LOS: 1 days | Discharge: HOME | End: 2021-06-01
Attending: EMERGENCY MEDICINE
Payer: MEDICARE

## 2021-06-01 VITALS
HEART RATE: 78 BPM | WEIGHT: 229.94 LBS | HEIGHT: 69 IN | RESPIRATION RATE: 18 BRPM | OXYGEN SATURATION: 98 % | SYSTOLIC BLOOD PRESSURE: 101 MMHG | TEMPERATURE: 98 F | DIASTOLIC BLOOD PRESSURE: 60 MMHG

## 2021-06-01 DIAGNOSIS — Z96.649 PRESENCE OF UNSPECIFIED ARTIFICIAL HIP JOINT: Chronic | ICD-10-CM

## 2021-06-01 DIAGNOSIS — Z96.653 PRESENCE OF ARTIFICIAL KNEE JOINT, BILATERAL: Chronic | ICD-10-CM

## 2021-06-01 PROCEDURE — 99284 EMERGENCY DEPT VISIT MOD MDM: CPT

## 2021-06-01 NOTE — ED PROVIDER NOTE - CONSTITUTIONAL, MLM
normal... Well appearing, awake, alert, oriented to person, place, time/situation and in no apparent distress. Multiple facial and body tattoos.

## 2021-06-01 NOTE — ED PROVIDER NOTE - ATTENDING CONTRIBUTION TO CARE
42 yo M presents to ED referred to [facility] after calling [facility] because of racing thoughts and feels he needs to be admitted psychiatrically.  Pt denies any current SI/HI and is compliant with meds.  On exam awake and alert, cooperative in NAD, PERRL, throat clear mm moist, Neck supple, Cor Reg, Lungs clear b/l, Abd soft, NT., Ext FROM, Neuro non-focal.  will check labs and EKG for BH clearance and d/w Telepsych once medically cleared

## 2021-06-01 NOTE — ED PROVIDER NOTE - PROGRESS NOTE DETAILS
LISBETH León: Tele psych consulted. LISBETH León: Cleared by [employer]. Now requesting housing. Repeat BMP after IVF.

## 2021-06-01 NOTE — ED PROVIDER NOTE - OBJECTIVE STATEMENT
40 yo male PMHx panic disorder, HTN, former IVDU, left hip/knee replacement presents to ED. Patient states calls [facility] to be admitted for "borderline suicidal." Instructed patient to present to [facility] to be admitted. Patient admits to aggression and mood disorder. Previous admission to [facility]. No active suicidal ideation, but admits to possible HI.   Denies ETOH use, BH/VH.   Meds: Clonidine 0.3mg QID, Seroquel 800mg at bedside, Losartan 100mg QD, Norvasc 10mg at night, Suboxone 8mg sublingual strips TID, Klonopin 2mg QID. 42 yo male PMHx panic disorder, Bipolar disorder, HTN, stroke, former IVDU, left hip/knee replacement presents to ED requesting to be admitted for psychiatric help. Patient states calls [facility] to be admitted for "borderline suicidal." Instructed patient to present to [facility] to be admitted. Patient admits to aggression and mood disorder. Previous admission to [facility]. No active suicidal ideation, but admits to possible HI. No further complaints at this time.   Denies ETOH use, /VH.   Meds: Clonidine 0.3mg QID, Seroquel 800mg at bedside, Losartan 100mg QD, Norvasc 10mg at night, Suboxone 8mg sublingual strips TID, Klonopin 2mg QID.

## 2021-06-01 NOTE — ED PROVIDER NOTE - PATIENT PORTAL LINK FT
You can access the FollowMyHealth Patient Portal offered by [facility] by registering at the following website: [email or web address]. By joining [facility]’s FollowMyHealth portal, you will also be able to view your health information using other applications (apps) compatible with our system.

## 2021-06-01 NOTE — ED PROVIDER NOTE - CLINICAL SUMMARY MEDICAL DECISION MAKING FREE TEXT BOX
42 yo male PMHx panic disorder, Bipolar disorder, HTN, stroke, former IVDU, left hip/knee replacement presents to ED requesting to be admitted for psychiatric help. Plan: labs, Tele Psych.
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
## 2021-06-01 NOTE — ED ADULT TRIAGE NOTE - CHIEF COMPLAINT QUOTE
needs [place] placement denies si/hi, denies drugs/alcohol today, unsteady gait in triage slurred speech and falling asleep. denies other complaints at this time.

## 2021-06-01 NOTE — ED PROVIDER NOTE - NSFOLLOWUPINSTRUCTIONS_ED_ALL_ED_FT
continue present medications  follow up with psychiatry as soon as possible  return to ed with worse symptoms

## 2021-06-01 NOTE — ED PROVIDER NOTE - CARE PROVIDER_API CALL
Rashid Sood)  Internal Medicine; Nephrology  [address] [place] [address]  Phone: [phone]  Fax: [phone]  Follow Up Time:

## 2021-06-01 NOTE — ED PROVIDER NOTE - PMH
Anxiety    Bipolar disorder    GERD (gastroesophageal reflux disease)    HTN (hypertension)    Seizure-like activity    Stroke    Substance abuse  opioid use

## 2021-06-02 VITALS
DIASTOLIC BLOOD PRESSURE: 98 MMHG | RESPIRATION RATE: 20 BRPM | OXYGEN SATURATION: 98 % | SYSTOLIC BLOOD PRESSURE: 164 MMHG | HEART RATE: 88 BPM

## 2021-06-02 DIAGNOSIS — F13.20 SEDATIVE, HYPNOTIC OR ANXIOLYTIC DEPENDENCE, UNCOMPLICATED: ICD-10-CM

## 2021-06-02 LAB
ANION GAP SERPL CALC-SCNC: 10 MMOL/L — SIGNIFICANT CHANGE UP (ref 5–17)
ANION GAP SERPL CALC-SCNC: 11 MMOL/L — SIGNIFICANT CHANGE UP (ref 5–17)
APAP SERPL-MCNC: <3 UG/ML — LOW (ref 10–26)
BASOPHILS # BLD AUTO: 0.04 K/UL — SIGNIFICANT CHANGE UP (ref 0–0.2)
BASOPHILS NFR BLD AUTO: 0.6 % — SIGNIFICANT CHANGE UP (ref 0–2)
BUN SERPL-MCNC: 29.8 MG/DL — HIGH (ref 8–20)
BUN SERPL-MCNC: 30.3 MG/DL — HIGH (ref 8–20)
CALCIUM SERPL-MCNC: 8.8 MG/DL — SIGNIFICANT CHANGE UP (ref 8.6–10.2)
CALCIUM SERPL-MCNC: 9.7 MG/DL — SIGNIFICANT CHANGE UP (ref 8.6–10.2)
CHLORIDE SERPL-SCNC: 105 MMOL/L — SIGNIFICANT CHANGE UP (ref 98–107)
CHLORIDE SERPL-SCNC: 107 MMOL/L — SIGNIFICANT CHANGE UP (ref 98–107)
CO2 SERPL-SCNC: 25 MMOL/L — SIGNIFICANT CHANGE UP (ref 22–29)
CO2 SERPL-SCNC: 26 MMOL/L — SIGNIFICANT CHANGE UP (ref 22–29)
COVID-19 SPIKE DOMAIN AB INTERP: NEGATIVE — SIGNIFICANT CHANGE UP
COVID-19 SPIKE DOMAIN ANTIBODY RESULT: 0.4 U/ML — SIGNIFICANT CHANGE UP
CREAT SERPL-MCNC: 2.38 MG/DL — HIGH (ref 0.5–1.3)
CREAT SERPL-MCNC: 2.81 MG/DL — HIGH (ref 0.5–1.3)
EOSINOPHIL # BLD AUTO: 0.34 K/UL — SIGNIFICANT CHANGE UP (ref 0–0.5)
EOSINOPHIL NFR BLD AUTO: 5.3 % — SIGNIFICANT CHANGE UP (ref 0–6)
ETHANOL SERPL-MCNC: <10 MG/DL — SIGNIFICANT CHANGE UP (ref 0–9)
GLUCOSE SERPL-MCNC: 103 MG/DL — HIGH (ref 70–99)
GLUCOSE SERPL-MCNC: 105 MG/DL — HIGH (ref 70–99)
HCT VFR BLD CALC: 32.9 % — LOW (ref 39–50)
HGB BLD-MCNC: 10.5 G/DL — LOW (ref 13–17)
IMM GRANULOCYTES NFR BLD AUTO: 0.2 % — SIGNIFICANT CHANGE UP (ref 0–1.5)
LYMPHOCYTES # BLD AUTO: 1.69 K/UL — SIGNIFICANT CHANGE UP (ref 1–3.3)
LYMPHOCYTES # BLD AUTO: 26.2 % — SIGNIFICANT CHANGE UP (ref 13–44)
MCHC RBC-ENTMCNC: 28 PG — SIGNIFICANT CHANGE UP (ref 27–34)
MCHC RBC-ENTMCNC: 31.9 GM/DL — LOW (ref 32–36)
MCV RBC AUTO: 87.7 FL — SIGNIFICANT CHANGE UP (ref 80–100)
MONOCYTES # BLD AUTO: 0.49 K/UL — SIGNIFICANT CHANGE UP (ref 0–0.9)
MONOCYTES NFR BLD AUTO: 7.6 % — SIGNIFICANT CHANGE UP (ref 2–14)
NEUTROPHILS # BLD AUTO: 3.87 K/UL — SIGNIFICANT CHANGE UP (ref 1.8–7.4)
NEUTROPHILS NFR BLD AUTO: 60.1 % — SIGNIFICANT CHANGE UP (ref 43–77)
PLATELET # BLD AUTO: 251 K/UL — SIGNIFICANT CHANGE UP (ref 150–400)
POTASSIUM SERPL-MCNC: 4.8 MMOL/L — SIGNIFICANT CHANGE UP (ref 3.5–5.3)
POTASSIUM SERPL-MCNC: 5.6 MMOL/L — HIGH (ref 3.5–5.3)
POTASSIUM SERPL-SCNC: 4.8 MMOL/L — SIGNIFICANT CHANGE UP (ref 3.5–5.3)
POTASSIUM SERPL-SCNC: 5.6 MMOL/L — HIGH (ref 3.5–5.3)
RBC # BLD: 3.75 M/UL — LOW (ref 4.2–5.8)
RBC # FLD: 13 % — SIGNIFICANT CHANGE UP (ref 10.3–14.5)
SALICYLATES SERPL-MCNC: <0.6 MG/DL — LOW (ref 10–20)
SARS-COV-2 IGG+IGM SERPL QL IA: 0.4 U/ML — SIGNIFICANT CHANGE UP
SARS-COV-2 IGG+IGM SERPL QL IA: NEGATIVE — SIGNIFICANT CHANGE UP
SARS-COV-2 RNA SPEC QL NAA+PROBE: SIGNIFICANT CHANGE UP
SODIUM SERPL-SCNC: 141 MMOL/L — SIGNIFICANT CHANGE UP (ref 135–145)
SODIUM SERPL-SCNC: 143 MMOL/L — SIGNIFICANT CHANGE UP (ref 135–145)
WBC # BLD: 6.44 K/UL — SIGNIFICANT CHANGE UP (ref 3.8–10.5)
WBC # FLD AUTO: 6.44 K/UL — SIGNIFICANT CHANGE UP (ref 3.8–10.5)

## 2021-06-02 PROCEDURE — 85025 COMPLETE CBC W/AUTO DIFF WBC: CPT

## 2021-06-02 PROCEDURE — 86769 SARS-COV-2 COVID-19 ANTIBODY: CPT

## 2021-06-02 PROCEDURE — U0003: CPT

## 2021-06-02 PROCEDURE — U0005: CPT

## 2021-06-02 PROCEDURE — 80048 BASIC METABOLIC PNL TOTAL CA: CPT

## 2021-06-02 PROCEDURE — 82550 ASSAY OF CK (CPK): CPT

## 2021-06-02 PROCEDURE — 82553 CREATINE MB FRACTION: CPT

## 2021-06-02 PROCEDURE — 90792 PSYCH DIAG EVAL W/MED SRVCS: CPT | Mod: 95

## 2021-06-02 PROCEDURE — 99285 EMERGENCY DEPT VISIT HI MDM: CPT

## 2021-06-02 PROCEDURE — 80307 DRUG TEST PRSMV CHEM ANLYZR: CPT

## 2021-06-02 PROCEDURE — 36415 COLL VENOUS BLD VENIPUNCTURE: CPT

## 2021-06-02 PROCEDURE — 93010 ELECTROCARDIOGRAM REPORT: CPT

## 2021-06-02 PROCEDURE — 93005 ELECTROCARDIOGRAM TRACING: CPT

## 2021-06-02 RX ORDER — SODIUM CHLORIDE 9 MG/ML
1000 INJECTION INTRAMUSCULAR; INTRAVENOUS; SUBCUTANEOUS ONCE
Refills: 0 | Status: COMPLETED | OUTPATIENT
Start: 2021-06-02 | End: 2021-06-02

## 2021-06-02 RX ADMIN — Medication 2 MILLIGRAM(S): at 05:23

## 2021-06-02 RX ADMIN — SODIUM CHLORIDE 1000 MILLILITER(S): 9 INJECTION INTRAMUSCULAR; INTRAVENOUS; SUBCUTANEOUS at 06:07

## 2021-06-02 RX ADMIN — SODIUM CHLORIDE 1000 MILLILITER(S): 9 INJECTION INTRAMUSCULAR; INTRAVENOUS; SUBCUTANEOUS at 05:23

## 2021-06-02 NOTE — CHART NOTE - NSCHARTNOTEFT_GEN_A_CORE
SW Note: SW requested to assist pt with housing issues. pt was cleared for discharge by ED and  providers. Pt was offered substance abuse resources and possible referrals from  telepsych but pt refused. pt was instructed by telepsych to f/u with his MH provider in the community. Met with pt while he was in the main ED , security was present due to behavior issues. Pt was irritable. Confirmed he could not go back to his previous residence and would not disclose info about the setting he was in, just said" its not a sober home". offered to work with [facility] to explore shelter placement. pt refused. requested a medicaid taxi and gave his parents address. Pt made aware that SW could not get a taxi to an address that we could not confirm. Pt did try to call his parents from the nurses station but no one answered. Offered bus tickets and pt refused. Reviewed telepsych SW note and called and left [facility] for pts parents, [phone]. While waiting for a return call the pt was placed in the ED waiting room. I went out to waiting room to try to talk with pt again about [facility] emergency services, locations and [facility] center. ED security stated they had pt go outside due to behavior issues. provided security with some bus passes and hand outs on DASH center and [facility] locations/ emergency housing.   Received a call from the pts father [phone], who stated the pt is not welcomed at that home. He was made aware that the pt was discharged.

## 2021-06-02 NOTE — ED BEHAVIORAL HEALTH ASSESSMENT NOTE - DESCRIPTION
see BH note    Patient Name: Carl MurrayBirth Date: 1980  Address: [address] [place] [id number]: Male  Rx Written	Rx Dispensed	Drug	Quantity	Days Supply	Prescriber Name	Prescriber Harper #	Payment Method	Dispenser  05/24/2021	05/24/2021	clonazepam 2 mg tablet	60	30	Susanne Ewing	[id number]	Insurance	[facility]  05/11/2021	05/11/2021	buprenorphine-naloxone 8-2 mg sl film	90gm	30	Vangie Rincon	[id number]	Insurance	[facility]  04/08/2021	04/12/2021	buprenorphine-naloxone 8-2 mg sl film	84gm	28	Yoel Lake	[id number]	Insurance	[facility]  04/12/2021	04/12/2021	alprazolam 2 mg tablet	30	15	TEJAS Osorio	[id number]	Insurance	[facility]    Patient Name: Yamel Patel Date: 1980  Address: [address] [place] [id number]: Male  Rx Written	Rx Dispensed	Drug	Quantity	Days Supply	Prescriber Name	Prescriber Harper #	Payment Method	Dispenser  05/03/2021	05/03/2021	alprazolam 2 mg tablet	60	30	Susanne Ewing	[id number]	Insurance	[facility] Replacement of both hips, HTN as in HPI

## 2021-06-02 NOTE — ED ADULT NURSE NOTE - CHIEF COMPLAINT QUOTE
needs [place] placement denies si/hi, denies drugs/alcohol today, unsteady gait in triage slurred speech and falling asleep. denies other complaints at this time.
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
## 2021-06-02 NOTE — ED ADULT NURSE REASSESSMENT NOTE - NS ED NURSE REASSESS COMMENT FT1
MD at bedside, 1:1 DC, pt requesting need for housing. states he cannot go back to his group home. awaiting SW
pt waned by security in interview room. pt then placed in  3. A vape was then found In pt's room. Security called and made aware. security came to rechecked pt. a bottle of pills were found on pt and confiscated.
pt yelling, wont stay in assigned bed area. SW called for housing. pt cleared by psych and DC by MD. pt ripped out own IV. pt ate breakfast. drinking gingerale. pt escorted to waiting room by security,
report given to BREANNA Davidson. Pt transferred to B9R with 1:1 for safety.

## 2021-06-02 NOTE — ED BEHAVIORAL HEALTH NOTE - BEHAVIORAL HEALTH NOTE
Consult requested by EM Attending Dr. Velez at 2:48 Telepsychiatry attempted to consult at 3:50 but unable to initiate due to patient not being in a private room. ED staff aware and agreed to get patient in a private room.

## 2021-06-02 NOTE — ED ADULT NURSE NOTE - CAS EDP DISCH TYPE
Health Maintenance Due   Topic Date Due    Mammogram  05/04/2001    Colonoscopy  05/04/2011    Influenza Vaccine  08/01/2018        Home
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
## 2021-06-02 NOTE — ED BEHAVIORAL HEALTH ASSESSMENT NOTE - RISK ASSESSMENT
Low Acute Suicide Risk af problems w housing  cf substance use, male, single   pf help seeking     pt precontemplative regarding substance use treatment, unwilling to complete safety plan

## 2021-06-03 NOTE — ED ADULT NURSE NOTE - CAS TRG GEN SKIN COLOR
Patient Returning Call  Reason for call  UTI  Information relayed to patient:  The writer read the following to patient per Dr Gillian Garzon: I guess I would recommend she be seen.  The writer read the following to patient per CMA:pt is also due for a Px, would she be willing to schedule that as well? She is going to need FU in some way for medication refills and she hasnt had a Px since march 2017  Patient has additional questions:  No  If YES, what are your questions/concerns:  Transferred to scheduling.   Okay to leave a detailed message?: No call back needed   Normal for race

## 2021-06-04 ENCOUNTER — NON-APPOINTMENT (OUTPATIENT)
Age: 41
End: 2021-06-04

## 2021-06-04 ENCOUNTER — APPOINTMENT (OUTPATIENT)
Dept: FAMILY MEDICINE | Facility: CLINIC | Age: 41
End: 2021-06-04
Payer: MEDICARE

## 2021-06-04 PROCEDURE — 99442: CPT | Mod: 95
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
## 2021-06-07 ENCOUNTER — INPATIENT (INPATIENT)
Facility: HOSPITAL | Age: 41
LOS: 4 days | Discharge: HOME | DRG: 917 | End: 2021-06-12
Attending: STUDENT IN AN ORGANIZED HEALTH CARE EDUCATION/TRAINING PROGRAM | Admitting: INTERNAL MEDICINE
Payer: MEDICARE

## 2021-06-07 VITALS
OXYGEN SATURATION: 92 % | DIASTOLIC BLOOD PRESSURE: 40 MMHG | RESPIRATION RATE: 12 BRPM | HEIGHT: 69 IN | HEART RATE: 71 BPM | SYSTOLIC BLOOD PRESSURE: 82 MMHG

## 2021-06-07 DIAGNOSIS — Z96.649 PRESENCE OF UNSPECIFIED ARTIFICIAL HIP JOINT: Chronic | ICD-10-CM

## 2021-06-07 DIAGNOSIS — I95.9 HYPOTENSION, UNSPECIFIED: ICD-10-CM

## 2021-06-07 DIAGNOSIS — Z96.653 PRESENCE OF ARTIFICIAL KNEE JOINT, BILATERAL: Chronic | ICD-10-CM

## 2021-06-07 LAB
ALBUMIN SERPL ELPH-MCNC: 4.2 G/DL — SIGNIFICANT CHANGE UP (ref 3.3–5.2)
ALP SERPL-CCNC: 140 U/L — HIGH (ref 40–120)
ALT FLD-CCNC: 31 U/L — SIGNIFICANT CHANGE UP
ANION GAP SERPL CALC-SCNC: 17 MMOL/L — SIGNIFICANT CHANGE UP (ref 5–17)
APAP SERPL-MCNC: <3 UG/ML — LOW (ref 10–26)
AST SERPL-CCNC: 45 U/L — HIGH
BASOPHILS # BLD AUTO: 0.04 K/UL — SIGNIFICANT CHANGE UP (ref 0–0.2)
BASOPHILS NFR BLD AUTO: 0.5 % — SIGNIFICANT CHANGE UP (ref 0–2)
BILIRUB SERPL-MCNC: 0.3 MG/DL — LOW (ref 0.4–2)
BUN SERPL-MCNC: 50.7 MG/DL — HIGH (ref 8–20)
CALCIUM SERPL-MCNC: 9.5 MG/DL — SIGNIFICANT CHANGE UP (ref 8.6–10.2)
CHLORIDE SERPL-SCNC: 104 MMOL/L — SIGNIFICANT CHANGE UP (ref 98–107)
CO2 SERPL-SCNC: 20 MMOL/L — LOW (ref 22–29)
CREAT SERPL-MCNC: 4.46 MG/DL — HIGH (ref 0.5–1.3)
EOSINOPHIL # BLD AUTO: 0.3 K/UL — SIGNIFICANT CHANGE UP (ref 0–0.5)
EOSINOPHIL NFR BLD AUTO: 3.4 % — SIGNIFICANT CHANGE UP (ref 0–6)
ETHANOL SERPL-MCNC: <10 MG/DL — SIGNIFICANT CHANGE UP (ref 0–9)
GAS PNL BLDA: SIGNIFICANT CHANGE UP
GLUCOSE BLDC GLUCOMTR-MCNC: 103 MG/DL — HIGH (ref 70–99)
GLUCOSE BLDC GLUCOMTR-MCNC: 232 MG/DL — HIGH (ref 70–99)
GLUCOSE BLDC GLUCOMTR-MCNC: 235 MG/DL — HIGH (ref 70–99)
GLUCOSE BLDC GLUCOMTR-MCNC: <30 MG/DL — CRITICAL LOW (ref 70–99)
GLUCOSE BLDC GLUCOMTR-MCNC: <30 MG/DL — CRITICAL LOW (ref 70–99)
GLUCOSE SERPL-MCNC: 115 MG/DL — HIGH (ref 70–99)
HCT VFR BLD CALC: 33.4 % — LOW (ref 39–50)
HGB BLD-MCNC: 11.3 G/DL — LOW (ref 13–17)
IMM GRANULOCYTES NFR BLD AUTO: 0.3 % — SIGNIFICANT CHANGE UP (ref 0–1.5)
LYMPHOCYTES # BLD AUTO: 1.54 K/UL — SIGNIFICANT CHANGE UP (ref 1–3.3)
LYMPHOCYTES # BLD AUTO: 17.5 % — SIGNIFICANT CHANGE UP (ref 13–44)
MCHC RBC-ENTMCNC: 28.3 PG — SIGNIFICANT CHANGE UP (ref 27–34)
MCHC RBC-ENTMCNC: 33.8 GM/DL — SIGNIFICANT CHANGE UP (ref 32–36)
MCV RBC AUTO: 83.7 FL — SIGNIFICANT CHANGE UP (ref 80–100)
MONOCYTES # BLD AUTO: 0.68 K/UL — SIGNIFICANT CHANGE UP (ref 0–0.9)
MONOCYTES NFR BLD AUTO: 7.7 % — SIGNIFICANT CHANGE UP (ref 2–14)
NEUTROPHILS # BLD AUTO: 6.2 K/UL — SIGNIFICANT CHANGE UP (ref 1.8–7.4)
NEUTROPHILS NFR BLD AUTO: 70.6 % — SIGNIFICANT CHANGE UP (ref 43–77)
PLATELET # BLD AUTO: 255 K/UL — SIGNIFICANT CHANGE UP (ref 150–400)
POTASSIUM SERPL-MCNC: 4.5 MMOL/L — SIGNIFICANT CHANGE UP (ref 3.5–5.3)
POTASSIUM SERPL-SCNC: 4.5 MMOL/L — SIGNIFICANT CHANGE UP (ref 3.5–5.3)
PROT SERPL-MCNC: 7.2 G/DL — SIGNIFICANT CHANGE UP (ref 6.6–8.7)
RBC # BLD: 3.99 M/UL — LOW (ref 4.2–5.8)
RBC # FLD: 12.8 % — SIGNIFICANT CHANGE UP (ref 10.3–14.5)
SALICYLATES SERPL-MCNC: <0.6 MG/DL — LOW (ref 10–20)
SARS-COV-2 RNA SPEC QL NAA+PROBE: SIGNIFICANT CHANGE UP
SODIUM SERPL-SCNC: 141 MMOL/L — SIGNIFICANT CHANGE UP (ref 135–145)
WBC # BLD: 8.79 K/UL — SIGNIFICANT CHANGE UP (ref 3.8–10.5)
WBC # FLD AUTO: 8.79 K/UL — SIGNIFICANT CHANGE UP (ref 3.8–10.5)

## 2021-06-07 PROCEDURE — 99291 CRITICAL CARE FIRST HOUR: CPT

## 2021-06-07 PROCEDURE — 71045 X-RAY EXAM CHEST 1 VIEW: CPT | Mod: 26

## 2021-06-07 PROCEDURE — 99223 1ST HOSP IP/OBS HIGH 75: CPT

## 2021-06-07 RX ORDER — BUPRENORPHINE AND NALOXONE 2; .5 MG/1; MG/1
1 TABLET SUBLINGUAL
Qty: 0 | Refills: 0 | DISCHARGE

## 2021-06-07 RX ORDER — NOREPINEPHRINE BITARTRATE/D5W 8 MG/250ML
0.05 PLASTIC BAG, INJECTION (ML) INTRAVENOUS
Qty: 8 | Refills: 0 | Status: DISCONTINUED | OUTPATIENT
Start: 2021-06-07 | End: 2021-06-08

## 2021-06-07 RX ORDER — SODIUM CHLORIDE 9 MG/ML
2000 INJECTION INTRAMUSCULAR; INTRAVENOUS; SUBCUTANEOUS ONCE
Refills: 0 | Status: COMPLETED | OUTPATIENT
Start: 2021-06-07 | End: 2021-06-07

## 2021-06-07 RX ORDER — AMLODIPINE BESYLATE 2.5 MG/1
1 TABLET ORAL
Qty: 0 | Refills: 0 | DISCHARGE

## 2021-06-07 RX ORDER — PANTOPRAZOLE SODIUM 20 MG/1
40 TABLET, DELAYED RELEASE ORAL DAILY
Refills: 0 | Status: DISCONTINUED | OUTPATIENT
Start: 2021-06-07 | End: 2021-06-11

## 2021-06-07 RX ORDER — SODIUM BICARBONATE 1 MEQ/ML
0.19 SYRINGE (ML) INTRAVENOUS
Qty: 150 | Refills: 0 | Status: DISCONTINUED | OUTPATIENT
Start: 2021-06-07 | End: 2021-06-07

## 2021-06-07 RX ORDER — GLUCAGON INJECTION, SOLUTION 0.5 MG/.1ML
0.02 INJECTION, SOLUTION SUBCUTANEOUS
Qty: 5 | Refills: 0 | Status: DISCONTINUED | OUTPATIENT
Start: 2021-06-07 | End: 2021-06-07

## 2021-06-07 RX ORDER — METFORMIN HYDROCHLORIDE 850 MG/1
1 TABLET ORAL
Qty: 0 | Refills: 0 | DISCHARGE

## 2021-06-07 RX ORDER — CLONAZEPAM 1 MG
2 TABLET ORAL
Refills: 0 | Status: DISCONTINUED | OUTPATIENT
Start: 2021-06-07 | End: 2021-06-08

## 2021-06-07 RX ORDER — QUETIAPINE FUMARATE 200 MG/1
2 TABLET, FILM COATED ORAL
Qty: 0 | Refills: 0 | DISCHARGE

## 2021-06-07 RX ORDER — LAMOTRIGINE 25 MG/1
200 TABLET, ORALLY DISINTEGRATING ORAL DAILY
Refills: 0 | Status: DISCONTINUED | OUTPATIENT
Start: 2021-06-07 | End: 2021-06-12

## 2021-06-07 RX ORDER — DEXTROSE 50 % IN WATER 50 %
50 SYRINGE (ML) INTRAVENOUS ONCE
Refills: 0 | Status: COMPLETED | OUTPATIENT
Start: 2021-06-07 | End: 2021-06-07

## 2021-06-07 RX ORDER — GLUCAGON INJECTION, SOLUTION 0.5 MG/.1ML
5 INJECTION, SOLUTION SUBCUTANEOUS ONCE
Refills: 0 | Status: COMPLETED | OUTPATIENT
Start: 2021-06-07 | End: 2021-06-07

## 2021-06-07 RX ORDER — BUPROPION HYDROCHLORIDE 150 MG/1
300 TABLET, EXTENDED RELEASE ORAL
Qty: 0 | Refills: 0 | DISCHARGE

## 2021-06-07 RX ORDER — MIRTAZAPINE 45 MG/1
30 TABLET, ORALLY DISINTEGRATING ORAL AT BEDTIME
Refills: 0 | Status: DISCONTINUED | OUTPATIENT
Start: 2021-06-07 | End: 2021-06-08

## 2021-06-07 RX ORDER — LEVOTHYROXINE SODIUM 125 MCG
1 TABLET ORAL
Qty: 0 | Refills: 0 | DISCHARGE

## 2021-06-07 RX ORDER — PHENYLEPHRINE HYDROCHLORIDE 10 MG/ML
0.5 INJECTION INTRAVENOUS
Qty: 40 | Refills: 0 | Status: DISCONTINUED | OUTPATIENT
Start: 2021-06-07 | End: 2021-06-07

## 2021-06-07 RX ORDER — EPINEPHRINE 0.3 MG/.3ML
0.03 INJECTION INTRAMUSCULAR; SUBCUTANEOUS
Qty: 4 | Refills: 0 | Status: DISCONTINUED | OUTPATIENT
Start: 2021-06-07 | End: 2021-06-08

## 2021-06-07 RX ORDER — HEPARIN SODIUM 5000 [USP'U]/ML
5000 INJECTION INTRAVENOUS; SUBCUTANEOUS EVERY 8 HOURS
Refills: 0 | Status: DISCONTINUED | OUTPATIENT
Start: 2021-06-07 | End: 2021-06-12

## 2021-06-07 RX ORDER — SODIUM CHLORIDE 9 MG/ML
1000 INJECTION, SOLUTION INTRAVENOUS ONCE
Refills: 0 | Status: COMPLETED | OUTPATIENT
Start: 2021-06-07 | End: 2021-06-07

## 2021-06-07 RX ADMIN — GLUCAGON INJECTION, SOLUTION 20 MG/KG/HR: 0.5 INJECTION, SOLUTION SUBCUTANEOUS at 18:52

## 2021-06-07 RX ADMIN — Medication 125 MEQ/KG/HR: at 22:10

## 2021-06-07 RX ADMIN — SODIUM CHLORIDE 2000 MILLILITER(S): 9 INJECTION INTRAMUSCULAR; INTRAVENOUS; SUBCUTANEOUS at 18:52

## 2021-06-07 RX ADMIN — EPINEPHRINE 11.2 MICROGRAM(S)/KG/MIN: 0.3 INJECTION INTRAMUSCULAR; SUBCUTANEOUS at 22:09

## 2021-06-07 RX ADMIN — SODIUM CHLORIDE 2000 MILLILITER(S): 9 INJECTION INTRAMUSCULAR; INTRAVENOUS; SUBCUTANEOUS at 17:42

## 2021-06-07 RX ADMIN — GLUCAGON INJECTION, SOLUTION 5 MILLIGRAM(S): 0.5 INJECTION, SOLUTION SUBCUTANEOUS at 18:52

## 2021-06-07 RX ADMIN — SODIUM CHLORIDE 1000 MILLILITER(S): 9 INJECTION, SOLUTION INTRAVENOUS at 22:10

## 2021-06-07 RX ADMIN — Medication 50 MILLILITER(S): at 23:20

## 2021-06-07 RX ADMIN — GLUCAGON INJECTION, SOLUTION 20 MG/KG/HR: 0.5 INJECTION, SOLUTION SUBCUTANEOUS at 19:27

## 2021-06-07 RX ADMIN — GLUCAGON INJECTION, SOLUTION 5 MILLIGRAM(S): 0.5 INJECTION, SOLUTION SUBCUTANEOUS at 19:40

## 2021-06-07 RX ADMIN — PHENYLEPHRINE HYDROCHLORIDE 18.7 MICROGRAM(S)/KG/MIN: 10 INJECTION INTRAVENOUS at 20:00

## 2021-06-07 RX ADMIN — GLUCAGON INJECTION, SOLUTION 20 MG/KG/HR: 0.5 INJECTION, SOLUTION SUBCUTANEOUS at 20:00

## 2021-06-07 NOTE — H&P ADULT - ASSESSMENT
41M smoker w/ PMHx HTN, bipolar disorder, polysubstance abuse on suboxone, stroke was sent in by family after he kept talking about committing suicide    Hypotension ?possible medication overdose   LIV     Neuro: Mentation appears to at baseline. Avoid sedation if possible. Will need Psych eval in AM and restart regular psych meds and Suboxone. Constant observation/ suicide watch   Cardiovascular: Hold all antihypertensives including labetalol, clonidine. Switch norsynephrine to Epinephrine. Stop glucagon ( received total ~ 20mg in ED). EKG w/ normal QRS/QTc. Aim for MAP target 65. Trend enzymes  Resp/Chest: Maintain SpO2 > 92%. On Volume AC . Wean per protocol as tolerated and daily SBT. Vent bundle. EtCO2 monitoring  GI/Nutrition: Diet: Keep NPO for now/ On tube feeds. IV PPI. Bowel regimen as needed  ID: f/u blood / urine cultures. Trend LA. ID f/u   Renal: Avoid nephrotoxic agents. Strict I&O  Endocrinology: Check A1c, TSH and lipid panel. Maintain Blood sugar < 180. ISS as per protocol  Haem/Oncology:  DVT ppx w/ Lovenox  Code status: Full/ DNR/DNI  Line/tubes/ drains: R/L IJ TLC ( shock), Keiko: , ETT/OGT: , Perez    Critical Care time: 35 minutes assessing presenting problems of acute illness that poses high probability of life threatening deterioration or end organ damage/dysfunction.  Medical decision making including Initiating plan of care, reviewing data, reviewing radiology, discussing with multidisciplinary team, non inclusive of procedures     41M smoker w/ PMHx HTN, bipolar disorder, polysubstance abuse on suboxone, stroke was sent in by family after he kept talking about committing suicide    Hypotension ?possible medication overdose   Suicidal ideations  Acute renal failure     Neuro: Mentation appears to at baseline. Avoid sedation if possible. Will need Psych eval in AM and restart regular psych meds and Suboxone. Constant observation/ suicide watch   Cardiovascular: Hold all antihypertensives including labetalol, clonidine, ARBs, norvasc. Switch norsynephrine to Epinephrine. Stop glucagon (received total ~ 20mg in ED). EKG w/ normal QRS/QTc. Aim for MAP target 65. Trend enzymes  Resp/Chest: Maintain SpO2 > 92%. Oxygenating well  GI/Nutrition: Diet: Keep NPO  ID: Check UA, COVID and BCx. Will hold off on empiric Abx for now. Trend LA  Renal: Given 2L NS in ED. Bolus another liter LR and started on bicarb gtt. Stop losartan, Metformin and meloxicam. Check renal US, CK, UA. Avoid nephrotoxic agents. Strict I&O. Renal eval AM  Endocrinology: Check A1c, TSH and lipid panel. Maintain Blood sugar < 180. ISS as per protocol  Haem/Oncology:  DVT ppx w/ HSQ  Code status: Full    Critical Care time: 35 minutes assessing presenting problems of acute illness that poses high probability of life threatening deterioration or end organ damage/dysfunction.  Medical decision making including Initiating plan of care, reviewing data, reviewing radiology, discussing with multidisciplinary team, non inclusive of procedures     41M smoker w/ PMHx HTN, bipolar disorder, polysubstance abuse on suboxone, stroke was sent in by family after he kept talking about committing suicide    Hypotension ?possible medication overdose   Suicidal ideations  Acute renal failure     Neuro: Mentation appears to at baseline. Avoid sedation if possible. Will need Psych eval in AM and restart regular psych meds and Suboxone. Constant observation/ suicide watch   Cardiovascular: Hold all antihypertensives including labetalol, clonidine, ARBs, Norvasc. Switch norsynephrine to Epinephrine. Stop glucagon (received total ~ 20mg in ED). EKG w/ normal QRS/QTc. Aim for MAP target 65. Trend enzymes  Resp/Chest: Maintain SpO2 > 92%. Oxygenating well  GI/Nutrition: Diet: Keep NPO  ID: Check UA, COVID and BCx. Will hold off on empiric Abx for now. Trend LA  Renal: Given 2L NS in ED. Bolus another liter LR and started on bicarb gtt. Stop losartan, Metformin and meloxicam. Check renal US, CK, UA and toxicology. Strict I&O. Renal eval AM  Endocrinology: Check A1c, TSH and lipid panel. Maintain Blood sugar < 180. ISS as per protocol  Haem/Oncology:  DVT ppx w/ HSQ  Code status: Full    Critical Care time: 35 minutes assessing presenting problems of acute illness that poses high probability of life threatening deterioration or end organ damage/dysfunction.  Medical decision making including Initiating plan of care, reviewing data, reviewing radiology, discussing with multidisciplinary team, non inclusive of procedures

## 2021-06-07 NOTE — ED PROVIDER NOTE - UNABLE TO OBTAIN
pt is uncooperative , he says nothing is wrong states took no drugs , no meds Severe Illness/Injury denies everything uncooperative somnolent
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
## 2021-06-07 NOTE — H&P ADULT - HISTORY OF PRESENT ILLNESS
41M smoker w/ PMHx HTN, bipolar disorder, polysubstance abuse on suboxone, stroke was sent in by family after he kept talking about committing suicide. As per mother, pt takes drugs and was recently kicked out of a sober house and was is currently homeless. He has been calling her and telling her that he is suicidal, wants to kill himself [place]. In ED pt was hypotensive 60/30 with HR in low 60s. He was given 2L NS bolus, glucagon 5mg x 2, started on a glucagon gtt and was also started on phenylephrine gtt. Denies he took any of his meds intentionally. EKG w/ NSR

## 2021-06-07 NOTE — ED PROVIDER NOTE - CLINICAL SUMMARY MEDICAL DECISION MAKING FREE TEXT BOX
done pt with hypotension on multiple medications abdomen soft h/h/ stable   HR 68   consider glucagon with good response systole 60 to 90   then dropped again on phenylephrine

## 2021-06-07 NOTE — ED PROVIDER NOTE - PATIENT PORTAL LINK FT
You can access the FollowMyHealth Patient Portal offered by [facility] by registering at the following website: [email or web address]. By joining [facility]’s FollowMyHealth portal, you will also be able to view your health information using other applications (apps) compatible with our system.

## 2021-06-07 NOTE — H&P ADULT - NSHPPHYSICALEXAM_GEN_ALL_CORE
General: No acute distress  HEENT: Pupils equal, reactive to light.  Symmetric. Moist mucosa  PULM: Clear to auscultation bilaterally  CVS: Regular rate and rhythm, no murmurs, rubs, or gallops  ABD: Soft, nondistended, nontender, normoactive bowel sounds  EXT: No edema, nontender  SKIN: Warm and well perfused, no rashes noted.  NEURO: Alert, oriented x 2, nonfocal. Moving all extremities

## 2021-06-07 NOTE — ED ADULT NURSE REASSESSMENT NOTE - NS ED NURSE REASSESS COMMENT FT1
As per ICU MD stop gege drip and start epi drip. ICU MD states pt. does not need glucagon drip anymore.

## 2021-06-07 NOTE — PHARMACOTHERAPY INTERVENTION NOTE - COMMENTS
Pt on glucagon infusion for suspected beta blocker overdose. Recommended infusion dose is 3 - 10 mG/Hr, however [place] and [employer]aris pumps default to mG/kG/Hr dosing. Spoke to RN to relay difference in units to avoid incorrect titration. Based on patient weight of 99.8 kG, titration parameters would be:  2 mG/Hr = 0.02 mG/kG/Hr  3 mG/Hr = 0.03 mG/kG/Hr  4 mG/Hr = 0.04 mG/kG/hr  ...  10 mG/Hr = 0.1 mG/kG/Hr
Called pharmacies to obtain medication list. Uses multiple pharmacies, has duplications of therapy (alprazolam and clonazepam, losartan and valsartan, etc.).
Hypotension

## 2021-06-07 NOTE — ED ADULT NURSE NOTE - OBJECTIVE STATEMENT
pt brought in appears pale and hypotensive MD parra at bedside, pt on monitor close to nurses pt is uncoperative "I only want medication for my back" pt denies other complaints other then back pain pt fall risk trying to get up refusing bracelet refusing to sit back on bed de escalation techniques used to maintain pt saftey.

## 2021-06-07 NOTE — ED ADULT NURSE REASSESSMENT NOTE - NS ED NURSE REASSESS COMMENT FT1
Pt. awake after talking to ICU. making suicidal statements. Pt. states  "I wish you left me dead" Pt. hostile about answering questions and states he wants to leave.

## 2021-06-07 NOTE — ED ADULT NURSE REASSESSMENT NOTE - NS ED NURSE REASSESS COMMENT FT1
Assumed pt. care at 1930 from off going RN.  Pt. sleeping but awakens to touch.  Pt. on cardiac monitor and continuos pulse ox. Pt. in NSR in lead II. Pt. respirations even and unlabored. Pt. in no apparent distress. Pt.  on glucagon drip and gege drip for BP. Pt. map above 65. ICU to consult with pt.
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
## 2021-06-08 LAB
ALBUMIN SERPL ELPH-MCNC: 3.9 G/DL — SIGNIFICANT CHANGE UP (ref 3.3–5.2)
ALP SERPL-CCNC: 139 U/L — HIGH (ref 40–120)
ALT FLD-CCNC: 31 U/L — SIGNIFICANT CHANGE UP
AMPHET UR-MCNC: POSITIVE
ANION GAP SERPL CALC-SCNC: 12 MMOL/L — SIGNIFICANT CHANGE UP (ref 5–17)
APTT BLD: 36.9 SEC — HIGH (ref 27.5–35.5)
AST SERPL-CCNC: 42 U/L — HIGH
BARBITURATES UR SCN-MCNC: NEGATIVE — SIGNIFICANT CHANGE UP
BASOPHILS # BLD AUTO: 0.03 K/UL — SIGNIFICANT CHANGE UP (ref 0–0.2)
BASOPHILS NFR BLD AUTO: 0.4 % — SIGNIFICANT CHANGE UP (ref 0–2)
BENZODIAZ UR-MCNC: POSITIVE
BILIRUB SERPL-MCNC: 0.3 MG/DL — LOW (ref 0.4–2)
BUN SERPL-MCNC: 47.8 MG/DL — HIGH (ref 8–20)
CALCIUM SERPL-MCNC: 8.7 MG/DL — SIGNIFICANT CHANGE UP (ref 8.6–10.2)
CHLORIDE SERPL-SCNC: 105 MMOL/L — SIGNIFICANT CHANGE UP (ref 98–107)
CK MB CFR SERPL CALC: 28.4 NG/ML — HIGH (ref 0–6.7)
CK SERPL-CCNC: 1645 U/L — HIGH (ref 30–200)
CO2 SERPL-SCNC: 23 MMOL/L — SIGNIFICANT CHANGE UP (ref 22–29)
COCAINE METAB.OTHER UR-MCNC: NEGATIVE — SIGNIFICANT CHANGE UP
CREAT SERPL-MCNC: 3.17 MG/DL — HIGH (ref 0.5–1.3)
EOSINOPHIL # BLD AUTO: 0.21 K/UL — SIGNIFICANT CHANGE UP (ref 0–0.5)
EOSINOPHIL NFR BLD AUTO: 2.5 % — SIGNIFICANT CHANGE UP (ref 0–6)
GLUCOSE BLDC GLUCOMTR-MCNC: 120 MG/DL — HIGH (ref 70–99)
GLUCOSE BLDC GLUCOMTR-MCNC: 139 MG/DL — HIGH (ref 70–99)
GLUCOSE BLDC GLUCOMTR-MCNC: 165 MG/DL — HIGH (ref 70–99)
GLUCOSE BLDC GLUCOMTR-MCNC: 177 MG/DL — HIGH (ref 70–99)
GLUCOSE BLDC GLUCOMTR-MCNC: 184 MG/DL — HIGH (ref 70–99)
GLUCOSE BLDC GLUCOMTR-MCNC: 210 MG/DL — HIGH (ref 70–99)
GLUCOSE BLDC GLUCOMTR-MCNC: 233 MG/DL — HIGH (ref 70–99)
GLUCOSE BLDC GLUCOMTR-MCNC: 297 MG/DL — HIGH (ref 70–99)
GLUCOSE BLDC GLUCOMTR-MCNC: 309 MG/DL — HIGH (ref 70–99)
GLUCOSE BLDC GLUCOMTR-MCNC: 377 MG/DL — HIGH (ref 70–99)
GLUCOSE BLDC GLUCOMTR-MCNC: 59 MG/DL — LOW (ref 70–99)
GLUCOSE SERPL-MCNC: 119 MG/DL — HIGH (ref 70–99)
HCT VFR BLD CALC: 33.1 % — LOW (ref 39–50)
HGB BLD-MCNC: 10.8 G/DL — LOW (ref 13–17)
IMM GRANULOCYTES NFR BLD AUTO: 0.4 % — SIGNIFICANT CHANGE UP (ref 0–1.5)
LACTATE SERPL-SCNC: 0.7 MMOL/L — SIGNIFICANT CHANGE UP (ref 0.5–2)
LDH SERPL L TO P-CCNC: 307 U/L — HIGH (ref 98–192)
LYMPHOCYTES # BLD AUTO: 1.04 K/UL — SIGNIFICANT CHANGE UP (ref 1–3.3)
LYMPHOCYTES # BLD AUTO: 12.3 % — LOW (ref 13–44)
MAGNESIUM SERPL-MCNC: 2.6 MG/DL — SIGNIFICANT CHANGE UP (ref 1.6–2.6)
MCHC RBC-ENTMCNC: 27.8 PG — SIGNIFICANT CHANGE UP (ref 27–34)
MCHC RBC-ENTMCNC: 32.6 GM/DL — SIGNIFICANT CHANGE UP (ref 32–36)
MCV RBC AUTO: 85.1 FL — SIGNIFICANT CHANGE UP (ref 80–100)
METHADONE UR-MCNC: NEGATIVE — SIGNIFICANT CHANGE UP
MONOCYTES # BLD AUTO: 0.65 K/UL — SIGNIFICANT CHANGE UP (ref 0–0.9)
MONOCYTES NFR BLD AUTO: 7.7 % — SIGNIFICANT CHANGE UP (ref 2–14)
NEUTROPHILS # BLD AUTO: 6.47 K/UL — SIGNIFICANT CHANGE UP (ref 1.8–7.4)
NEUTROPHILS NFR BLD AUTO: 76.7 % — SIGNIFICANT CHANGE UP (ref 43–77)
NT-PROBNP SERPL-SCNC: 27 PG/ML — SIGNIFICANT CHANGE UP (ref 0–300)
OPIATES UR-MCNC: NEGATIVE — SIGNIFICANT CHANGE UP
PCP SPEC-MCNC: SIGNIFICANT CHANGE UP
PCP UR-MCNC: NEGATIVE — SIGNIFICANT CHANGE UP
PHOSPHATE SERPL-MCNC: 4.5 MG/DL — SIGNIFICANT CHANGE UP (ref 2.4–4.7)
PLATELET # BLD AUTO: 193 K/UL — SIGNIFICANT CHANGE UP (ref 150–400)
POTASSIUM SERPL-MCNC: 4.1 MMOL/L — SIGNIFICANT CHANGE UP (ref 3.5–5.3)
POTASSIUM SERPL-SCNC: 4.1 MMOL/L — SIGNIFICANT CHANGE UP (ref 3.5–5.3)
PROT SERPL-MCNC: 6.4 G/DL — LOW (ref 6.6–8.7)
RBC # BLD: 3.89 M/UL — LOW (ref 4.2–5.8)
RBC # FLD: 12.8 % — SIGNIFICANT CHANGE UP (ref 10.3–14.5)
SODIUM SERPL-SCNC: 140 MMOL/L — SIGNIFICANT CHANGE UP (ref 135–145)
T4 AB SER-ACNC: 5.1 UG/DL — SIGNIFICANT CHANGE UP (ref 4.5–12)
THC UR QL: POSITIVE
TROPONIN T SERPL-MCNC: 0.03 NG/ML — SIGNIFICANT CHANGE UP (ref 0–0.06)
TSH SERPL-MCNC: 1.16 UIU/ML — SIGNIFICANT CHANGE UP (ref 0.27–4.2)
WBC # BLD: 8.43 K/UL — SIGNIFICANT CHANGE UP (ref 3.8–10.5)
WBC # FLD AUTO: 8.43 K/UL — SIGNIFICANT CHANGE UP (ref 3.8–10.5)

## 2021-06-08 PROCEDURE — 93306 TTE W/DOPPLER COMPLETE: CPT | Mod: 26

## 2021-06-08 PROCEDURE — 99233 SBSQ HOSP IP/OBS HIGH 50: CPT

## 2021-06-08 PROCEDURE — 99223 1ST HOSP IP/OBS HIGH 75: CPT

## 2021-06-08 PROCEDURE — 76775 US EXAM ABDO BACK WALL LIM: CPT | Mod: 26

## 2021-06-08 RX ORDER — ALPRAZOLAM 0.25 MG
2 TABLET ORAL
Refills: 0 | Status: DISCONTINUED | OUTPATIENT
Start: 2021-06-08 | End: 2021-06-12

## 2021-06-08 RX ORDER — SODIUM CHLORIDE 9 MG/ML
1000 INJECTION, SOLUTION INTRAVENOUS
Refills: 0 | Status: DISCONTINUED | OUTPATIENT
Start: 2021-06-08 | End: 2021-06-09

## 2021-06-08 RX ORDER — SODIUM CHLORIDE 9 MG/ML
1000 INJECTION INTRAMUSCULAR; INTRAVENOUS; SUBCUTANEOUS
Refills: 0 | Status: COMPLETED | OUTPATIENT
Start: 2021-06-08 | End: 2021-06-09

## 2021-06-08 RX ORDER — ALPRAZOLAM 0.25 MG
0.5 TABLET ORAL EVERY 12 HOURS
Refills: 0 | Status: DISCONTINUED | OUTPATIENT
Start: 2021-06-08 | End: 2021-06-08

## 2021-06-08 RX ORDER — HYDROMORPHONE HYDROCHLORIDE 2 MG/ML
0.5 INJECTION INTRAMUSCULAR; INTRAVENOUS; SUBCUTANEOUS ONCE
Refills: 0 | Status: DISCONTINUED | OUTPATIENT
Start: 2021-06-08 | End: 2021-06-08

## 2021-06-08 RX ORDER — VANCOMYCIN HCL 1 G
1000 VIAL (EA) INTRAVENOUS ONCE
Refills: 0 | Status: COMPLETED | OUTPATIENT
Start: 2021-06-08 | End: 2021-06-08

## 2021-06-08 RX ORDER — NICOTINE POLACRILEX 2 MG
1 GUM BUCCAL DAILY
Refills: 0 | Status: DISCONTINUED | OUTPATIENT
Start: 2021-06-08 | End: 2021-06-12

## 2021-06-08 RX ORDER — DEXTROSE 10 % IN WATER 10 %
1000 INTRAVENOUS SOLUTION INTRAVENOUS
Refills: 0 | Status: DISCONTINUED | OUTPATIENT
Start: 2021-06-08 | End: 2021-06-08

## 2021-06-08 RX ORDER — BUPRENORPHINE AND NALOXONE 2; .5 MG/1; MG/1
3 TABLET SUBLINGUAL
Refills: 0 | Status: DISCONTINUED | OUTPATIENT
Start: 2021-06-08 | End: 2021-06-12

## 2021-06-08 RX ORDER — HYDROCORTISONE 20 MG
50 TABLET ORAL EVERY 6 HOURS
Refills: 0 | Status: DISCONTINUED | OUTPATIENT
Start: 2021-06-08 | End: 2021-06-08

## 2021-06-08 RX ORDER — PIPERACILLIN AND TAZOBACTAM 4; .5 G/20ML; G/20ML
3.38 INJECTION, POWDER, LYOPHILIZED, FOR SOLUTION INTRAVENOUS ONCE
Refills: 0 | Status: COMPLETED | OUTPATIENT
Start: 2021-06-08 | End: 2021-06-08

## 2021-06-08 RX ORDER — INSULIN LISPRO 100/ML
VIAL (ML) SUBCUTANEOUS
Refills: 0 | Status: DISCONTINUED | OUTPATIENT
Start: 2021-06-08 | End: 2021-06-12

## 2021-06-08 RX ORDER — DEXTROSE 50 % IN WATER 50 %
50 SYRINGE (ML) INTRAVENOUS ONCE
Refills: 0 | Status: COMPLETED | OUTPATIENT
Start: 2021-06-08 | End: 2021-06-08

## 2021-06-08 RX ORDER — PIPERACILLIN AND TAZOBACTAM 4; .5 G/20ML; G/20ML
3.38 INJECTION, POWDER, LYOPHILIZED, FOR SOLUTION INTRAVENOUS EVERY 8 HOURS
Refills: 0 | Status: DISCONTINUED | OUTPATIENT
Start: 2021-06-08 | End: 2021-06-08

## 2021-06-08 RX ORDER — PIPERACILLIN AND TAZOBACTAM 4; .5 G/20ML; G/20ML
3.38 INJECTION, POWDER, LYOPHILIZED, FOR SOLUTION INTRAVENOUS EVERY 12 HOURS
Refills: 0 | Status: DISCONTINUED | OUTPATIENT
Start: 2021-06-08 | End: 2021-06-08

## 2021-06-08 RX ADMIN — Medication 2 MILLIGRAM(S): at 11:44

## 2021-06-08 RX ADMIN — Medication 2: at 11:44

## 2021-06-08 RX ADMIN — HYDROMORPHONE HYDROCHLORIDE 0.5 MILLIGRAM(S): 2 INJECTION INTRAMUSCULAR; INTRAVENOUS; SUBCUTANEOUS at 03:05

## 2021-06-08 RX ADMIN — PIPERACILLIN AND TAZOBACTAM 200 GRAM(S): 4; .5 INJECTION, POWDER, LYOPHILIZED, FOR SOLUTION INTRAVENOUS at 02:53

## 2021-06-08 RX ADMIN — SODIUM CHLORIDE 83 MILLILITER(S): 9 INJECTION INTRAMUSCULAR; INTRAVENOUS; SUBCUTANEOUS at 16:51

## 2021-06-08 RX ADMIN — Medication 50 MILLIGRAM(S): at 05:14

## 2021-06-08 RX ADMIN — Medication 4: at 16:54

## 2021-06-08 RX ADMIN — PANTOPRAZOLE SODIUM 40 MILLIGRAM(S): 20 TABLET, DELAYED RELEASE ORAL at 11:47

## 2021-06-08 RX ADMIN — HEPARIN SODIUM 5000 UNIT(S): 5000 INJECTION INTRAVENOUS; SUBCUTANEOUS at 22:33

## 2021-06-08 RX ADMIN — Medication 2 MILLIGRAM(S): at 22:46

## 2021-06-08 RX ADMIN — BUPRENORPHINE AND NALOXONE 3 TABLET(S): 2; .5 TABLET SUBLINGUAL at 10:48

## 2021-06-08 RX ADMIN — Medication 4: at 22:42

## 2021-06-08 RX ADMIN — Medication 0.3 MILLIGRAM(S): at 22:33

## 2021-06-08 RX ADMIN — HYDROMORPHONE HYDROCHLORIDE 0.5 MILLIGRAM(S): 2 INJECTION INTRAMUSCULAR; INTRAVENOUS; SUBCUTANEOUS at 02:52

## 2021-06-08 RX ADMIN — Medication 250 MILLIGRAM(S): at 02:53

## 2021-06-08 RX ADMIN — Medication 50 MILLILITER(S): at 00:12

## 2021-06-08 RX ADMIN — LAMOTRIGINE 200 MILLIGRAM(S): 25 TABLET, ORALLY DISINTEGRATING ORAL at 11:44

## 2021-06-08 RX ADMIN — Medication 0.3 MILLIGRAM(S): at 14:49

## 2021-06-08 NOTE — CONSULT NOTE ADULT - ASSESSMENT
41 year old male with PMH HTN, T2DM, Bipolar disorder, Anxiety disorder, opioid abuse now on Suboxone, Seizure from BDZ withdrawal, GERD, ?CKD presented with AMS and noted to have hypoglycemia and hypotension and admitted to MICU with  Glucagon and Epinephrine. Suspected to have OD, but patient denies. Instead states being outdoors and getting dehydration.   Improved overnight, weaned off IV pressors.      Hypotensive Shock - resolved. Suspect BB poisoning though patient denies  - Monitor off IV pressors    T2DM with Hypoglycemia - now resolved. Hyperglycemia  - Hold home OHA  - BGM with SSI    LIV, patient maintains he never had normal renal function. Normal SCR in October last year  on chart review. Interval improvement from admission.  - I/O charting, IVF, monitor renal indices  - Avoid Nephrotoxins    HTN  - Resume Labetalol and Clonidine  - hold off Losartan given renal failure    Bipolar  - Currently on Lamotrigine.  - psych follow up and clarification of medication regimen.    Anxiety  - Continue Xanax  - Psych follow up    Chronic Opioid dependence  - Continue Suboxone    GERD  - Continue PPI    Elevated LFT  - monitor transaminases    VTEp - UFH    Disposition - Anticipate to be medically stable within 24-48 hours; Pending Psych evaluation, patient desires to go to [place]
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
## 2021-06-09 DIAGNOSIS — F11.20 OPIOID DEPENDENCE, UNCOMPLICATED: ICD-10-CM

## 2021-06-09 LAB
A1C WITH ESTIMATED AVERAGE GLUCOSE RESULT: 5.8 % — HIGH (ref 4–5.6)
ALBUMIN SERPL ELPH-MCNC: 4.2 G/DL — SIGNIFICANT CHANGE UP (ref 3.3–5.2)
ALP SERPL-CCNC: 137 U/L — HIGH (ref 40–120)
ALT FLD-CCNC: 31 U/L — SIGNIFICANT CHANGE UP
ANION GAP SERPL CALC-SCNC: 11 MMOL/L — SIGNIFICANT CHANGE UP (ref 5–17)
AST SERPL-CCNC: 25 U/L — SIGNIFICANT CHANGE UP
BILIRUB DIRECT SERPL-MCNC: 0.1 MG/DL — SIGNIFICANT CHANGE UP (ref 0–0.3)
BILIRUB INDIRECT FLD-MCNC: SIGNIFICANT CHANGE UP MG/DL (ref 0.2–1)
BILIRUB SERPL-MCNC: <0.2 MG/DL — LOW (ref 0.4–2)
BUN SERPL-MCNC: 20.7 MG/DL — HIGH (ref 8–20)
CALCIUM SERPL-MCNC: 9.1 MG/DL — SIGNIFICANT CHANGE UP (ref 8.6–10.2)
CHLORIDE SERPL-SCNC: 105 MMOL/L — SIGNIFICANT CHANGE UP (ref 98–107)
CO2 SERPL-SCNC: 27 MMOL/L — SIGNIFICANT CHANGE UP (ref 22–29)
COVID-19 SPIKE DOMAIN AB INTERP: NEGATIVE — SIGNIFICANT CHANGE UP
COVID-19 SPIKE DOMAIN ANTIBODY RESULT: 0.4 U/ML — SIGNIFICANT CHANGE UP
CREAT SERPL-MCNC: 1.25 MG/DL — SIGNIFICANT CHANGE UP (ref 0.5–1.3)
ESTIMATED AVERAGE GLUCOSE: 120 MG/DL — HIGH (ref 68–114)
GLUCOSE BLDC GLUCOMTR-MCNC: 102 MG/DL — HIGH (ref 70–99)
GLUCOSE BLDC GLUCOMTR-MCNC: 105 MG/DL — HIGH (ref 70–99)
GLUCOSE BLDC GLUCOMTR-MCNC: 117 MG/DL — HIGH (ref 70–99)
GLUCOSE BLDC GLUCOMTR-MCNC: 129 MG/DL — HIGH (ref 70–99)
GLUCOSE SERPL-MCNC: 102 MG/DL — HIGH (ref 70–99)
HCT VFR BLD CALC: 35.8 % — LOW (ref 39–50)
HGB BLD-MCNC: 11.6 G/DL — LOW (ref 13–17)
MCHC RBC-ENTMCNC: 27.4 PG — SIGNIFICANT CHANGE UP (ref 27–34)
MCHC RBC-ENTMCNC: 32.4 GM/DL — SIGNIFICANT CHANGE UP (ref 32–36)
MCV RBC AUTO: 84.6 FL — SIGNIFICANT CHANGE UP (ref 80–100)
PLATELET # BLD AUTO: 212 K/UL — SIGNIFICANT CHANGE UP (ref 150–400)
POTASSIUM SERPL-MCNC: 4.5 MMOL/L — SIGNIFICANT CHANGE UP (ref 3.5–5.3)
POTASSIUM SERPL-SCNC: 4.5 MMOL/L — SIGNIFICANT CHANGE UP (ref 3.5–5.3)
PROT SERPL-MCNC: 6.9 G/DL — SIGNIFICANT CHANGE UP (ref 6.6–8.7)
RBC # BLD: 4.23 M/UL — SIGNIFICANT CHANGE UP (ref 4.2–5.8)
RBC # FLD: 13 % — SIGNIFICANT CHANGE UP (ref 10.3–14.5)
SARS-COV-2 IGG+IGM SERPL QL IA: 0.4 U/ML — SIGNIFICANT CHANGE UP
SARS-COV-2 IGG+IGM SERPL QL IA: NEGATIVE — SIGNIFICANT CHANGE UP
SODIUM SERPL-SCNC: 143 MMOL/L — SIGNIFICANT CHANGE UP (ref 135–145)
WBC # BLD: 4.8 K/UL — SIGNIFICANT CHANGE UP (ref 3.8–10.5)
WBC # FLD AUTO: 4.8 K/UL — SIGNIFICANT CHANGE UP (ref 3.8–10.5)

## 2021-06-09 PROCEDURE — 99232 SBSQ HOSP IP/OBS MODERATE 35: CPT

## 2021-06-09 PROCEDURE — 99223 1ST HOSP IP/OBS HIGH 75: CPT

## 2021-06-09 RX ORDER — HYDRALAZINE HCL 50 MG
50 TABLET ORAL EVERY 6 HOURS
Refills: 0 | Status: DISCONTINUED | OUTPATIENT
Start: 2021-06-09 | End: 2021-06-10

## 2021-06-09 RX ORDER — LABETALOL HCL 100 MG
200 TABLET ORAL EVERY 12 HOURS
Refills: 0 | Status: DISCONTINUED | OUTPATIENT
Start: 2021-06-09 | End: 2021-06-10

## 2021-06-09 RX ORDER — RISPERIDONE 4 MG/1
2 TABLET ORAL AT BEDTIME
Refills: 0 | Status: DISCONTINUED | OUTPATIENT
Start: 2021-06-09 | End: 2021-06-12

## 2021-06-09 RX ADMIN — Medication 0.3 MILLIGRAM(S): at 05:47

## 2021-06-09 RX ADMIN — PANTOPRAZOLE SODIUM 40 MILLIGRAM(S): 20 TABLET, DELAYED RELEASE ORAL at 11:34

## 2021-06-09 RX ADMIN — Medication 1 PATCH: at 08:05

## 2021-06-09 RX ADMIN — LAMOTRIGINE 200 MILLIGRAM(S): 25 TABLET, ORALLY DISINTEGRATING ORAL at 11:34

## 2021-06-09 RX ADMIN — Medication 0.3 MILLIGRAM(S): at 23:03

## 2021-06-09 RX ADMIN — HEPARIN SODIUM 5000 UNIT(S): 5000 INJECTION INTRAVENOUS; SUBCUTANEOUS at 05:47

## 2021-06-09 RX ADMIN — Medication 50 MILLIGRAM(S): at 16:48

## 2021-06-09 RX ADMIN — Medication 200 MILLIGRAM(S): at 16:48

## 2021-06-09 RX ADMIN — Medication 0.3 MILLIGRAM(S): at 11:41

## 2021-06-09 RX ADMIN — Medication 2 MILLIGRAM(S): at 11:41

## 2021-06-09 RX ADMIN — BUPRENORPHINE AND NALOXONE 3 TABLET(S): 2; .5 TABLET SUBLINGUAL at 10:14

## 2021-06-09 RX ADMIN — SODIUM CHLORIDE 83 MILLILITER(S): 9 INJECTION INTRAMUSCULAR; INTRAVENOUS; SUBCUTANEOUS at 05:47

## 2021-06-09 RX ADMIN — Medication 1 PATCH: at 17:09

## 2021-06-09 RX ADMIN — RISPERIDONE 2 MILLIGRAM(S): 4 TABLET ORAL at 23:04

## 2021-06-09 NOTE — BH CONSULTATION LIAISON ASSESSMENT NOTE - UNDOMICILED
Patient was living in a sober house which he left from several weeks ago and since then has been staying in hostels and sometimes with his parents

## 2021-06-09 NOTE — BH CONSULTATION LIAISON ASSESSMENT NOTE - NSBHCHARTREVIEWINVESTIGATE_PSY_A_CORE FT
< from: 12 Lead ECG (06.02.21 @ 00:41) >    Ventricular Rate 74 BPM    Atrial Rate 74 BPM    P-R Interval 194 ms    QRS Duration 106 ms    Q-T Interval 350 ms    QTC Calculation(Bazett) 388 ms    P Axis 46 degrees    R Axis 12 degrees    T Axis 25 degrees    Diagnosis Line *** Poor data quality, interpretation may be adversely affected  Normal sinus rhythm  Normal ECG    Confirmed by Ronaldo Suggs ([id number]) on 6/2/2021 7:07:21 AM    < end of copied text >

## 2021-06-09 NOTE — BH CONSULTATION LIAISON ASSESSMENT NOTE - NSBHCHARTREVIEWVS_PSY_A_CORE FT
Vital Signs Last 24 Hrs  T(C): 36.8 (09 Jun 2021 11:38), Max: 37 (08 Jun 2021 18:39)  T(F): 98.3 (09 Jun 2021 11:38), Max: 98.6 (08 Jun 2021 18:39)  HR: 83 (09 Jun 2021 11:38) (78 - 112)  BP: 209/119 (09 Jun 2021 11:38) (151/64 - 209/119)  BP(mean): 117 (08 Jun 2021 17:00) (89 - 117)  RR: 20 (09 Jun 2021 11:38) (20 - 25)  SpO2: 96% (09 Jun 2021 11:38) (96% - 99%)
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
## 2021-06-09 NOTE — PROGRESS NOTE ADULT - ASSESSMENT
41 year old male with PMH HTN, T2DM, Bipolar disorder, Anxiety disorder, opioid abuse now on Suboxone, Seizure from BDZ withdrawal, GERD, ?CKD presented with AMS and noted to have hypoglycemia and hypotension and admitted to MICU with  Glucagon and Epinephrine. Suspected to have OD, but patient denies. Instead states being outdoors and getting dehydration.   Improved overnight, weaned off IV pressors.    #Possible suicidal ideation  - Pt denying at this time.  - Psych 1:1  - Psych consulted, awaiting recs.    #Hypotensive Shock - resolved.   - Suspect BB poisoning though patient denies  - BP stable    #T2DM with Hypoglycemia - now resolved.   - Hold home OHA  - BGM with SSI    #LIV  - Now resolved   - Avoid Nephrotoxins  - Monitor BMP    #HTN, uncontrolled  - Resume Labetalol and Clonidine  - hold off Losartan given renal failure    #Bipolar  - Currently on Lamotrigine.  - psych follow up and clarification of medication regimen.    #Anxiety  - Continue Xanax  - Psych follow up    #Chronic Opioid dependence  - Continue Suboxone    #GERD  - Continue PPI    #Elevated LFT  - monitor transaminases    VTEp - UFH    Disposition - Anticipate to be medically stable within 24-48 hours; Pending Psych evaluation, patient desires to go to [place]

## 2021-06-09 NOTE — BH CONSULTATION LIAISON ASSESSMENT NOTE - NSBHCHARTREVIEWLAB_PSY_A_CORE FT
Basic Metabolic Panel in AM (06.09.21 @ 07:29)    Sodium, Serum: 143 mmol/L    Potassium, Serum: 4.5 mmol/L    Chloride, Serum: 105 mmol/L    Carbon Dioxide, Serum: 27.0 mmol/L    Anion Gap, Serum: 11 mmol/L    Blood Urea Nitrogen, Serum: 20.7 mg/dL    Creatinine, Serum: 1.25 mg/dL    Glucose, Serum: 102 mg/dL    Calcium, Total Serum: 9.1 mg/dL    eGFR if Non : 71: Interpretative comment  The units for eGFR are mL/min/1.73M2 (normalized body surface area). The  eGFR is calculated from a serum creatinine using the CKD-EPI equation.  Other variables required for calculation are race, age and sex. Among  patients with chronic kidney disease (CKD), the eGFR is useful in  determining the stage of disease according to KDOQI CKD classification.  All eGFR results are reported numerically with the following  interpretation.          GFR                    With                 Without     (ml/min/1.73 m2)    Kidney Damage       Kidney Damage        >= 90                    Stage 1                     Normal        60-89                    Stage 2                     Decreased GFR        30-59     Stage 3                     Stage 3        15-29                    Stage 4                     Stage 4        < 15                      Stage 5                     Stage 5  Each stage of CKD assumes that the associated GFR level has been in  effect for at least 3 months. Determination of stages one and two (with  eGFR > 59 ml/min/m2) requires estimation of kidney damage for at least 3  months as defined by structural or functional abnormalities.  Limitations: All estimates of GFR will be less accurate for patients at  extremes of muscle mass (including but not limited to frail elderly,  critically ill, or cancer patients), those with unusual diets, and those  with conditions associated with reduced secretion or extrarenal  elimination of creatinine. The eGFR equation is not recommended for use  in patients with unstable creatinine levels. mL/min/1.73M2    eGFR if African American: 82 mL/min/1.73M2

## 2021-06-09 NOTE — BH CONSULTATION LIAISON ASSESSMENT NOTE - CURRENT MEDICATION
MEDICATIONS  (STANDING):  buprenorphine 8 mG/naloxone 2 mG SL  Tablet 3 Tablet(s) SubLingual <User Schedule>  cloNIDine 0.3 milliGRAM(s) Oral every 8 hours  heparin   Injectable 5000 Unit(s) SubCutaneous every 8 hours  hydrALAZINE 50 milliGRAM(s) Oral every 6 hours  insulin lispro (ADMELOG) corrective regimen sliding scale.   SubCutaneous four times a day before meals  labetalol 200 milliGRAM(s) Oral every 12 hours  lamoTRIgine 200 milliGRAM(s) Oral daily  nicotine - 21 mG/24Hr(s) Patch 1 patch Transdermal daily  pantoprazole  Injectable 40 milliGRAM(s) IV Push daily  risperiDONE   Tablet 2 milliGRAM(s) Oral at bedtime    MEDICATIONS  (PRN):  ALPRAZolam 2 milliGRAM(s) Oral two times a day PRN anxiety

## 2021-06-09 NOTE — BH CONSULTATION LIAISON ASSESSMENT NOTE - OTHER PAST PSYCHIATRIC HISTORY (INCLUDE DETAILS REGARDING ONSET, COURSE OF ILLNESS, INPATIENT/OUTPATIENT TREATMENT)
h/o bipolar depression with multiple past admission and no past suicide attempts   Gets psychiatric meds prescribed by his PMD but recently was connected to [facility] psychiatry   Xanax 2mg PO BID, Suboxone 8/2 TID, Lamictal 200mg daily, Risperdal 2mg QHS,
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
## 2021-06-10 LAB
AMPHET UR-MCNC: NEGATIVE — SIGNIFICANT CHANGE UP
ANION GAP SERPL CALC-SCNC: 10 MMOL/L — SIGNIFICANT CHANGE UP (ref 5–17)
APPEARANCE UR: CLEAR — SIGNIFICANT CHANGE UP
BACTERIA # UR AUTO: ABNORMAL
BARBITURATES UR SCN-MCNC: NEGATIVE — SIGNIFICANT CHANGE UP
BENZODIAZ UR-MCNC: POSITIVE
BILIRUB UR-MCNC: NEGATIVE — SIGNIFICANT CHANGE UP
BUN SERPL-MCNC: 11.9 MG/DL — SIGNIFICANT CHANGE UP (ref 8–20)
CALCIUM SERPL-MCNC: 10.8 MG/DL — HIGH (ref 8.6–10.2)
CHLORIDE SERPL-SCNC: 100 MMOL/L — SIGNIFICANT CHANGE UP (ref 98–107)
CO2 SERPL-SCNC: 27 MMOL/L — SIGNIFICANT CHANGE UP (ref 22–29)
COCAINE METAB.OTHER UR-MCNC: NEGATIVE — SIGNIFICANT CHANGE UP
COLOR SPEC: YELLOW — SIGNIFICANT CHANGE UP
CREAT SERPL-MCNC: 0.89 MG/DL — SIGNIFICANT CHANGE UP (ref 0.5–1.3)
DIFF PNL FLD: NEGATIVE — SIGNIFICANT CHANGE UP
EPI CELLS # UR: SIGNIFICANT CHANGE UP
GLUCOSE BLDC GLUCOMTR-MCNC: 127 MG/DL — HIGH (ref 70–99)
GLUCOSE BLDC GLUCOMTR-MCNC: 128 MG/DL — HIGH (ref 70–99)
GLUCOSE BLDC GLUCOMTR-MCNC: 141 MG/DL — HIGH (ref 70–99)
GLUCOSE BLDC GLUCOMTR-MCNC: 155 MG/DL — HIGH (ref 70–99)
GLUCOSE SERPL-MCNC: 143 MG/DL — HIGH (ref 70–99)
GLUCOSE UR QL: NEGATIVE MG/DL — SIGNIFICANT CHANGE UP
HCT VFR BLD CALC: 35.2 % — LOW (ref 39–50)
HGB BLD-MCNC: 12.1 G/DL — LOW (ref 13–17)
KETONES UR-MCNC: NEGATIVE — SIGNIFICANT CHANGE UP
LEUKOCYTE ESTERASE UR-ACNC: NEGATIVE — SIGNIFICANT CHANGE UP
MCHC RBC-ENTMCNC: 28.1 PG — SIGNIFICANT CHANGE UP (ref 27–34)
MCHC RBC-ENTMCNC: 34.4 GM/DL — SIGNIFICANT CHANGE UP (ref 32–36)
MCV RBC AUTO: 81.7 FL — SIGNIFICANT CHANGE UP (ref 80–100)
METHADONE UR-MCNC: NEGATIVE — SIGNIFICANT CHANGE UP
NITRITE UR-MCNC: NEGATIVE — SIGNIFICANT CHANGE UP
OPIATES UR-MCNC: NEGATIVE — SIGNIFICANT CHANGE UP
PCP SPEC-MCNC: SIGNIFICANT CHANGE UP
PCP UR-MCNC: NEGATIVE — SIGNIFICANT CHANGE UP
PH UR: 8 — SIGNIFICANT CHANGE UP (ref 5–8)
PLATELET # BLD AUTO: 190 K/UL — SIGNIFICANT CHANGE UP (ref 150–400)
POTASSIUM SERPL-MCNC: 4.3 MMOL/L — SIGNIFICANT CHANGE UP (ref 3.5–5.3)
POTASSIUM SERPL-SCNC: 4.3 MMOL/L — SIGNIFICANT CHANGE UP (ref 3.5–5.3)
PROT UR-MCNC: 15 MG/DL
RBC # BLD: 4.31 M/UL — SIGNIFICANT CHANGE UP (ref 4.2–5.8)
RBC # FLD: 12.8 % — SIGNIFICANT CHANGE UP (ref 10.3–14.5)
SODIUM SERPL-SCNC: 137 MMOL/L — SIGNIFICANT CHANGE UP (ref 135–145)
SP GR SPEC: 1.01 — SIGNIFICANT CHANGE UP (ref 1.01–1.02)
THC UR QL: POSITIVE
TROPONIN T SERPL-MCNC: <0.01 NG/ML — SIGNIFICANT CHANGE UP (ref 0–0.06)
UROBILINOGEN FLD QL: NEGATIVE MG/DL — SIGNIFICANT CHANGE UP
WBC # BLD: 4.48 K/UL — SIGNIFICANT CHANGE UP (ref 3.8–10.5)
WBC # FLD AUTO: 4.48 K/UL — SIGNIFICANT CHANGE UP (ref 3.8–10.5)

## 2021-06-10 PROCEDURE — 93010 ELECTROCARDIOGRAM REPORT: CPT

## 2021-06-10 PROCEDURE — 99232 SBSQ HOSP IP/OBS MODERATE 35: CPT

## 2021-06-10 RX ORDER — VALSARTAN 80 MG/1
320 TABLET ORAL DAILY
Refills: 0 | Status: DISCONTINUED | OUTPATIENT
Start: 2021-06-11 | End: 2021-06-10

## 2021-06-10 RX ORDER — LABETALOL HCL 100 MG
10 TABLET ORAL ONCE
Refills: 0 | Status: COMPLETED | OUTPATIENT
Start: 2021-06-10 | End: 2021-06-10

## 2021-06-10 RX ORDER — CLONAZEPAM 1 MG
2 TABLET ORAL
Refills: 0 | Status: DISCONTINUED | OUTPATIENT
Start: 2021-06-10 | End: 2021-06-12

## 2021-06-10 RX ORDER — GABAPENTIN 400 MG/1
800 CAPSULE ORAL
Refills: 0 | Status: DISCONTINUED | OUTPATIENT
Start: 2021-06-10 | End: 2021-06-12

## 2021-06-10 RX ORDER — HYDRALAZINE HCL 50 MG
75 TABLET ORAL THREE TIMES A DAY
Refills: 0 | Status: DISCONTINUED | OUTPATIENT
Start: 2021-06-10 | End: 2021-06-11

## 2021-06-10 RX ORDER — LABETALOL HCL 100 MG
300 TABLET ORAL EVERY 12 HOURS
Refills: 0 | Status: DISCONTINUED | OUTPATIENT
Start: 2021-06-10 | End: 2021-06-12

## 2021-06-10 RX ORDER — HYDRALAZINE HCL 50 MG
5 TABLET ORAL EVERY 6 HOURS
Refills: 0 | Status: DISCONTINUED | OUTPATIENT
Start: 2021-06-10 | End: 2021-06-12

## 2021-06-10 RX ORDER — VALSARTAN 80 MG/1
320 TABLET ORAL ONCE
Refills: 0 | Status: COMPLETED | OUTPATIENT
Start: 2021-06-10 | End: 2021-06-10

## 2021-06-10 RX ORDER — QUETIAPINE FUMARATE 200 MG/1
400 TABLET, FILM COATED ORAL
Refills: 0 | Status: DISCONTINUED | OUTPATIENT
Start: 2021-06-10 | End: 2021-06-12

## 2021-06-10 RX ORDER — LOSARTAN POTASSIUM 100 MG/1
100 TABLET, FILM COATED ORAL DAILY
Refills: 0 | Status: DISCONTINUED | OUTPATIENT
Start: 2021-06-11 | End: 2021-06-12

## 2021-06-10 RX ADMIN — Medication 1 PATCH: at 08:28

## 2021-06-10 RX ADMIN — Medication 0.3 MILLIGRAM(S): at 20:46

## 2021-06-10 RX ADMIN — Medication 10 MILLIGRAM(S): at 21:22

## 2021-06-10 RX ADMIN — GABAPENTIN 800 MILLIGRAM(S): 400 CAPSULE ORAL at 16:44

## 2021-06-10 RX ADMIN — LAMOTRIGINE 200 MILLIGRAM(S): 25 TABLET, ORALLY DISINTEGRATING ORAL at 10:51

## 2021-06-10 RX ADMIN — Medication 1 PATCH: at 16:39

## 2021-06-10 RX ADMIN — Medication 2 MILLIGRAM(S): at 08:11

## 2021-06-10 RX ADMIN — VALSARTAN 320 MILLIGRAM(S): 80 TABLET ORAL at 10:52

## 2021-06-10 RX ADMIN — Medication 1 PATCH: at 07:26

## 2021-06-10 RX ADMIN — Medication 50 MILLIGRAM(S): at 10:50

## 2021-06-10 RX ADMIN — Medication 0.3 MILLIGRAM(S): at 06:10

## 2021-06-10 RX ADMIN — Medication 50 MILLIGRAM(S): at 06:10

## 2021-06-10 RX ADMIN — RISPERIDONE 2 MILLIGRAM(S): 4 TABLET ORAL at 20:47

## 2021-06-10 RX ADMIN — Medication 5 MILLIGRAM(S): at 16:43

## 2021-06-10 RX ADMIN — Medication 300 MILLIGRAM(S): at 16:44

## 2021-06-10 RX ADMIN — Medication 2 MILLIGRAM(S): at 00:38

## 2021-06-10 RX ADMIN — Medication 1 PATCH: at 10:51

## 2021-06-10 RX ADMIN — Medication 0.3 MILLIGRAM(S): at 10:50

## 2021-06-10 RX ADMIN — GABAPENTIN 800 MILLIGRAM(S): 400 CAPSULE ORAL at 22:52

## 2021-06-10 RX ADMIN — Medication 2 MILLIGRAM(S): at 16:44

## 2021-06-10 RX ADMIN — QUETIAPINE FUMARATE 400 MILLIGRAM(S): 200 TABLET, FILM COATED ORAL at 16:44

## 2021-06-10 RX ADMIN — BUPRENORPHINE AND NALOXONE 3 TABLET(S): 2; .5 TABLET SUBLINGUAL at 08:12

## 2021-06-10 RX ADMIN — Medication 50 MILLIGRAM(S): at 16:44

## 2021-06-10 RX ADMIN — HEPARIN SODIUM 5000 UNIT(S): 5000 INJECTION INTRAVENOUS; SUBCUTANEOUS at 10:51

## 2021-06-10 RX ADMIN — Medication 50 MILLIGRAM(S): at 00:37

## 2021-06-10 RX ADMIN — Medication 200 MILLIGRAM(S): at 06:10

## 2021-06-10 RX ADMIN — Medication 5 MILLIGRAM(S): at 13:32

## 2021-06-10 RX ADMIN — Medication 10 MILLIGRAM(S): at 17:41

## 2021-06-10 RX ADMIN — Medication 75 MILLIGRAM(S): at 22:52

## 2021-06-10 RX ADMIN — PANTOPRAZOLE SODIUM 40 MILLIGRAM(S): 20 TABLET, DELAYED RELEASE ORAL at 10:50

## 2021-06-10 RX ADMIN — HEPARIN SODIUM 5000 UNIT(S): 5000 INJECTION INTRAVENOUS; SUBCUTANEOUS at 20:46

## 2021-06-10 RX ADMIN — Medication 2: at 10:51

## 2021-06-10 NOTE — PROGRESS NOTE ADULT - ASSESSMENT
41 year old male with PMH HTN, T2DM, Bipolar disorder, Anxiety disorder, opioid abuse now on Suboxone, Seizure from BDZ withdrawal, GERD, ?CKD presented with AMS and noted to have hypoglycemia and hypotension and admitted to MICU with  Glucagon and Epinephrine. Suspected to have OD, but patient denies. Instead states being outdoors and getting dehydration.   Improved overnight, weaned off IV pressors.    #Possible suicidal ideation  - Pt denying at this time.  - Psych 1:1  - Psych consult appreciated.  - D/c plan to voluntary inpt psych.    #Hypotensive Shock - resolved.   - Suspect BB poisoning though patient denies  - BP stable    #T2DM with Hypoglycemia - now resolved.   - Hold home OHA  - BGM with SSI    #LIV  - Now resolved   - Avoid Nephrotoxins  - Monitor BMP    #HTN, uncontrolled  - C/w Labetalol, Clonidine, Hydralazine  - Resume home Valsartan    #Bipolar  - Currently on Lamotrigine.  - psych follow up and clarification of medication regimen.    #Anxiety  - Continue Xanax  - Psych follow up    #Chronic Opioid dependence  - Continue Suboxone    #GERD  - Continue PPI    #Elevated LFT  - monitor transaminases    VTEp - UFH    Disposition - Pending improvement of HTN, possibly medically clear today. Dispo plan is d/c to voluntary inpt psych.   41 year old male with PMH HTN, T2DM, Bipolar disorder, Anxiety disorder, opioid abuse now on Suboxone, Seizure from BDZ withdrawal, GERD, ?CKD presented with AMS and noted to have hypoglycemia and hypotension and admitted to MICU with  Glucagon and Epinephrine. Suspected to have OD, but patient denies. Instead states being outdoors and getting dehydration.   Improved overnight, weaned off IV pressors.    #Possible suicidal ideation  - Pt denying at this time.  - Psych 1:1  - Psych consult appreciated.  - D/c plan to voluntary inpt psych.    #Hypotensive Shock - resolved.   - Suspect BB poisoning though patient denies  - BP stable    #T2DM with Hypoglycemia - now resolved.   - Hold home OHA  - BGM with SSI    #LIV  - Now resolved   - Avoid Nephrotoxins  - Monitor BMP    #HTN urgency  - C/w Labetalol, Clonidine, Hydralazine  - Resume home ARB    #Bipolar  - Currently on Lamotrigine.  - psych follow up and clarification of medication regimen.    #Anxiety  - Continue Xanax  - Psych follow up    #Chronic Opioid dependence  - Continue Suboxone    #GERD  - Continue PPI    #Elevated LFT  - monitor transaminases    VTEp - UFH    Disposition - Pending improvement of HTN, possibly medically clear today. Dispo plan is d/c to voluntary inpt psych.

## 2021-06-11 ENCOUNTER — TRANSCRIPTION ENCOUNTER (OUTPATIENT)
Age: 41
End: 2021-06-11

## 2021-06-11 LAB
GLUCOSE BLDC GLUCOMTR-MCNC: 113 MG/DL — HIGH (ref 70–99)
GLUCOSE BLDC GLUCOMTR-MCNC: 124 MG/DL — HIGH (ref 70–99)
GLUCOSE BLDC GLUCOMTR-MCNC: 129 MG/DL — HIGH (ref 70–99)
GLUCOSE BLDC GLUCOMTR-MCNC: 150 MG/DL — HIGH (ref 70–99)
TROPONIN T SERPL-MCNC: <0.01 NG/ML — SIGNIFICANT CHANGE UP (ref 0–0.06)

## 2021-06-11 PROCEDURE — 99232 SBSQ HOSP IP/OBS MODERATE 35: CPT

## 2021-06-11 PROCEDURE — 70450 CT HEAD/BRAIN W/O DYE: CPT | Mod: 26

## 2021-06-11 RX ORDER — LABETALOL HCL 100 MG
1 TABLET ORAL
Qty: 0 | Refills: 0 | DISCHARGE
Start: 2021-06-11

## 2021-06-11 RX ORDER — INSULIN GLARGINE 100 [IU]/ML
8 INJECTION, SOLUTION SUBCUTANEOUS AT BEDTIME
Refills: 0 | Status: DISCONTINUED | OUTPATIENT
Start: 2021-06-11 | End: 2021-06-12

## 2021-06-11 RX ORDER — LABETALOL HCL 100 MG
1 TABLET ORAL
Qty: 0 | Refills: 0 | DISCHARGE

## 2021-06-11 RX ORDER — VALSARTAN 80 MG/1
1 TABLET ORAL
Qty: 0 | Refills: 0 | DISCHARGE

## 2021-06-11 RX ORDER — MIRTAZAPINE 45 MG/1
2 TABLET, ORALLY DISINTEGRATING ORAL
Qty: 0 | Refills: 0 | DISCHARGE

## 2021-06-11 RX ORDER — PANTOPRAZOLE SODIUM 20 MG/1
1 TABLET, DELAYED RELEASE ORAL
Qty: 0 | Refills: 0 | DISCHARGE
Start: 2021-06-11

## 2021-06-11 RX ORDER — HYDRALAZINE HCL 50 MG
50 TABLET ORAL EVERY 6 HOURS
Refills: 0 | Status: DISCONTINUED | OUTPATIENT
Start: 2021-06-11 | End: 2021-06-12

## 2021-06-11 RX ORDER — OMEPRAZOLE 10 MG/1
1 CAPSULE, DELAYED RELEASE ORAL
Qty: 0 | Refills: 0 | DISCHARGE

## 2021-06-11 RX ORDER — HYDRALAZINE HCL 50 MG
3 TABLET ORAL
Qty: 0 | Refills: 0 | DISCHARGE
Start: 2021-06-11

## 2021-06-11 RX ORDER — HYDRALAZINE HCL 50 MG
1 TABLET ORAL
Qty: 0 | Refills: 0 | DISCHARGE

## 2021-06-11 RX ORDER — BUPROPION HYDROCHLORIDE 150 MG/1
1 TABLET, EXTENDED RELEASE ORAL
Qty: 0 | Refills: 0 | DISCHARGE

## 2021-06-11 RX ORDER — CYCLOBENZAPRINE HYDROCHLORIDE 10 MG/1
1 TABLET, FILM COATED ORAL
Qty: 0 | Refills: 0 | DISCHARGE

## 2021-06-11 RX ORDER — PANTOPRAZOLE SODIUM 20 MG/1
40 TABLET, DELAYED RELEASE ORAL
Refills: 0 | Status: DISCONTINUED | OUTPATIENT
Start: 2021-06-11 | End: 2021-06-12

## 2021-06-11 RX ORDER — MELOXICAM 15 MG/1
1 TABLET ORAL
Qty: 0 | Refills: 0 | DISCHARGE

## 2021-06-11 RX ADMIN — Medication 0.3 MILLIGRAM(S): at 22:41

## 2021-06-11 RX ADMIN — Medication 2 MILLIGRAM(S): at 22:51

## 2021-06-11 RX ADMIN — LOSARTAN POTASSIUM 100 MILLIGRAM(S): 100 TABLET, FILM COATED ORAL at 06:21

## 2021-06-11 RX ADMIN — RISPERIDONE 2 MILLIGRAM(S): 4 TABLET ORAL at 22:43

## 2021-06-11 RX ADMIN — BUPRENORPHINE AND NALOXONE 3 TABLET(S): 2; .5 TABLET SUBLINGUAL at 08:45

## 2021-06-11 RX ADMIN — GABAPENTIN 800 MILLIGRAM(S): 400 CAPSULE ORAL at 16:53

## 2021-06-11 RX ADMIN — Medication 0: at 11:19

## 2021-06-11 RX ADMIN — GABAPENTIN 800 MILLIGRAM(S): 400 CAPSULE ORAL at 11:22

## 2021-06-11 RX ADMIN — Medication 50 MILLIGRAM(S): at 16:54

## 2021-06-11 RX ADMIN — GABAPENTIN 800 MILLIGRAM(S): 400 CAPSULE ORAL at 06:21

## 2021-06-11 RX ADMIN — QUETIAPINE FUMARATE 400 MILLIGRAM(S): 200 TABLET, FILM COATED ORAL at 06:21

## 2021-06-11 RX ADMIN — HEPARIN SODIUM 5000 UNIT(S): 5000 INJECTION INTRAVENOUS; SUBCUTANEOUS at 06:21

## 2021-06-11 RX ADMIN — Medication 1 PATCH: at 16:54

## 2021-06-11 RX ADMIN — Medication 2 MILLIGRAM(S): at 06:21

## 2021-06-11 RX ADMIN — Medication 75 MILLIGRAM(S): at 11:21

## 2021-06-11 RX ADMIN — PANTOPRAZOLE SODIUM 40 MILLIGRAM(S): 20 TABLET, DELAYED RELEASE ORAL at 16:53

## 2021-06-11 RX ADMIN — Medication 0.3 MILLIGRAM(S): at 11:20

## 2021-06-11 RX ADMIN — Medication 300 MILLIGRAM(S): at 06:21

## 2021-06-11 RX ADMIN — INSULIN GLARGINE 8 UNIT(S): 100 INJECTION, SOLUTION SUBCUTANEOUS at 22:51

## 2021-06-11 RX ADMIN — QUETIAPINE FUMARATE 400 MILLIGRAM(S): 200 TABLET, FILM COATED ORAL at 16:53

## 2021-06-11 RX ADMIN — Medication 1 PATCH: at 09:44

## 2021-06-11 RX ADMIN — Medication 2 MILLIGRAM(S): at 16:53

## 2021-06-11 RX ADMIN — Medication 1 PATCH: at 07:38

## 2021-06-11 RX ADMIN — Medication 75 MILLIGRAM(S): at 06:21

## 2021-06-11 RX ADMIN — LAMOTRIGINE 200 MILLIGRAM(S): 25 TABLET, ORALLY DISINTEGRATING ORAL at 11:20

## 2021-06-11 RX ADMIN — HEPARIN SODIUM 5000 UNIT(S): 5000 INJECTION INTRAVENOUS; SUBCUTANEOUS at 11:21

## 2021-06-11 RX ADMIN — Medication 1 PATCH: at 11:20

## 2021-06-11 RX ADMIN — Medication 300 MILLIGRAM(S): at 16:54

## 2021-06-11 RX ADMIN — Medication 0.3 MILLIGRAM(S): at 06:21

## 2021-06-11 RX ADMIN — PANTOPRAZOLE SODIUM 40 MILLIGRAM(S): 20 TABLET, DELAYED RELEASE ORAL at 11:20

## 2021-06-11 RX ADMIN — Medication 0.2 MILLIGRAM(S): at 01:41

## 2021-06-11 RX ADMIN — HEPARIN SODIUM 5000 UNIT(S): 5000 INJECTION INTRAVENOUS; SUBCUTANEOUS at 22:41

## 2021-06-11 NOTE — DISCHARGE NOTE PROVIDER - HOSPITAL COURSE
41 year old male with PMH HTN, T2DM, Bipolar disorder, Anxiety disorder, opioid abuse now on Suboxone, Seizure from BDZ withdrawal, GERD, ?CKD presented with AMS and noted to have hypoglycemia and hypotension and admitted to MICU with  Glucagon and Epinephrine. Suspected to have OD, but patient denies. Instead states being outdoors and getting dehydration. Improved overnight, weaned off IV pressors and downgraded to Medicine. Psych was consulted and pt placed on 1:1. Per psych, patient requires inpatient psychiatric admission. Patients course complicated by hypertensive urgency, BP medications adjusted. BP improved. Patient was noted to have LIV which resolved w/ IVFs. Patient currently asymptomatic and is stable for discharge to inpatient Psych. 41 year old male with PMH HTN, T2DM, Bipolar disorder, Anxiety disorder, opioid abuse now on Suboxone, Seizure from BDZ withdrawal, GERD, ?CKD presented with AMS and noted to have hypoglycemia and hypotension and admitted to MICU with  Glucagon and Epinephrine. Suspected to have OD, but patient denies. Instead states being outdoors and getting dehydration. Improved overnight, weaned off IV pressors and downgraded to Medicine. Psych was consulted and pt placed on 1:1. Per psych, patient requires inpatient psychiatric admission. Patients course complicated by hypertensive urgency, BP medications adjusted. BP improved. Patient was noted to have LIV which resolved w/ IVFs. Patient currently asymptomatic and is stable for discharge.    Discharge Physical Examination:  Vital Signs Last 24 Hrs  T(F): 97.8 (12 Jun 2021 11:00), Max: 98.3 (11 Jun 2021 16:30)  HR: 76 (12 Jun 2021 11:00) (72 - 97)  BP: 109/72 (12 Jun 2021 11:00) (109/72 - 145/96)  RR: 18 (12 Jun 2021 11:00) (18 - 18)  SpO2: 94% (12 Jun 2021 11:00) (94% - 98%)    Physical Exam:  Constitutional: alert and oriented, in no acute distress   Neck: Soft and supple, No LAD, No JVD  Respiratory: Clear to auscultation bilaterally, no wheezes or crackles  Cardiovascular: Regular rate and rhyhtm, no murmurs, gallops, rubs  Gastrointestinal: Soft, non-tender to palpation, +bs  Vascular: 2+ peripheral pulses  Neurological: A/O x 3, no focal neurological deficits  Musculoskeletal: 5/5 strength b/l upper and lower extremities, no lower extremity edema bilaterally    Time spent on discharge: 35 minutes

## 2021-06-11 NOTE — DISCHARGE NOTE PROVIDER - NSDCCPCAREPLAN_GEN_ALL_CORE_FT
PRINCIPAL DISCHARGE DIAGNOSIS  Diagnosis: Hypotension, unspecified hypotension type  Assessment and Plan of Treatment:        PRINCIPAL DISCHARGE DIAGNOSIS  Diagnosis: Hypertension  Assessment and Plan of Treatment: - Please take all medications as prescribed      SECONDARY DISCHARGE DIAGNOSES  Diagnosis: Suicidal ideation  Assessment and Plan of Treatment: - Please follow up with Psychiatry

## 2021-06-11 NOTE — CHART NOTE - NSCHARTNOTEFT_GEN_A_CORE
Contacted by RN for pt feeling unwell, RN concern   Patient seen and examined at bedside with MD  Reporting CP, b/l numbness of finger tips, headache, ?visual changes    Denies CP, abd pain, n/v/d    Vital Signs Last 24 Hrs  T(C): 36.7 (10 Gilberto 2021 18:00), Max: 36.9 (10 Gilberto 2021 00:36)  T(F): 98.1 (10 Gilberto 2021 18:00), Max: 98.4 (10 Gilberto 2021 00:36)  HR: 72 (10 Gilberto 2021 18:00) (66 - 77)  BP: 211/114 (10 Gilberto 2021 17:36) (142/89 - 211/114)  BP(mean): 160 (09 Jun 2021 23:01) (160 - 160)  RR: 18 (10 Gilberto 2021 18:00) (18 - 18)  SpO2: 100% (10 Gilberto 2021 18:00) (95% - 100%)    Physical Exam:  Gen: AOx3, sitting on bed in NAD   CV: RRR, +S1S2  Lungs: CTA b/l, unlabored respirations on RA  Abd: +bowel sounds, soft/nt/nd  Ext: MAEx4, without edema  Neuro: grossly nonfocal. PERRL, EOMI. strength 4/5 b/l UEs, 4/5 b/l LEs   Psych: appears anxious       A/P: HTN  labetalol 10mg IVP x1  ECG  STAT noncon CT head   trop x3, cbc, bmp  RN to monitor, assess and escalate to PA PRN.  Will continue to monitor.
Search Terms: gaby henriquez, 1980Search Date: 06/08/2021 14:32:02 PM  The Drug Utilization Report below displays all of the controlled substance prescriptions, if any, that your patient has filled in the last twelve months. The information displayed on this report is compiled from pharmacy submissions to the Department, and accurately reflects the information as submitted by the pharmacies.    This report was requested by: Marcia Machado | Reference #: [id number]    Others' Prescriptions  Patient Name: Yamel HenriquezBirth Date: 1980  Address: [address] [place] [id number]: Male  Rx Written	Rx Dispensed	Drug	Quantity	Days Supply	Prescriber Name	Prescriber Harper #	Payment Method	Dispenser  05/03/2021	05/03/2021	alprazolam 2 mg tablet	60	30	Susanne Ewing	[id number]	Insurance	[facility]  11/05/2020	01/23/2021	alprazolam 2 mg tablet	60	30	Galibov, Xuan	[id number]	Insurance	[facility]  11/05/2020	11/05/2020	alprazolam 2 mg tablet	60	30	Galibov, Xuan	[id number]	Insurance	[facility]  10/20/2020	10/20/2020	clonazepam 0.5 mg tablet	60	30	Galibov, Xuan	[id number]	[facility]  09/08/2020	09/08/2020	clonazepam 0.5 mg tablet	10	10	Kelby Patino	[id number]	Insurance	[facility]    Patient Name: Jose HenriquezBirth Date: 1980  Address: [address] [place] [id number]: Male  Rx Written	Rx Dispensed	Drug	Quantity	Days Supply	Prescriber Name	Prescriber Harper #	Payment Method	Dispenser  12/07/2020	12/07/2020	zolpidem tartrate 10 mg tablet	30	30	Ruperto Bauman	[id number]	[facility]    Patient Name: Gaby HenriquezBirth Date: 1980  Address: [address] [place] [id number]: Male  Rx Written	Rx Dispensed	Drug	Quantity	Days Supply	Prescriber Name	Prescriber Harper #	Payment Method	Dispenser  06/04/2021	06/04/2021	alprazolam 2 mg tablet	30	15	Pierre Osorio FIDEL	[id number]	Insurance	[facility]  05/24/2021	05/24/2021	clonazepam 2 mg tablet	60	30	Susanne Ewing	[id number]	Insurance	[facility]  05/11/2021	05/11/2021	buprenorphine-naloxone 8-2 mg sl film	90gm	30	Vangie Rincon	[id number]	Insurance	[facility]  04/08/2021	04/12/2021	buprenorphine-naloxone 8-2 mg sl film	84gm	28	Yoel Lake	[id number]	Insurance	[facility]  04/12/2021	04/12/2021	alprazolam 2 mg tablet	30	15	TEJAS Osorio	[id number]	Insurance	[facility]  03/30/2021	03/30/2021	alprazolam 2 mg tablet	30	15	Graeme Sheldon	[id number]	Insurance	[facility]  03/11/2021	03/16/2021	buprenorphine-naloxone 8-2 mg sl film	84gm	28	Yoel Lake	[id number]	Insurance	[facility]  02/23/2021	02/23/2021	alprazolam 0.5 mg tablet	10	10	Cristian Roman)	[id number]	Insurance	[facility]  02/17/2021	02/17/2021	buprenorphine-naloxone 8-2 mg sl film	84gm	28	Yoel Lake	[id number]	Insurance	[facility]  01/25/2021	02/02/2021	buprenorphine-naloxone 8-2 mg sl film	42gm	14	Yoel Lake	[id number]	Insurance	[facility]  01/20/2021	01/20/2021	buprenorphine-naloxone 8-2 mg sl film	42gm	14	Pacharaceli Yoel	[id number]	Insurance	[facility]  01/06/2021	01/06/2021	buprenorphine-naloxone 8-2 mg sl film	42gm	14	Yoel Lake	[id number]	Insurance	[facility]  12/23/2020	12/23/2020	alprazolam 1 mg tablet	120	30	Billy Sheffield	[id number]	Insurance	[facility]  12/14/2020	12/14/2020	clonazepam 0.5 mg tablet	120	30	Ruperto Bauman	[id number]	Insurance	[facility]  12/09/2020	12/09/2020	buprenorphine-naloxone 8-2 mg sl film	84gm	28	Yoel Lake	[id number]	Insurance	[facility]  11/23/2020	11/23/2020	diazepam 2 mg tablet	28	14	Xuan Jackson	[id number]	Insurance	[facility]  11/11/2020	11/12/2020	buprenorphine-naloxone 8-2 mg sl film	84gm	28	Yoel Lake	[id number]	Insurance	[facility]  11/12/2020	11/12/2020	lorazepam 1 mg tablet	90	30	[id number]	Insurance	[facility]  10/30/2020	10/31/2020	clonazepam 1 mg tablet	90	30	[id number]	Insurance	[facility]  10/23/2020	10/23/2020	alprazolam 1 mg tablet	21	7	[facility]	[id number]	Insurance	[facility]  10/14/2020	10/14/2020	buprenorphine-naloxone 8-2 mg sl film	84gm	28	Yoel Lake	[id number]	Insurance	[facility]  10/12/2020	10/13/2020	carisoprodol 250 mg tablet	14	14	Ovidio Coy	[id number]	Insurance	[facility]  10/06/2020	10/06/2020	clonazepam 0.5 mg tablet	30	15	Mary Grace MD	[id number]	Insurance	[facility]  09/16/2020	09/17/2020	buprenorphine-naloxone 8-2 mg sl film	84gm	28	Yoel Lake	[id number]	Insurance	[facility]  09/04/2020	09/04/2020	buprenorphine-naloxone 8-2 mg sl film	42gm	14	Yoel Lake	[id number]	Insurance	[facility]  08/26/2020	08/26/2020	clonazepam 0.5 mg tablet	10	10	Kelby Patino	[id number]	Insurance	[facility]  08/05/2020	08/05/2020	buprenorphine-naloxone 8-2 mg sl film	84gm	28	Yoel Lake	[id number]	Insurance	[facility]  08/03/2020	08/03/2020	buprenorphine-naloxone 8-2 mg sl film	6gm	2	Yoel Lake	[id number]	Insurance	[facility]  07/02/2020	07/07/2020	buprenorphine-naloxone 8-2 mg sl film	84gm	28	Pacharaceli Yoel	[id number]	Insurance	[facility]  06/04/2020	06/10/2020	buprenorphine-naloxone 8-2 mg sl film	84gm	28	DexteraraceliYoel	[id number]	Insurance	[facility]  * - Drugs marked with an asterisk are compound drugs. If the compound drug is made up of more than one controlled substance, then each controlled substance will be a separate row in the
Called out to radiology to F/U CTH. Per radiologist +old small parietal infarct as seen on 8/2020 CTH. No acute hemorrhage, infarct or mass effect. Patient BP remains elevated despite 10mg IVP Labetalol. Patient is sleeping in bed, appears comfortable. Pt denies complaints, 1:1 at bedside. Ordered Clonidine 0.2mg PO x1 dose. Instructed RN to repeat BP 1 hours post administration and notify PA. Will continue to monitor.

## 2021-06-11 NOTE — PROGRESS NOTE ADULT - ASSESSMENT
41 year old male with PMH HTN, T2DM, Bipolar disorder, Anxiety disorder, opioid abuse now on Suboxone, Seizure from BDZ withdrawal, GERD, ?CKD presented with AMS and noted to have hypoglycemia and hypotension and admitted to MICU with  Glucagon and Epinephrine. Suspected to have OD, but patient denies. Instead states being outdoors and getting dehydration.   Improved overnight, weaned off IV pressors.    #Possible suicidal ideation  - Pt denying at this time.  - Psych 1:1  - Psych consult appreciated.  - D/c plan to voluntary inpt psych.    #Hypotensive Shock - resolved.   - Suspect BB poisoning though patient denies  - BP stable    #T2DM with Hypoglycemia - now resolved.   - Hold home OHA  - BGM with SSI    #LIV  - Now resolved   - Avoid Nephrotoxins  - Monitor BMP    #HTN urgency, resolved  - C/w Labetalol, Clonidine, Hydralazine, Losartan, uptitrate as neede  - BP improved this AM    #Bipolar  - Currently on Lamotrigine, Seroquel, Risperdal, Gabapentin, Klonopin.  - Psych consulted, recs appreciated.     #Anxiety  - Continue Xanax, Klonopin  - Psych following    #Chronic Opioid dependence  - Continue Suboxone    #GERD  - Continue PPI    #Elevated LFT  - improved  - monitor transaminases    VTEp - UFH    Disposition - Medically clear for discharge to [facility]

## 2021-06-11 NOTE — DISCHARGE NOTE PROVIDER - CARE PROVIDER_API CALL
Nick Nicholas was admitted [facility] from [facility] via cart accompanied by Other transporter.   Reason for hospitalization is SOB.   Upon arrival, patient is stable. Patient has history significant for ..  Past Medical History:   Diagnosis Date   • Allergy    • Anxiety    • Bronchitis    • Chronic pain    • COPD, severe (CMS/HCC) PFT 2016    Followed by Dr Tristen Monroy [phone]    • Depression    • Drug abuse NEC, unspec    • Esophageal reflux    • Essential (primary) hypertension    • Fracture    • Moderate persistent asthma     Followed by Dr Tristen Monroy [phone]   • Nocturnal hypoxia     2 L O2 at HS ([facility]). Followed by Dr Tristen Monroy  [phone]   • Personal history of traumatic fracture    • Restrictive lung disease PFT 2016    Severe; Followed by Dr. Tristen Monroy [phone]   .  Patient oriented to bed, call light, , room and unit.  Patient provided with the following educational materials upon admission:safety, advanced directives, infection control and pain.   Level of understanding patient verbalized understanding.   Admission orders received at this time.   MD notified of patient arrival.   See Epic documentation for patient individualized nursing care plan.   PMD,   Phone: (   )    -  Fax: (   )    -  Follow Up Time:

## 2021-06-11 NOTE — DISCHARGE NOTE PROVIDER - NSDCMRMEDTOKEN_GEN_ALL_CORE_FT
ALPRAZolam 2 mg oral tablet: 1 tab(s) orally 2 times a day  amLODIPine 10 mg oral tablet: 1 tab(s) orally once a day  clonazePAM 2 mg oral tablet: 1 tab(s) orally 2 times a day  cloNIDine 0.3 mg oral tablet: 1 tab(s) orally 4 times a day  gabapentin 800 mg oral tablet: 1 tab(s) orally 4 times a day  hydrALAZINE 25 mg oral tablet: 3 tab(s) orally 3 times a day  labetalol 300 mg oral tablet: 1 tab(s) orally every 12 hours  lamoTRIgine 200 mg oral tablet: 1 tab(s) orally once a day  losartan 100 mg oral tablet: 1 tab(s) orally once a day  metFORMIN 500 mg oral tablet, extended release: 1 tab(s) orally once a day  pantoprazole 40 mg oral delayed release tablet: 1 tab(s) orally once a day (before a meal)  QUEtiapine 400 mg oral tablet, extended release: 1 tab(s) orally 2 times a day  risperiDONE 2 mg oral tablet: 1 tab(s) orally once a day  Suboxone 8 mg-2 mg sublingual film: 3 film(s) sublingual daily  Ventolin HFA 90 mcg/inh inhalation aerosol: 2 puff(s) inhaled every 6 hours, As Needed   ALPRAZolam 2 mg oral tablet: 1 tab(s) orally 2 times a day  clonazePAM 2 mg oral tablet: 1 tab(s) orally 2 times a day  cloNIDine 0.3 mg oral tablet: 1 tab(s) orally 4 times a day  gabapentin 800 mg oral tablet: 1 tab(s) orally 4 times a day  hydrALAZINE 50 mg oral tablet: 1 tab(s) orally every 6 hours  labetalol 300 mg oral tablet: 1 tab(s) orally every 12 hours  lamoTRIgine 200 mg oral tablet: 1 tab(s) orally once a day  losartan 100 mg oral tablet: 1 tab(s) orally once a day  metFORMIN 500 mg oral tablet, extended release: 1 tab(s) orally once a day  pantoprazole 40 mg oral delayed release tablet: 1 tab(s) orally once a day (before a meal)  QUEtiapine 400 mg oral tablet, extended release: 1 tab(s) orally 2 times a day  risperiDONE 2 mg oral tablet: 1 tab(s) orally once a day  Suboxone 8 mg-2 mg sublingual film: 3 film(s) sublingual daily  Ventolin HFA 90 mcg/inh inhalation aerosol: 2 puff(s) inhaled every 6 hours, As Needed

## 2021-06-12 ENCOUNTER — TRANSCRIPTION ENCOUNTER (OUTPATIENT)
Age: 41
End: 2021-06-12

## 2021-06-12 VITALS
HEART RATE: 76 BPM | DIASTOLIC BLOOD PRESSURE: 72 MMHG | TEMPERATURE: 98 F | RESPIRATION RATE: 18 BRPM | OXYGEN SATURATION: 94 % | SYSTOLIC BLOOD PRESSURE: 109 MMHG

## 2021-06-12 LAB
ANION GAP SERPL CALC-SCNC: 10 MMOL/L — SIGNIFICANT CHANGE UP (ref 5–17)
BUN SERPL-MCNC: 27.1 MG/DL — HIGH (ref 8–20)
CALCIUM SERPL-MCNC: 9.4 MG/DL — SIGNIFICANT CHANGE UP (ref 8.6–10.2)
CHLORIDE SERPL-SCNC: 106 MMOL/L — SIGNIFICANT CHANGE UP (ref 98–107)
CO2 SERPL-SCNC: 23 MMOL/L — SIGNIFICANT CHANGE UP (ref 22–29)
CREAT SERPL-MCNC: 1.23 MG/DL — SIGNIFICANT CHANGE UP (ref 0.5–1.3)
GLUCOSE BLDC GLUCOMTR-MCNC: 132 MG/DL — HIGH (ref 70–99)
GLUCOSE BLDC GLUCOMTR-MCNC: 133 MG/DL — HIGH (ref 70–99)
GLUCOSE SERPL-MCNC: 129 MG/DL — HIGH (ref 70–99)
HCT VFR BLD CALC: 31.7 % — LOW (ref 39–50)
HGB BLD-MCNC: 10.6 G/DL — LOW (ref 13–17)
MCHC RBC-ENTMCNC: 27.7 PG — SIGNIFICANT CHANGE UP (ref 27–34)
MCHC RBC-ENTMCNC: 33.4 GM/DL — SIGNIFICANT CHANGE UP (ref 32–36)
MCV RBC AUTO: 83 FL — SIGNIFICANT CHANGE UP (ref 80–100)
PLATELET # BLD AUTO: 180 K/UL — SIGNIFICANT CHANGE UP (ref 150–400)
POTASSIUM SERPL-MCNC: 4.3 MMOL/L — SIGNIFICANT CHANGE UP (ref 3.5–5.3)
POTASSIUM SERPL-SCNC: 4.3 MMOL/L — SIGNIFICANT CHANGE UP (ref 3.5–5.3)
RBC # BLD: 3.82 M/UL — LOW (ref 4.2–5.8)
RBC # FLD: 13.3 % — SIGNIFICANT CHANGE UP (ref 10.3–14.5)
SODIUM SERPL-SCNC: 139 MMOL/L — SIGNIFICANT CHANGE UP (ref 135–145)
WBC # BLD: 6.2 K/UL — SIGNIFICANT CHANGE UP (ref 3.8–10.5)
WBC # FLD AUTO: 6.2 K/UL — SIGNIFICANT CHANGE UP (ref 3.8–10.5)

## 2021-06-12 PROCEDURE — 85730 THROMBOPLASTIN TIME PARTIAL: CPT

## 2021-06-12 PROCEDURE — 84443 ASSAY THYROID STIM HORMONE: CPT

## 2021-06-12 PROCEDURE — 83605 ASSAY OF LACTIC ACID: CPT

## 2021-06-12 PROCEDURE — 82803 BLOOD GASES ANY COMBINATION: CPT

## 2021-06-12 PROCEDURE — 84100 ASSAY OF PHOSPHORUS: CPT

## 2021-06-12 PROCEDURE — C8929: CPT

## 2021-06-12 PROCEDURE — 83615 LACTATE (LD) (LDH) ENZYME: CPT

## 2021-06-12 PROCEDURE — 83036 HEMOGLOBIN GLYCOSYLATED A1C: CPT

## 2021-06-12 PROCEDURE — 82550 ASSAY OF CK (CPK): CPT

## 2021-06-12 PROCEDURE — 80307 DRUG TEST PRSMV CHEM ANLYZR: CPT

## 2021-06-12 PROCEDURE — 70450 CT HEAD/BRAIN W/O DYE: CPT

## 2021-06-12 PROCEDURE — 80076 HEPATIC FUNCTION PANEL: CPT

## 2021-06-12 PROCEDURE — 85027 COMPLETE CBC AUTOMATED: CPT

## 2021-06-12 PROCEDURE — 76775 US EXAM ABDO BACK WALL LIM: CPT

## 2021-06-12 PROCEDURE — 82330 ASSAY OF CALCIUM: CPT

## 2021-06-12 PROCEDURE — 83735 ASSAY OF MAGNESIUM: CPT

## 2021-06-12 PROCEDURE — 96374 THER/PROPH/DIAG INJ IV PUSH: CPT

## 2021-06-12 PROCEDURE — 81001 URINALYSIS AUTO W/SCOPE: CPT

## 2021-06-12 PROCEDURE — 36415 COLL VENOUS BLD VENIPUNCTURE: CPT

## 2021-06-12 PROCEDURE — 82962 GLUCOSE BLOOD TEST: CPT

## 2021-06-12 PROCEDURE — 84295 ASSAY OF SERUM SODIUM: CPT

## 2021-06-12 PROCEDURE — 86769 SARS-COV-2 COVID-19 ANTIBODY: CPT

## 2021-06-12 PROCEDURE — 85014 HEMATOCRIT: CPT

## 2021-06-12 PROCEDURE — U0005: CPT

## 2021-06-12 PROCEDURE — U0003: CPT

## 2021-06-12 PROCEDURE — 82947 ASSAY GLUCOSE BLOOD QUANT: CPT

## 2021-06-12 PROCEDURE — 83880 ASSAY OF NATRIURETIC PEPTIDE: CPT

## 2021-06-12 PROCEDURE — 82533 TOTAL CORTISOL: CPT

## 2021-06-12 PROCEDURE — 80053 COMPREHEN METABOLIC PANEL: CPT

## 2021-06-12 PROCEDURE — 85018 HEMOGLOBIN: CPT

## 2021-06-12 PROCEDURE — 82553 CREATINE MB FRACTION: CPT

## 2021-06-12 PROCEDURE — 99285 EMERGENCY DEPT VISIT HI MDM: CPT | Mod: 25

## 2021-06-12 PROCEDURE — 99233 SBSQ HOSP IP/OBS HIGH 50: CPT

## 2021-06-12 PROCEDURE — 84436 ASSAY OF TOTAL THYROXINE: CPT

## 2021-06-12 PROCEDURE — 84484 ASSAY OF TROPONIN QUANT: CPT

## 2021-06-12 PROCEDURE — 36600 WITHDRAWAL OF ARTERIAL BLOOD: CPT

## 2021-06-12 PROCEDURE — 85025 COMPLETE CBC W/AUTO DIFF WBC: CPT

## 2021-06-12 PROCEDURE — 99239 HOSP IP/OBS DSCHRG MGMT >30: CPT

## 2021-06-12 PROCEDURE — 84132 ASSAY OF SERUM POTASSIUM: CPT

## 2021-06-12 PROCEDURE — 82435 ASSAY OF BLOOD CHLORIDE: CPT

## 2021-06-12 PROCEDURE — 93005 ELECTROCARDIOGRAM TRACING: CPT

## 2021-06-12 PROCEDURE — 71045 X-RAY EXAM CHEST 1 VIEW: CPT

## 2021-06-12 PROCEDURE — 96376 TX/PRO/DX INJ SAME DRUG ADON: CPT

## 2021-06-12 PROCEDURE — 80048 BASIC METABOLIC PNL TOTAL CA: CPT

## 2021-06-12 RX ORDER — HYDRALAZINE HCL 50 MG
1 TABLET ORAL
Qty: 56 | Refills: 0
Start: 2021-06-12 | End: 2021-06-25

## 2021-06-12 RX ORDER — LABETALOL HCL 100 MG
1 TABLET ORAL
Qty: 28 | Refills: 0
Start: 2021-06-12 | End: 2021-06-25

## 2021-06-12 RX ORDER — LOSARTAN POTASSIUM 100 MG/1
1 TABLET, FILM COATED ORAL
Qty: 0 | Refills: 0 | DISCHARGE

## 2021-06-12 RX ORDER — LAMOTRIGINE 25 MG/1
1 TABLET, ORALLY DISINTEGRATING ORAL
Qty: 0 | Refills: 0 | DISCHARGE

## 2021-06-12 RX ORDER — AMLODIPINE BESYLATE 2.5 MG/1
1 TABLET ORAL
Qty: 0 | Refills: 0 | DISCHARGE

## 2021-06-12 RX ORDER — LAMOTRIGINE 25 MG/1
1 TABLET, ORALLY DISINTEGRATING ORAL
Qty: 14 | Refills: 0
Start: 2021-06-12 | End: 2021-06-25

## 2021-06-12 RX ORDER — LOSARTAN POTASSIUM 100 MG/1
1 TABLET, FILM COATED ORAL
Qty: 14 | Refills: 0
Start: 2021-06-12 | End: 2021-06-25

## 2021-06-12 RX ORDER — HYDRALAZINE HCL 50 MG
1 TABLET ORAL
Qty: 0 | Refills: 0 | DISCHARGE
Start: 2021-06-12

## 2021-06-12 RX ADMIN — HEPARIN SODIUM 5000 UNIT(S): 5000 INJECTION INTRAVENOUS; SUBCUTANEOUS at 06:15

## 2021-06-12 RX ADMIN — GABAPENTIN 800 MILLIGRAM(S): 400 CAPSULE ORAL at 11:29

## 2021-06-12 RX ADMIN — Medication 1 PATCH: at 07:23

## 2021-06-12 RX ADMIN — QUETIAPINE FUMARATE 400 MILLIGRAM(S): 200 TABLET, FILM COATED ORAL at 06:15

## 2021-06-12 RX ADMIN — Medication 50 MILLIGRAM(S): at 11:29

## 2021-06-12 RX ADMIN — GABAPENTIN 800 MILLIGRAM(S): 400 CAPSULE ORAL at 06:15

## 2021-06-12 RX ADMIN — HEPARIN SODIUM 5000 UNIT(S): 5000 INJECTION INTRAVENOUS; SUBCUTANEOUS at 11:29

## 2021-06-12 RX ADMIN — GABAPENTIN 800 MILLIGRAM(S): 400 CAPSULE ORAL at 00:05

## 2021-06-12 RX ADMIN — Medication 50 MILLIGRAM(S): at 00:05

## 2021-06-12 RX ADMIN — PANTOPRAZOLE SODIUM 40 MILLIGRAM(S): 20 TABLET, DELAYED RELEASE ORAL at 06:15

## 2021-06-12 RX ADMIN — Medication 50 MILLIGRAM(S): at 06:15

## 2021-06-12 RX ADMIN — LOSARTAN POTASSIUM 100 MILLIGRAM(S): 100 TABLET, FILM COATED ORAL at 06:15

## 2021-06-12 RX ADMIN — Medication 1 PATCH: at 11:30

## 2021-06-12 RX ADMIN — BUPRENORPHINE AND NALOXONE 3 TABLET(S): 2; .5 TABLET SUBLINGUAL at 07:46

## 2021-06-12 RX ADMIN — Medication 0.3 MILLIGRAM(S): at 11:29

## 2021-06-12 RX ADMIN — Medication 300 MILLIGRAM(S): at 06:15

## 2021-06-12 RX ADMIN — LAMOTRIGINE 200 MILLIGRAM(S): 25 TABLET, ORALLY DISINTEGRATING ORAL at 11:29

## 2021-06-12 RX ADMIN — Medication 0.3 MILLIGRAM(S): at 06:15

## 2021-06-12 RX ADMIN — Medication 1 PATCH: at 11:29

## 2021-06-12 RX ADMIN — Medication 2 MILLIGRAM(S): at 06:15
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
## 2021-06-12 NOTE — DISCHARGE NOTE NURSING/CASE MANAGEMENT/SOCIAL WORK - PATIENT PORTAL LINK FT
You can access the FollowMyHealth Patient Portal offered by [facility] by registering at the following website: [email or web address]. By joining [facility]’s FollowMyHealth portal, you will also be able to view your health information using other applications (apps) compatible with our system.

## 2021-06-12 NOTE — BH CONSULTATION LIAISON PROGRESS NOTE - NSBHCHARTREVIEWVS_PSY_A_CORE FT
Vital Signs Last 24 Hrs  T(C): 36.6 (12 Jun 2021 11:00), Max: 36.8 (11 Jun 2021 16:30)  T(F): 97.8 (12 Jun 2021 11:00), Max: 98.3 (11 Jun 2021 16:30)  HR: 76 (12 Jun 2021 11:00) (66 - 97)  BP: 109/72 (12 Jun 2021 11:00) (109/62 - 145/96)  BP(mean): --  RR: 18 (12 Jun 2021 11:00) (18 - 18)  SpO2: 94% (12 Jun 2021 11:00) (94% - 98%)

## 2021-06-12 NOTE — BH CONSULTATION LIAISON PROGRESS NOTE - CURRENT MEDICATION
MEDICATIONS  (STANDING):  buprenorphine 8 mG/naloxone 2 mG SL  Tablet 3 Tablet(s) SubLingual <User Schedule>  clonazePAM  Tablet 2 milliGRAM(s) Oral two times a day  cloNIDine 0.3 milliGRAM(s) Oral every 8 hours  gabapentin 800 milliGRAM(s) Oral four times a day  heparin   Injectable 5000 Unit(s) SubCutaneous every 8 hours  hydrALAZINE 50 milliGRAM(s) Oral every 6 hours  insulin glargine Injectable (LANTUS) 8 Unit(s) SubCutaneous at bedtime  insulin lispro (ADMELOG) corrective regimen sliding scale.   SubCutaneous four times a day before meals  labetalol 300 milliGRAM(s) Oral every 12 hours  lamoTRIgine 200 milliGRAM(s) Oral daily  losartan 100 milliGRAM(s) Oral daily  nicotine - 21 mG/24Hr(s) Patch 1 patch Transdermal daily  pantoprazole    Tablet 40 milliGRAM(s) Oral before breakfast  QUEtiapine 400 milliGRAM(s) Oral two times a day  risperiDONE   Tablet 2 milliGRAM(s) Oral at bedtime    MEDICATIONS  (PRN):  ALPRAZolam 2 milliGRAM(s) Oral two times a day PRN anxiety  hydrALAZINE Injectable 5 milliGRAM(s) IV Push every 6 hours PRN SBP >160
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
## 2021-06-15 LAB
CORTICOSTEROID BINDING GLOBULIN RESULT: 2.2 MG/DL — SIGNIFICANT CHANGE UP
CORTIS F/TOTAL MFR SERPL: 44 % — SIGNIFICANT CHANGE UP
CORTIS SERPL-MCNC: 26 UG/DL — HIGH
CORTISOL, FREE RESULT: 11 UG/DL — HIGH
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
## 2021-06-20 ENCOUNTER — NON-APPOINTMENT (OUTPATIENT)
Age: 41
End: 2021-06-20

## 2021-06-21 ENCOUNTER — EMERGENCY (EMERGENCY)
Facility: HOSPITAL | Age: 41
LOS: 1 days | Discharge: HOME | End: 2021-06-21
Attending: EMERGENCY MEDICINE
Payer: MEDICARE

## 2021-06-21 VITALS
DIASTOLIC BLOOD PRESSURE: 61 MMHG | RESPIRATION RATE: 18 BRPM | TEMPERATURE: 99 F | SYSTOLIC BLOOD PRESSURE: 93 MMHG | HEART RATE: 68 BPM | HEIGHT: 69 IN | OXYGEN SATURATION: 96 %

## 2021-06-21 VITALS
DIASTOLIC BLOOD PRESSURE: 65 MMHG | OXYGEN SATURATION: 99 % | SYSTOLIC BLOOD PRESSURE: 108 MMHG | RESPIRATION RATE: 14 BRPM | TEMPERATURE: 98 F | HEART RATE: 65 BPM

## 2021-06-21 DIAGNOSIS — Z96.653 PRESENCE OF ARTIFICIAL KNEE JOINT, BILATERAL: Chronic | ICD-10-CM

## 2021-06-21 DIAGNOSIS — Z96.649 PRESENCE OF UNSPECIFIED ARTIFICIAL HIP JOINT: Chronic | ICD-10-CM

## 2021-06-21 PROCEDURE — 99283 EMERGENCY DEPT VISIT LOW MDM: CPT

## 2021-06-21 NOTE — ED PROVIDER NOTE - CONSTITUTIONAL, MLM
normal... somnolent but arouses ot verbal stimuli, awake, alert, oriented to person, place, time/situation and in no apparent distress.

## 2021-06-21 NOTE — ED PROVIDER NOTE - WET READ LAUNCH FT
[facility]  Hospitalist Discharge Summary      Date of Admission:  2/3/2021  Date of Discharge:  2/6/2021 12:04 PM  Discharging Provider: Barrie Cisneros MD  Discharge Team: Hospitalist Service, [place] 3    Discharge Diagnoses   # Pulmonary emboli, R segmental, non massive (POA)   #New onset paroxysmal Afib with RVR (POA)  # Ovarian carcinoma (POA)  # Bilateral pleural effusions (POA)  # Restless leg syndrome   #Insomnia    #Migraines      Follow-ups Needed After Discharge   Follow-up Appointments     Adult [facility] Follow-up and recommended labs and tests      Follow up with primary care provider, Bolivar Juarez, within 7 days   to evaluate medication change, for hospital follow- up and regarding new   diagnosis.  The following labs/tests are recommended: liver function   tests, pulmonary function tests, thyroid tests.      Appointments on [place] and/or [facility] (with [facility] or [facility]   provider or service). Call [phone] if you haven't heard regarding   these appointments within 7 days of discharge.            PCP to follow up PFT, LFT, Thryoid Labs for new amiodarone as well as follow up OAC, depending on cancer and [facility]AD[facility]ASC, patient may not be able to come off of OAC    Unresulted Labs Ordered in the Past 30 Days of this Admission     No orders found from 1/4/2021 to 2/4/2021.        Discharge Disposition   Discharged to home  Condition at discharge: Stable    Hospital Course     Patient is a 63 female with past medical history of stage IV ovarian carcinoma with, carcinomatosis status post carboplatin Taxol admitted for acute onset chest pain, pleuritic hypotension secondary to a segmental pulmonary embolism.  Had PERT eval Trop negative TTE negative, BNP 1150, likely secondary to malignancy versus family history of factor V Leiden.  :: Patient in bed on heparin drip and remained stable, notably had new onset of A. fib with RVR  :: Cardiologist  consulted and amnio drip was begun, had 2 brief episodes of RVR which resolved spontaneously, converted to oral amnio, and Xarelto  :: Baseline hepatic panel and thyroid tests obtained before discharge  :: Education and anticipatory guidance given to patient patient's daughter on day of discharge, both asking many questions which were answered to satisfaction  :: Patient has close follow-ups available with Onc and PCP    Consultations This Hospital Stay   PHARMACY IP CONSULT  PHARMACY IP CONSULT  PHARMACY IP CONSULT  PHARMACY IP CONSULT  GYNECOLOGIC ONCOLOGY ADULT IP CONSULT    Code Status   Full Code    Time Spent on this Encounter   I, Barrie Cisneros MD, personally saw the patient today and spent greater than 30 minutes discharging this patient.       Barrie Cisneros MD  [facility] [address] [address]  Phone: [phone]  ______________________________________________________________________    Physical Exam   Vital Signs:                    Weight: 143 lbs 11.84 oz  General: Frail-appearing elderly woman sitting up in bed, no acute distress  Head: NC, AT  Eye: symm gaze, anicteric sclerae  ENT: patent nares wo drainage/epistaxis, MMM  Pulm: CTAB, comfortable WOB on room air  CV: normal rate, regular rhythm  GI: soft, NTND  Neuro: awake, alert, hearing speech and phonation, intact grossly       Primary Care Physician   Bolivar Juarez    Discharge Orders      Nutrition Referral      Reason for your hospital stay    Shortness of breath due to pulmonary embolism     Adult [facility] Follow-up and recommended labs and tests    Follow up with primary care provider, Bolivar Juarez, within 7 days to evaluate medication change, for hospital follow- up and regarding new diagnosis.  The following labs/tests are recommended: liver function tests, pulmonary function tests, thyroid tests.      Appointments on [place] and/or [facility] (with [facility] or [facility] provider  or service). Call [phone] if you haven't heard regarding these appointments within 7 days of discharge.     Activity    Your activity upon discharge: activity as tolerated     Full Code     Diet    Follow this diet upon discharge: Orders Placed This Encounter      Regular Diet Adult       Significant Results and Procedures   Most Recent 3 CBC's:  Recent Labs   Lab Test 02/06/21  0508 02/05/21  0438 02/04/21  0726   WBC 2.2* 2.4* 3.0*   HGB 10.0* 11.2* 11.0*   MCV 89 89 91    171 171     Most Recent 3 BMP's:  Recent Labs   Lab Test 02/06/21  0508 02/05/21  1000 02/05/21  0438 02/04/21  0726     --  136 138   POTASSIUM 4.0 4.0 3.4 3.2*   CHLORIDE 105  --  105 105   CO2 30  --  26 26   BUN 6*  --  7 12   CR 0.59  --  0.56 0.55   ANIONGAP 2*  --  4 7   HORACIO 9.0  --  8.7 8.5   GLC 95  --  107* 92     Most Recent 2 LFT's:  Recent Labs   Lab Test 02/05/21  0438 02/04/21  0726   AST 20 22   ALT 24 32   ALKPHOS 110 109   BILITOTAL 0.5 0.6     Most Recent 3 INR's:  Recent Labs   Lab Test 02/03/21  1548 01/11/21  0645 12/31/20  1150   INR 1.06 1.14 1.04     Most Recent INR's and Anticoagulation Dosing History:  Anticoagulation Dose History     Recent Dosing and Labs Latest Ref Rng & Units 12/31/2020 1/11/2021 2/3/2021    INR 0.86 - 1.14 1.04 1.14 1.06        Most Recent 3 Creatinines:  Recent Labs   Lab Test 02/06/21  0508 02/05/21  0438 02/04/21  0726   CR 0.59 0.56 0.55     Most Recent 3 Hemoglobins:  Recent Labs   Lab Test 02/06/21  0508 02/05/21  0438 02/04/21  0726   HGB 10.0* 11.2* 11.0*     Most Recent 3 Troponin's:  Recent Labs   Lab Test 02/03/21  1548   TROPI <0.015     Most Recent 3 BNP's:  Recent Labs   Lab Test 02/03/21  2107   NTBNPI 1,154*     Most Recent D-dimer:No lab results found.  Most Recent Cholesterol Panel:No lab results found.  Most Recent 6 Bacteria Isolates From Any Culture (See EPIC Reports for Culture Details):  Recent Labs   Lab Test 12/26/20  1638   CULT No growth     Most Recent  TSH and T4:  Recent Labs   Lab Test 02/04/21  0726   TSH 1.03     Most Recent Hemoglobin A1c:No lab results found.  Most Recent 6 glucoses:  Recent Labs   Lab Test 02/06/21  0508 02/05/21  0438 02/04/21  0726 02/03/21  1548 02/01/21  1102 01/13/21  0525   GLC 95 107* 92 121* 111* 152*     Most Recent Urinalysis:No lab results found.  Most Recent ABG:No lab results found.  Most Recent ESR & CRP:No lab results found.  Most Recent Anemia Panel:  Recent Labs   Lab Test 02/06/21  0508   WBC 2.2*   HGB 10.0*   HCT 32.0*   MCV 89        Most Recent CPK:No lab results found.,   Results for orders placed or performed during the hospital encounter of 02/03/21   XR Chest Port 1 View    Narrative    Exam: XR CHEST PORT 1 VW, 2/3/2021 5:12 PM    Indication: chest pain, soa    Comparison: 2/3/2021 chest CT    Findings:   Upright, AP view of the chest. Midline trachea. Left heart border is  silhouetted against moderate sized left-sided pleural effusion with  small degree of overlying compressive atelectasis. . Small right  pleural effusion No focal airspace consolidation or pneumothorax.  Osseous structures and upper abdomen are unremarkable.      Impression    Impression: Moderate-sized left and small right pleural effusion.     I have personally reviewed the examination and initial interpretation  and I agree with the findings.    MICHELLE AMBRIZ MD   CT Chest Pulmonary Embolism w Contrast     Value    Radiologist flags Pulmonary embolus (Urgent)    Narrative    EXAM: CTA pulmonary angiogram, 2/3/2021 5:00 PM    HISTORY: PE suspected, high prob    COMPARISON: 12/23/2020 CT chest.    TECHNIQUE: Volumetric CT images obtained through the chest with  contrast. Coronal and axial MIP reformatted images obtained.  Three-dimensional (3D) post-processed angiographic images were  reconstructed, archived to PACS and used in interpretation of this  study.     CONTRAST: iopamidol (ISOVUE-370) solution 55 mL ml isovue 370  IV.    FINDINGS:     Vascular: There is good contrast opacification of the pulmonary  arterial vasculature. Multiple segmental emboli within the proximal  right lower lobe pulmonary artery. No paradoxical intraventricular  septal bowing. Proximal pulmonary artery is normal caliber. No  discernible contrast reflux into the IVC.    The central tracheobronchial tree is patent. Heart size is normal.  Aortic root is normal caliber. Mild biapical scarring. Moderate size  bilateral pleural effusions, left greater than right, with overlying  compressive atelectasis in the medial left lower lobe and the lingula.  No pneumothorax. Normal three-vessel aortic arch. No axillary  adenopathy. No mediastinal adenopathy. Mildly patulous esophagus.     There is ascites in the upper abdomen. Likely partially visualized  colon along the tip of the liver.. No suspicious osseous lesions.      Impression    IMPRESSION:   1. Exam is positive for acute pulmonary embolismNo evidence of right  heart strain or increased right heart pressures.   2. Moderate to large left and small right pleural effusion, unchanged.  3. Bilateral lower lobe and lingular atelectasis.    [Urgent Result: Pulmonary embolus    Finding was identified on 2/3/2021 5:08 PM.     Kemar Grossman MD was contacted by Dr. Nils Wise at 2/3/2021 5:16 PM  and verbalized understanding of the urgent finding.      I have personally reviewed the examination and initial interpretation  and I agree with the findings.    MICHELLE AMBRIZ MD   US Lower Extremity Venous Duplex Bilateral    Narrative    EXAMINATION: DOPPLER VENOUS ULTRASOUND OF BILATERAL LOWER EXTREMITIES,  2/4/2021 1:47 PM     COMPARISON: None available.    HISTORY: 63F with stage4 ovarian carcinoma presented with R segemental  PEs    TECHNIQUE:  Gray-scale evaluation with compression, spectral flow and  color Doppler assessment of the deep venous system of both legs from  groin to knee, and then at the  ankles.    FINDINGS:  In both lower extremities, the common femoral, femoral, popliteal and  posterior tibial veins demonstrate normal compressibility and blood  flow.      Impression    IMPRESSION:  No evidence of deep venous thrombosis in either lower extremity.    I have personally reviewed the examination and initial interpretation  and I agree with the findings.    LILIAM GARIBAY MD   Echocardiogram Complete    Narrative    [id number]  [id number]  [id number]  [id number]^MIRA^DHRUV^[facility],[place]  Echocardiography Laboratory  [address] [place] [id number]     Name: CAROLINE DALAL  MRN: [id number]  : 1957  Study Date: 2021 04:12 PM  Age: 63 yrs  Gender: Female  Patient Location: [facility]  Reason For Study: Atrial Fibrillation  Ordering Physician: DHRUV MEYERS  Performed By: Cindi Montana RDCS     BSA: 1.9 m2  Height: 72 in  Weight: 150 lb  HR: 103  BP: 86/61 mmHg  _____________________________________________________________________________  __        Procedure  Complete Portable Echo Adult. Technically difficult study. Poor acoustic  windows. The patient's rhythm is sinus tachycardia.  _____________________________________________________________________________  __        Interpretation Summary  Technically difficult study. Poor acoustic windows. The patient's rhythm is  sinus tachycardia.  Borderline (EF 50-55%) reduced left ventricular function is present. Mild  diffuse hypokinesis is present. The inferolateral and inferior walls are not  optimally visualized.  Global right ventricular function is normal. The right ventricle is normal  size.  No significant valvular abnormalities.  A left pleural effusion is present.  There is no prior study for direct comparison.  _____________________________________________________________________________  __        Left Ventricle  Left ventricular wall thickness is normal. Left  ventricular size is normal.  Borderline (EF 50-55%) reduced left ventricular function is present. Left  ventricular diastolic function is indeterminate. Mild diffuse hypokinesis is  present. The inferolateral and inferior walls are not optimally visualized.     Right Ventricle  Global right ventricular function is normal. The right ventricle is normal  size.     Atria  Mild left atrial enlargement is present.     Mitral Valve  The mitral valve is normal. Trace mitral insufficiency is present.        Aortic Valve  The valve leaflets are not well visualized. On Doppler interrogation, there is  no significant stenosis or regurgitation.     Tricuspid Valve  The tricuspid valve is normal. Trace tricuspid insufficiency is present. The  right ventricular systolic pressure is approximated at 17.9 mmHg plus the  right atrial pressure.     Pulmonic Valve  The valve leaflets are not well visualized. Trace pulmonic insufficiency is  present.     Vessels  Sinuses of Valsalva 3.7 cm. IVC diameter <2.1 cm collapsing >50% with sniff  suggests a normal RA pressure of 3 mmHg.     Pericardium  No pericardial effusion is present.        Miscellaneous  A left pleural effusion is present.     Compared to Previous Study  There is no prior study for direct comparison.  _____________________________________________________________________________  __     MMode/2D Measurements & Calculations  IVSd: 0.76 cm  LVIDd: 4.0 cm  LVIDs: 3.1 cm  LVPWd: 1.0 cm  FS: 23.8 %  LV mass(C)d: 109.6 grams  LV mass(C)dI: 58.1 grams/m2  Ao root diam: 3.7 cm  LVOT diam: 2.0 cm  LVOT area: 3.1 cm2  RWT: 0.51  TAPSE: 3.1 cm        Doppler Measurements & Calculations  MV E max paige: 66.9 cm/sec  MV A max paige: 64.0 cm/sec  MV E/A: 1.0  MV dec time: 0.24 sec  TR max paige: 211.7 cm/sec  TR max P.9 mmHg     E/E' av.5  Lateral E/e': 5.6  Medial E/e': 9.3     _____________________________________________________________________________  __           Report  approved by: Bina Szymanski 02/03/2021 04:57 PM            Discharge Medications   Discharge Medication List as of 2/6/2021  9:38 AM      START taking these medications    Details   amiodarone (PACERONE) 200 MG tablet Take 2 tablets (400 mg) by mouth 2 times daily for 14 days, THEN 1 tablet (200 mg) daily for 14 days., Disp-74 tablet, R-0, E-PrescribePager [phone]      Rivaroxaban ANTICOAGULANT 15 & 20 MG TBPK Take 15 mg by mouth 2 times daily for 7 days, THEN 20 mg daily for 7 days., Disp-44 each, R-0, E-PrescribePager [phone]         CONTINUE these medications which have CHANGED    Details   oxyCODONE (ROXICODONE) 5 MG tablet Take 1-2 tablets (5-10 mg) by mouth every 4 hours as needed for severe pain, Disp-28 tablet, R-0, E-Prescribe         CONTINUE these medications which have NOT CHANGED    Details   acetaminophen (TYLENOL) 325 MG tablet Take 2 tablets (650 mg) by mouth every 6 hours as needed for mild pain, Disp-50 tablet, R-0, E-Prescribe      amitriptyline (ELAVIL) 100 MG tablet TAKE 1 TABLET (100 MG) BY MOUTH EVERY NIGHT AT BEDTIME, Historical      OLANZapine zydis (ZYPREXA) 5 MG ODT TAKE 1 TABLET BY MOUTH AT BEDTIME, Disp-30 tablet, R-0, E-Prescribe      ondansetron (ZOFRAN-ODT) 4 MG ODT tab Take 1 tablet (4 mg) by mouth every 6 hours as needed for nausea or vomiting, Disp-20 tablet, R-0, E-Prescribe      rOPINIRole (REQUIP) 0.25 MG tablet Take by mouth At Bedtime Take 1 tablet 1-3 hour before bedtime. Increase every 2 days by 1 pill until at 1 mg, Historical      SUMAtriptan (IMITREX) 100 MG tablet Take 100 mg by mouth at onset of headache , Historical      valACYclovir (VALTREX) 1000 mg tablet Take 1,000 mg by mouth as needed , Historical      polyethylene glycol (MIRALAX) 17 GM/Dose powder Take 17 g by mouth 2 times daily, Disp-510 g, R-0, E-Prescribe      prochlorperazine (COMPAZINE) 10 MG tablet Take 1 tablet (10 mg) by mouth every 6 hours as needed for nausea or vomiting, Disp-30 tablet, R-0,  E-Prescribe      senna-docusate (SENOKOT-S/PERICOLACE) 8.6-50 MG tablet Take 2 tablets by mouth 2 times daily, Disp-30 tablet, R-0, E-Prescribe         STOP taking these medications       ibuprofen (ADVIL/MOTRIN) 600 MG tablet Comments:   Reason for Stopping:             Allergies   No Known Allergies   There are no Wet Read(s) to document.

## 2021-06-21 NOTE — ED PROVIDER NOTE - CLINICAL SUMMARY MEDICAL DECISION MAKING FREE TEXT BOX
Pt is awake, alert, lucid and coherent. Ambulating with steady gait, no complaints, no sign of trauma. No clinical sign of intox or withdrawal. Stable for dc.

## 2021-06-21 NOTE — ED ADULT NURSE NOTE - OBJECTIVE STATEMENT
Pt alert and oriented, lethargic , responds appropriately to voice. Denies any drug/alcohol abuse, state he took his Suboxone and clonidine for BP this morning.  Breathing is even and unlabored, pt on  sats 94% on RA. Offers no complaints, states he needs Rx for Xanax for his admission to a rehab tmrw. Pt resting comfortably at this time.

## 2021-06-21 NOTE — ED PROVIDER NOTE - PATIENT PORTAL LINK FT
You can access the FollowMyHealth Patient Portal offered by [facility] by registering at the following website: [email or web address]. By joining [facility]’s FollowMyHealth portal, you will also be able to view your health information using other applications (apps) compatible with our system.

## 2021-06-21 NOTE — ED ADULT TRIAGE NOTE - CHIEF COMPLAINT QUOTE
Pt brought in by ambulance from [facility] for possible overdose. Pt acting strangely as per reports and nodding off. Pt states that he took only his Suboxone this morning and his blood pressure meds. Pt denies any other drugs or alcohol. States that he takes Xanax daily but did not take any today. Pt lethargic at triage.

## 2021-06-21 NOTE — ED PROVIDER NOTE - OBJECTIVE STATEMENT
41 year old male with PMH polysubstance abuse presents with AMS. The pt was found sleeping at [employer]. He offers no complaints at this time except for stating hat he feels tired. he admits to taking his daily suboxone dose but denies any other substances including alcohol. No chest pain, fevers, headache.

## 2021-06-23 NOTE — CDI QUERY NOTE - NSCDIOTHERTXTBX_GEN_ALL_CORE_HH
SUPPORTING DOCUMENTATION / EVIDENCE:  Presented with AMS found to have hypoglycemia, hypotension  Admitted with suicidal ideation and hypotensive shock  Suspected overdose from beta blocker  Encephalopathy documented    H&P:   Hypotension ?possible medication overdose   Suicidal ideations  Acute renal failure     MICU 6/8:   Possible overdose  - patient denies overdosing on anything  - utox positive however patient is on suboxone, xanax, and admits to marijuana use (edibles)  - restarted on suboxone and xanax  - psych consult placed  - still with 1:1 in room     Hypotension  - resolved, now normotensive    Encephalopathy    ATTENDING:  presented with AMS and noted to have hypoglycemia and hypotension and admitted to MICU with  Glucagon and Epinephrine. Suspected to have OD, but patient denies. Instead states being outdoors and getting dehydration.   Improved overnight, weaned off IV pressors.    #Possible suicidal ideation  - Pt denying at this time  - Psych 1:1  - Psych consult appreciated.  - D/c plan to voluntary inpt psych  - asked psych to reevaluate, patient no longer actively suicidal. Pending d/c to inpatient facility however if patient wants to go home he is cleared from psych perspective     #Hypotensive Shock - resolved.   - Suspect BB poisoning though patient denies  - BP stable    #T2DM with Hypoglycemia - now resolved.      Please clarify, in addendum to dc summary, the type of suspected encephalopathy.   - toxic encephalopathy   - encephalopathy due to drugs   - other   - not clinically significant    Thank you

## 2021-06-24 ENCOUNTER — APPOINTMENT (OUTPATIENT)
Dept: FAMILY MEDICINE | Facility: CLINIC | Age: 41
End: 2021-06-24
Payer: MEDICARE

## 2021-06-24 ENCOUNTER — EMERGENCY (EMERGENCY)
Facility: HOSPITAL | Age: 41
LOS: 1 days | Discharge: HOME | End: 2021-06-24
Attending: EMERGENCY MEDICINE
Payer: MEDICARE

## 2021-06-24 VITALS
HEIGHT: 69 IN | WEIGHT: 229.94 LBS | DIASTOLIC BLOOD PRESSURE: 55 MMHG | HEART RATE: 77 BPM | OXYGEN SATURATION: 97 % | RESPIRATION RATE: 18 BRPM | TEMPERATURE: 99 F | SYSTOLIC BLOOD PRESSURE: 95 MMHG

## 2021-06-24 VITALS
HEART RATE: 71 BPM | DIASTOLIC BLOOD PRESSURE: 48 MMHG | BODY MASS INDEX: 34.07 KG/M2 | RESPIRATION RATE: 16 BRPM | SYSTOLIC BLOOD PRESSURE: 91 MMHG | TEMPERATURE: 97.3 F | WEIGHT: 230 LBS | HEIGHT: 69 IN

## 2021-06-24 DIAGNOSIS — F32.9 MAJOR DEPRESSIVE DISORDER, SINGLE EPISODE, UNSPECIFIED: ICD-10-CM

## 2021-06-24 DIAGNOSIS — G89.29 OTHER CHRONIC PAIN: ICD-10-CM

## 2021-06-24 DIAGNOSIS — M54.2 CERVICALGIA: ICD-10-CM

## 2021-06-24 DIAGNOSIS — F19.10 OTHER PSYCHOACTIVE SUBSTANCE ABUSE, UNCOMPLICATED: ICD-10-CM

## 2021-06-24 DIAGNOSIS — Z96.653 PRESENCE OF ARTIFICIAL KNEE JOINT, BILATERAL: Chronic | ICD-10-CM

## 2021-06-24 DIAGNOSIS — Z96.649 PRESENCE OF UNSPECIFIED ARTIFICIAL HIP JOINT: Chronic | ICD-10-CM

## 2021-06-24 LAB
ALBUMIN SERPL ELPH-MCNC: 4.3 G/DL — SIGNIFICANT CHANGE UP (ref 3.3–5.2)
ALP SERPL-CCNC: 129 U/L — HIGH (ref 40–120)
ALT FLD-CCNC: 24 U/L — SIGNIFICANT CHANGE UP
ANION GAP SERPL CALC-SCNC: 14 MMOL/L — SIGNIFICANT CHANGE UP (ref 5–17)
APAP SERPL-MCNC: <3 UG/ML — LOW (ref 10–26)
AST SERPL-CCNC: 28 U/L — SIGNIFICANT CHANGE UP
BASOPHILS # BLD AUTO: 0.04 K/UL — SIGNIFICANT CHANGE UP (ref 0–0.2)
BASOPHILS NFR BLD AUTO: 0.6 % — SIGNIFICANT CHANGE UP (ref 0–2)
BILIRUB SERPL-MCNC: 0.2 MG/DL — LOW (ref 0.4–2)
BUN SERPL-MCNC: 43.9 MG/DL — HIGH (ref 8–20)
CALCIUM SERPL-MCNC: 9.9 MG/DL — SIGNIFICANT CHANGE UP (ref 8.6–10.2)
CHLORIDE SERPL-SCNC: 104 MMOL/L — SIGNIFICANT CHANGE UP (ref 98–107)
CO2 SERPL-SCNC: 24 MMOL/L — SIGNIFICANT CHANGE UP (ref 22–29)
CREAT SERPL-MCNC: 2.68 MG/DL — HIGH (ref 0.5–1.3)
EOSINOPHIL # BLD AUTO: 0.29 K/UL — SIGNIFICANT CHANGE UP (ref 0–0.5)
EOSINOPHIL NFR BLD AUTO: 4.1 % — SIGNIFICANT CHANGE UP (ref 0–6)
ETHANOL SERPL-MCNC: <10 MG/DL — SIGNIFICANT CHANGE UP (ref 0–9)
GLUCOSE SERPL-MCNC: 131 MG/DL — HIGH (ref 70–99)
HCT VFR BLD CALC: 30.8 % — LOW (ref 39–50)
HGB BLD-MCNC: 9.9 G/DL — LOW (ref 13–17)
IMM GRANULOCYTES NFR BLD AUTO: 0.3 % — SIGNIFICANT CHANGE UP (ref 0–1.5)
LYMPHOCYTES # BLD AUTO: 2.19 K/UL — SIGNIFICANT CHANGE UP (ref 1–3.3)
LYMPHOCYTES # BLD AUTO: 31.1 % — SIGNIFICANT CHANGE UP (ref 13–44)
MCHC RBC-ENTMCNC: 27.9 PG — SIGNIFICANT CHANGE UP (ref 27–34)
MCHC RBC-ENTMCNC: 32.1 GM/DL — SIGNIFICANT CHANGE UP (ref 32–36)
MCV RBC AUTO: 86.8 FL — SIGNIFICANT CHANGE UP (ref 80–100)
MONOCYTES # BLD AUTO: 0.56 K/UL — SIGNIFICANT CHANGE UP (ref 0–0.9)
MONOCYTES NFR BLD AUTO: 7.9 % — SIGNIFICANT CHANGE UP (ref 2–14)
NEUTROPHILS # BLD AUTO: 3.95 K/UL — SIGNIFICANT CHANGE UP (ref 1.8–7.4)
NEUTROPHILS NFR BLD AUTO: 56 % — SIGNIFICANT CHANGE UP (ref 43–77)
PLATELET # BLD AUTO: 291 K/UL — SIGNIFICANT CHANGE UP (ref 150–400)
POTASSIUM SERPL-MCNC: 4.2 MMOL/L — SIGNIFICANT CHANGE UP (ref 3.5–5.3)
POTASSIUM SERPL-SCNC: 4.2 MMOL/L — SIGNIFICANT CHANGE UP (ref 3.5–5.3)
PROT SERPL-MCNC: 7.4 G/DL — SIGNIFICANT CHANGE UP (ref 6.6–8.7)
RBC # BLD: 3.55 M/UL — LOW (ref 4.2–5.8)
RBC # FLD: 13.7 % — SIGNIFICANT CHANGE UP (ref 10.3–14.5)
SALICYLATES SERPL-MCNC: <0.6 MG/DL — LOW (ref 10–20)
SODIUM SERPL-SCNC: 141 MMOL/L — SIGNIFICANT CHANGE UP (ref 135–145)
WBC # BLD: 7.05 K/UL — SIGNIFICANT CHANGE UP (ref 3.8–10.5)
WBC # FLD AUTO: 7.05 K/UL — SIGNIFICANT CHANGE UP (ref 3.8–10.5)

## 2021-06-24 PROCEDURE — 93010 ELECTROCARDIOGRAM REPORT: CPT

## 2021-06-24 PROCEDURE — 99214 OFFICE O/P EST MOD 30 MIN: CPT

## 2021-06-24 PROCEDURE — 90791 PSYCH DIAGNOSTIC EVALUATION: CPT

## 2021-06-24 PROCEDURE — 99284 EMERGENCY DEPT VISIT MOD MDM: CPT

## 2021-06-24 RX ORDER — BUPRENORPHINE AND NALOXONE 2; .5 MG/1; MG/1
1 TABLET SUBLINGUAL
Refills: 0 | Status: COMPLETED | OUTPATIENT
Start: 2021-06-24 | End: 2021-07-01

## 2021-06-24 RX ORDER — TRAZODONE HCL 50 MG
50 TABLET ORAL AT BEDTIME
Refills: 0 | Status: DISCONTINUED | OUTPATIENT
Start: 2021-06-24 | End: 2021-06-29

## 2021-06-24 RX ORDER — LABETALOL HCL 100 MG
300 TABLET ORAL
Refills: 0 | Status: DISCONTINUED | OUTPATIENT
Start: 2021-06-24 | End: 2021-06-24

## 2021-06-24 RX ORDER — GABAPENTIN 400 MG/1
800 CAPSULE ORAL EVERY 8 HOURS
Refills: 0 | Status: DISCONTINUED | OUTPATIENT
Start: 2021-06-24 | End: 2021-06-29

## 2021-06-24 RX ORDER — LAMOTRIGINE 25 MG/1
200 TABLET, ORALLY DISINTEGRATING ORAL AT BEDTIME
Refills: 0 | Status: DISCONTINUED | OUTPATIENT
Start: 2021-06-24 | End: 2021-06-29

## 2021-06-24 RX ORDER — CELECOXIB 200 MG/1
200 CAPSULE ORAL DAILY
Refills: 0 | Status: DISCONTINUED | OUTPATIENT
Start: 2021-06-24 | End: 2021-06-29

## 2021-06-24 RX ORDER — BUPROPION HYDROCHLORIDE 150 MG/1
300 TABLET, EXTENDED RELEASE ORAL DAILY
Refills: 0 | Status: DISCONTINUED | OUTPATIENT
Start: 2021-06-24 | End: 2021-06-29

## 2021-06-24 NOTE — ED BEHAVIORAL HEALTH ASSESSMENT NOTE - DESCRIPTION
Replacement of both hips, HTN as in HPI ICU Vital Signs Last 24 Hrs  T(C): 37.1 (24 Jun 2021 19:29), Max: 37.1 (24 Jun 2021 19:29)  T(F): 98.8 (24 Jun 2021 19:29), Max: 98.8 (24 Jun 2021 19:29)  HR: 77 (24 Jun 2021 19:29) (77 - 77)  BP: 95/55 (24 Jun 2021 19:29) (95/55 - 95/55)  BP(mean): --  ABP: --  ABP(mean): --  RR: 18 (24 Jun 2021 19:29) (18 - 18)  SpO2: 97% (24 Jun 2021 19:29) (97% - 97%)

## 2021-06-24 NOTE — ED BEHAVIORAL HEALTH ASSESSMENT NOTE - CURRENT MEDICATION
Gabapentin 800 qid, Meloxicam 7.5 qd, Suboxone 8/2 tid, Hydralazine 50 qid, labetalol 300 bid, Lamictal 200 qd, Losartan 100 qd

## 2021-06-24 NOTE — ED BEHAVIORAL HEALTH ASSESSMENT NOTE - VIOLENCE RISK FACTORS:
Substance abuse/Noncompliance with treatment/Community stressors that increase the risk of destabilization/Irritability

## 2021-06-24 NOTE — ASSESSMENT
[FreeTextEntry1] : \par Neck pain\par Patient reports neck pain for a few days which is not severe\par He has chronic pain for which I am refilling his gabapentin.I reviewed his last labs from the hospital: GFR on 06/12/2021 is 72,  creat 1.23 and creat clearance > 79. I am up titrating his gabapentin back to 800 mg QI but I advised him that this is the max dose. Patient understands\par Giving meloxicam, advised to take as needed QD with meals, and to stop taking it if he has abdominal symptoms including but not limited to pain, GI bleed for which he should go to the hospital. \par Advised to increase his oral hydration\par \par As per hospital records, he was at [facility] ED for substance overdose? after being found sleeping at [facility]\par Patient requested controlled substances but as per previosu conversations with him, I will not prescribed them to him anymore and he is to continue getting them from psych. He reports that will not continue going with his current psychiatrist who prescribed him a 30 days supply on gil/15/2021 as per Dr Nunez note. I advised him to find another psychiatrist , he still has enough medications. If he needs more or has any anxiety/withdrawal symptoms he should go to the emergency department. \par WIll need psych records. Advised to sign the release form\par \par Patient denies that needs other medications at this time.\par \par Return to care: as needed\par Call or return for any questions
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
## 2021-06-24 NOTE — ED ADULT NURSE NOTE - NSIMPLEMENTINTERV_GEN_ALL_ED
Implemented All Universal Safety Interventions:  [place] to call system. Call bell, personal items and telephone within reach. Instruct patient to call for assistance. Room bathroom lighting operational. Non-slip footwear when patient is off stretcher. Physically safe environment: no spills, clutter or unnecessary equipment. Stretcher in lowest position, wheels locked, appropriate side rails in place.
1

## 2021-06-24 NOTE — ED STATDOCS - PROGRESS NOTE DETAILS
Pt. noted to be have elevated BUN/Cr. possibly due to dehydration. Pt. has had elevated BUN/Cr. in the past. Pt. will be given oral hydration and repeat chemistry in the morning. BP also will be monitored. Pt. signed out to Dr. Garrett. Pt. for psych hold until the morning for possible voluntary admission.

## 2021-06-24 NOTE — HISTORY OF PRESENT ILLNESS
[de-identified] : Mr. ZAKIYA BRICEÑO is a 40 year old male with PMH of asthma, last crisis 5 years ago, never in the ICU, DM, HLD, HTN, hypothyroidism, insomnia, panic disorder? who comes tot he office requesting Xanax. A per previous conversations with patient, he is to get them prescribed by his psychiatrist whom he has been seeing and I am not prescribing him controlled substances anymore (see my previous note) which he knows of. Patient called the service 4 days ago requesting a refill, but as per Dr Nunez note, patient was prescribed clonazepam on 06/15/2021 for a 30 days supply.\par Patient also requests a refill on his gabapentin for chronic pain. He used to take 800 mg QID but I decreased his dose to 800 TID dince he was in a high dose and to avoid kidney issues. Patient reports that he felt better in the higher dose. Denies history of kidney diseases.\par \par \par Sees endocrinology Dr Jacobs\par Psych:  Susanne Ewing, BARNEY [FreeTextEntry1] : xanax refill
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
## 2021-06-24 NOTE — ED ADULT TRIAGE NOTE - CHIEF COMPLAINT QUOTE
pt with reports panic attacks causing chest pain, PCP told him to come here to see psychiatrist. denies SI. took losartan and clonidine PTA

## 2021-06-24 NOTE — ED STATDOCS - CLINICAL SUMMARY MEDICAL DECISION MAKING FREE TEXT BOX
Pt with depression and anxiety, requesting inpatient psych. Hx of substance abuse. Will have psych evaluate.

## 2021-06-24 NOTE — ED ADULT NURSE NOTE - OBJECTIVE STATEMENT
Pt is a 41 y/o male w/ a hx of panis disorder and depression presents to the ED c/o Panic. Pt states he feels tightness in his chest. Pt went to [facility] to get admitted but was sent here. Pt currently homeless. Pt admits to THC use. denies any other recent drug or etoh use. pt currently on Suboxone. Pt denies any SI/HI, or A/V/H.

## 2021-06-25 VITALS
DIASTOLIC BLOOD PRESSURE: 77 MMHG | SYSTOLIC BLOOD PRESSURE: 121 MMHG | OXYGEN SATURATION: 98 % | RESPIRATION RATE: 20 BRPM | TEMPERATURE: 98 F | HEART RATE: 69 BPM

## 2021-06-25 LAB
AMPHET UR-MCNC: NEGATIVE — SIGNIFICANT CHANGE UP
ANION GAP SERPL CALC-SCNC: 9 MMOL/L — SIGNIFICANT CHANGE UP (ref 5–17)
APPEARANCE UR: CLEAR — SIGNIFICANT CHANGE UP
BARBITURATES UR SCN-MCNC: NEGATIVE — SIGNIFICANT CHANGE UP
BENZODIAZ UR-MCNC: NEGATIVE — SIGNIFICANT CHANGE UP
BILIRUB UR-MCNC: NEGATIVE — SIGNIFICANT CHANGE UP
BUN SERPL-MCNC: 36.7 MG/DL — HIGH (ref 8–20)
CALCIUM SERPL-MCNC: 9.3 MG/DL — SIGNIFICANT CHANGE UP (ref 8.6–10.2)
CHLORIDE SERPL-SCNC: 102 MMOL/L — SIGNIFICANT CHANGE UP (ref 98–107)
CO2 SERPL-SCNC: 27 MMOL/L — SIGNIFICANT CHANGE UP (ref 22–29)
COCAINE METAB.OTHER UR-MCNC: NEGATIVE — SIGNIFICANT CHANGE UP
COLOR SPEC: YELLOW — SIGNIFICANT CHANGE UP
CREAT SERPL-MCNC: 1.61 MG/DL — HIGH (ref 0.5–1.3)
DIFF PNL FLD: NEGATIVE — SIGNIFICANT CHANGE UP
GLUCOSE SERPL-MCNC: 210 MG/DL — HIGH (ref 70–99)
GLUCOSE UR QL: NEGATIVE MG/DL — SIGNIFICANT CHANGE UP
KETONES UR-MCNC: NEGATIVE — SIGNIFICANT CHANGE UP
LEUKOCYTE ESTERASE UR-ACNC: NEGATIVE — SIGNIFICANT CHANGE UP
METHADONE UR-MCNC: NEGATIVE — SIGNIFICANT CHANGE UP
NITRITE UR-MCNC: NEGATIVE — SIGNIFICANT CHANGE UP
OPIATES UR-MCNC: NEGATIVE — SIGNIFICANT CHANGE UP
PCP SPEC-MCNC: SIGNIFICANT CHANGE UP
PCP UR-MCNC: NEGATIVE — SIGNIFICANT CHANGE UP
PH UR: 6 — SIGNIFICANT CHANGE UP (ref 5–8)
POTASSIUM SERPL-MCNC: 4.5 MMOL/L — SIGNIFICANT CHANGE UP (ref 3.5–5.3)
POTASSIUM SERPL-SCNC: 4.5 MMOL/L — SIGNIFICANT CHANGE UP (ref 3.5–5.3)
PROT UR-MCNC: NEGATIVE MG/DL — SIGNIFICANT CHANGE UP
SARS-COV-2 RNA SPEC QL NAA+PROBE: SIGNIFICANT CHANGE UP
SODIUM SERPL-SCNC: 138 MMOL/L — SIGNIFICANT CHANGE UP (ref 135–145)
SP GR SPEC: 1.01 — SIGNIFICANT CHANGE UP (ref 1.01–1.02)
THC UR QL: NEGATIVE — SIGNIFICANT CHANGE UP
UROBILINOGEN FLD QL: NEGATIVE MG/DL — SIGNIFICANT CHANGE UP

## 2021-06-25 PROCEDURE — 85025 COMPLETE CBC W/AUTO DIFF WBC: CPT

## 2021-06-25 PROCEDURE — U0003: CPT

## 2021-06-25 PROCEDURE — 99234 HOSP IP/OBS SM DT SF/LOW 45: CPT

## 2021-06-25 PROCEDURE — 36415 COLL VENOUS BLD VENIPUNCTURE: CPT

## 2021-06-25 PROCEDURE — 80048 BASIC METABOLIC PNL TOTAL CA: CPT

## 2021-06-25 PROCEDURE — 99284 EMERGENCY DEPT VISIT MOD MDM: CPT

## 2021-06-25 PROCEDURE — 80307 DRUG TEST PRSMV CHEM ANLYZR: CPT

## 2021-06-25 PROCEDURE — U0005: CPT

## 2021-06-25 PROCEDURE — G0378: CPT

## 2021-06-25 PROCEDURE — 81003 URINALYSIS AUTO W/O SCOPE: CPT

## 2021-06-25 PROCEDURE — 93005 ELECTROCARDIOGRAM TRACING: CPT

## 2021-06-25 PROCEDURE — 80053 COMPREHEN METABOLIC PANEL: CPT

## 2021-06-25 PROCEDURE — 99213 OFFICE O/P EST LOW 20 MIN: CPT

## 2021-06-25 RX ORDER — LOSARTAN POTASSIUM 100 MG/1
100 TABLET, FILM COATED ORAL DAILY
Refills: 0 | Status: DISCONTINUED | OUTPATIENT
Start: 2021-06-25 | End: 2021-06-29

## 2021-06-25 RX ADMIN — Medication 0.2 MILLIGRAM(S): at 09:42

## 2021-06-25 RX ADMIN — LOSARTAN POTASSIUM 100 MILLIGRAM(S): 100 TABLET, FILM COATED ORAL at 09:41

## 2021-06-25 RX ADMIN — GABAPENTIN 800 MILLIGRAM(S): 400 CAPSULE ORAL at 06:22

## 2021-06-25 RX ADMIN — BUPRENORPHINE AND NALOXONE 1 TABLET(S): 2; .5 TABLET SUBLINGUAL at 09:40

## 2021-06-25 NOTE — ED ADULT NURSE REASSESSMENT NOTE - NS ED NURSE REASSESS COMMENT FT1
assumed care of pt awake and alert ambulating in the common area. pt offers no complaints and has made no attempts to harm himself or others.

## 2021-06-25 NOTE — ED ADULT NURSE REASSESSMENT NOTE - NS ED NURSE REASSESS COMMENT FT1
Pt resting comfortably in stretcher, NAD noted, respirations even and unlabored,  safety maintained.

## 2021-06-25 NOTE — ED ADULT NURSE REASSESSMENT NOTE - NS ED NURSE REASSESS COMMENT FT1
pt reassessed by the psychiatric team and cleared. JOSE Armstrong consulted pt and has established an inpatient rehab assignment at [facility] in [place]. pt endorses this plan.

## 2021-06-25 NOTE — ED CDU PROVIDER DISPOSITION NOTE - CLINICAL COURSE
Pt cleared by psych and to go to [facility] to detox program [place].  They are sending [facility] to take pt to program
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
## 2021-06-25 NOTE — ED CDU PROVIDER INITIAL DAY NOTE - MEDICAL DECISION MAKING DETAILS
pt awaiting for psych placement also receiving oral hydration due to history of LIV due to dehydration

## 2021-06-25 NOTE — ED BEHAVIORAL HEALTH NOTE - BEHAVIORAL HEALTH NOTE
PROGRESS NOTE: 21 @ 09:38  	  • Reason for Ongoing Consultation: 	    ID: 41yyo Male with HEALTH ISSUES - PROBLEM Dx:  Depressive disorder  Polysubstance abuse      INTERVAL DATA:   • Interval Chief Complaint: "I feel ok"  • Interval History: Pt is a 40y/o male with pmhx of HTN, depression, anxiety, and polysubstance abuse who presented to the ED 1 day ago for a panic attack. Pt states today that he is seeking a rehab facility for drug abuse s/p a Benzo detox. Pt stated that he attempted to admit himself to [facility] in [facility] yesterday, but was rejected d/t a "dirty drug screen", which triggered him to "flip out" because he was "clean". Pt admitted that last time he smoked marijuana was 5 days ago, and that one week ago he was detoxed from Benzos @ [facility], where he then remained in the sober house at [facility]. Pt states that he has remained sober from his past opoid use d/t to his compliancy with suboxone, which was verified by [employer] (ref # [id number]). Pt remained alert, oriented and cooperative throughout interview and was motivated to remain sober. He denies anxiety, depression, SI/HI/VH/AH. Pt has been cleared by psych.     REVIEW OF SYSTEMS:   • Constitutional Symptoms	No complaints  • Eyes	No complaints  • Ears / Nose / Throat / Mouth	No complaints  • Cardiovascular	No complaints  • Respiratory	No complaints  • Gastrointestinal	No complaints  • Genitourinary	No complaints  • Musculoskeletal	No complaints  • Skin	No complaints  • Neurological	No complaints  • Psychiatric (see HPI)	See HPI  • Endocrine	No complaints  • Hematologic / Lymphatic	No complaints  • Allergic / Immunologic	No complaints    REVIEW OF VITALS/LABS/IMAGING/INVESTIGATIONS:   • Vital signs reviewed: Yes  • Vital Signs:	    T(C): 36.7 (21 @ 07:23), Max: 37.1 (21 @ 19:29)  HR: 69 (21 @ 07:23) (68 - 78)  BP: 121/77 (21 @ 07:23) (95/55 - 121/77)  RR: 20 (21 @ 07:23) (18 - 20)  SpO2: 98% (21 @ 07:23) (96% - 98%)    • Available labs reviewed: Yes  • Available Lab Results:                           9.9    7.05  )-----------( 291      ( 2021 22:34 )             30.8         141  |  104  |  43.9<H>  ----------------------------<  131<H>  4.2   |  24.0  |  2.68<H>    Ca    9.9      2021 22:34    TPro  7.4  /  Alb  4.3  /  TBili  0.2<L>  /  DBili  x   /  AST  28  /  ALT  24  /  AlkPhos  129<H>      LIVER FUNCTIONS - ( 2021 22:34 )  Alb: 4.3 g/dL / Pro: 7.4 g/dL / ALK PHOS: 129 U/L / ALT: 24 U/L / AST: 28 U/L / GGT: x             Urinalysis Basic - ( 2021 04:02 )    Color: Yellow / Appearance: Clear / S.015 / pH: x  Gluc: x / Ketone: Negative  / Bili: Negative / Urobili: Negative mg/dL   Blood: x / Protein: Negative mg/dL / Nitrite: Negative   Leuk Esterase: Negative / RBC: x / WBC x   Sq Epi: x / Non Sq Epi: x / Bacteria: x        MEDICATIONS:      PRN Medications:  • PRN Medications since last evaluation	  • PRN Details	    Current Medications:   buprenorphine 8 mG/naloxone 2 mG SL  Tablet 1 Tablet(s) SubLingual <User Schedule>  buPROPion XL (24-Hour) 300 milliGRAM(s) Oral daily  celecoxib 200 milliGRAM(s) Oral daily  cloNIDine 0.2 milliGRAM(s) Oral three times a day  gabapentin 800 milliGRAM(s) Oral every 8 hours  lamoTRIgine 200 milliGRAM(s) Oral at bedtime  losartan 100 milliGRAM(s) Oral daily  traZODone 50 milliGRAM(s) Oral at bedtime     Medication Side Effects:  • Medication Side Effects or Adverse Reactions (new or ongoing)	None known    MENTAL STATUS EXAM:   • Level of Consciousness	Alert  • General Appearance	Well developed  • Body Habitus	Well nourished  • Hygiene	Fair  • Grooming	Fair  • Behavior	Cooperative  • Eye Contact	Good  • Relatedness	Good  • Impulse Control	Normal  • Muscle Tone / Strength	Deferred  • Abnormal Movements	No abnormal movements  • Gait / Station	Normal gait / station  • Speech Volume	Normal  • Speech Rate	Normal  • Speech Spontaneity	Normal  • Speech Articulation	Normal  • Mood	Normal  • Affect Quality	Euthymic  • Affect Range	Full  • Affect Congruence	Congruent  • Thought Process	Linear  • Thought Associations	Normal  • Thought Content	Unremarkable  • Perceptions	No abnormalities  • Oriented to Time	Yes  • Oriented to Place	Yes  • Oriented to Situation	Yes  • Oriented to Person	Yes  • Attention / Concentration	Normal  • Estimated Intelligence	Average  • Recent Memory	Normal  • Remote Memory	Normal  • Fund of Knowledge	Normal  • Language	No abnormalities noted  • Judgment (regarding everyday events)	Fair  • Insight (regarding psychiatric illness)	Fair    SUICIDALITY:   • Suicidality (Interval)	none known    HOMICIDALITY/AGGRESSION:   • Homicidality/Aggression	none known    DIAGNOSIS DSM-V:    Psychiatric Diagnosis (Corresponds to DSM-IV Axis I, II):   HEALTH ISSUES - PROBLEM Dx:  Depressive disorder    Polysubstance abuse             Medical Diagnosis (Corresponds to DSM-IV Axis III):  • Axis III	      ASSESSMENT OF CURRENT CONDITION: A Pt is a 40y/o male with pmhx of HTN, depression, anxiety, and polysubstance abuse who presented to the ED 1 day ago for a panic attack. Upon presentation, pt is a&Ox3, calm and cooperative. He expressed that he is motivated to remain sober. Pt has been compliant with his Suboxone, and was detox off benzos one week ago. Pt was seen by Terrence  (Drug and rehab counselor), who was working to find him a rehab facility. Pt is cleared by psych.     Summary (include case differential, formulation and patient response to therapy):     Risk Assessment (consider static vs modifiable risk factors and protective factors; comment on level of risk for dangerous behavior): RF: prior psych dx, recent substance abuse. PF: rehab support, medication compliancy     PLAN: Cleared and d/c by psych. Pt will be going to rehab facility. PROGRESS NOTE: 21 @ 09:38  	  • Reason for Ongoing Consultation: 	re-evaluation    ID: 41yyo Male with HEALTH ISSUES - PROBLEM Dx:  Depressive disorder  Polysubstance abuse      INTERVAL DATA:   • Interval Chief Complaint: "I feel ok"  • Interval History: Pt is a 40y/o male with pmhx of HTN, depression, anxiety, and polysubstance abuse who presented to the ED 1 day ago for a panic attack. Pt states today that he is seeking a rehab facility for drug abuse s/p a Benzo detox. Pt stated that he attempted to admit himself to [facility] in [facility] yesterday, but was rejected d/t a "dirty drug screen", which triggered him to "flip out" because he was "clean". Pt admitted that last time he smoked marijuana was 5 days ago, and that one week ago he was detoxed from Benzos @ [facility], where he then remained in the sober house at [facility]. Pt states that he has remained sober from his past opoid use d/t to his compliancy with suboxone, which was verified by [employer] (ref # [id number]). Pt remained alert, oriented and cooperative throughout interview and was motivated to remain sober. He denies anxiety, depression, SI/HI/VH/AH. Pt has been cleared by psych.     REVIEW OF SYSTEMS:   • Constitutional Symptoms	No complaints  • Eyes	No complaints  • Ears / Nose / Throat / Mouth	No complaints  • Cardiovascular	No complaints  • Respiratory	No complaints  • Gastrointestinal	No complaints  • Genitourinary	No complaints  • Musculoskeletal	No complaints  • Skin	No complaints  • Neurological	No complaints  • Psychiatric (see HPI)	See HPI  • Endocrine	No complaints  • Hematologic / Lymphatic	No complaints  • Allergic / Immunologic	No complaints    REVIEW OF VITALS/LABS/IMAGING/INVESTIGATIONS:   • Vital signs reviewed: Yes  • Vital Signs:	    T(C): 36.7 (21 @ 07:23), Max: 37.1 (21 @ 19:29)  HR: 69 (21 @ 07:23) (68 - 78)  BP: 121/77 (21 @ 07:23) (95/55 - 121/77)  RR: 20 (21 @ 07:23) (18 - 20)  SpO2: 98% (21 @ 07:23) (96% - 98%)    • Available labs reviewed: Yes  • Available Lab Results:                           9.9    7.05  )-----------( 291      ( 2021 22:34 )             30.8         141  |  104  |  43.9<H>  ----------------------------<  131<H>  4.2   |  24.0  |  2.68<H>    Ca    9.9      2021 22:34    TPro  7.4  /  Alb  4.3  /  TBili  0.2<L>  /  DBili  x   /  AST  28  /  ALT  24  /  AlkPhos  129<H>      LIVER FUNCTIONS - ( 2021 22:34 )  Alb: 4.3 g/dL / Pro: 7.4 g/dL / ALK PHOS: 129 U/L / ALT: 24 U/L / AST: 28 U/L / GGT: x             Urinalysis Basic - ( 2021 04:02 )    Color: Yellow / Appearance: Clear / S.015 / pH: x  Gluc: x / Ketone: Negative  / Bili: Negative / Urobili: Negative mg/dL   Blood: x / Protein: Negative mg/dL / Nitrite: Negative   Leuk Esterase: Negative / RBC: x / WBC x   Sq Epi: x / Non Sq Epi: x / Bacteria: x        MEDICATIONS:      PRN Medications:  • PRN Medications since last evaluation	  • PRN Details	    Current Medications:   buprenorphine 8 mG/naloxone 2 mG SL  Tablet 1 Tablet(s) SubLingual <User Schedule>  bupropion XL (24-Hour) 300 milliGRAM(s) Oral daily  celecoxib 200 milliGRAM(s) Oral daily  clonidine 0.2 milliGRAM(s) Oral three times a day  gabapentin 800 milliGRAM(s) Oral every 8 hours  lamotrigine 200 milliGRAM(s) Oral at bedtime  losartan 100 milliGRAM(s) Oral daily  trazodone 50 milliGRAM(s) Oral at bedtime     Medication Side Effects:  • Medication Side Effects or Adverse Reactions (new or ongoing)	None known    MENTAL STATUS EXAM:   • Level of Consciousness	Alert  • General Appearance	Well developed  • Body Habitus	Well nourished  • Hygiene	Fair  • Grooming	Fair  • Behavior	Cooperative  • Eye Contact	Good  • Relatedness	Good  • Impulse Control	Normal  • Muscle Tone / Strength	Deferred  • Abnormal Movements	No abnormal movements  • Gait / Station	Normal gait / station  • Speech Volume	Normal  • Speech Rate	Normal  • Speech Spontaneity	Normal  • Speech Articulation	Normal  • Mood	Normal  • Affect Quality	Euthymic  • Affect Range	Full  • Affect Congruence	Congruent  • Thought Process	Linear  • Thought Associations	Normal  • Thought Content	Unremarkable  • Perceptions	No abnormalities  • Oriented to Time	Yes  • Oriented to Place	Yes  • Oriented to Situation	Yes  • Oriented to Person	Yes  • Attention / Concentration	Normal  • Estimated Intelligence	Average  • Recent Memory	Normal  • Remote Memory	Normal  • Fund of Knowledge	Normal  • Language	No abnormalities noted  • Judgment (regarding everyday events)	Fair  • Insight (regarding psychiatric illness)	Fair    SUICIDALITY:   • Suicidality (Interval)	none known    HOMICIDALITY/AGGRESSION:   • Homicidality/Aggression	none known    DIAGNOSIS DSM-V:    Psychiatric Diagnosis (Corresponds to DSM-IV Axis I, II):   HEALTH ISSUES - PROBLEM Dx:  Depressive disorder  opioid use disorder on agonist therapy             Medical Diagnosis (Corresponds to DSM-IV Axis III):  • Axis III	  HTN GERD     ASSESSMENT OF CURRENT CONDITION: A Pt is a 40y/o male with pmhx of HTN, depression, anxiety, and polysubstance abuse who presented to the ED 1 day ago for a panic attack. Upon presentation, pt is a&Ox3, calm and cooperative. He expressed that he is motivated to remain sober. Pt has been compliant with his Suboxone, and was detox off benzos one week ago. Pt was seen by Terrence  (Drug and rehab counselor), who was working to find him a rehab facility. Pt is cleared by psych.     Summary (include case differential, formulation and patient response to therapy): uneventful night No acute psychiatric issues    Risk Assessment (consider static vs modifiable risk factors and protective factors; comment on level of risk for dangerous behavior): RF: prior psych dx, recent substance abuse. PF: rehab support, medication compliancy     PLAN: Cleared and d/c by psych. Pt will be going to rehab facility.

## 2021-06-25 NOTE — SBIRT NOTE ADULT - NSSBIRTDRGACTIVEREFTXDET_GEN_A_CORE
Provided SBIRT services: Referral to Treatment Performed. Screening results reviewed with patient and patient provided information regarding healthy guidelines and potential negative consequences associated with level of risk. Motivation and readiness to reduce or stop use was discussed and goals and activities to make changes were suggested/offered. Referral for complete assessment and level of care determination at a certified treatment facility was completed by contacting the treatment facility via phone:

## 2021-06-25 NOTE — ED CDU PROVIDER INITIAL DAY NOTE - PHYSICAL EXAMINATION
Const: Awake, alert and oriented. Anxious appearing.  HEENT: NC/AT. Moist mucous membranes.  Eyes: No scleral icterus. EOMI.  Neck:. Soft and supple. Full ROM without pain.  Cardiac: Regular rate and regular rhythm. +S1/S2. Peripheral pulses 2+ and symmetric. No LE edema.  Resp: Speaking in full sentences. No evidence of respiratory distress. No wheezes, rales or rhonchi.  Abd: Soft, non-tender, non-distended. Normal bowel sounds in all 4 quadrants. No guarding or rebound.  Back: Spine midline and non-tender. No CVAT.  Skin: No rashes, abrasions or lacerations.  Lymph: No cervical lymphadenopathy.  Neuro: Awake, alert & oriented x 3. Moves all extremities symmetrically.

## 2021-06-30 ENCOUNTER — NON-APPOINTMENT (OUTPATIENT)
Age: 41
End: 2021-06-30

## 2021-07-06 ENCOUNTER — EMERGENCY (EMERGENCY)
Facility: HOSPITAL | Age: 41
LOS: 1 days | Discharge: HOME | End: 2021-07-06
Attending: EMERGENCY MEDICINE
Payer: MEDICARE

## 2021-07-06 VITALS
SYSTOLIC BLOOD PRESSURE: 101 MMHG | DIASTOLIC BLOOD PRESSURE: 64 MMHG | HEIGHT: 69 IN | RESPIRATION RATE: 18 BRPM | WEIGHT: 229.94 LBS | OXYGEN SATURATION: 96 % | HEART RATE: 61 BPM | TEMPERATURE: 99 F

## 2021-07-06 DIAGNOSIS — Z96.653 PRESENCE OF ARTIFICIAL KNEE JOINT, BILATERAL: Chronic | ICD-10-CM

## 2021-07-06 DIAGNOSIS — Z96.649 PRESENCE OF UNSPECIFIED ARTIFICIAL HIP JOINT: Chronic | ICD-10-CM

## 2021-07-06 LAB
ALBUMIN SERPL ELPH-MCNC: 4.6 G/DL — SIGNIFICANT CHANGE UP (ref 3.3–5.2)
ALP SERPL-CCNC: 127 U/L — HIGH (ref 40–120)
ALT FLD-CCNC: 26 U/L — SIGNIFICANT CHANGE UP
AMPHET UR-MCNC: POSITIVE
ANION GAP SERPL CALC-SCNC: 9 MMOL/L — SIGNIFICANT CHANGE UP (ref 5–17)
APAP SERPL-MCNC: <3 UG/ML — LOW (ref 10–26)
APPEARANCE UR: CLEAR — SIGNIFICANT CHANGE UP
AST SERPL-CCNC: 29 U/L — SIGNIFICANT CHANGE UP
BACTERIA # UR AUTO: ABNORMAL
BARBITURATES UR SCN-MCNC: NEGATIVE — SIGNIFICANT CHANGE UP
BASOPHILS # BLD AUTO: 0.03 K/UL — SIGNIFICANT CHANGE UP (ref 0–0.2)
BASOPHILS NFR BLD AUTO: 0.5 % — SIGNIFICANT CHANGE UP (ref 0–2)
BENZODIAZ UR-MCNC: NEGATIVE — SIGNIFICANT CHANGE UP
BILIRUB SERPL-MCNC: 0.2 MG/DL — LOW (ref 0.4–2)
BILIRUB UR-MCNC: ABNORMAL
BUN SERPL-MCNC: 22.5 MG/DL — HIGH (ref 8–20)
CALCIUM SERPL-MCNC: 9.6 MG/DL — SIGNIFICANT CHANGE UP (ref 8.6–10.2)
CHLORIDE SERPL-SCNC: 104 MMOL/L — SIGNIFICANT CHANGE UP (ref 98–107)
CO2 SERPL-SCNC: 27 MMOL/L — SIGNIFICANT CHANGE UP (ref 22–29)
COCAINE METAB.OTHER UR-MCNC: NEGATIVE — SIGNIFICANT CHANGE UP
COLOR SPEC: YELLOW — SIGNIFICANT CHANGE UP
CREAT SERPL-MCNC: 1.21 MG/DL — SIGNIFICANT CHANGE UP (ref 0.5–1.3)
DIFF PNL FLD: NEGATIVE — SIGNIFICANT CHANGE UP
EOSINOPHIL # BLD AUTO: 0.27 K/UL — SIGNIFICANT CHANGE UP (ref 0–0.5)
EOSINOPHIL NFR BLD AUTO: 4.5 % — SIGNIFICANT CHANGE UP (ref 0–6)
EPI CELLS # UR: SIGNIFICANT CHANGE UP
ETHANOL SERPL-MCNC: <10 MG/DL — SIGNIFICANT CHANGE UP (ref 0–9)
GLUCOSE SERPL-MCNC: 113 MG/DL — HIGH (ref 70–99)
GLUCOSE UR QL: NEGATIVE MG/DL — SIGNIFICANT CHANGE UP
HCT VFR BLD CALC: 34.2 % — LOW (ref 39–50)
HGB BLD-MCNC: 11.1 G/DL — LOW (ref 13–17)
IMM GRANULOCYTES NFR BLD AUTO: 0.3 % — SIGNIFICANT CHANGE UP (ref 0–1.5)
KETONES UR-MCNC: NEGATIVE — SIGNIFICANT CHANGE UP
LEUKOCYTE ESTERASE UR-ACNC: NEGATIVE — SIGNIFICANT CHANGE UP
LYMPHOCYTES # BLD AUTO: 2.09 K/UL — SIGNIFICANT CHANGE UP (ref 1–3.3)
LYMPHOCYTES # BLD AUTO: 35.2 % — SIGNIFICANT CHANGE UP (ref 13–44)
MCHC RBC-ENTMCNC: 28.3 PG — SIGNIFICANT CHANGE UP (ref 27–34)
MCHC RBC-ENTMCNC: 32.5 GM/DL — SIGNIFICANT CHANGE UP (ref 32–36)
MCV RBC AUTO: 87.2 FL — SIGNIFICANT CHANGE UP (ref 80–100)
METHADONE UR-MCNC: POSITIVE
MONOCYTES # BLD AUTO: 0.52 K/UL — SIGNIFICANT CHANGE UP (ref 0–0.9)
MONOCYTES NFR BLD AUTO: 8.8 % — SIGNIFICANT CHANGE UP (ref 2–14)
NEUTROPHILS # BLD AUTO: 3.01 K/UL — SIGNIFICANT CHANGE UP (ref 1.8–7.4)
NEUTROPHILS NFR BLD AUTO: 50.7 % — SIGNIFICANT CHANGE UP (ref 43–77)
NITRITE UR-MCNC: NEGATIVE — SIGNIFICANT CHANGE UP
OPIATES UR-MCNC: NEGATIVE — SIGNIFICANT CHANGE UP
PCP SPEC-MCNC: SIGNIFICANT CHANGE UP
PCP UR-MCNC: POSITIVE
PH UR: 6 — SIGNIFICANT CHANGE UP (ref 5–8)
PLATELET # BLD AUTO: 210 K/UL — SIGNIFICANT CHANGE UP (ref 150–400)
POTASSIUM SERPL-MCNC: 4.1 MMOL/L — SIGNIFICANT CHANGE UP (ref 3.5–5.3)
POTASSIUM SERPL-SCNC: 4.1 MMOL/L — SIGNIFICANT CHANGE UP (ref 3.5–5.3)
PROT SERPL-MCNC: 7.1 G/DL — SIGNIFICANT CHANGE UP (ref 6.6–8.7)
PROT UR-MCNC: 30 MG/DL
RBC # BLD: 3.92 M/UL — LOW (ref 4.2–5.8)
RBC # FLD: 14 % — SIGNIFICANT CHANGE UP (ref 10.3–14.5)
RBC CASTS # UR COMP ASSIST: SIGNIFICANT CHANGE UP /HPF (ref 0–4)
SALICYLATES SERPL-MCNC: <0.6 MG/DL — LOW (ref 10–20)
SODIUM SERPL-SCNC: 140 MMOL/L — SIGNIFICANT CHANGE UP (ref 135–145)
SP GR SPEC: 1.02 — SIGNIFICANT CHANGE UP (ref 1.01–1.02)
THC UR QL: POSITIVE
UROBILINOGEN FLD QL: 1 MG/DL
WBC # BLD: 5.94 K/UL — SIGNIFICANT CHANGE UP (ref 3.8–10.5)
WBC # FLD AUTO: 5.94 K/UL — SIGNIFICANT CHANGE UP (ref 3.8–10.5)
WBC UR QL: SIGNIFICANT CHANGE UP

## 2021-07-06 PROCEDURE — 93010 ELECTROCARDIOGRAM REPORT: CPT

## 2021-07-06 PROCEDURE — 99284 EMERGENCY DEPT VISIT MOD MDM: CPT

## 2021-07-06 PROCEDURE — 90791 PSYCH DIAGNOSTIC EVALUATION: CPT

## 2021-07-06 NOTE — ED BEHAVIORAL HEALTH ASSESSMENT NOTE - NSSUICRSKFACTOR_PSY_ALL_CORE
No
Current and Past Psychiatric Diagnoses/Presenting Symptoms/Historical Factors/Treatment Related Factors/Activating Events/Stressors
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
## 2021-07-06 NOTE — ED PROVIDER NOTE - PATIENT PORTAL LINK FT
You can access the FollowMyHealth Patient Portal offered by [facility] by registering at the following website: [email or web address]. By joining [facility]’s FollowMyHealth portal, you will also be able to view your health information using other applications (apps) compatible with our system. You can access the FollowMyHealth Patient Portal offered by [facility] by registering at the following website: [email or web address]. By joining [facility]’s FollowMyHealth portal, you will also be able to view your health information using other applications (apps) compatible with our system.

## 2021-07-06 NOTE — ED PROVIDER NOTE - ATTENDING CONTRIBUTION TO CARE
41yoM; with pmh signif for Bipolar, Polysubstance abuse (on Suboxone); now p/w need for medical clearance to be admitted to [facility]. denies si/hi/ah/vh. denies cp/sob/palp. denies abd pain.  General:     NAD  Head:     NC/AT, EOMI, oral mucosa moist  Neck:     trachea midline  Lungs:     CTA b/l, no w/r/r  CVS:     S1S2, RRR, no m/g/r  Ext:    pina x4  Neuro: grossly intact  A/P:  41yoM p/w medical clearance needed for detox  -labs, ekg, nurse to nurse

## 2021-07-06 NOTE — ED PROVIDER NOTE - OBJECTIVE STATEMENT
40 y/o male presents requesting medical evaluation for detox at [facility]. PT is on Suboxone and has not used opiates recently. He also reports he was detox from benzos last month and has also not recently used benzos, He states he is going to [facility] to help with outpatient housing. Denies any c/o.

## 2021-07-06 NOTE — ED BEHAVIORAL HEALTH ASSESSMENT NOTE - DETAILS
n/a h/o punching holes in walls and threatening others verbally denies patient educated to call 911 or go to ER if danger to self or others

## 2021-07-06 NOTE — ED BEHAVIORAL HEALTH ASSESSMENT NOTE - CURRENT MEDICATION
Seroquel 400mg QHS, Risperdal 1mg QHS, Gabapentin 800 qid, Meloxicam 7.5 qd, Suboxone 8/2 tid, Hydralazine 50 qid, labetalol 300 bid, Lamictal 200 qd, Losartan 100 qd

## 2021-07-06 NOTE — ED PROVIDER NOTE - CLINICAL SUMMARY MEDICAL DECISION MAKING FREE TEXT BOX
42 y/o male requesting medical clearance for voluntary detox at [facility]  [place], psych eval, d/c  No signs of active withdrawal

## 2021-07-06 NOTE — ED BEHAVIORAL HEALTH ASSESSMENT NOTE - DESCRIPTION
Replacement of both hips, HTN as in HPI Vital Signs Last 24 Hrs  T(C): 37.1 (06 Jul 2021 14:50), Max: 37.1 (06 Jul 2021 14:50)  T(F): 98.7 (06 Jul 2021 14:50), Max: 98.7 (06 Jul 2021 14:50)  HR: 61 (06 Jul 2021 14:50) (61 - 61)  BP: 101/64 (06 Jul 2021 14:50) (101/64 - 101/64)  BP(mean): --  RR: 18 (06 Jul 2021 14:50) (18 - 18)  SpO2: 96% (06 Jul 2021 14:50) (96% - 96%)

## 2021-07-06 NOTE — ED BEHAVIORAL HEALTH ASSESSMENT NOTE - RISK ASSESSMENT
Low Acute Suicide Risk RF past suicide attempt, recent detox from sedative,  PF future oriented, help seeking, denies any current s/h ideation

## 2021-07-06 NOTE — ED BEHAVIORAL HEALTH ASSESSMENT NOTE - SAFETY PLAN ADDT'L DETAILS
Safety plan discussed with.../Provision of National Suicide Prevention Lifeline 3-012-113-TALK (4382)
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
## 2021-07-08 ENCOUNTER — INPATIENT (INPATIENT)
Facility: HOSPITAL | Age: 41
LOS: 5 days | Discharge: PSYCHIATRIC OR SUBSTANCE USE FACILITY | DRG: 305 | End: 2021-07-14
Attending: HOSPITALIST | Admitting: FAMILY MEDICINE
Payer: MEDICARE

## 2021-07-08 VITALS
OXYGEN SATURATION: 98 % | HEIGHT: 69 IN | RESPIRATION RATE: 19 BRPM | HEART RATE: 75 BPM | WEIGHT: 229.94 LBS | SYSTOLIC BLOOD PRESSURE: 125 MMHG | DIASTOLIC BLOOD PRESSURE: 65 MMHG | TEMPERATURE: 98 F

## 2021-07-08 DIAGNOSIS — Z96.653 PRESENCE OF ARTIFICIAL KNEE JOINT, BILATERAL: Chronic | ICD-10-CM

## 2021-07-08 DIAGNOSIS — Z96.649 PRESENCE OF UNSPECIFIED ARTIFICIAL HIP JOINT: Chronic | ICD-10-CM

## 2021-07-08 DIAGNOSIS — R07.9 CHEST PAIN, UNSPECIFIED: ICD-10-CM

## 2021-07-08 LAB
ALBUMIN SERPL ELPH-MCNC: 4.7 G/DL — SIGNIFICANT CHANGE UP (ref 3.3–5.2)
ALP SERPL-CCNC: 151 U/L — HIGH (ref 40–120)
ALT FLD-CCNC: 26 U/L — SIGNIFICANT CHANGE UP
ANION GAP SERPL CALC-SCNC: 8 MMOL/L — SIGNIFICANT CHANGE UP (ref 5–17)
AST SERPL-CCNC: 26 U/L — SIGNIFICANT CHANGE UP
BASOPHILS # BLD AUTO: 0.05 K/UL — SIGNIFICANT CHANGE UP (ref 0–0.2)
BASOPHILS NFR BLD AUTO: 0.9 % — SIGNIFICANT CHANGE UP (ref 0–2)
BILIRUB SERPL-MCNC: 0.2 MG/DL — LOW (ref 0.4–2)
BUN SERPL-MCNC: 26.8 MG/DL — HIGH (ref 8–20)
CALCIUM SERPL-MCNC: 9.9 MG/DL — SIGNIFICANT CHANGE UP (ref 8.6–10.2)
CHLORIDE SERPL-SCNC: 104 MMOL/L — SIGNIFICANT CHANGE UP (ref 98–107)
CO2 SERPL-SCNC: 27 MMOL/L — SIGNIFICANT CHANGE UP (ref 22–29)
CREAT SERPL-MCNC: 1.31 MG/DL — HIGH (ref 0.5–1.3)
EOSINOPHIL # BLD AUTO: 0.23 K/UL — SIGNIFICANT CHANGE UP (ref 0–0.5)
EOSINOPHIL NFR BLD AUTO: 4 % — SIGNIFICANT CHANGE UP (ref 0–6)
GLUCOSE SERPL-MCNC: 154 MG/DL — HIGH (ref 70–99)
HCT VFR BLD CALC: 35.9 % — LOW (ref 39–50)
HGB BLD-MCNC: 11.6 G/DL — LOW (ref 13–17)
IMM GRANULOCYTES NFR BLD AUTO: 0.3 % — SIGNIFICANT CHANGE UP (ref 0–1.5)
LYMPHOCYTES # BLD AUTO: 1.37 K/UL — SIGNIFICANT CHANGE UP (ref 1–3.3)
LYMPHOCYTES # BLD AUTO: 23.9 % — SIGNIFICANT CHANGE UP (ref 13–44)
MCHC RBC-ENTMCNC: 28.3 PG — SIGNIFICANT CHANGE UP (ref 27–34)
MCHC RBC-ENTMCNC: 32.3 GM/DL — SIGNIFICANT CHANGE UP (ref 32–36)
MCV RBC AUTO: 87.6 FL — SIGNIFICANT CHANGE UP (ref 80–100)
MONOCYTES # BLD AUTO: 0.49 K/UL — SIGNIFICANT CHANGE UP (ref 0–0.9)
MONOCYTES NFR BLD AUTO: 8.5 % — SIGNIFICANT CHANGE UP (ref 2–14)
NEUTROPHILS # BLD AUTO: 3.58 K/UL — SIGNIFICANT CHANGE UP (ref 1.8–7.4)
NEUTROPHILS NFR BLD AUTO: 62.4 % — SIGNIFICANT CHANGE UP (ref 43–77)
PLATELET # BLD AUTO: 203 K/UL — SIGNIFICANT CHANGE UP (ref 150–400)
POTASSIUM SERPL-MCNC: 4.5 MMOL/L — SIGNIFICANT CHANGE UP (ref 3.5–5.3)
POTASSIUM SERPL-SCNC: 4.5 MMOL/L — SIGNIFICANT CHANGE UP (ref 3.5–5.3)
PROT SERPL-MCNC: 7.6 G/DL — SIGNIFICANT CHANGE UP (ref 6.6–8.7)
RAPID RVP RESULT: SIGNIFICANT CHANGE UP
RBC # BLD: 4.1 M/UL — LOW (ref 4.2–5.8)
RBC # FLD: 13.9 % — SIGNIFICANT CHANGE UP (ref 10.3–14.5)
SARS-COV-2 RNA SPEC QL NAA+PROBE: SIGNIFICANT CHANGE UP
SODIUM SERPL-SCNC: 139 MMOL/L — SIGNIFICANT CHANGE UP (ref 135–145)
TROPONIN T SERPL-MCNC: 0.01 NG/ML — SIGNIFICANT CHANGE UP (ref 0–0.06)
TROPONIN T SERPL-MCNC: <0.01 NG/ML — SIGNIFICANT CHANGE UP (ref 0–0.06)
WBC # BLD: 5.74 K/UL — SIGNIFICANT CHANGE UP (ref 3.8–10.5)
WBC # FLD AUTO: 5.74 K/UL — SIGNIFICANT CHANGE UP (ref 3.8–10.5)

## 2021-07-08 PROCEDURE — 71046 X-RAY EXAM CHEST 2 VIEWS: CPT | Mod: 26

## 2021-07-08 PROCEDURE — 99222 1ST HOSP IP/OBS MODERATE 55: CPT

## 2021-07-08 PROCEDURE — 99284 EMERGENCY DEPT VISIT MOD MDM: CPT | Mod: CS

## 2021-07-08 RX ORDER — ALPRAZOLAM 0.25 MG
0.5 TABLET ORAL THREE TIMES A DAY
Refills: 0 | Status: DISCONTINUED | OUTPATIENT
Start: 2021-07-08 | End: 2021-07-14

## 2021-07-08 RX ORDER — BUPRENORPHINE AND NALOXONE 2; .5 MG/1; MG/1
1 TABLET SUBLINGUAL THREE TIMES A DAY
Refills: 0 | Status: DISCONTINUED | OUTPATIENT
Start: 2021-07-08 | End: 2021-07-08

## 2021-07-08 RX ORDER — NITROGLYCERIN 6.5 MG
0.4 CAPSULE, EXTENDED RELEASE ORAL
Refills: 0 | Status: DISCONTINUED | OUTPATIENT
Start: 2021-07-08 | End: 2021-07-14

## 2021-07-08 RX ORDER — HYDRALAZINE HCL 50 MG
10 TABLET ORAL EVERY 6 HOURS
Refills: 0 | Status: DISCONTINUED | OUTPATIENT
Start: 2021-07-08 | End: 2021-07-14

## 2021-07-08 RX ORDER — RISPERIDONE 4 MG/1
1 TABLET ORAL AT BEDTIME
Refills: 0 | Status: DISCONTINUED | OUTPATIENT
Start: 2021-07-08 | End: 2021-07-14

## 2021-07-08 RX ORDER — AMLODIPINE BESYLATE 2.5 MG/1
10 TABLET ORAL ONCE
Refills: 0 | Status: COMPLETED | OUTPATIENT
Start: 2021-07-08 | End: 2021-07-08

## 2021-07-08 RX ORDER — METFORMIN HYDROCHLORIDE 850 MG/1
1 TABLET ORAL
Qty: 0 | Refills: 0 | DISCHARGE

## 2021-07-08 RX ORDER — ENOXAPARIN SODIUM 100 MG/ML
40 INJECTION SUBCUTANEOUS DAILY
Refills: 0 | Status: DISCONTINUED | OUTPATIENT
Start: 2021-07-08 | End: 2021-07-14

## 2021-07-08 RX ORDER — HYDRALAZINE HCL 50 MG
10 TABLET ORAL ONCE
Refills: 0 | Status: COMPLETED | OUTPATIENT
Start: 2021-07-08 | End: 2021-07-08

## 2021-07-08 RX ORDER — LABETALOL HCL 100 MG
10 TABLET ORAL ONCE
Refills: 0 | Status: COMPLETED | OUTPATIENT
Start: 2021-07-08 | End: 2021-07-08

## 2021-07-08 RX ORDER — BUPRENORPHINE AND NALOXONE 2; .5 MG/1; MG/1
1 TABLET SUBLINGUAL
Refills: 0 | Status: DISCONTINUED | OUTPATIENT
Start: 2021-07-08 | End: 2021-07-14

## 2021-07-08 RX ORDER — SODIUM CHLORIDE 9 MG/ML
500 INJECTION INTRAMUSCULAR; INTRAVENOUS; SUBCUTANEOUS
Refills: 0 | Status: COMPLETED | OUTPATIENT
Start: 2021-07-08 | End: 2021-07-11

## 2021-07-08 RX ORDER — LABETALOL HCL 100 MG
300 TABLET ORAL EVERY 8 HOURS
Refills: 0 | Status: DISCONTINUED | OUTPATIENT
Start: 2021-07-08 | End: 2021-07-14

## 2021-07-08 RX ORDER — QUETIAPINE FUMARATE 200 MG/1
400 TABLET, FILM COATED ORAL AT BEDTIME
Refills: 0 | Status: DISCONTINUED | OUTPATIENT
Start: 2021-07-08 | End: 2021-07-14

## 2021-07-08 RX ORDER — LOSARTAN POTASSIUM 100 MG/1
100 TABLET, FILM COATED ORAL DAILY
Refills: 0 | Status: DISCONTINUED | OUTPATIENT
Start: 2021-07-08 | End: 2021-07-14

## 2021-07-08 RX ORDER — PANTOPRAZOLE SODIUM 20 MG/1
40 TABLET, DELAYED RELEASE ORAL
Refills: 0 | Status: DISCONTINUED | OUTPATIENT
Start: 2021-07-08 | End: 2021-07-14

## 2021-07-08 RX ORDER — LAMOTRIGINE 25 MG/1
200 TABLET, ORALLY DISINTEGRATING ORAL DAILY
Refills: 0 | Status: DISCONTINUED | OUTPATIENT
Start: 2021-07-08 | End: 2021-07-14

## 2021-07-08 RX ORDER — GABAPENTIN 400 MG/1
800 CAPSULE ORAL EVERY 8 HOURS
Refills: 0 | Status: DISCONTINUED | OUTPATIENT
Start: 2021-07-08 | End: 2021-07-14

## 2021-07-08 RX ADMIN — Medication 0.5 MILLIGRAM(S): at 21:02

## 2021-07-08 RX ADMIN — Medication 0.3 MILLIGRAM(S): at 15:45

## 2021-07-08 RX ADMIN — ENOXAPARIN SODIUM 40 MILLIGRAM(S): 100 INJECTION SUBCUTANEOUS at 21:05

## 2021-07-08 RX ADMIN — Medication 0.2 MILLIGRAM(S): at 21:05

## 2021-07-08 RX ADMIN — QUETIAPINE FUMARATE 400 MILLIGRAM(S): 200 TABLET, FILM COATED ORAL at 21:16

## 2021-07-08 RX ADMIN — GABAPENTIN 800 MILLIGRAM(S): 400 CAPSULE ORAL at 21:02

## 2021-07-08 RX ADMIN — AMLODIPINE BESYLATE 10 MILLIGRAM(S): 2.5 TABLET ORAL at 08:13

## 2021-07-08 RX ADMIN — RISPERIDONE 1 MILLIGRAM(S): 4 TABLET ORAL at 21:54

## 2021-07-08 RX ADMIN — Medication 10 MILLIGRAM(S): at 16:55

## 2021-07-08 RX ADMIN — Medication 10 MILLIGRAM(S): at 18:26

## 2021-07-08 RX ADMIN — Medication 300 MILLIGRAM(S): at 21:54

## 2021-07-08 RX ADMIN — LOSARTAN POTASSIUM 100 MILLIGRAM(S): 100 TABLET, FILM COATED ORAL at 08:13

## 2021-07-08 RX ADMIN — Medication 10 MILLIGRAM(S): at 17:50

## 2021-07-08 RX ADMIN — Medication 0.3 MILLIGRAM(S): at 08:13

## 2021-07-08 NOTE — H&P ADULT - HISTORY OF PRESENT ILLNESS
40 y/o male came to the ER for on and off left side cp since this morning, sharp, was lasting few seconds every time it came back, radiating to left arm had some diaphoresis, cp started at rest and there was not aggravating or relieving factor. 4-5 on pain scale, no sob, no sig. HA, no focal weakness, no cough, no fever, no dizziness, no LOC, no abd. pain, no n/v/d. no sig. cp at the time of admission. pt. was noted to have elevated bp in the ER.  42 y/o male came to the ER for on and off left side cp since this morning, sharp, was lasting few seconds every time it came back, radiating to left arm had some diaphoresis, cp started at rest and there was not aggravating or relieving factor. was about 7 on pain scale, no sob, no sig. HA, no focal weakness, no cough, no fever, no dizziness, no LOC, no abd. pain, no n/v/d. no sig. cp at the time of admission. pt. was noted to have elevated bp in the ER.

## 2021-07-08 NOTE — ED PROVIDER NOTE - CARE PROVIDERS DIRECT ADDRESSES
[email or web address]

## 2021-07-08 NOTE — ED ADULT NURSE NOTE - OBJECTIVE STATEMENT
constant lower back pain  pt. denies ever having chest pain  pain still has chest pain 0430 constant  lt. side to the shoulder.  numbness to both hands couple of hours  denies fevers, chills, nausea, vomiting, sweating, diarrhea  stroke denies any defecits. assumed care at 0730am pt. a&ox3, pt. comes to ED c/o constant left sided chest pain and lower back pain starting at 0430am. pt. denies ever having this pain. pt. also c/o numbness to both hands for the past couple of hours. pt. denies fevers, chills, nausea, vomiting, sweating, diarrhea. pt. has a hx. of a stroke years ago, denies any deficits. pt. in no apparent distress at this time, breathing even and unlabored.

## 2021-07-08 NOTE — ED ADULT TRIAGE NOTE - CHIEF COMPLAINT QUOTE
Patient complains of chest pain radiating to left shoulder started this morning. Denies nausea vomiting and SOB.

## 2021-07-08 NOTE — ED PROVIDER NOTE - CARE PROVIDER_API CALL
César Barnard)  Cardiology; Cardiovascular Disease; Internal Medicine  [address] [place] [address]  Phone: [phone]  Fax: [phone]  Follow Up Time:     Guerrero Lantigua)  Cardiology; Internal Medicine  [address] [place] [address], [address]  [place], [place] [id number]  Phone: [phone]  Fax: [phone]  Follow Up Time:

## 2021-07-08 NOTE — ED PROVIDER NOTE - NSFOLLOWUPINSTRUCTIONS_ED_ALL_ED_FT
return to the ED if symptoms worsen chest pain, shortness of breath, abdominal pain, nausea/vomiting. Follow up with Cardiology in 1 week.

## 2021-07-08 NOTE — ED ADULT TRIAGE NOTE - PRO INTERPRETER NEED 2
Sepsis Note   Tee Ohara 60 y o  male MRN: [id number]  Unit/Bed#: ED 27 Encounter: [id number]            Initial Sepsis Screening     Row Name 05/04/18 1325                Is the patient's history suggestive of a new or worsening infection? (!)  Yes (Proceed)  -        Suspected source of infection pneumonia  -        Are two or more of the following signs & symptoms of infection both present and new to the patient? (!)  Yes (Proceed)  -        Indicate SIRS criteria Hyperthemia > 38 3C (100 9F); Tachypnea > 20 resp per min  -        If the answer is yes to both questions, suspicion of sepsis is present  --        If severe sepsis is present AND tissue hypoperfusion perists in the hour after fluid resuscitation or lactate > 4, the patient meets criteria for SEPTIC SHOCK  --        Are any of the following organ dysfunction criteria present within 6 hours of suspected infection and SIRS criteria that are NOT considered to be chronic conditions? (!)  Yes  -        Organ dysfunction Acute respiratory failure (new need for invasive or non-invasive mechanical ventilation)  -        Date of presentation of severe sepsis  --        Time of presentation of severe sepsis  --        Tissue hypoperfusion persists in the hour after crystalloid fluid administration, evidenced, by either:  --        Was hypotension present within one hour of the conclusion of crystalloid fluid administration?  --        Date of presentation of septic shock  --        Time of presentation of septic shock  --          User Key  (r) = Recorded By, (t) = Taken By, (c) = Cosigned By    [address] Name Provider Type     Valente Cobos MD Resident               Default Flowsheet Data (last 720 hours)      Sepsis Reassessment     Row Name 05/04/18 1326                   Volume Status and Tissue Perfusion Post Fluid Resuscitation- Must Document ALL of the Following:    Vital Signs Reviewed Yes  -        Cardio Normal S1/S2; Regular rate and rhythm; No murmor  -        Pulmonary (!)  Normal effort; Wheezes;Rales  -        Capillary Refill Brisk  -        Peripheral Pulses Radial  -        Peripheral Pulse +2  -        Skin Warm;Dry  -           *OR*   Intensive Monitoring- Must Document Two * of the Following Four *:    Vital Signs Reviewed  --        * Central Venous Pressure (CVP or RAP)  --        * Central Venous Oxygen (SVO2, ScvO2 or Oxygen saturation via central catheter)  --        * Bedside Cardiovascular US in IVC diameter and % collapse  --        * Passive Leg Raise OR Crystalloid Challenge  --          User Key  (r) = Recorded By, (t) = Taken By, (c) = Cosigned By    Initials Name Provider Type     El Pimentel MD Resident English

## 2021-07-08 NOTE — H&P ADULT - ASSESSMENT
40 y/o male came to the ER for on and off left side cp since this morning, sharp, was lasting few seconds every time it came back, radiating to left arm had some diaphoresis, cp started at rest and there was not aggravating or relieving factor. 4-5 on pain scale, no sob, no sig. HA, no focal weakness, no cough, no fever, no dizziness, no LOC, no abd. pain, no n/v/d. no sig. cp at the time of admission. pt. was noted to have elevated bp in the ER.     - other chest pain    - Htn urgency    - H/o bipolar disorder    - renal insufficiency   40 y/o male came to the ER for on and off left side cp since this morning, sharp, was lasting few seconds every time it came back, radiating to left arm had some diaphoresis, cp started at rest and there was not aggravating or relieving factor. 4-5 on pain scale, no sob, no sig. HA, no focal weakness, no cough, no fever, no dizziness, no LOC, no abd. pain, no n/v/d. no sig. cp at the time of admission. pt. was noted to have elevated bp in the ER.     - other chest pain, trop negative x 2, likely htn urgency contributing, will admit to tele, last echo 06/08/21 with normal LV function and EF 65 to 70 % , follow 3rd troponin, SL NTG prn for cp, will increase labetalol 300 mg bid to tid, cardio [facility].     - Htn urgency, will increase labetalol 300 mg bid to tid, continue clonidine and losartan, will keep on iv hydralazine prn for sbp above 170.     - H/o bipolar disorder, continue seroquel, risperidone, lamotrigine ( doses used as per recent psychiatrist note in the chart ), prn xanax.     - renal insufficiency, mild will give gentle hydration, follow am labs.    - h/o polysubstance abuse. pt. on suboxone.

## 2021-07-08 NOTE — ED PROVIDER NOTE - OBJECTIVE STATEMENT
42yo M hx of HTN, drug abuse on suboxone, GERD, TIA pw left sided cp that started this morning around 4:30am. notes pain radiates to his left shoulder. no sob/diaphoresis no n/v. Denies f/c/palpitations/ cough/rash/headache/dizziness/abd.pain/d/c/dysuria/hematuria. no sick contacts no recent travel. no hx of dvt/pe. notes that came to the hospital 2 days a go to get clearance for [facility] wanted to go to drug rehab denies recent drug use -   states used last time 1 year ago (opiods) currently on suboxone. no si/hi. no fmhx of sudden death at a young age. ex smoker quit 1 year ago.

## 2021-07-08 NOTE — H&P ADULT - NSHPPHYSICALEXAM_GEN_ALL_CORE
General: Well developed male not in distress  HEENT: AT, NC. PERRL. intact EOM. no throat erythema or exudate.   Neck: supple. no JVD.   Chest: CTA bilaterally  Heart: S1,S2. RRR. no heart murmur. no edema.   Abdomen: soft. non-tender. non-distended. + BS.   Ext: no calf tenderness on either side, ROM of all ext. intact. distal pulses 2 +.  Neuro: AAO x3. no focal weakness. no speech disorder, cns ii to xii intact, m /r/s intact.  Skin: no rash noted, normal tone, + multiple tattoos.   psych : mood ok, no si/hi.

## 2021-07-08 NOTE — ED PROVIDER NOTE - CLINICAL SUMMARY MEDICAL DECISION MAKING FREE TEXT BOX
cp will check acs labs 2 trops cxr ekg; pt didn't take his bp meds this morning will give pts home meds PERC neg; reproducible pain possibly msk--reassess

## 2021-07-08 NOTE — ED ADULT NURSE REASSESSMENT NOTE - NS ED NURSE REASSESS COMMENT FT1
pt. a&ox3. pt. awaiting troponin. pt. informed on plan of care. pt. in no apparent distress at this time, breathing even and unlabored.

## 2021-07-08 NOTE — ED PROVIDER NOTE - PROGRESS NOTE DETAILS
normal echo june 2021 ef 65%; 2 trops neg today ekg wnl feeling better will dc with outpatient cardiology follow up wanted to dc pt however BP elevated given home clonidine not improving given labetalol 10mg x 2 BP still 190systolic notes that cp is also back feels sweaty no sob or headache will admit; denies drug use-- spoke to patient states that only smoked pot denies cocaine/amphetamines  +utox from 2 days ago for amphetamines; cardiology consulted

## 2021-07-08 NOTE — ED PROVIDER NOTE - PHYSICAL EXAMINATION
Head: atraumatic, normacephalic  Face: atraumatic, no crepitus no orbiral/maxillary/mandibular ttp  throat: uvula midline no exudates  eyes: perrla eomi  heart: rrr s1s2  lungs: ctab  abd: soft, nt nd +bs no rebound/guarding no cva ttp  skin: warm  LE: no swelling, no calf ttp  back: no midline cervical/thoracic/lumbar ttp  chest wall: reproducible left anterior chest wall pain and ttp to left trapezius

## 2021-07-08 NOTE — ED PROVIDER NOTE - PATIENT PORTAL LINK FT
You can access the FollowMyHealth Patient Portal offered by [facility] by registering at the following website: [email or web address]. By joining [facility]’s FollowMyHealth portal, you will also be able to view your health information using other applications (apps) compatible with our system.

## 2021-07-09 DIAGNOSIS — F31.9 BIPOLAR DISORDER, UNSPECIFIED: ICD-10-CM

## 2021-07-09 DIAGNOSIS — Z02.9 ENCOUNTER FOR ADMINISTRATIVE EXAMINATIONS, UNSPECIFIED: ICD-10-CM

## 2021-07-09 DIAGNOSIS — F19.10 OTHER PSYCHOACTIVE SUBSTANCE ABUSE, UNCOMPLICATED: ICD-10-CM

## 2021-07-09 DIAGNOSIS — I16.0 HYPERTENSIVE URGENCY: ICD-10-CM

## 2021-07-09 DIAGNOSIS — R30.0 DYSURIA: ICD-10-CM

## 2021-07-09 DIAGNOSIS — R07.9 CHEST PAIN, UNSPECIFIED: ICD-10-CM

## 2021-07-09 LAB
A1C WITH ESTIMATED AVERAGE GLUCOSE RESULT: 5.4 % — SIGNIFICANT CHANGE UP (ref 4–5.6)
AMPHET UR-MCNC: NEGATIVE — SIGNIFICANT CHANGE UP
ANION GAP SERPL CALC-SCNC: 11 MMOL/L — SIGNIFICANT CHANGE UP (ref 5–17)
APPEARANCE UR: CLEAR — SIGNIFICANT CHANGE UP
BACTERIA # UR AUTO: NEGATIVE — SIGNIFICANT CHANGE UP
BARBITURATES UR SCN-MCNC: NEGATIVE — SIGNIFICANT CHANGE UP
BENZODIAZ UR-MCNC: NEGATIVE — SIGNIFICANT CHANGE UP
BILIRUB UR-MCNC: NEGATIVE — SIGNIFICANT CHANGE UP
BUN SERPL-MCNC: 15.2 MG/DL — SIGNIFICANT CHANGE UP (ref 8–20)
CALCIUM SERPL-MCNC: 9.6 MG/DL — SIGNIFICANT CHANGE UP (ref 8.6–10.2)
CHLORIDE SERPL-SCNC: 103 MMOL/L — SIGNIFICANT CHANGE UP (ref 98–107)
CHOLEST SERPL-MCNC: 148 MG/DL — SIGNIFICANT CHANGE UP
CO2 SERPL-SCNC: 25 MMOL/L — SIGNIFICANT CHANGE UP (ref 22–29)
COCAINE METAB.OTHER UR-MCNC: NEGATIVE — SIGNIFICANT CHANGE UP
COLOR SPEC: YELLOW — SIGNIFICANT CHANGE UP
COVID-19 SPIKE DOMAIN AB INTERP: NEGATIVE — SIGNIFICANT CHANGE UP
COVID-19 SPIKE DOMAIN ANTIBODY RESULT: 0.4 U/ML — SIGNIFICANT CHANGE UP
CREAT SERPL-MCNC: 0.87 MG/DL — SIGNIFICANT CHANGE UP (ref 0.5–1.3)
DIFF PNL FLD: NEGATIVE — SIGNIFICANT CHANGE UP
EPI CELLS # UR: NEGATIVE — SIGNIFICANT CHANGE UP
ESTIMATED AVERAGE GLUCOSE: 108 MG/DL — SIGNIFICANT CHANGE UP (ref 68–114)
GLUCOSE SERPL-MCNC: 110 MG/DL — HIGH (ref 70–99)
GLUCOSE UR QL: NEGATIVE MG/DL — SIGNIFICANT CHANGE UP
HDLC SERPL-MCNC: 53 MG/DL — SIGNIFICANT CHANGE UP
KETONES UR-MCNC: NEGATIVE — SIGNIFICANT CHANGE UP
LEUKOCYTE ESTERASE UR-ACNC: NEGATIVE — SIGNIFICANT CHANGE UP
LIPID PNL WITH DIRECT LDL SERPL: 77 MG/DL — SIGNIFICANT CHANGE UP
METHADONE UR-MCNC: NEGATIVE — SIGNIFICANT CHANGE UP
NITRITE UR-MCNC: NEGATIVE — SIGNIFICANT CHANGE UP
NON HDL CHOLESTEROL: 95 MG/DL — SIGNIFICANT CHANGE UP
OPIATES UR-MCNC: NEGATIVE — SIGNIFICANT CHANGE UP
PCP SPEC-MCNC: SIGNIFICANT CHANGE UP
PCP UR-MCNC: NEGATIVE — SIGNIFICANT CHANGE UP
PH UR: 6.5 — SIGNIFICANT CHANGE UP (ref 5–8)
POTASSIUM SERPL-MCNC: 3.9 MMOL/L — SIGNIFICANT CHANGE UP (ref 3.5–5.3)
POTASSIUM SERPL-SCNC: 3.9 MMOL/L — SIGNIFICANT CHANGE UP (ref 3.5–5.3)
PROT UR-MCNC: 15 MG/DL
RBC CASTS # UR COMP ASSIST: NEGATIVE /HPF — SIGNIFICANT CHANGE UP (ref 0–4)
SARS-COV-2 IGG+IGM SERPL QL IA: 0.4 U/ML — SIGNIFICANT CHANGE UP
SARS-COV-2 IGG+IGM SERPL QL IA: NEGATIVE — SIGNIFICANT CHANGE UP
SODIUM SERPL-SCNC: 138 MMOL/L — SIGNIFICANT CHANGE UP (ref 135–145)
SP GR SPEC: 1.01 — SIGNIFICANT CHANGE UP (ref 1.01–1.02)
THC UR QL: POSITIVE
TRIGL SERPL-MCNC: 89 MG/DL — SIGNIFICANT CHANGE UP
TROPONIN T SERPL-MCNC: <0.01 NG/ML — SIGNIFICANT CHANGE UP (ref 0–0.06)
UROBILINOGEN FLD QL: 1 MG/DL
WBC UR QL: SIGNIFICANT CHANGE UP

## 2021-07-09 PROCEDURE — 93010 ELECTROCARDIOGRAM REPORT: CPT

## 2021-07-09 PROCEDURE — 75574 CT ANGIO HRT W/3D IMAGE: CPT | Mod: 26

## 2021-07-09 PROCEDURE — 99233 SBSQ HOSP IP/OBS HIGH 50: CPT

## 2021-07-09 PROCEDURE — 99223 1ST HOSP IP/OBS HIGH 75: CPT

## 2021-07-09 RX ORDER — METOPROLOL TARTRATE 50 MG
25 TABLET ORAL ONCE
Refills: 0 | Status: COMPLETED | OUTPATIENT
Start: 2021-07-09 | End: 2021-07-09

## 2021-07-09 RX ADMIN — Medication 0.5 MILLIGRAM(S): at 21:43

## 2021-07-09 RX ADMIN — RISPERIDONE 1 MILLIGRAM(S): 4 TABLET ORAL at 21:41

## 2021-07-09 RX ADMIN — Medication 300 MILLIGRAM(S): at 21:40

## 2021-07-09 RX ADMIN — GABAPENTIN 800 MILLIGRAM(S): 400 CAPSULE ORAL at 13:54

## 2021-07-09 RX ADMIN — Medication 0.2 MILLIGRAM(S): at 05:53

## 2021-07-09 RX ADMIN — Medication 0.2 MILLIGRAM(S): at 13:54

## 2021-07-09 RX ADMIN — Medication 0.2 MILLIGRAM(S): at 21:40

## 2021-07-09 RX ADMIN — GABAPENTIN 800 MILLIGRAM(S): 400 CAPSULE ORAL at 05:53

## 2021-07-09 RX ADMIN — Medication 300 MILLIGRAM(S): at 05:53

## 2021-07-09 RX ADMIN — Medication 25 MILLIGRAM(S): at 13:54

## 2021-07-09 RX ADMIN — Medication 10 MILLIGRAM(S): at 20:55

## 2021-07-09 RX ADMIN — GABAPENTIN 800 MILLIGRAM(S): 400 CAPSULE ORAL at 21:40

## 2021-07-09 RX ADMIN — Medication 25 MILLIGRAM(S): at 11:34

## 2021-07-09 RX ADMIN — Medication 0.5 MILLIGRAM(S): at 11:49

## 2021-07-09 RX ADMIN — ENOXAPARIN SODIUM 40 MILLIGRAM(S): 100 INJECTION SUBCUTANEOUS at 11:49

## 2021-07-09 RX ADMIN — BUPRENORPHINE AND NALOXONE 1 TABLET(S): 2; .5 TABLET SUBLINGUAL at 08:30

## 2021-07-09 RX ADMIN — QUETIAPINE FUMARATE 400 MILLIGRAM(S): 200 TABLET, FILM COATED ORAL at 21:41

## 2021-07-09 RX ADMIN — PANTOPRAZOLE SODIUM 40 MILLIGRAM(S): 20 TABLET, DELAYED RELEASE ORAL at 08:30

## 2021-07-09 RX ADMIN — BUPRENORPHINE AND NALOXONE 1 TABLET(S): 2; .5 TABLET SUBLINGUAL at 17:57

## 2021-07-09 RX ADMIN — LOSARTAN POTASSIUM 100 MILLIGRAM(S): 100 TABLET, FILM COATED ORAL at 06:40

## 2021-07-09 RX ADMIN — BUPRENORPHINE AND NALOXONE 1 TABLET(S): 2; .5 TABLET SUBLINGUAL at 00:07

## 2021-07-09 RX ADMIN — LAMOTRIGINE 200 MILLIGRAM(S): 25 TABLET, ORALLY DISINTEGRATING ORAL at 11:49

## 2021-07-09 RX ADMIN — Medication 300 MILLIGRAM(S): at 13:54

## 2021-07-09 NOTE — PROGRESS NOTE ADULT - PROBLEM SELECTOR PLAN 1
- P/w chest pain likely in the setting of HTN urgency  - appreciate cards recs  - S/p metoprolol, x 1 and nitroglycerin 0.4 mg sublingual every 5 minutes PRN  - F/u CTA angio cardiac w/ IV contrast

## 2021-07-09 NOTE — CONSULT NOTE ADULT - ASSESSMENT
A/P: 40 y/o M with a PMHx of HTN, Bipolar disorder, and CVA who presented to the ER with complaints of high blood pressure and chest pain. Patient states that yesterday he began having a constant, sharp left sided chest pain that radiated to his left arm. Patient states that he knew his blood pressure was elevated, and states he has been taking his medications as prescribed (clonidine, hydralazine, losartan, and norvasc). Patient states that at baseline METS>4 with no chest pain or dyspnea. Patient states that the chest pain resolved after his blood pressure started to improve. Patient denies any fevers, chills, syncope, near syncope, abdominal pain, SOB, N/V/D, headache, or dizziness.   Troponin negative x 3    Chest Pain  - Troponins negative x 3  - Recent normal echo.   - In setting of hypertensive urgency.   - Plan for CTA cardiac.   - Metoprolol 25mg PO x 1.     HTN Urgency  - BP better controlled.   - Continue labetalol, losartan, and clonidine.   - Restart norvasc.     Assessment and recommendations are final when note is signed by the attending.

## 2021-07-09 NOTE — CONSULT NOTE ADULT - ATTENDING COMMENTS
41m with hypertensive urgency and chest pain    - BP improved with labetalol, now normotensive  - continue clonidine, labetalol, losartan at current doses  - if additional BP control is needed, would add thiazide diuretic next  - outpatient secondary hypertension workup  - multiple CAD risk factors, negative cTn trend, and non-ischemic EKG can rule out significant coronary artery disease with CCTA  - if BP remains controlled and CCTA without severe findings, Cardiology follow up in 1-2 weeks after discharge

## 2021-07-09 NOTE — PROGRESS NOTE ADULT - PROBLEM SELECTOR PLAN 3
- DVT ppx- Lovenox 40 mg  - Diet- DASH/TLC - F/u U/A. Will hold off on empiric treatment given no leukocytosis and fever

## 2021-07-09 NOTE — PHYSICAL THERAPY INITIAL EVALUATION ADULT - PERSONAL SAFETY AND JUDGMENT, REHAB EVAL
Daily Note     Today's date: 2018  Patient name: Karina Hutson  : 1961  MRN: [id number]  Referring provider: April Michaels MD  Dx:   Encounter Diagnosis     ICD-10-CM    1  Bilateral hand numbness R20 0        Subjective: "I had a good weekend "      Objective: See treatment diary below    Specialty Daily Treatment Diary     Manual  2018                                                Exercise Diary  2018    Rice Mansfield 10 Min 10 Min 10 Min 10 Min    Sensation Sticks        Theraputty Grasps [employer] x 20 Green x 20 Green x 20 Green x 20    Theraputty Pinches Red x 20 Green x 20 Green x 20 Green x 20    Theraputty Intrinsic Red x 20 Green x 20 Green x 20 Green x 20    Theraputty Finger Abduction Red x 10 Green x 10 Green x 10 Green x 10    Digi-Flex Green x 20 Blue x 20 Blue x 20 Blue x 20    Thera-Ball Pushes 5 sec x 10 5 sec x 10 5 sec x 10 5 sec x 10    UBE Machine 10 Min 10 Min 10 Min 10 Min    Theraputty Thumb Presses Red x 20 Green x 20 Green x 20 Green x 20    Theraputty Cone Presses Red x 20 Green x 20 Green x 20 Green x 20    Purdue Peg Board 10 sets 1:25 10 sets 10 sets 10 sets    Brown Gripper Peg Board 1st Resistance 15 pegs 2nd Resistance 15 pegs 2nd Resistance 15 pegs 2nd resistance 15 pegs    Golf Ball Manipulation 2 x 10 2 x 10 2 x 10 2 x 10    Yellow Ball Squeezes 5 sec x 10 5 sec x 10 5 sec x 10 5 sec x 10    Nut and [place] Board X 6 X 6 X 6 X 6                                        Modalities 2018                                Assessment: Tolerated treatment well  Patient demonstrated fatigue post treatment and would benefit from continued OT      Plan: Continue per plan of care  Progress treatment as tolerated 
intact

## 2021-07-09 NOTE — CONSULT NOTE ADULT - SUBJECTIVE AND OBJECTIVE BOX
[place] [facility]                                                               Office:  [address] [place] [address]                                                              Telephone: [phone]. Fax:[phone]                                                                        CARDIOLOGY CONSULTATION NOTE                                                                                             Consult requested by:  Dr. Mccollum  Reason for Consultation: Chest Pain  Primary Cardiologist: None  PCP: Unknown  History obtained by: Patient and medical record   obtained: No    Covid Status: Negative 07/08/21    Chief complaint:    Patient is a 41y old  Male who presents with a chief complaint of chest pain/ htn urgency (08 Jul 2021 19:26)      HPI: 42 y/o M with a PMHx of HTN, Bipolar disorder, and CVA who presented to the ER with complaints of high blood pressure and chest pain. Patient states that yesterday he began having a constant, sharp left sided chest pain that radiated to his left arm. Patient states that he knew his blood pressure was elevated, and states he has been taking his medications as prescribed (clonidine, hydralazine, losartan, and norvasc). Patient states that at baseline METS>4 with no chest pain or dyspnea. Patient states that the chest pain resolved after his blood pressure started to improve. Patient denies any fevers, chills, syncope, near syncope, abdominal pain, SOB, N/V/D, headache, or dizziness.     REVIEW OF SYMPTOMS:     CONSTITUTIONAL: No fever, weight loss, or fatigue  ENMT:  No difficulty hearing, tinnitus, vertigo; No sinus or throat pain  NECK: No pain or stiffness  CARDIOVASCULAR: AS PER HPI  RESPIRATORY: AS PER HPI  : No dysuria, no hematuria   GI: No dark color stool, no melena, no diarrhea, no constipation, no abdominal pain   NEURO: No headache, no dizziness, no slurred speech   MUSCULOSKELETAL: No joint pain or swelling; No muscle, back, or extremity pain  PSYCH: No agitation, no anxiety.    ALL OTHER REVIEW OF SYSTEMS ARE NEGATIVE.      PREVIOUS DIAGNOSTIC TESTING  ECHO FINDINGS:  < from: TTE Echo Complete w/ Contrast w/ Doppler (06.08.21 @ 09:51) >  PHYSICIAN INTERPRETATION:  Left Ventricle: The left ventricular internal cavity size is normal. Left ventricular wall thickness is mildly increased.  Global LV systolic function was normal. Left ventricular ejection fraction, by visual estimation, is 65 to 70%.  Right Ventricle: The right ventricle is not well visualized, appears to have normal systolic function.  Left Atrium: The left atrium is normal in size.  Right Atrium: The right atrium is normal in size.  Pericardium: There is no evidence of pericardial effusion.  Mitral Valve: There is mild mitral annular calcification.  Tricuspid Valve: Adequate TR velocity was not obtained to accurately assess RVSP.  Aortic Valve: The aortic valve was not well visualized. Mild aortic valve leaflet thickening. No aortic valve stenosis.  Pulmonic Valve: The pulmonic valve was not well visualized.  Aorta: The aortic root and ascending aorta are structurally normal, with no evidence of dilitation.  Pulmonary Artery: The pulmonary artery is of normal size and origin.  Venous: The inferior vena cava was normal sized, with respiratory size variation greater than 50%.      Summary:   1. Normal global left ventricular systolic function.   2. Left ventricular ejection fraction, by visual estimation, is 65 to 70%.   3. Mildly increased LV wall thickness.   4. Normal left ventricular internal cavity size.   5. The right ventricle is not well visualized, appears to have normal systolic function.   6. The left atrium is normal in size.   7. The right atrium is normalin size.   8. Mild mitral annular calcification.   9. Mild aortic valve leaflet thickening. No aortic valve stenosis.  10. There is no evidence of pericardial effusion.  11. Recommend clinical correlation with the above findings.    Selvin Smith MD Electronically signed on 6/8/2021 at 12:10:37 PM      < end of copied text >      ALLERGIES: Allergies    No Known Allergies    Intolerances        PAST MEDICAL HISTORY  Substance abuse    Anxiety    HTN (hypertension)    GERD (gastroesophageal reflux disease)    Bipolar disorder    Stroke    Seizure-like activity        PAST SURGICAL HISTORY  H/O total knee replacement, bilateral    History of hip replacement    No significant past surgical history        FAMILY HISTORY:  FH: CAD (coronary artery disease)  grandfather        SOCIAL HISTORY:   CIGARETTES: Sometimes smoker  ALCOHOL: Denies  DRUGS: Denies      CURRENT MEDICATIONS:  cloNIDine 0.2 milliGRAM(s) Oral three times a day  hydrALAZINE Injectable 10 milliGRAM(s) IV Push every 6 hours PRN  labetalol 300 milliGRAM(s) Oral every 8 hours  losartan 100 milliGRAM(s) Oral daily  nitroglycerin     SubLingual 0.4 milliGRAM(s) SubLingual every 5 minutes PRN     buprenorphine 8 mG/naloxone 2 mG SL  Tablet  gabapentin  lamoTRIgine  QUEtiapine  risperiDONE   Tablet  pantoprazole    Tablet  enoxaparin Injectable  sodium chloride 0.9%.        HOME MEDICATIONS:  Home Medications:  ALPRAZolam 2 mg oral tablet: 1 tab(s) orally 2 times a day (08 Jul 2021 07:28)  clonazePAM 2 mg oral tablet: 1 tab(s) orally 2 times a day (08 Jul 2021 07:28)  gabapentin 800 mg oral tablet: 1 tab(s) orally 4 times a day (08 Jul 2021 07:28)  pantoprazole 40 mg oral delayed release tablet: 1 tab(s) orally once a day (before a meal) (08 Jul 2021 07:28)  QUEtiapine 400 mg oral tablet, extended release: 1 tab(s) orally 2 times a day (08 Jul 2021 07:28)  risperiDONE 2 mg oral tablet: 1 tab(s) orally once a day (08 Jul 2021 07:28)  Suboxone 8 mg-2 mg sublingual film: 3 film(s) sublingual daily (08 Jul 2021 07:28)  Ventolin HFA 90 mcg/inh inhalation aerosol: 2 puff(s) inhaled every 6 hours, As Needed (08 Jul 2021 07:28)      Vital Signs Last 24 Hrs  T(C): 36.7 (09 Jul 2021 08:33), Max: 36.7 (09 Jul 2021 08:33)  T(F): 98 (09 Jul 2021 08:33), Max: 98 (09 Jul 2021 08:33)  HR: 91 (09 Jul 2021 08:33) (59 - 91)  BP: 138/57 (09 Jul 2021 08:33) (118/71 - 199/102)  RR: 16 (09 Jul 2021 08:33) (13 - 16)  SpO2: 98% (09 Jul 2021 08:33) (98% - 99%)      PHYSICAL EXAM:  Constitutional: Comfortable . No acute distress.   HEENT: Atraumatic and normocephalic , neck is supple . no JVD. No carotid bruit. PEERL   CNS: A&Ox3. No focal deficits. EOMI. Cranial nerves II-IX are intact.   Lymph Nodes: Cervical : Not palpable.  Respiratory: CTAB  Cardiovascular: S1S2 RRR. No murmur/rubs or gallop.  Gastrointestinal: Soft non-tender and non distended . +Bowel sounds. negative Phillips's sign.  Extremities: No edema.   Psychiatric: Calm . no agitation.  Skin: No skin rash/ulcers visualized to face, hands or feet.    Intake and output:     LABS:                        11.6   5.74  )-----------( 203      ( 08 Jul 2021 07:56 )             35.9     07-09    138  |  103  |  15.2  ----------------------------<  110<H>  3.9   |  25.0  |  0.87    Ca    9.6      09 Jul 2021 03:25    TPro  7.6  /  Alb  4.7  /  TBili  0.2<L>  /  DBili  x   /  AST  26  /  ALT  26  /  AlkPhos  151<H>  07-08    CARDIAC MARKERS ( 09 Jul 2021 03:25 )  x     / <0.01 ng/mL / x     / x     / x      CARDIAC MARKERS ( 08 Jul 2021 14:05 )  x     / <0.01 ng/mL / x     / x     / x      CARDIAC MARKERS ( 08 Jul 2021 07:56 )  x     / 0.01 ng/mL / x     / x     / x        ;p-BNP=        INTERPRETATION OF TELEMETRY: Reviewed by me. SR/SB  ECG: Reviewed by me. SB, NSST/T wave abnormalities    RADIOLOGY & ADDITIONAL STUDIES:    X-ray:  reviewed by me.   < from: Xray Chest 2 Views PA/Lat (07.08.21 @ 08:09) >   EXAM:  XR CHEST PA LAT 2V                          PROCEDURE DATE:  07/08/2021          INTERPRETATION:  Clinical history: 41-year-old male, chest pain.    Two views of the chest are compared to 6/7/2021 and demonstrate a normal cardiac silhouetteand normal pulmonary vasculature with no consolidation, effusion, gross adenopathy, pneumothorax or acute osseous finding.    IMPRESSION:  No acute radiographic findings and no change    --- End of Report ---    < end of copied text >    CT scan:   MRI:

## 2021-07-09 NOTE — SBIRT NOTE ADULT - NSSBIRTDRGPASSREFTXDET_GEN_A_CORE
Provided SBIRT services: Referral to Treatment Performed. Screening results reviewed with patient and patient provided information regarding healthy guidelines and potential negative consequences associated with level of risk. Motivation and readiness to reduce or stop use was discussed and goals and activities to make changes were suggested/offered. Referral for complete assessment and level of care determination at a certified treatment facility was completed by giving the patient information for treatment facilities that met their needs and encouraging them to call for an appointment. A call was not made to a facility because pt is currently in tx at [facility]. Reported to be drug-free since last rehab placement at [facility]. Encouraged pt to continue with his current tx plan. Offered information, patient signed consent for: [facility], case management service. SBIRT will f/u with  for housing; pt reported to be unhoused.   Provided SBIRT services: Referral to Treatment Performed. Screening results reviewed with patient and patient provided information regarding healthy guidelines and potential negative consequences associated with level of risk. Motivation and readiness to reduce or stop use was discussed and goals and activities to make changes were suggested/offered. Pt reported he is currently in tx at [facility]. Reported to be drug-free approx 1 month. Last rehab placement reported this past month at [place]. Linked pt at bedside to [facility] for possible rehab/CR opportunity; [facility] reported pt is not meeting criteria for inpatient rehab/CR due to his successful recovery/maintenance. Encouraged pt to continue with his current tx plan with [facility]. Offered information, patient signed consent for: [facility], case management service. SBIRT will f/u with  for housing; pt reported to be unhoused.

## 2021-07-09 NOTE — PROGRESS NOTE ADULT - SUBJECTIVE AND OBJECTIVE BOX
[facility] Division of Hospital Medicine    Chief Complaint:  HTN urgency and chest pain    SUBJECTIVE / OVERNIGHT EVENTS: Patient slept well. He endorses LT-sided chest pain, 8/10 and sharp in nature. Denies f/c/h/d/n/v.     Patient denies chest pain, SOB, abd pain, N/V, fever, chills, dysuria or any other complaints. All remainder ROS negative.     MEDICATIONS  (STANDING):  buprenorphine 8 mG/naloxone 2 mG SL  Tablet 1 Tablet(s) SubLingual <User Schedule>  cloNIDine 0.2 milliGRAM(s) Oral three times a day  enoxaparin Injectable 40 milliGRAM(s) SubCutaneous daily  gabapentin 800 milliGRAM(s) Oral every 8 hours  labetalol 300 milliGRAM(s) Oral every 8 hours  lamoTRIgine 200 milliGRAM(s) Oral daily  losartan 100 milliGRAM(s) Oral daily  pantoprazole    Tablet 40 milliGRAM(s) Oral before breakfast  QUEtiapine 400 milliGRAM(s) Oral at bedtime  risperiDONE   Tablet 1 milliGRAM(s) Oral at bedtime  sodium chloride 0.9%. 500 milliLiter(s) (100 mL/Hr) IV Continuous <Continuous>    MEDICATIONS  (PRN):  ALPRAZolam 0.5 milliGRAM(s) Oral three times a day PRN anxiety  hydrALAZINE Injectable 10 milliGRAM(s) IV Push every 6 hours PRN for sbp above 170.  nitroglycerin     SubLingual 0.4 milliGRAM(s) SubLingual every 5 minutes PRN Chest Pain        I&O's Summary      PHYSICAL EXAM:  Vital Signs Last 24 Hrs  T(C): 37 (09 Jul 2021 11:41), Max: 37 (09 Jul 2021 11:41)  T(F): 98.6 (09 Jul 2021 11:41), Max: 98.6 (09 Jul 2021 11:41)  HR: 66 (09 Jul 2021 11:41) (59 - 91)  BP: 143/83 (09 Jul 2021 11:41) (118/71 - 199/102)  BP(mean): --  RR: 16 (09 Jul 2021 11:41) (13 - 16)  SpO2: 99% (09 Jul 2021 11:41) (98% - 99%)        CONSTITUTIONAL: NAD, well-developed, well-groomed  ENMT: Moist oral mucosa, no pharyngeal injection or exudates; normal dentition  RESPIRATORY: Normal respiratory effort; lungs are clear to auscultation bilaterally  CARDIOVASCULAR: Regular rate and rhythm, normal S1 and S2, no murmur/rub/gallop; No lower extremity edema; Peripheral pulses are 2+ bilaterally  ABDOMEN: Nontender to palpation, normoactive bowel sounds, no rebound/guarding; No hepatosplenomegaly  MUSCLOSKELETAL:  Normal gait; no clubbing or cyanosis of digits; no joint swelling or tenderness to palpation  PSYCH: A+O to person, place, and time; affect appropriate  NEUROLOGY: CN 2-12 are intact and symmetric; no gross sensory deficits;   SKIN: No rashes; no palpable lesions    LABS:                        11.6   5.74  )-----------( 203      ( 08 Jul 2021 07:56 )             35.9     07-09    138  |  103  |  15.2  ----------------------------<  110<H>  3.9   |  25.0  |  0.87    Ca    9.6      09 Jul 2021 03:25    TPro  7.6  /  Alb  4.7  /  TBili  0.2<L>  /  DBili  x   /  AST  26  /  ALT  26  /  AlkPhos  151<H>  07-08      CARDIAC MARKERS ( 09 Jul 2021 03:25 )  x     / <0.01 ng/mL / x     / x     / x      CARDIAC MARKERS ( 08 Jul 2021 14:05 )  x     / <0.01 ng/mL / x     / x     / x      CARDIAC MARKERS ( 08 Jul 2021 07:56 )  x     / 0.01 ng/mL / x     / x     / x              CAPILLARY BLOOD GLUCOSE            RADIOLOGY & ADDITIONAL TESTS:  Results Reviewed:   Imaging Personally Reviewed:  Electrocardiogram Personally Reviewed:                                           [facility] Division of Hospital Medicine    Chief Complaint:  HTN urgency and chest pain    SUBJECTIVE / OVERNIGHT EVENTS: Patient slept well. He endorses LT-sided chest pain, 8/10 and sharp in nature. Denies f/c/h/d/n/v.     Patient denies chest pain, SOB, abd pain, N/V, fever, chills, dysuria or any other complaints. All remainder ROS negative.     MEDICATIONS  (STANDING):  buprenorphine 8 mG/naloxone 2 mG SL  Tablet 1 Tablet(s) SubLingual <User Schedule>  cloNIDine 0.2 milliGRAM(s) Oral three times a day  enoxaparin Injectable 40 milliGRAM(s) SubCutaneous daily  gabapentin 800 milliGRAM(s) Oral every 8 hours  labetalol 300 milliGRAM(s) Oral every 8 hours  lamoTRIgine 200 milliGRAM(s) Oral daily  losartan 100 milliGRAM(s) Oral daily  pantoprazole    Tablet 40 milliGRAM(s) Oral before breakfast  QUEtiapine 400 milliGRAM(s) Oral at bedtime  risperiDONE   Tablet 1 milliGRAM(s) Oral at bedtime  sodium chloride 0.9%. 500 milliLiter(s) (100 mL/Hr) IV Continuous <Continuous>    MEDICATIONS  (PRN):  ALPRAZolam 0.5 milliGRAM(s) Oral three times a day PRN anxiety  hydrALAZINE Injectable 10 milliGRAM(s) IV Push every 6 hours PRN for sbp above 170.  nitroglycerin     SubLingual 0.4 milliGRAM(s) SubLingual every 5 minutes PRN Chest Pain        I&O's Summary      PHYSICAL EXAM:  Vital Signs Last 24 Hrs  T(C): 37 (09 Jul 2021 11:41), Max: 37 (09 Jul 2021 11:41)  T(F): 98.6 (09 Jul 2021 11:41), Max: 98.6 (09 Jul 2021 11:41)  HR: 66 (09 Jul 2021 11:41) (59 - 91)  BP: 143/83 (09 Jul 2021 11:41) (118/71 - 199/102)  BP(mean): --  RR: 16 (09 Jul 2021 11:41) (13 - 16)  SpO2: 99% (09 Jul 2021 11:41) (98% - 99%)        CONSTITUTIONAL: NAD, well-developed, well-groomed  ENMT: Moist oral mucosa, no pharyngeal injection or exudates; normal dentition  RESPIRATORY: Normal respiratory effort; lungs are clear to auscultation bilaterally  CARDIOVASCULAR: Regular rate and rhythm, normal S1 and S2, no murmur/rub/gallop; No lower extremity edema; Peripheral pulses are 2+ bilaterally  ABDOMEN: Nontender to palpation, normoactive bowel sounds, no rebound/guarding; No hepatosplenomegaly  MUSCLOSKELETAL:  Normal gait; no clubbing or cyanosis of digits; no joint swelling or tenderness to palpation  PSYCH: A+O to person, place, and time; affect appropriate  NEUROLOGY: CN 2-12 are intact and symmetric; no gross sensory deficits;   SKIN: No rashes; no palpable lesions    LABS:                        11.6   5.74  )-----------( 203      ( 08 Jul 2021 07:56 )             35.9     07-09    138  |  103  |  15.2  ----------------------------<  110<H>  3.9   |  25.0  |  0.87    Ca    9.6      09 Jul 2021 03:25    TPro  7.6  /  Alb  4.7  /  TBili  0.2<L>  /  DBili  x   /  AST  26  /  ALT  26  /  AlkPhos  151<H>  07-08      CARDIAC MARKERS ( 09 Jul 2021 03:25 )  x     / <0.01 ng/mL / x     / x     / x      CARDIAC MARKERS ( 08 Jul 2021 14:05 )  x     / <0.01 ng/mL / x     / x     / x      CARDIAC MARKERS ( 08 Jul 2021 07:56 )  x     / 0.01 ng/mL / x     / x     / x              CAPILLARY BLOOD GLUCOSE            RADIOLOGY & ADDITIONAL TESTS:  Results Reviewed:   Imaging Personally Reviewed:  1)< from: Xray Chest 2 Views PA/Lat (07.08.21 @ 08:09) >     EXAM:  XR CHEST PA LAT 2V                          PROCEDURE DATE:  07/08/2021          INTERPRETATION:  Clinical history: 41-year-old male, chest pain.    Two views of the chest are compared to 6/7/2021 and demonstrate a normal cardiac silhouetteand normal pulmonary vasculature with no consolidation, effusion, gross adenopathy, pneumothorax or acute osseous finding.    IMPRESSION:  No acute radiographic findings and no change    --- End of Report ---            NALLELY MARTINEZ DO; Attending Radiologist  This document has been electronically signed. Jul 8 2021  8:22AM    < end of copied text >    Electrocardiogram Personally Reviewed:

## 2021-07-09 NOTE — PROGRESS NOTE ADULT - PROBLEM SELECTOR PLAN 6
- DVT ppx- Lovenox 40 mg  - Diet- DASH/TLC  - Dispo- pending CTA cardiac and further medication optimization  - PT- home - DVT ppx- Lovenox 40 mg  - Diet- DASH/TLC  - Dispo- pending CTA cardiac and further medication optimization  - PT- home  - Updated patient's mother- Jody Terri (7/9)

## 2021-07-10 LAB
ALBUMIN SERPL ELPH-MCNC: 3.9 G/DL — SIGNIFICANT CHANGE UP (ref 3.3–5.2)
ALP SERPL-CCNC: 103 U/L — SIGNIFICANT CHANGE UP (ref 40–120)
ALT FLD-CCNC: 17 U/L — SIGNIFICANT CHANGE UP
ANION GAP SERPL CALC-SCNC: 9 MMOL/L — SIGNIFICANT CHANGE UP (ref 5–17)
AST SERPL-CCNC: 12 U/L — SIGNIFICANT CHANGE UP
BILIRUB SERPL-MCNC: <0.2 MG/DL — LOW (ref 0.4–2)
BUN SERPL-MCNC: 19.7 MG/DL — SIGNIFICANT CHANGE UP (ref 8–20)
CALCIUM SERPL-MCNC: 9.2 MG/DL — SIGNIFICANT CHANGE UP (ref 8.6–10.2)
CHLORIDE SERPL-SCNC: 104 MMOL/L — SIGNIFICANT CHANGE UP (ref 98–107)
CO2 SERPL-SCNC: 29 MMOL/L — SIGNIFICANT CHANGE UP (ref 22–29)
CREAT SERPL-MCNC: 1.09 MG/DL — SIGNIFICANT CHANGE UP (ref 0.5–1.3)
GLUCOSE SERPL-MCNC: 108 MG/DL — HIGH (ref 70–99)
HCT VFR BLD CALC: 34.2 % — LOW (ref 39–50)
HGB BLD-MCNC: 11 G/DL — LOW (ref 13–17)
MCHC RBC-ENTMCNC: 27.8 PG — SIGNIFICANT CHANGE UP (ref 27–34)
MCHC RBC-ENTMCNC: 32.2 GM/DL — SIGNIFICANT CHANGE UP (ref 32–36)
MCV RBC AUTO: 86.4 FL — SIGNIFICANT CHANGE UP (ref 80–100)
PLATELET # BLD AUTO: 181 K/UL — SIGNIFICANT CHANGE UP (ref 150–400)
POTASSIUM SERPL-MCNC: 3.8 MMOL/L — SIGNIFICANT CHANGE UP (ref 3.5–5.3)
POTASSIUM SERPL-SCNC: 3.8 MMOL/L — SIGNIFICANT CHANGE UP (ref 3.5–5.3)
PROT SERPL-MCNC: 6.3 G/DL — LOW (ref 6.6–8.7)
RBC # BLD: 3.96 M/UL — LOW (ref 4.2–5.8)
RBC # FLD: 13.9 % — SIGNIFICANT CHANGE UP (ref 10.3–14.5)
SODIUM SERPL-SCNC: 142 MMOL/L — SIGNIFICANT CHANGE UP (ref 135–145)
WBC # BLD: 4.79 K/UL — SIGNIFICANT CHANGE UP (ref 3.8–10.5)
WBC # FLD AUTO: 4.79 K/UL — SIGNIFICANT CHANGE UP (ref 3.8–10.5)

## 2021-07-10 PROCEDURE — 99233 SBSQ HOSP IP/OBS HIGH 50: CPT

## 2021-07-10 RX ADMIN — ENOXAPARIN SODIUM 40 MILLIGRAM(S): 100 INJECTION SUBCUTANEOUS at 12:03

## 2021-07-10 RX ADMIN — GABAPENTIN 800 MILLIGRAM(S): 400 CAPSULE ORAL at 13:33

## 2021-07-10 RX ADMIN — BUPRENORPHINE AND NALOXONE 1 TABLET(S): 2; .5 TABLET SUBLINGUAL at 16:56

## 2021-07-10 RX ADMIN — Medication 300 MILLIGRAM(S): at 21:17

## 2021-07-10 RX ADMIN — Medication 0.2 MILLIGRAM(S): at 06:25

## 2021-07-10 RX ADMIN — LAMOTRIGINE 200 MILLIGRAM(S): 25 TABLET, ORALLY DISINTEGRATING ORAL at 12:04

## 2021-07-10 RX ADMIN — QUETIAPINE FUMARATE 400 MILLIGRAM(S): 200 TABLET, FILM COATED ORAL at 21:17

## 2021-07-10 RX ADMIN — Medication 0.5 MILLIGRAM(S): at 08:15

## 2021-07-10 RX ADMIN — GABAPENTIN 800 MILLIGRAM(S): 400 CAPSULE ORAL at 21:17

## 2021-07-10 RX ADMIN — Medication 300 MILLIGRAM(S): at 13:33

## 2021-07-10 RX ADMIN — BUPRENORPHINE AND NALOXONE 1 TABLET(S): 2; .5 TABLET SUBLINGUAL at 03:18

## 2021-07-10 RX ADMIN — BUPRENORPHINE AND NALOXONE 1 TABLET(S): 2; .5 TABLET SUBLINGUAL at 23:03

## 2021-07-10 RX ADMIN — LOSARTAN POTASSIUM 100 MILLIGRAM(S): 100 TABLET, FILM COATED ORAL at 06:24

## 2021-07-10 RX ADMIN — PANTOPRAZOLE SODIUM 40 MILLIGRAM(S): 20 TABLET, DELAYED RELEASE ORAL at 06:25

## 2021-07-10 RX ADMIN — Medication 0.2 MILLIGRAM(S): at 13:33

## 2021-07-10 RX ADMIN — GABAPENTIN 800 MILLIGRAM(S): 400 CAPSULE ORAL at 06:25

## 2021-07-10 RX ADMIN — Medication 0.2 MILLIGRAM(S): at 21:17

## 2021-07-10 RX ADMIN — Medication 300 MILLIGRAM(S): at 06:24

## 2021-07-10 RX ADMIN — BUPRENORPHINE AND NALOXONE 1 TABLET(S): 2; .5 TABLET SUBLINGUAL at 08:13

## 2021-07-10 RX ADMIN — RISPERIDONE 1 MILLIGRAM(S): 4 TABLET ORAL at 21:17

## 2021-07-10 RX ADMIN — Medication 0.5 MILLIGRAM(S): at 23:13

## 2021-07-10 NOTE — PROGRESS NOTE ADULT - SUBJECTIVE AND OBJECTIVE BOX
CC: Chest pain is resolved. Bipolar disorder.   HPI:  42 y/o male came to the ER for on and off left side cp since this morning, sharp, was lasting few seconds every time it came back, radiating to left arm had some diaphoresis, cp started at rest and there was not aggravating or relieving factor. was about 7 on pain scale, no sob, no sig. HA, no focal weakness, no cough, no fever, no dizziness, no LOC, no abd. pain, no n/v/d. no sig. cp at the time of admission. pt. was noted to have elevated bp in the ER.  (2021 19:26)    REVIEW OF SYSTEMS:    Patient denied fever, chills, abdominal pain, nausea, vomiting, cough, shortness of breath, chest pain or palpitations    Vital Signs Last 24 Hrs  T(C): 36.4 (10 Jul 2021 04:17), Max: 36.7 (2021 20:54)  T(F): 97.6 (10 Jul 2021 04:17), Max: 98 (2021 20:54)  HR: 60 (10 Jul 2021 04:17) (60 - 71)  BP: 129/96 (10 Jul 2021 04:17) (113/74 - 189/115)  BP(mean): --  RR: 18 (10 Jul 2021 04:17) (18 - 18)  SpO2: 99% (10 Jul 2021 04:17) (96% - 99%)I&O's Summary    PHYSICAL EXAM:  GENERAL: NAD,  HEENT: PERRL, +EOMI, anicteric, no [place]  NECK: Supple, No JVD   CHEST/LUNG: CTA bilaterally; Normal effort  HEART: S1S2 Normal intensity, no murmurs, gallops or rubs noted  ABDOMEN: Soft, BS Normoactive, NT, ND, no HSM noted  EXTREMITIES:  2+ radial and DP pulses noted, no clubbing, cyanosis, or edema noted,   SKIN: No rashes or lesions noted  NEURO: A&Ox3, no focal deficits noted, CN II-XII intact  PSYCH: Depressed mood and affect; insight/judgement inappropriate  LABS:                        11.0   4.79  )-----------( 181      ( 10 Jul 2021 08:37 )             34.2     07-10    142  |  104  |  19.7  ----------------------------<  108<H>  3.8   |  29.0  |  1.09    Ca    9.2      10 Jul 2021 08:37    TPro  6.3<L>  /  Alb  3.9  /  TBili  <0.2<L>  /  DBili  x   /  AST  12  /  ALT  17  /  AlkPhos  103  07-10      Urinalysis Basic - ( 2021 14:23 )    Color: Yellow / Appearance: Clear / S.010 / pH: x  Gluc: x / Ketone: Negative  / Bili: Negative / Urobili: 1 mg/dL   Blood: x / Protein: 15 mg/dL / Nitrite: Negative   Leuk Esterase: Negative / RBC: Negative /HPF / WBC 0-2   Sq Epi: x / Non Sq Epi: Negative / Bacteria: Negative      RADIOLOGY & ADDITIONAL TESTS:    MEDICATIONS:  MEDICATIONS  (STANDING):  buprenorphine 8 mG/naloxone 2 mG SL  Tablet 1 Tablet(s) SubLingual <User Schedule>  cloNIDine 0.2 milliGRAM(s) Oral three times a day  enoxaparin Injectable 40 milliGRAM(s) SubCutaneous daily  gabapentin 800 milliGRAM(s) Oral every 8 hours  labetalol 300 milliGRAM(s) Oral every 8 hours  lamoTRIgine 200 milliGRAM(s) Oral daily  losartan 100 milliGRAM(s) Oral daily  pantoprazole    Tablet 40 milliGRAM(s) Oral before breakfast  QUEtiapine 400 milliGRAM(s) Oral at bedtime  risperiDONE   Tablet 1 milliGRAM(s) Oral at bedtime  sodium chloride 0.9%. 500 milliLiter(s) (100 mL/Hr) IV Continuous <Continuous>    MEDICATIONS  (PRN):  ALPRAZolam 0.5 milliGRAM(s) Oral three times a day PRN anxiety  hydrALAZINE Injectable 10 milliGRAM(s) IV Push every 6 hours PRN for sbp above 170.  nitroglycerin     SubLingual 0.4 milliGRAM(s) SubLingual every 5 minutes PRN Chest Pain

## 2021-07-11 PROCEDURE — 99233 SBSQ HOSP IP/OBS HIGH 50: CPT

## 2021-07-11 RX ADMIN — QUETIAPINE FUMARATE 400 MILLIGRAM(S): 200 TABLET, FILM COATED ORAL at 21:23

## 2021-07-11 RX ADMIN — LOSARTAN POTASSIUM 100 MILLIGRAM(S): 100 TABLET, FILM COATED ORAL at 05:08

## 2021-07-11 RX ADMIN — Medication 0.2 MILLIGRAM(S): at 21:24

## 2021-07-11 RX ADMIN — Medication 0.2 MILLIGRAM(S): at 05:08

## 2021-07-11 RX ADMIN — ENOXAPARIN SODIUM 40 MILLIGRAM(S): 100 INJECTION SUBCUTANEOUS at 11:37

## 2021-07-11 RX ADMIN — RISPERIDONE 1 MILLIGRAM(S): 4 TABLET ORAL at 21:23

## 2021-07-11 RX ADMIN — BUPRENORPHINE AND NALOXONE 1 TABLET(S): 2; .5 TABLET SUBLINGUAL at 23:36

## 2021-07-11 RX ADMIN — SODIUM CHLORIDE 100 MILLILITER(S): 9 INJECTION INTRAMUSCULAR; INTRAVENOUS; SUBCUTANEOUS at 08:55

## 2021-07-11 RX ADMIN — GABAPENTIN 800 MILLIGRAM(S): 400 CAPSULE ORAL at 14:03

## 2021-07-11 RX ADMIN — Medication 300 MILLIGRAM(S): at 21:23

## 2021-07-11 RX ADMIN — Medication 300 MILLIGRAM(S): at 05:08

## 2021-07-11 RX ADMIN — Medication 0.2 MILLIGRAM(S): at 14:03

## 2021-07-11 RX ADMIN — PANTOPRAZOLE SODIUM 40 MILLIGRAM(S): 20 TABLET, DELAYED RELEASE ORAL at 05:08

## 2021-07-11 RX ADMIN — BUPRENORPHINE AND NALOXONE 1 TABLET(S): 2; .5 TABLET SUBLINGUAL at 08:50

## 2021-07-11 RX ADMIN — Medication 0.5 MILLIGRAM(S): at 21:23

## 2021-07-11 RX ADMIN — Medication 0.5 MILLIGRAM(S): at 08:50

## 2021-07-11 RX ADMIN — Medication 0.5 MILLIGRAM(S): at 14:03

## 2021-07-11 RX ADMIN — Medication 300 MILLIGRAM(S): at 14:04

## 2021-07-11 RX ADMIN — BUPRENORPHINE AND NALOXONE 1 TABLET(S): 2; .5 TABLET SUBLINGUAL at 17:36

## 2021-07-11 RX ADMIN — LAMOTRIGINE 200 MILLIGRAM(S): 25 TABLET, ORALLY DISINTEGRATING ORAL at 11:38

## 2021-07-11 RX ADMIN — GABAPENTIN 800 MILLIGRAM(S): 400 CAPSULE ORAL at 05:08

## 2021-07-11 RX ADMIN — GABAPENTIN 800 MILLIGRAM(S): 400 CAPSULE ORAL at 21:38

## 2021-07-11 NOTE — PROGRESS NOTE ADULT - SUBJECTIVE AND OBJECTIVE BOX
CC: Chest pain is resolved .  CTA cardiac with calcium score 0.8 and no evident blockage.  Bipolar disorder   HPI:  42 y/o male came to the ER for on and off left side cp since this morning, sharp, was lasting few seconds every time it came back, radiating to left arm had some diaphoresis, cp started at rest and there was not aggravating or relieving factor. was about 7 on pain scale, no sob, no sig. HA, no focal weakness, no cough, no fever, no dizziness, no LOC, no abd. pain, no n/v/d. no sig. cp at the time of admission. pt. was noted to have elevated bp in the ER.  (2021 19:26)    REVIEW OF SYSTEMS:    Patient denied fever, chills, abdominal pain, nausea, vomiting, cough, shortness of breath, chest pain or palpitations    Vital Signs Last 24 Hrs  T(C): 36.8 (2021 08:45), Max: 36.8 (10 Jul 2021 20:49)  T(F): 98.2 (2021 08:45), Max: 98.2 (10 Jul 2021 20:49)  HR: 102 (2021 08:45) (60 - 102)  BP: 135/87 (2021 08:45) (126/77 - 154/78)  BP(mean): --  RR: 18 (2021 08:45) (18 - 18)  SpO2: 97% (2021 08:45) (96% - 100%)I&O's Summary    PHYSICAL EXAM:  GENERAL: NAD, well-groomed  HEENT: PERRL, +EOMI, anicteric, no [place]  NECK: Supple, No JVD   CHEST/LUNG: CTA bilaterally; Normal effort  HEART: S1S2 Normal intensity, no murmurs, gallops or rubs noted  ABDOMEN: Soft, BS Normoactive, NT, ND, no HSM noted  EXTREMITIES:  2+ radial and DP pulses noted, no clubbing, cyanosis, or edema noted, FROM x 4  SKIN: No rashes or lesions noted  NEURO: A&Ox3, no focal deficits noted, CN II-XII intact  PSYCH: Depressed  mood and affect; insight/judgement appropriate  LABS:                        11.0   4.79  )-----------( 181      ( 10 Jul 2021 08:37 )             34.2     07-10    142  |  104  |  19.7  ----------------------------<  108<H>  3.8   |  29.0  |  1.09    Ca    9.2      10 Jul 2021 08:37    TPro  6.3<L>  /  Alb  3.9  /  TBili  <0.2<L>  /  DBili  x   /  AST  12  /  ALT  17  /  AlkPhos  103  07-10      Urinalysis Basic - ( 2021 14:23 )    Color: Yellow / Appearance: Clear / S.010 / pH: x  Gluc: x / Ketone: Negative  / Bili: Negative / Urobili: 1 mg/dL   Blood: x / Protein: 15 mg/dL / Nitrite: Negative   Leuk Esterase: Negative / RBC: Negative /HPF / WBC 0-2   Sq Epi: x / Non Sq Epi: Negative / Bacteria: Negative      RADIOLOGY & ADDITIONAL TESTS:    MEDICATIONS:  MEDICATIONS  (STANDING):  buprenorphine 8 mG/naloxone 2 mG SL  Tablet 1 Tablet(s) SubLingual <User Schedule>  cloNIDine 0.2 milliGRAM(s) Oral three times a day  enoxaparin Injectable 40 milliGRAM(s) SubCutaneous daily  gabapentin 800 milliGRAM(s) Oral every 8 hours  labetalol 300 milliGRAM(s) Oral every 8 hours  lamoTRIgine 200 milliGRAM(s) Oral daily  losartan 100 milliGRAM(s) Oral daily  pantoprazole    Tablet 40 milliGRAM(s) Oral before breakfast  QUEtiapine 400 milliGRAM(s) Oral at bedtime  risperiDONE   Tablet 1 milliGRAM(s) Oral at bedtime    MEDICATIONS  (PRN):  ALPRAZolam 0.5 milliGRAM(s) Oral three times a day PRN anxiety  hydrALAZINE Injectable 10 milliGRAM(s) IV Push every 6 hours PRN for sbp above 170.  nitroglycerin     SubLingual 0.4 milliGRAM(s) SubLingual every 5 minutes PRN Chest Pain

## 2021-07-12 PROCEDURE — 99232 SBSQ HOSP IP/OBS MODERATE 35: CPT

## 2021-07-12 RX ADMIN — BUPRENORPHINE AND NALOXONE 1 TABLET(S): 2; .5 TABLET SUBLINGUAL at 17:23

## 2021-07-12 RX ADMIN — Medication 300 MILLIGRAM(S): at 12:11

## 2021-07-12 RX ADMIN — Medication 0.2 MILLIGRAM(S): at 05:50

## 2021-07-12 RX ADMIN — RISPERIDONE 1 MILLIGRAM(S): 4 TABLET ORAL at 21:30

## 2021-07-12 RX ADMIN — Medication 300 MILLIGRAM(S): at 21:30

## 2021-07-12 RX ADMIN — Medication 0.5 MILLIGRAM(S): at 21:29

## 2021-07-12 RX ADMIN — Medication 0.2 MILLIGRAM(S): at 21:29

## 2021-07-12 RX ADMIN — LOSARTAN POTASSIUM 100 MILLIGRAM(S): 100 TABLET, FILM COATED ORAL at 05:50

## 2021-07-12 RX ADMIN — BUPRENORPHINE AND NALOXONE 1 TABLET(S): 2; .5 TABLET SUBLINGUAL at 09:34

## 2021-07-12 RX ADMIN — LAMOTRIGINE 200 MILLIGRAM(S): 25 TABLET, ORALLY DISINTEGRATING ORAL at 12:11

## 2021-07-12 RX ADMIN — GABAPENTIN 800 MILLIGRAM(S): 400 CAPSULE ORAL at 12:11

## 2021-07-12 RX ADMIN — GABAPENTIN 800 MILLIGRAM(S): 400 CAPSULE ORAL at 21:29

## 2021-07-12 RX ADMIN — Medication 300 MILLIGRAM(S): at 05:50

## 2021-07-12 RX ADMIN — Medication 0.5 MILLIGRAM(S): at 12:13

## 2021-07-12 RX ADMIN — BUPRENORPHINE AND NALOXONE 1 TABLET(S): 2; .5 TABLET SUBLINGUAL at 23:32

## 2021-07-12 RX ADMIN — Medication 10 MILLIGRAM(S): at 00:28

## 2021-07-12 RX ADMIN — QUETIAPINE FUMARATE 400 MILLIGRAM(S): 200 TABLET, FILM COATED ORAL at 21:30

## 2021-07-12 RX ADMIN — Medication 0.2 MILLIGRAM(S): at 12:11

## 2021-07-12 RX ADMIN — Medication 0.5 MILLIGRAM(S): at 17:23

## 2021-07-12 RX ADMIN — GABAPENTIN 800 MILLIGRAM(S): 400 CAPSULE ORAL at 05:50

## 2021-07-12 RX ADMIN — PANTOPRAZOLE SODIUM 40 MILLIGRAM(S): 20 TABLET, DELAYED RELEASE ORAL at 05:50

## 2021-07-12 NOTE — PROGRESS NOTE ADULT - SUBJECTIVE AND OBJECTIVE BOX
CC: chest pain/ htn urgency (11 Jul 2021 11:35)    HPI:  40 y/o male came to the ER for on and off left side cp.    INTERVAL HPI/OVERNIGHT EVENTS: Patient seen and examined sitting up in bed.  Patient requesting to be discharged to [facility] for psych issues.  Patient denies any headache, dizziness, SOB, CP, abdominal pain, nausea, vomiting, dysuria.  Other ROS reviewed and are negative.    Vital Signs Last 24 Hrs  T(C): 36.4 (12 Jul 2021 04:56), Max: 36.7 (11 Jul 2021 14:06)  T(F): 97.6 (12 Jul 2021 04:56), Max: 98.1 (11 Jul 2021 14:06)  HR: 66 (12 Jul 2021 11:16) (62 - 73)  BP: 116/79 (12 Jul 2021 11:16) (105/62 - 182/98)  BP(mean): --  RR: 18 (12 Jul 2021 11:16) (18 - 20)  SpO2: 100% (12 Jul 2021 11:16) (95% - 100%)  I&O's Detail    PHYSICAL EXAM:  GENERAL: NAD  HEAD:  Atraumatic, Normocephalic  NECK: Supple, No JVD, Normal thyroid  NERVOUS SYSTEM:  Alert & Oriented X3, Good concentration; Motor Strength 5/5 B/L upper and lower extremities  CHEST/LUNG: Clear to auscultation bilaterally  HEART: Regular rate and rhythm; No murmurs, rubs, or gallops  ABDOMEN: Soft, Nontender, Nondistended; Bowel sounds present  EXTREMITIES:  2+ Peripheral Pulses, No clubbing, cyanosis, or edema        MEDICATIONS  (STANDING):  buprenorphine 8 mG/naloxone 2 mG SL  Tablet 1 Tablet(s) SubLingual <User Schedule>  cloNIDine 0.2 milliGRAM(s) Oral three times a day  enoxaparin Injectable 40 milliGRAM(s) SubCutaneous daily  gabapentin 800 milliGRAM(s) Oral every 8 hours  labetalol 300 milliGRAM(s) Oral every 8 hours  lamoTRIgine 200 milliGRAM(s) Oral daily  losartan 100 milliGRAM(s) Oral daily  pantoprazole    Tablet 40 milliGRAM(s) Oral before breakfast  QUEtiapine 400 milliGRAM(s) Oral at bedtime  risperiDONE   Tablet 1 milliGRAM(s) Oral at bedtime    MEDICATIONS  (PRN):  ALPRAZolam 0.5 milliGRAM(s) Oral three times a day PRN anxiety  hydrALAZINE Injectable 10 milliGRAM(s) IV Push every 6 hours PRN for sbp above 170.  nitroglycerin     SubLingual 0.4 milliGRAM(s) SubLingual every 5 minutes PRN Chest Pain

## 2021-07-12 NOTE — PROGRESS NOTE ADULT - ATTENDING COMMENTS
I have personally seen, examined and participated in the care of this patient. I have reviewed all pertinent clinical information, including history, physical exam, plan and agree with the above.   pt requesting psych eval for inpt psych admission. f/u consult.

## 2021-07-13 LAB — SARS-COV-2 RNA SPEC QL NAA+PROBE: SIGNIFICANT CHANGE UP

## 2021-07-13 PROCEDURE — 99232 SBSQ HOSP IP/OBS MODERATE 35: CPT

## 2021-07-13 PROCEDURE — 99223 1ST HOSP IP/OBS HIGH 75: CPT

## 2021-07-13 RX ADMIN — ENOXAPARIN SODIUM 40 MILLIGRAM(S): 100 INJECTION SUBCUTANEOUS at 13:48

## 2021-07-13 RX ADMIN — Medication 300 MILLIGRAM(S): at 21:20

## 2021-07-13 RX ADMIN — BUPRENORPHINE AND NALOXONE 1 TABLET(S): 2; .5 TABLET SUBLINGUAL at 16:52

## 2021-07-13 RX ADMIN — QUETIAPINE FUMARATE 400 MILLIGRAM(S): 200 TABLET, FILM COATED ORAL at 21:21

## 2021-07-13 RX ADMIN — Medication 300 MILLIGRAM(S): at 05:32

## 2021-07-13 RX ADMIN — Medication 0.2 MILLIGRAM(S): at 05:32

## 2021-07-13 RX ADMIN — BUPRENORPHINE AND NALOXONE 1 TABLET(S): 2; .5 TABLET SUBLINGUAL at 08:19

## 2021-07-13 RX ADMIN — GABAPENTIN 800 MILLIGRAM(S): 400 CAPSULE ORAL at 21:21

## 2021-07-13 RX ADMIN — LOSARTAN POTASSIUM 100 MILLIGRAM(S): 100 TABLET, FILM COATED ORAL at 05:32

## 2021-07-13 RX ADMIN — GABAPENTIN 800 MILLIGRAM(S): 400 CAPSULE ORAL at 05:32

## 2021-07-13 RX ADMIN — GABAPENTIN 800 MILLIGRAM(S): 400 CAPSULE ORAL at 13:47

## 2021-07-13 RX ADMIN — PANTOPRAZOLE SODIUM 40 MILLIGRAM(S): 20 TABLET, DELAYED RELEASE ORAL at 05:32

## 2021-07-13 RX ADMIN — LAMOTRIGINE 200 MILLIGRAM(S): 25 TABLET, ORALLY DISINTEGRATING ORAL at 13:48

## 2021-07-13 RX ADMIN — Medication 0.5 MILLIGRAM(S): at 13:48

## 2021-07-13 RX ADMIN — Medication 0.5 MILLIGRAM(S): at 21:25

## 2021-07-13 RX ADMIN — Medication 300 MILLIGRAM(S): at 13:47

## 2021-07-13 RX ADMIN — RISPERIDONE 1 MILLIGRAM(S): 4 TABLET ORAL at 21:21

## 2021-07-13 RX ADMIN — Medication 0.2 MILLIGRAM(S): at 21:20

## 2021-07-13 RX ADMIN — Medication 0.2 MILLIGRAM(S): at 13:48

## 2021-07-13 NOTE — BH CONSULTATION LIAISON ASSESSMENT NOTE - NSBHCHARTREVIEWVS_PSY_A_CORE FT
Vital Signs Last 24 Hrs  T(C): 36.7 (13 Jul 2021 10:38), Max: 36.8 (12 Jul 2021 21:32)  T(F): 98 (13 Jul 2021 10:38), Max: 98.3 (12 Jul 2021 21:32)  HR: 76 (13 Jul 2021 10:38) (65 - 76)  BP: 141/95 (13 Jul 2021 10:38) (109/73 - 151/95)  BP(mean): --  RR: 18 (13 Jul 2021 10:38) (18 - 20)  SpO2: 95% (13 Jul 2021 10:38) (95% - 100%)

## 2021-07-13 NOTE — PROGRESS NOTE ADULT - REASON FOR ADMISSION
chest pain/ htn urgency

## 2021-07-13 NOTE — PROGRESS NOTE ADULT - ASSESSMENT
40 y/o M with a PMHx of HTN, Bipolar disorder, and CVA who presented to the ER with complaints of high blood pressure and chest pain. Patient states that he began having a constant, sharp left sided chest pain that radiated to his left arm. Patient states that he knew his blood pressure was elevated, and states he has been taking his medications as prescribed (clonidine, hydralazine, losartan, and norvasc). Patient states that at baseline METS>4 with no chest pain or dyspnea. Patient states that the chest pain resolved after his blood pressure started to improve.  Troponin negative x 3.     Problem/Plan - 1:  ·  Problem: Chest pain, unspecified type.  Plan: - P/w chest pain likely in the setting of HTN urgency  CTA angio cardiac  showing no coronary blockage and calcium score of 0.8  Outpatient follow up with cardiology.       Problem/Plan - 2:  ·  Problem: Hypertensive urgency.  Plan: - stable  - c/w clonidine, labetalol, and losartan.      Problem/Plan - 3:  ·  Problem: Dysuria.  Plan: - Resolved.  U/A negative. Will hold off on empiric treatment given no leukocytosis and fever.      Problem/Plan - 4:  ·  Problem: Bipolar disorder.  Plan: - c/w Seroquel, risperidone and lamotrigine. Patient requesting inpatient psych at [place] for "psych issues."  Psych consulted - awaiting voluntary inpatient psych admission.     Problem/Plan - 5:  ·  Problem: Polysubstance abuse.  Plan: - patient on Suboxone.     - Dispo- pending inpatient psych, social work following.  - PT- home  - patient's mother is - Jody Murray .    
40 y/o M with a PMHx of HTN, Bipolar disorder, and CVA who presented to the ER with complaints of high blood pressure and chest pain. Patient states that he began having a constant, sharp left sided chest pain that radiated to his left arm. Patient states that he knew his blood pressure was elevated, and states he has been taking his medications as prescribed (clonidine, hydralazine, losartan, and norvasc). Patient states that at baseline METS>4 with no chest pain or dyspnea. Patient states that the chest pain resolved after his blood pressure started to improve.  Troponin negative x 3.     Problem/Plan - 1:  ·  Problem: Chest pain, unspecified type.  Plan: - P/w chest pain likely in the setting of HTN urgency  CTA angio cardiac  showing no coronary blockage and calcium score of 0.8  Outpatient follow up with cardiology.       Problem/Plan - 2:  ·  Problem: Hypertensive urgency.  Plan: - stable  - c/w clonidine, labetalol, and losartan.      Problem/Plan - 3:  ·  Problem: Dysuria.  Plan: - Resolved.  U/A negative. Will hold off on empiric treatment given no leukocytosis and fever.      Problem/Plan - 4:  ·  Problem: Bipolar disorder.  Plan: - c/w Seroquel, risperidone and lamotrigine. Patient requesting inpatient psych at [facility] for "psych issues."  Psych consulted.     Problem/Plan - 5:  ·  Problem: Polysubstance abuse.  Plan: - patient on Suboxone.     - Dispo- pending psych eval - inpatient psych vs shelter, social work following.  - PT- home  - patient's mother is - Jody Murray .    
40 y/o M with a PMHx of HTN, Bipolar disorder, and CVA who presented to the ER with complaints of high blood pressure and chest pain. Patient states that yesterday he began having a constant, sharp left sided chest pain that radiated to his left arm. Patient states that he knew his blood pressure was elevated, and states he has been taking his medications as prescribed (clonidine, hydralazine, losartan, and norvasc). Patient states that at baseline METS>4 with no chest pain or dyspnea. Patient states that the chest pain resolved after his blood pressure started to improve. Patient denies any fevers, chills, syncope, near syncope, abdominal pain, SOB, N/V/D, headache, or dizziness.   Troponin negative x 3     Problem/Plan - 1:  ·  Problem: Chest pain, unspecified type.  Plan: - P/w chest pain likely in the setting of HTN urgency  - appreciate cards recs  - F/u CTA angio cardiac w/ IV contrast.      Problem/Plan - 2:  ·  Problem: Hypertensive urgency.  Plan: - stable  - c/w clonidine, labetalol, and losartan.      Problem/Plan - 3:  ·  Problem: Dysuria.  Plan: - F/u U/A. Will hold off on empiric treatment given no leukocytosis and fever.      Problem/Plan - 4:  ·  Problem: Bipolar disorder.  Plan: - c/w Seroquel, risperidone and lamotrigine.      Problem/Plan - 5:  ·  Problem: Polysubstance abuse.  Plan: - patient on Suboxone.     - Dispo- pending CTA cardiac and further medication optimization  - PT- home  - patient's mother is - Jody Marieglory .  
42 y/o M with a PMHx of HTN, Bipolar disorder, and CVA who presented to the ER with complaints of high blood pressure and chest pain. Patient states that yesterday he began having a constant, sharp left sided chest pain that radiated to his left arm. Patient states that he knew his blood pressure was elevated, and states he has been taking his medications as prescribed (clonidine, hydralazine, losartan, and norvasc). Patient states that at baseline METS>4 with no chest pain or dyspnea. Patient states that the chest pain resolved after his blood pressure started to improve. Patient denies any fevers, chills, syncope, near syncope, abdominal pain, SOB, N/V/D, headache, or dizziness.   Troponin negative x 3     Problem/Plan - 1:  ·  Problem: Chest pain, unspecified type.  Plan: - P/w chest pain likely in the setting of HTN urgency  CTA angio cardiac  showing no coronary blockage and calcium score of 0.8  Further determination per cardiologist       Problem/Plan - 2:  ·  Problem: Hypertensive urgency.  Plan: - stable  - c/w clonidine, labetalol, and losartan.      Problem/Plan - 3:  ·  Problem: Dysuria.  Plan: - F/u U/A. Will hold off on empiric treatment given no leukocytosis and fever.      Problem/Plan - 4:  ·  Problem: Bipolar disorder.  Plan: - c/w Seroquel, risperidone and lamotrigine.      Problem/Plan - 5:  ·  Problem: Polysubstance abuse.  Plan: - patient on Suboxone.     - Dispo- pending CTA cardiac interpretation and further determination by cardiology  - PT- home  - patient's mother is - Jody Murray .    
40 y/o M with a PMHx of HTN, Bipolar disorder, and CVA who presented to the ER with complaints of high blood pressure and chest pain. Patient states that yesterday he began having a constant, sharp left sided chest pain that radiated to his left arm. Patient states that he knew his blood pressure was elevated, and states he has been taking his medications as prescribed (clonidine, hydralazine, losartan, and norvasc). Patient states that at baseline METS>4 with no chest pain or dyspnea. Patient states that the chest pain resolved after his blood pressure started to improve. Patient denies any fevers, chills, syncope, near syncope, abdominal pain, SOB, N/V/D, headache, or dizziness.   Troponin negative x 3

## 2021-07-13 NOTE — BH CONSULTATION LIAISON ASSESSMENT NOTE - NSSUICPROTFACT_PSY_ALL_CORE
Responsibility to children, family, or others/Fear of death or the actual act of killing self/Positive therapeutic relationships
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
## 2021-07-13 NOTE — PROGRESS NOTE ADULT - SUBJECTIVE AND OBJECTIVE BOX
CC: chest pain/ htn urgency (12 Jul 2021 13:58)    HPI:  40 y/o male came to the ER for on and off left side cp.    INTERVAL HPI/OVERNIGHT EVENTS: Patient seen and examined sitting up in bed.  Patient denies any headache, dizziness, SOB, CP, abdominal pain, nausea, vomiting, dysuria.  Other ROS reviewed and are negative.    Vital Signs Last 24 Hrs  T(C): 36.7 (13 Jul 2021 10:38), Max: 36.8 (12 Jul 2021 21:32)  T(F): 98 (13 Jul 2021 10:38), Max: 98.3 (12 Jul 2021 21:32)  HR: 76 (13 Jul 2021 13:44) (65 - 76)  BP: 152/105 (13 Jul 2021 13:44) (109/73 - 152/105)  BP(mean): --  RR: 18 (13 Jul 2021 10:38) (18 - 20)  SpO2: 95% (13 Jul 2021 10:38) (95% - 100%)  I&O's Detail    PHYSICAL EXAM:  GENERAL: NAD  HEAD:  Atraumatic, Normocephalic  NECK: Supple, No JVD, Normal thyroid  NERVOUS SYSTEM:  Alert & Oriented X3, Good concentration; Motor Strength 5/5 B/L upper and lower extremities  CHEST/LUNG: Clear to auscultation bilaterally  HEART: Regular rate and rhythm; No murmurs, rubs, or gallops  ABDOMEN: Soft, Nontender, Nondistended; Bowel sounds present  EXTREMITIES:  2+ Peripheral Pulses, No clubbing, cyanosis, or edema      MEDICATIONS  (STANDING):  buprenorphine 8 mG/naloxone 2 mG SL  Tablet 1 Tablet(s) SubLingual <User Schedule>  cloNIDine 0.2 milliGRAM(s) Oral three times a day  enoxaparin Injectable 40 milliGRAM(s) SubCutaneous daily  gabapentin 800 milliGRAM(s) Oral every 8 hours  labetalol 300 milliGRAM(s) Oral every 8 hours  lamoTRIgine 200 milliGRAM(s) Oral daily  losartan 100 milliGRAM(s) Oral daily  pantoprazole    Tablet 40 milliGRAM(s) Oral before breakfast  QUEtiapine 400 milliGRAM(s) Oral at bedtime  risperiDONE   Tablet 1 milliGRAM(s) Oral at bedtime    MEDICATIONS  (PRN):  ALPRAZolam 0.5 milliGRAM(s) Oral three times a day PRN anxiety  hydrALAZINE Injectable 10 milliGRAM(s) IV Push every 6 hours PRN for sbp above 170.  nitroglycerin     SubLingual 0.4 milliGRAM(s) SubLingual every 5 minutes PRN Chest Pain
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
## 2021-07-13 NOTE — BH CONSULTATION LIAISON ASSESSMENT NOTE - NSBHCHARTREVIEWLAB_PSY_A_CORE FT
Basic Metabolic Panel in AM (07.09.21 @ 03:25)    Sodium, Serum: 138 mmol/L    Potassium, Serum: 3.9 mmol/L    Chloride, Serum: 103 mmol/L    Carbon Dioxide, Serum: 25.0 mmol/L    Anion Gap, Serum: 11 mmol/L    Blood Urea Nitrogen, Serum: 15.2 mg/dL    Creatinine, Serum: 0.87 mg/dL    Glucose, Serum: 110 mg/dL    Calcium, Total Serum: 9.6 mg/dL    eGFR if Non : 106: Interpretative comment  The units for eGFR are mL/min/1.73M2 (normalized body surface area). The  eGFR is calculated from a serum creatinine using the CKD-EPI equation.  Other variables required for calculation are race, age and sex. Among  patients with chronic kidney disease (CKD), the eGFR is useful in  determining the stage of disease according to KDOQI CKD classification.  All eGFR results are reported numerically with the following  interpretation.          GFR                    With                 Without     (ml/min/1.73 m2)    Kidney Damage       Kidney Damage        >= 90                    Stage 1                     Normal        60-89                    Stage 2                     Decreased GFR        30-59     Stage 3                     Stage 3        15-29                    Stage 4                     Stage 4        < 15                      Stage 5                     Stage 5  Each stage of CKD assumes that the associated GFR level has been in  effect for at least 3 months. Determination of stages one and two (with  eGFR > 59 ml/min/m2) requires estimation of kidney damage for at least 3  months as defined by structural or functional abnormalities.  Limitations: All estimates of GFR will be less accurate for patients at  extremes of muscle mass (including but not limited to frail elderly,  critically ill, or cancer patients), those with unusual diets, and those  with conditions associated with reduced secretion or extrarenal  elimination of creatinine. The eGFR equation is not recommended for use  in patients with unstable creatinine levels. mL/min/1.73M2    eGFR if African American: 123 mL/min/1.73M2

## 2021-07-13 NOTE — PROGRESS NOTE ADULT - ATTENDING COMMENTS
I have personally seen, examined and participated in the care of this patient. I have reviewed all pertinent clinical information, including history, physical exam, plan and agree with the above.   pt pending dc to inpt psych
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
## 2021-07-13 NOTE — BH CONSULTATION LIAISON ASSESSMENT NOTE - NSACTIVEVENT_PSY_ALL_CORE
recent detox reported at [facility]/Current or pending social isolation/Inadequate social supports

## 2021-07-14 ENCOUNTER — TRANSCRIPTION ENCOUNTER (OUTPATIENT)
Age: 41
End: 2021-07-14

## 2021-07-14 VITALS
TEMPERATURE: 99 F | OXYGEN SATURATION: 98 % | DIASTOLIC BLOOD PRESSURE: 71 MMHG | HEART RATE: 75 BPM | RESPIRATION RATE: 18 BRPM | SYSTOLIC BLOOD PRESSURE: 117 MMHG

## 2021-07-14 LAB — GLUCOSE BLDC GLUCOMTR-MCNC: 138 MG/DL — HIGH (ref 70–99)

## 2021-07-14 PROCEDURE — 80307 DRUG TEST PRSMV CHEM ANLYZR: CPT

## 2021-07-14 PROCEDURE — 83036 HEMOGLOBIN GLYCOSYLATED A1C: CPT

## 2021-07-14 PROCEDURE — 86769 SARS-COV-2 COVID-19 ANTIBODY: CPT

## 2021-07-14 PROCEDURE — 85025 COMPLETE CBC W/AUTO DIFF WBC: CPT

## 2021-07-14 PROCEDURE — U0005: CPT

## 2021-07-14 PROCEDURE — 80048 BASIC METABOLIC PNL TOTAL CA: CPT

## 2021-07-14 PROCEDURE — 85027 COMPLETE CBC AUTOMATED: CPT

## 2021-07-14 PROCEDURE — U0003: CPT

## 2021-07-14 PROCEDURE — 80053 COMPREHEN METABOLIC PANEL: CPT

## 2021-07-14 PROCEDURE — 93005 ELECTROCARDIOGRAM TRACING: CPT

## 2021-07-14 PROCEDURE — 80061 LIPID PANEL: CPT

## 2021-07-14 PROCEDURE — 0225U NFCT DS DNA&RNA 21 SARSCOV2: CPT

## 2021-07-14 PROCEDURE — 84484 ASSAY OF TROPONIN QUANT: CPT

## 2021-07-14 PROCEDURE — 71046 X-RAY EXAM CHEST 2 VIEWS: CPT

## 2021-07-14 PROCEDURE — 99284 EMERGENCY DEPT VISIT MOD MDM: CPT

## 2021-07-14 PROCEDURE — 36415 COLL VENOUS BLD VENIPUNCTURE: CPT

## 2021-07-14 PROCEDURE — 82962 GLUCOSE BLOOD TEST: CPT

## 2021-07-14 PROCEDURE — 75574 CT ANGIO HRT W/3D IMAGE: CPT

## 2021-07-14 PROCEDURE — 81001 URINALYSIS AUTO W/SCOPE: CPT

## 2021-07-14 PROCEDURE — 99239 HOSP IP/OBS DSCHRG MGMT >30: CPT

## 2021-07-14 PROCEDURE — 97163 PT EVAL HIGH COMPLEX 45 MIN: CPT

## 2021-07-14 RX ORDER — QUETIAPINE FUMARATE 200 MG/1
1 TABLET, FILM COATED ORAL
Qty: 0 | Refills: 0 | DISCHARGE
Start: 2021-07-14

## 2021-07-14 RX ORDER — GABAPENTIN 400 MG/1
1 CAPSULE ORAL
Qty: 0 | Refills: 0 | DISCHARGE
Start: 2021-07-14

## 2021-07-14 RX ORDER — QUETIAPINE FUMARATE 200 MG/1
1 TABLET, FILM COATED ORAL
Qty: 0 | Refills: 0 | DISCHARGE

## 2021-07-14 RX ORDER — GABAPENTIN 400 MG/1
0.5 CAPSULE ORAL
Qty: 0 | Refills: 0 | DISCHARGE
Start: 2021-07-14

## 2021-07-14 RX ORDER — LABETALOL HCL 100 MG
1 TABLET ORAL
Qty: 0 | Refills: 0 | DISCHARGE
Start: 2021-07-14

## 2021-07-14 RX ORDER — GABAPENTIN 400 MG/1
2 CAPSULE ORAL
Qty: 0 | Refills: 0 | DISCHARGE
Start: 2021-07-14

## 2021-07-14 RX ORDER — RISPERIDONE 4 MG/1
1 TABLET ORAL
Qty: 0 | Refills: 0 | DISCHARGE
Start: 2021-07-14

## 2021-07-14 RX ORDER — ALPRAZOLAM 0.25 MG
1 TABLET ORAL
Qty: 0 | Refills: 0 | DISCHARGE
Start: 2021-07-14

## 2021-07-14 RX ORDER — GABAPENTIN 400 MG/1
1 CAPSULE ORAL
Qty: 0 | Refills: 0 | DISCHARGE

## 2021-07-14 RX ORDER — CLONAZEPAM 1 MG
1 TABLET ORAL
Qty: 0 | Refills: 0 | DISCHARGE

## 2021-07-14 RX ORDER — ALPRAZOLAM 0.25 MG
1 TABLET ORAL
Qty: 0 | Refills: 0 | DISCHARGE

## 2021-07-14 RX ORDER — RISPERIDONE 4 MG/1
1 TABLET ORAL
Qty: 0 | Refills: 0 | DISCHARGE

## 2021-07-14 RX ADMIN — Medication 300 MILLIGRAM(S): at 11:48

## 2021-07-14 RX ADMIN — GABAPENTIN 800 MILLIGRAM(S): 400 CAPSULE ORAL at 05:17

## 2021-07-14 RX ADMIN — GABAPENTIN 800 MILLIGRAM(S): 400 CAPSULE ORAL at 11:48

## 2021-07-14 RX ADMIN — BUPRENORPHINE AND NALOXONE 1 TABLET(S): 2; .5 TABLET SUBLINGUAL at 00:39

## 2021-07-14 RX ADMIN — LOSARTAN POTASSIUM 100 MILLIGRAM(S): 100 TABLET, FILM COATED ORAL at 05:17

## 2021-07-14 RX ADMIN — Medication 300 MILLIGRAM(S): at 05:17

## 2021-07-14 RX ADMIN — BUPRENORPHINE AND NALOXONE 1 TABLET(S): 2; .5 TABLET SUBLINGUAL at 08:17

## 2021-07-14 RX ADMIN — Medication 0.2 MILLIGRAM(S): at 11:48

## 2021-07-14 RX ADMIN — LAMOTRIGINE 200 MILLIGRAM(S): 25 TABLET, ORALLY DISINTEGRATING ORAL at 11:48

## 2021-07-14 RX ADMIN — Medication 0.2 MILLIGRAM(S): at 05:17

## 2021-07-14 RX ADMIN — PANTOPRAZOLE SODIUM 40 MILLIGRAM(S): 20 TABLET, DELAYED RELEASE ORAL at 05:17

## 2021-07-14 NOTE — DISCHARGE NOTE NURSING/CASE MANAGEMENT/SOCIAL WORK - PATIENT PORTAL LINK FT
You can access the FollowMyHealth Patient Portal offered by [facility] by registering at the following website: [email or web address]. By joining [employer]’s FollowMyHealth portal, you will also be able to view your health information using other applications (apps) compatible with our system.

## 2021-07-14 NOTE — DISCHARGE NOTE PROVIDER - CARE PROVIDER_API CALL
Claude Keller (DO)  Cardiovascular Disease; Internal Medicine  [address] [place] [id number]  Phone: [phone]  Fax: [phone]  Follow Up Time:     Primary doctor,   Phone: (   )    -  Fax: (   )    -  Follow Up Time:

## 2021-07-14 NOTE — DISCHARGE NOTE PROVIDER - PROVIDER TOKENS
PROVIDER:[TOKEN:[[id number]]],FREE:[LAST:[Primary doctor],PHONE:[(   )    -],FAX:[(   )    -]]

## 2021-07-14 NOTE — DISCHARGE NOTE PROVIDER - NSDCCPCAREPLAN_GEN_ALL_CORE_FT
PRINCIPAL DISCHARGE DIAGNOSIS  Diagnosis: Chest pain, unspecified type  Assessment and Plan of Treatment: Follow up with cardiology.      SECONDARY DISCHARGE DIAGNOSES  Diagnosis: Hypertensive urgency  Assessment and Plan of Treatment: Continue current medications.  Follow up with primary doctor and cardiology.    Diagnosis: Bipolar disorder  Assessment and Plan of Treatment: Inpatient voluntary psych admission    Diagnosis: Polysubstance abuse  Assessment and Plan of Treatment: Inpatient voluntary psych admission

## 2021-07-14 NOTE — DISCHARGE NOTE PROVIDER - NSDCMRMEDTOKEN_GEN_ALL_CORE_FT
ALPRAZolam 0.5 mg oral tablet: 1 tab(s) orally 3 times a day, As needed, anxiety  cloNIDine 0.2 mg oral tablet: 1 tab(s) orally 3 times a day  gabapentin 400 mg oral capsule: 2 cap(s) orally every 8 hours  labetalol 300 mg oral tablet: 1 tab(s) orally every 8 hours  lamoTRIgine 200 mg oral tablet: 1 tab(s) orally once a day  losartan 100 mg oral tablet: 1 tab(s) orally once a day  pantoprazole 40 mg oral delayed release tablet: 1 tab(s) orally once a day (before a meal)  QUEtiapine 400 mg oral tablet: 1 tab(s) orally once a day (at bedtime)  risperiDONE 1 mg oral tablet: 1 tab(s) orally once a day (at bedtime)  Suboxone 8 mg-2 mg sublingual film: 3 film(s) sublingual daily  Ventolin HFA 90 mcg/inh inhalation aerosol: 2 puff(s) inhaled every 6 hours, As Needed

## 2021-07-14 NOTE — DISCHARGE NOTE NURSING/CASE MANAGEMENT/SOCIAL WORK - NSDCVIVACCINE_GEN_ALL_CORE_FT
Tdap; 02-Mar-2019 00:55; Juan Miguel Cortez); [employer]; [id number] (Exp. Date: 20-Nov-2020); IntraMuscular; Deltoid Left.; 0.5 milliLiter(s); VIS (VIS Published: 09-May-2013, VIS Presented: 02-Mar-2019);

## 2021-07-14 NOTE — DISCHARGE NOTE PROVIDER - HOSPITAL COURSE
42 y/o M with a PMHx of HTN, Bipolar disorder, and CVA who presented to the ER with complaints of high blood pressure and chest pain. Patient states that he began having a constant, sharp left sided chest pain that radiated to his left arm. Patient states that he knew his blood pressure was elevated, and states he has been taking his medications as prescribed (clonidine, hydralazine, losartan, and norvasc). Patient states that at baseline METS>4 with no chest pain or dyspnea. Patient states that the chest pain resolved after his blood pressure started to improve.  Troponin negative x 3.  CP likely in setting of HTN urgency.  Cardiology consulted.  CTA angio cardiac  showing no coronary blockage and calcium score of 0.8.  Patient will need outpatient follow up with cardiology.  BP improved.  Patient requesting inpatient psych at [place] for "psych issues."  Psych consulted.  Patient stable for discharge to inpatient psych with voluntary admission.    Vital Signs Last 24 Hrs  T(C): 36.1 (14 Jul 2021 04:55), Max: 36.7 (13 Jul 2021 10:38)  T(F): 97 (14 Jul 2021 04:55), Max: 98 (13 Jul 2021 10:38)  HR: 60 (14 Jul 2021 09:23) (59 - 76)  BP: 163/99 (14 Jul 2021 09:23) (132/83 - 163/99)  BP(mean): --  RR: 18 (14 Jul 2021 09:23) (17 - 18)  SpO2: 98% (14 Jul 2021 09:23) (95% - 99%)    PHYSICAL EXAM:  GENERAL: NAD  HEAD:  Atraumatic, Normocephalic  NECK: Supple, No JVD, Normal thyroid  NERVOUS SYSTEM:  Alert & Oriented X3, Good concentration; Motor Strength 5/5 B/L upper and lower extremities  CHEST/LUNG: Clear to auscultation bilaterally  HEART: Regular rate and rhythm; No murmurs, rubs, or gallops  ABDOMEN: Soft, Nontender, Nondistended; Bowel sounds present  EXTREMITIES:  2+ Peripheral Pulses, No clubbing, cyanosis, or edema     40 y/o M with a PMHx of HTN, Bipolar disorder, and CVA who presented to the ER with complaints of high blood pressure and chest pain. Patient states that he began having a constant, sharp left sided chest pain that radiated to his left arm. Patient states that he knew his blood pressure was elevated, and states he has been taking his medications as prescribed (clonidine, hydralazine, losartan, and norvasc). Patient states that at baseline METS>4 with no chest pain or dyspnea. Patient states that the chest pain resolved after his blood pressure started to improve.  Troponin negative x 3.  CP likely in setting of HTN urgency.  Cardiology consulted.  CTA angio cardiac  showing no coronary blockage and calcium score of 0.8.  Patient will need outpatient follow up with cardiology.  BP improved.  Patient requesting inpatient psych at [place] for "psych issues."  Psych consulted.  Patient stable for discharge to inpatient psych with voluntary admission.    Vital Signs Last 24 Hrs  T(C): 36.1 (14 Jul 2021 04:55), Max: 36.7 (13 Jul 2021 10:38)  T(F): 97 (14 Jul 2021 04:55), Max: 98 (13 Jul 2021 10:38)  HR: 60 (14 Jul 2021 09:23) (59 - 76)  BP: 163/99 (14 Jul 2021 09:23) (132/83 - 163/99)  BP(mean): --  RR: 18 (14 Jul 2021 09:23) (17 - 18)  SpO2: 98% (14 Jul 2021 09:23) (95% - 99%)    PHYSICAL EXAM:  GENERAL: NAD  HEAD:  Atraumatic, Normocephalic  NECK: Supple, No JVD, Normal thyroid  NERVOUS SYSTEM:  Alert & Oriented X3, Good concentration; Motor Strength 5/5 B/L upper and lower extremities  CHEST/LUNG: Clear to auscultation bilaterally  HEART: Regular rate and rhythm; No murmurs, rubs, or gallops  ABDOMEN: Soft, Nontender, Nondistended; Bowel sounds present  EXTREMITIES:  2+ Peripheral Pulses, No clubbing, cyanosis, or edema    33minutes spent on discharge

## 2021-07-15 ENCOUNTER — EMERGENCY (EMERGENCY)
Facility: HOSPITAL | Age: 41
LOS: 1 days | Discharge: HOME | End: 2021-07-15
Attending: EMERGENCY MEDICINE
Payer: MEDICARE

## 2021-07-15 VITALS
HEART RATE: 87 BPM | OXYGEN SATURATION: 97 % | TEMPERATURE: 99 F | RESPIRATION RATE: 18 BRPM | SYSTOLIC BLOOD PRESSURE: 145 MMHG | DIASTOLIC BLOOD PRESSURE: 78 MMHG

## 2021-07-15 VITALS
WEIGHT: 226.64 LBS | DIASTOLIC BLOOD PRESSURE: 104 MMHG | SYSTOLIC BLOOD PRESSURE: 155 MMHG | TEMPERATURE: 99 F | RESPIRATION RATE: 16 BRPM | HEART RATE: 86 BPM | HEIGHT: 69 IN | OXYGEN SATURATION: 97 %

## 2021-07-15 DIAGNOSIS — Z96.653 PRESENCE OF ARTIFICIAL KNEE JOINT, BILATERAL: Chronic | ICD-10-CM

## 2021-07-15 DIAGNOSIS — Z96.649 PRESENCE OF UNSPECIFIED ARTIFICIAL HIP JOINT: Chronic | ICD-10-CM

## 2021-07-15 PROCEDURE — 99283 EMERGENCY DEPT VISIT LOW MDM: CPT | Mod: GC

## 2021-07-15 PROCEDURE — 99283 EMERGENCY DEPT VISIT LOW MDM: CPT

## 2021-07-15 NOTE — ED PROVIDER NOTE - NSFOLLOWUPINSTRUCTIONS_ED_ALL_ED_FT
FOLLOW UP WITH YOUR SBIRT COUNSELOR TOMORROW    Alcohol Use Disorder    WHAT YOU NEED TO KNOW:    Alcohol use disorder (AUD) is problem drinking. AUD includes alcohol abuse and alcohol dependency.     DISCHARGE INSTRUCTIONS:    Seek care immediately if:     Your heart is beating faster than usual.      You have hallucinations.      You cannot remember what happens while you are drinking.      You have seizures.    Contact your healthcare provider if:     You are anxious and have nausea.      Your hands are shaky and you are sweating heavily.      You have questions or concerns about your condition or care.    Follow up with your healthcare provider as directed: Do not try to stop drinking on your own. Your healthcare provider may need to help you withdraw from alcohol safely. He may need to admit you to the hospital. You may also need any of the following treatments:    Medicines to decrease your craving for alcohol      Support groups such as Alcoholics Anonymous       Therapy from a psychiatrist or psychologist       Admission to an inpatient facility for treatment for severe AUD    Interested in discussing options to reduce your alcohol or drug use?      [facility]: [phone]   [facility] Substance Abuse Services: [phone], option #2   Methadone Maintenance & Ambulatory Opiate Detox: [phone]  [facility]: [phone]  [facility]: [phone]  [facility]: [phone]    [facility]: [phone], option #2   [facility]: [phone]    [facility]: [phone]    [facility] Central Intake: [phone]  [facility] Chemical Dependency/Ancillary Withdrawal: [phone]  [facility] Methadone Maintenance: [phone]    [facility]: [phone]  [facility] Addiction Treatment Services: [phone]    [facility]: 1-713-4-[facility] Office of Alcoholism and Substance Abuse Services (OASAS): https://www.oasas.[place].gov/providerdirectory/  [facility] for Addiction Services and Psychotherapy Interventions Research (CASPIR)  www.[facility].org     Interested in discussing options to reduce your tobacco use?    [facility] for Tobacco Control:  [phone]  [facility] QUITLINE: 7-025-LI-QUITS ([phone])    Interested in learning more about substance use?      http://rethinkingdrinking.niaaa.nih.gov   https://www.drugabuse.gov/patients-families     Learn more about opioid overdose prevention programs in [facility]:  http://www.[facility].ny.gov/diseases/aids/general/opioid_overdose_prevention/

## 2021-07-15 NOTE — ED PROVIDER NOTE - OBJECTIVE STATEMENT
41M hx Anxiety, Bipolar disorder, HTN, Stroke, Substance abuse (opioid , alcohol) 41M hx Anxiety, Bipolar disorder, HTN, Stroke, Substance abuse (on Suboxone), hip and knee replacement, [facility], last use 2 weeks ago.  Otherwise denies pain, bleeding, SOB, chest pain, abdominal pain or fevers.  Denies other pertinent medical problems.  Denies any other substance use.

## 2021-07-15 NOTE — ED PROVIDER NOTE - ATTENDING CONTRIBUTION TO CARE
patient requesting detox   no somatic complaints  denies si or hi  aaox3  ambulates with steady gait  cta b/l, [id number]  abd soft, non tender

## 2021-07-15 NOTE — ED PROVIDER NOTE - PATIENT PORTAL LINK FT
You can access the FollowMyHealth Patient Portal offered by [facility] by registering at the following website: [email or web address]. By joining [employer]’s FollowMyHealth portal, you will also be able to view your health information using other applications (apps) compatible with our system.

## 2021-07-15 NOTE — ED PROVIDER NOTE - CLINICAL SUMMARY MEDICAL DECISION MAKING FREE TEXT BOX
41M hx Anxiety, Bipolar disorder, HTN, Stroke, Substance abuse (on Suboxone), hip and knee replacement, presents to ED requesting to go to [facility] for [facility], last use 2 weeks ago.

## 2021-07-15 NOTE — CHART NOTE - NSCHARTNOTEFT_GEN_A_CORE
SHAYNE Note: Pt requesting assistance in getting into [facility] program. SHAYNE met with pt, pt reports he wrote a letter for Father Edu Cheema to be accepted into [facility] program and was advised by [employer] to come to ED so we can fax or mail letter for him. SHAYNE placed call to SBIRT line, spoke to Paty who reports she is unfamiliar with this and has not heard of this before. Paty states pt will have to contact his project connect worker in AM to assist in facilitating this, as they leave at 5pm. Paty states no one at [facility] admissions at this time as well. ED provider also attempted to contact [facility], no answer. SHAYNE met with pt, advised him of need to contact [facility] worker in AM to facilitate, pt in agreement, no further SHAYNE services identified

## 2021-07-15 NOTE — ED ADULT NURSE NOTE - OBJECTIVE STATEMENT
41y male PMHX of ETOH abuse, drug abuse, and HTN. pt is AOx4 requesting detox, looking to speak with SW to receive assistance getting into detox. pt denies any discomfort at this time. pt states last drug use was approx 2 weeks ago, denies recent ETOH use. respirations even and unlabored, denies chest discomfort. abd soft and nondistended. skin WNL.

## 2021-07-16 RX ORDER — FOLIC ACID 0.8 MG
1 TABLET ORAL
Qty: 30 | Refills: 0
Start: 2021-07-16 | End: 2021-08-14

## 2021-07-23 ENCOUNTER — NON-APPOINTMENT (OUTPATIENT)
Age: 41
End: 2021-07-23

## 2021-07-23 RX ORDER — MELOXICAM 7.5 MG/1
7.5 TABLET ORAL
Qty: 15 | Refills: 0 | Status: ACTIVE | COMMUNITY
Start: 2021-06-24 | End: 1900-01-01

## 2021-07-23 RX ORDER — BUPROPION HYDROCHLORIDE 300 MG/1
300 TABLET, EXTENDED RELEASE ORAL DAILY
Qty: 30 | Refills: 0 | Status: ACTIVE | COMMUNITY
Start: 1900-01-01 | End: 1900-01-01

## 2021-07-26 ENCOUNTER — RX RENEWAL (OUTPATIENT)
Age: 41
End: 2021-07-26

## 2021-07-26 ENCOUNTER — APPOINTMENT (OUTPATIENT)
Dept: FAMILY MEDICINE | Facility: CLINIC | Age: 41
End: 2021-07-26

## 2021-07-26 ENCOUNTER — NON-APPOINTMENT (OUTPATIENT)
Age: 41
End: 2021-07-26

## 2021-07-26 ENCOUNTER — APPOINTMENT (OUTPATIENT)
Dept: FAMILY MEDICINE | Facility: CLINIC | Age: 41
End: 2021-07-26
Payer: MEDICARE

## 2021-07-26 DIAGNOSIS — F31.9 BIPOLAR DISORDER, UNSPECIFIED: ICD-10-CM

## 2021-07-26 PROCEDURE — 99443: CPT | Mod: 95

## 2021-07-26 RX ORDER — METHYLPREDNISOLONE 4 MG/1
4 TABLET ORAL
Qty: 1 | Refills: 0 | Status: DISCONTINUED | COMMUNITY
Start: 2021-04-23 | End: 2021-07-26

## 2021-07-26 NOTE — HISTORY OF PRESENT ILLNESS
[FreeTextEntry1] : med refill [de-identified] : Mr. ZAKIYA BRICEÑO is a 40 year old male with PMH of asthma, last crisis 5 years ago, never in the ICU, DM, HLD, HTN, hypothyroidism, insomnia, panic disorder?, bipolar disorder who is being seen via telephone after hospital discharge on 07/14/2021 for hypertensive crisis. Patient's blood rpessure was controlled at the hospital and he was transferred to [facility] for "psych issues" but he reports that he was there for 2 days onlay as it "didn't work out" and his insurance didn't cover it. \par Patient reports that has been living with his parents since discharge and will go tonight to a program named [facility] in [place] for mental health and addictions\par Got vaccinated for COVID-19 with pfizer 1st dose today. \par \par Patient used to be in the Suboxone but reports that his prescriber dr Lake in [facility] outpatient by [facility] but he reports that Dr Lake stopped it and is now getting a monthly shot of Sublocade. I don’t have any records of this\par Patient would like a refill in his blood pressure and psych medications. No complaints today\par \par Sees endocrinology Dr Jacobs\par Psych:  Susanne Ewing NP

## 2021-07-26 NOTE — ASSESSMENT
[FreeTextEntry1] : \par In regards to hypertension and hospital discharge\par Pending to see cardio as per hospital discharge. Patient will make appointment\par He was started on labetalol 300 mg Q 8 hrs. Would ask cardiology if needed as this patient has asthma and this may worsen it, but as per records, patient was in this medication in the past. Patient has not had an asthma crisis recently\par His clonidine was decreased to .2mg  from .3 mg, TID while in the hospital. Will send new dose to the pharmacy instead\par Denies chest pain, palpitations, shortness of breath\par Advised to tale medications as indicated\par  \par Anxiety and medication substance use\par Patient is to [facility]  [facility] [place] for [facility] health and addictions\par Not on Suboxone anymore. He is receiving Sublocade injection monthly instead by dr Lake in [facility] outpatient by [facility] \par \par Bipolar disorder\par As per [facility] records in the [facility] patient is on Seroquel, Quetiapine and Risperidone\par Patient used to get his psych medications from psychiatry but her reports that is "no longer seeing a psychiatrist" I advised him to see a new one and/or ot see neurology for his refills. Patient reports that will get the medication from the program hat he is going to\par Referring to  neurology\par \par Return to care: as needed\par Call or return for any questions

## 2021-08-10 ENCOUNTER — APPOINTMENT (OUTPATIENT)
Dept: CARDIOLOGY | Facility: CLINIC | Age: 41
End: 2021-08-10
Payer: MEDICARE

## 2021-08-10 ENCOUNTER — NON-APPOINTMENT (OUTPATIENT)
Age: 41
End: 2021-08-10

## 2021-08-10 VITALS
HEART RATE: 95 BPM | HEIGHT: 69 IN | WEIGHT: 236 LBS | TEMPERATURE: 99 F | DIASTOLIC BLOOD PRESSURE: 90 MMHG | SYSTOLIC BLOOD PRESSURE: 135 MMHG | OXYGEN SATURATION: 95 % | BODY MASS INDEX: 34.96 KG/M2

## 2021-08-10 DIAGNOSIS — Z92.89 PERSONAL HISTORY OF OTHER MEDICAL TREATMENT: ICD-10-CM

## 2021-08-10 DIAGNOSIS — Z86.59 PERSONAL HISTORY OF OTHER MENTAL AND BEHAVIORAL DISORDERS: ICD-10-CM

## 2021-08-10 DIAGNOSIS — F41.9 ANXIETY DISORDER, UNSPECIFIED: ICD-10-CM

## 2021-08-10 DIAGNOSIS — Z96.649 PRESENCE OF UNSPECIFIED ARTIFICIAL HIP JOINT: ICD-10-CM

## 2021-08-10 DIAGNOSIS — Z96.659 PRESENCE OF UNSPECIFIED ARTIFICIAL KNEE JOINT: ICD-10-CM

## 2021-08-10 DIAGNOSIS — Z86.73 PERSONAL HISTORY OF TRANSIENT ISCHEMIC ATTACK (TIA), AND CEREBRAL INFARCTION W/OUT RESIDUAL DEFICITS: ICD-10-CM

## 2021-08-10 DIAGNOSIS — F19.11 OTHER PSYCHOACTIVE SUBSTANCE ABUSE, IN REMISSION: ICD-10-CM

## 2021-08-10 PROCEDURE — 93000 ELECTROCARDIOGRAM COMPLETE: CPT

## 2021-08-10 PROCEDURE — 99214 OFFICE O/P EST MOD 30 MIN: CPT

## 2021-08-10 RX ORDER — ALPRAZOLAM 2 MG/1
2 TABLET ORAL
Qty: 30 | Refills: 0 | Status: ACTIVE | COMMUNITY

## 2021-08-10 RX ORDER — PANTOPRAZOLE 40 MG/1
40 TABLET, DELAYED RELEASE ORAL
Qty: 90 | Refills: 1 | Status: ACTIVE | COMMUNITY

## 2021-08-10 RX ORDER — ALBUTEROL 90 MCG
90 AEROSOL (GRAM) INHALATION 4 TIMES DAILY
Refills: 0 | Status: ACTIVE | COMMUNITY

## 2021-08-10 RX ORDER — BUPRENORPHINE HCL/NALOXONE HCL 8 MG-2 MG
8-2 TABLET, SUBLINGUAL SUBLINGUAL DAILY
Refills: 0 | Status: ACTIVE | COMMUNITY

## 2021-08-10 NOTE — CARDIOLOGY SUMMARY
[de-identified] : 8/10/21: NSR, nonspecific IVCD, no significantly changed from prior [de-identified] : Jul 21' CCTA: CAC 1, LCx not well visualized [de-identified] : J

## 2021-08-10 NOTE — HISTORY OF PRESENT ILLNESS
[FreeTextEntry1] : Mr. ZAKIYA BRICEÑO  is a 41 year male  who presents to Cardiology for follow up evaluation of after a recent hospitalization about a month ago at [facility] for hypertension and chest pain.  At the time a CCTA was performed, which did not demonstrate significant findings.  His medical history includes: Htn, bipolar d/o, DM2.\par \par Seen in office today, he  feels well. Specifically He denies chest pain, dyspnea, exertional symptoms, orthopnea, lower extremity edema, palpitations, or syncope. Compliant with all medications but requesting refills.\par No significant family history of pre-mature CAD or sudden cardiac death.

## 2021-08-19 ENCOUNTER — APPOINTMENT (OUTPATIENT)
Dept: ENDOCRINOLOGY | Facility: CLINIC | Age: 41
End: 2021-08-19

## 2021-09-01 ENCOUNTER — RX CHANGE (OUTPATIENT)
Age: 41
End: 2021-09-01

## 2021-09-03 ENCOUNTER — APPOINTMENT (OUTPATIENT)
Dept: FAMILY MEDICINE | Facility: CLINIC | Age: 41
End: 2021-09-03
Payer: MEDICARE

## 2021-09-03 VITALS
TEMPERATURE: 78 F | WEIGHT: 236 LBS | SYSTOLIC BLOOD PRESSURE: 108 MMHG | BODY MASS INDEX: 34.96 KG/M2 | HEIGHT: 69 IN | DIASTOLIC BLOOD PRESSURE: 70 MMHG | HEART RATE: 98 BPM | RESPIRATION RATE: 16 BRPM

## 2021-09-03 DIAGNOSIS — E11.69 TYPE 2 DIABETES MELLITUS WITH OTHER SPECIFIED COMPLICATION: ICD-10-CM

## 2021-09-03 DIAGNOSIS — E78.5 TYPE 2 DIABETES MELLITUS WITH OTHER SPECIFIED COMPLICATION: ICD-10-CM

## 2021-09-03 PROCEDURE — 36415 COLL VENOUS BLD VENIPUNCTURE: CPT

## 2021-09-03 PROCEDURE — 99214 OFFICE O/P EST MOD 30 MIN: CPT | Mod: 25

## 2021-09-03 RX ORDER — GABAPENTIN 800 MG/1
800 TABLET, COATED ORAL 4 TIMES DAILY
Qty: 120 | Refills: 2 | Status: ACTIVE | COMMUNITY
Start: 1900-01-01 | End: 1900-01-01

## 2021-09-07 LAB
CHOLEST SERPL-MCNC: 234 MG/DL
ESTIMATED AVERAGE GLUCOSE: 120 MG/DL
HBA1C MFR BLD HPLC: 5.8 %
HDLC SERPL-MCNC: 42 MG/DL
LDLC SERPL CALC-MCNC: 139 MG/DL
NONHDLC SERPL-MCNC: 193 MG/DL
TRIGL SERPL-MCNC: 269 MG/DL

## 2021-09-07 NOTE — ADDENDUM
[FreeTextEntry1] : 2:20 PM09/07/2021 Test results from 09/03/2021 reviewed and discussed with patient over the phone\par HbA1C: 5.8%. DM, in target. Continue current regimen. If HbA1C still low next time will consider lowering his metformin\par Lipid panel: HLD, not improving, starting statins. Will check LFTs within 1 month. I called this patient to the number on record. Tests results discussed over the phone. All questions answered \par

## 2021-09-07 NOTE — ASSESSMENT
[FreeTextEntry1] :  \par In regards to hypertension\par Patient's blood pressure in adequate range and in the low side: 108/70\par Continue Losartan 100 mg QD\par Continue Clonidine 0.2 mg TID\par Continue labetalol 300 mg BID, patient was supposed to be on TID. WIll stop his hydralazine and if his blood pressure gets higher he will start taking an extra dose of labetalol so he would be in the prescribed dose: 300 mg TID. Patient will call the office if his blood pressure gets high\par DIscussed avoid using salt, monitoring his blood pressures at home and bring the log on next visit.\par Advised to seek medical help emergently if systolic blood pressures (bottom number) is > 180 or < 100 or diastolic blood pressure (bottom number) is >100 or <60 or if patient has  symptoms that include but are not limited to constant headaches, chest pain, palpitations, dizziness, lightheadedness.\par Advised to take medications as indicated\par \par In regards to hyperlipidemia and DM\par Repeating lipid panel, HbA1C\par Encouraged to avoid high carbohydrates diet, to do exercise\par Low fat diet recommended\par Continue metformin 500 mg QD\par Not on statin, will wait for lipid panel and likely start on it\par \par Declines flu vaccine\par Return to care: within 1 week for blood pressure check or earlier if needed\par Call or return for any questions

## 2021-09-07 NOTE — HISTORY OF PRESENT ILLNESS
[de-identified] : Mr. ZAKIYA BRICEÑO is a 40 year old male with PMH of asthma, last crisis 5 years ago, never in the ICU, DM, HLD, HTN, hypothyroidism, insomnia, panic disorder?, bipolar disorder who comes in to the office for follow up in his medical conditions. He says that has been on Amlodipine 10 mg but I don't have any records of this. He has not been taking it for 2 days already.\par He  has been taking his labetalol BID instead of TID.\par He is also requesting a refill on his gabapentin\par Patient reports that has been on hydralazine too but I don’t have records that he is on it currently.\par \par Patient used to be in the Suboxone but reports that his prescriber dr Lake in [facility] outpatient by [facility] but he reports that Dr Lake stopped it and is now getting a monthly shot of Sublocade. I don’t have any records of this\par Patient would like a refill in his blood pressure and psych medications. No complaints today\par \par Sees endocrinology Dr Jacobs\par Psych:  Susanne Ewing, NP

## 2021-10-01 ENCOUNTER — RX CHANGE (OUTPATIENT)
Age: 41
End: 2021-10-01

## 2021-10-01 ENCOUNTER — NON-APPOINTMENT (OUTPATIENT)
Age: 41
End: 2021-10-01

## 2021-10-04 ENCOUNTER — APPOINTMENT (OUTPATIENT)
Dept: FAMILY MEDICINE | Facility: CLINIC | Age: 41
End: 2021-10-04
Payer: MEDICARE

## 2021-10-04 DIAGNOSIS — K59.00 CONSTIPATION, UNSPECIFIED: ICD-10-CM

## 2021-10-04 PROCEDURE — 99213 OFFICE O/P EST LOW 20 MIN: CPT

## 2021-10-04 NOTE — PHYSICAL EXAM
[Normal] : normal rate, regular rhythm, normal S1 and S2 and no murmur heard [de-identified] : midly tender to palpation. Normal bowel sounds.

## 2021-10-04 NOTE — ASSESSMENT
[FreeTextEntry1] : \par Constipation \par Advised that he should not wait this long without bowel movements as this could be dangerous, he could have complications including but not limited to a bowel obstruction which could have serious consequences.\par Advised to increase his oral hydration\par Restarting his lactulose\par Advised that if he doesn't have 1 bowel movement within 1 day, he should go to the emergency department\par Alarm symptoms discussed  including but not limited to worsening abdominal pain, nausea, vomiting and stop passing gasses and I advised the patient to go to the emergency department if these symptoms appear. \par \par Return to care: within 3 months or earlier if needed\par Call or return for any questions  Referring Physician (Optional): Dr. Baeza

## 2021-10-04 NOTE — HISTORY OF PRESENT ILLNESS
[FreeTextEntry8] : Mr. ZAKIYA BRICEÑO is a 40 year old male with PMH of asthma, last crisis 5 years ago, never in the ICU, DM, HLD, HTN, hypothyroidism, insomnia, panic disorder? who comes into the office requesting "constipation medication". He reports history of chronic constipation, intermittent and that he would need lactulose which his previous doctor would prescribe him. He has not had a bowel movement for about 1 week which has happened to him in the past. Mentions mild abdominal pain but denies nausea, vomiting. Admits to not drinking water. Reports that this has happened since eh has been taking opioids and now Suboxone. I don't  have any records that he takes lactulose frequently or intermittently. He reports that Miralax or other over the counter constipation medications don't work for him. no other complaints\humza \humza Sees endocrinology Dr Jacobs\humza Has not followed up with psych since 1 year ago.

## 2021-10-15 ENCOUNTER — APPOINTMENT (OUTPATIENT)
Dept: CARDIOLOGY | Facility: CLINIC | Age: 41
End: 2021-10-15
Payer: MEDICARE

## 2021-10-15 VITALS
OXYGEN SATURATION: 95 % | HEART RATE: 75 BPM | BODY MASS INDEX: 35.25 KG/M2 | WEIGHT: 238 LBS | DIASTOLIC BLOOD PRESSURE: 120 MMHG | TEMPERATURE: 98.5 F | SYSTOLIC BLOOD PRESSURE: 184 MMHG | HEIGHT: 69 IN

## 2021-10-15 VITALS — DIASTOLIC BLOOD PRESSURE: 90 MMHG | SYSTOLIC BLOOD PRESSURE: 140 MMHG

## 2021-10-15 DIAGNOSIS — E78.5 HYPERLIPIDEMIA, UNSPECIFIED: ICD-10-CM

## 2021-10-15 DIAGNOSIS — I10 ESSENTIAL (PRIMARY) HYPERTENSION: ICD-10-CM

## 2021-10-15 PROCEDURE — 99214 OFFICE O/P EST MOD 30 MIN: CPT

## 2021-10-15 RX ORDER — QUETIAPINE FUMARATE 400 MG/1
400 TABLET ORAL
Refills: 0 | Status: ACTIVE | COMMUNITY

## 2021-10-15 RX ORDER — LAMOTRIGINE 200 MG/1
200 TABLET ORAL
Refills: 0 | Status: ACTIVE | COMMUNITY

## 2021-10-15 RX ORDER — RISPERIDONE 1 MG/1
1 TABLET ORAL
Refills: 0 | Status: ACTIVE | COMMUNITY

## 2021-10-25 ENCOUNTER — NON-APPOINTMENT (OUTPATIENT)
Age: 41
End: 2021-10-25

## 2021-10-27 ENCOUNTER — RX CHANGE (OUTPATIENT)
Age: 41
End: 2021-10-27

## 2021-10-27 RX ORDER — LACTULOSE 10 G/15ML
20 SOLUTION ORAL DAILY
Qty: 45 | Refills: 0 | Status: ACTIVE | COMMUNITY
Start: 2021-10-04 | End: 1900-01-01

## 2021-11-06 ENCOUNTER — EMERGENCY (EMERGENCY)
Facility: HOSPITAL | Age: 41
LOS: 1 days | Discharge: HOME | End: 2021-11-06
Attending: EMERGENCY MEDICINE
Payer: MEDICARE

## 2021-11-06 VITALS
OXYGEN SATURATION: 98 % | DIASTOLIC BLOOD PRESSURE: 103 MMHG | SYSTOLIC BLOOD PRESSURE: 189 MMHG | HEART RATE: 101 BPM | TEMPERATURE: 99 F | RESPIRATION RATE: 22 BRPM | WEIGHT: 210.1 LBS | HEIGHT: 69 IN

## 2021-11-06 VITALS
DIASTOLIC BLOOD PRESSURE: 85 MMHG | RESPIRATION RATE: 19 BRPM | OXYGEN SATURATION: 98 % | HEART RATE: 98 BPM | SYSTOLIC BLOOD PRESSURE: 165 MMHG | TEMPERATURE: 98 F

## 2021-11-06 DIAGNOSIS — Z96.649 PRESENCE OF UNSPECIFIED ARTIFICIAL HIP JOINT: Chronic | ICD-10-CM

## 2021-11-06 DIAGNOSIS — Z96.653 PRESENCE OF ARTIFICIAL KNEE JOINT, BILATERAL: Chronic | ICD-10-CM

## 2021-11-06 LAB
ALBUMIN SERPL ELPH-MCNC: 4.7 G/DL — SIGNIFICANT CHANGE UP (ref 3.3–5.2)
ALP SERPL-CCNC: 138 U/L — HIGH (ref 40–120)
ALT FLD-CCNC: 32 U/L — SIGNIFICANT CHANGE UP
ANION GAP SERPL CALC-SCNC: 14 MMOL/L — SIGNIFICANT CHANGE UP (ref 5–17)
AST SERPL-CCNC: 28 U/L — SIGNIFICANT CHANGE UP
BASOPHILS # BLD AUTO: 0.05 K/UL — SIGNIFICANT CHANGE UP (ref 0–0.2)
BASOPHILS NFR BLD AUTO: 0.9 % — SIGNIFICANT CHANGE UP (ref 0–2)
BILIRUB SERPL-MCNC: 0.2 MG/DL — LOW (ref 0.4–2)
BUN SERPL-MCNC: 30.1 MG/DL — HIGH (ref 8–20)
CALCIUM SERPL-MCNC: 9.1 MG/DL — SIGNIFICANT CHANGE UP (ref 8.6–10.2)
CHLORIDE SERPL-SCNC: 103 MMOL/L — SIGNIFICANT CHANGE UP (ref 98–107)
CO2 SERPL-SCNC: 23 MMOL/L — SIGNIFICANT CHANGE UP (ref 22–29)
CREAT SERPL-MCNC: 1.18 MG/DL — SIGNIFICANT CHANGE UP (ref 0.5–1.3)
EOSINOPHIL # BLD AUTO: 0.44 K/UL — SIGNIFICANT CHANGE UP (ref 0–0.5)
EOSINOPHIL NFR BLD AUTO: 7.8 % — HIGH (ref 0–6)
GLUCOSE SERPL-MCNC: 117 MG/DL — HIGH (ref 70–99)
HCT VFR BLD CALC: 34.6 % — LOW (ref 39–50)
HGB BLD-MCNC: 11.7 G/DL — LOW (ref 13–17)
IMM GRANULOCYTES NFR BLD AUTO: 0.5 % — SIGNIFICANT CHANGE UP (ref 0–1.5)
LYMPHOCYTES # BLD AUTO: 1.47 K/UL — SIGNIFICANT CHANGE UP (ref 1–3.3)
LYMPHOCYTES # BLD AUTO: 26 % — SIGNIFICANT CHANGE UP (ref 13–44)
MCHC RBC-ENTMCNC: 27.6 PG — SIGNIFICANT CHANGE UP (ref 27–34)
MCHC RBC-ENTMCNC: 33.8 GM/DL — SIGNIFICANT CHANGE UP (ref 32–36)
MCV RBC AUTO: 81.6 FL — SIGNIFICANT CHANGE UP (ref 80–100)
MONOCYTES # BLD AUTO: 0.44 K/UL — SIGNIFICANT CHANGE UP (ref 0–0.9)
MONOCYTES NFR BLD AUTO: 7.8 % — SIGNIFICANT CHANGE UP (ref 2–14)
NEUTROPHILS # BLD AUTO: 3.22 K/UL — SIGNIFICANT CHANGE UP (ref 1.8–7.4)
NEUTROPHILS NFR BLD AUTO: 57 % — SIGNIFICANT CHANGE UP (ref 43–77)
PLATELET # BLD AUTO: 183 K/UL — SIGNIFICANT CHANGE UP (ref 150–400)
POTASSIUM SERPL-MCNC: 4.5 MMOL/L — SIGNIFICANT CHANGE UP (ref 3.5–5.3)
POTASSIUM SERPL-SCNC: 4.5 MMOL/L — SIGNIFICANT CHANGE UP (ref 3.5–5.3)
PROT SERPL-MCNC: 7.3 G/DL — SIGNIFICANT CHANGE UP (ref 6.6–8.7)
RBC # BLD: 4.24 M/UL — SIGNIFICANT CHANGE UP (ref 4.2–5.8)
RBC # FLD: 12.7 % — SIGNIFICANT CHANGE UP (ref 10.3–14.5)
SODIUM SERPL-SCNC: 140 MMOL/L — SIGNIFICANT CHANGE UP (ref 135–145)
TROPONIN T SERPL-MCNC: <0.01 NG/ML — SIGNIFICANT CHANGE UP (ref 0–0.06)
WBC # BLD: 5.65 K/UL — SIGNIFICANT CHANGE UP (ref 3.8–10.5)
WBC # FLD AUTO: 5.65 K/UL — SIGNIFICANT CHANGE UP (ref 3.8–10.5)

## 2021-11-06 PROCEDURE — 85025 COMPLETE CBC W/AUTO DIFF WBC: CPT

## 2021-11-06 PROCEDURE — 80053 COMPREHEN METABOLIC PANEL: CPT

## 2021-11-06 PROCEDURE — 71045 X-RAY EXAM CHEST 1 VIEW: CPT

## 2021-11-06 PROCEDURE — 99285 EMERGENCY DEPT VISIT HI MDM: CPT | Mod: GC

## 2021-11-06 PROCEDURE — 71045 X-RAY EXAM CHEST 1 VIEW: CPT | Mod: 26

## 2021-11-06 PROCEDURE — 93005 ELECTROCARDIOGRAM TRACING: CPT

## 2021-11-06 PROCEDURE — 84484 ASSAY OF TROPONIN QUANT: CPT

## 2021-11-06 PROCEDURE — 36415 COLL VENOUS BLD VENIPUNCTURE: CPT

## 2021-11-06 PROCEDURE — 93010 ELECTROCARDIOGRAM REPORT: CPT

## 2021-11-06 PROCEDURE — 99283 EMERGENCY DEPT VISIT LOW MDM: CPT | Mod: 25

## 2021-11-06 RX ORDER — CLARITHROMYCIN 500 MG
250 TABLET ORAL
Qty: 1 | Refills: 0
Start: 2021-11-06 | End: 2021-11-10

## 2021-11-06 RX ORDER — ALPRAZOLAM 0.25 MG
0.5 TABLET ORAL ONCE
Refills: 0 | Status: DISCONTINUED | OUTPATIENT
Start: 2021-11-06 | End: 2021-11-06

## 2021-11-06 RX ORDER — ALBUTEROL 90 UG/1
2 AEROSOL, METERED ORAL
Qty: 1 | Refills: 0
Start: 2021-11-06 | End: 2021-12-05

## 2021-11-06 RX ADMIN — Medication 0.5 MILLIGRAM(S): at 19:42

## 2021-11-06 NOTE — ED PROVIDER NOTE - CLINICAL SUMMARY MEDICAL DECISION MAKING FREE TEXT BOX
41y M PMHx substance abuse (previously on Suboxone), anxiety, & bipolar disorder p/w Lt-sided chest "pressure" that radiates to Lt shoulder since 5p. Pt is well-appearing with only mildly elevated BP to 160s/100s. Low suspicion for ACS, but will obtain CBC, CMP, troponin, & CXR. Pt denies recent drug use & denies fevers/chills, so low suspicion for endocarditis.

## 2021-11-06 NOTE — ED PROVIDER NOTE - CARE PROVIDER_API CALL
Claude Keller (DO)  Cardiovascular Disease; Internal Medicine  [address] [place] [id number]  Phone: [phone]  Fax: [phone]  Established Patient  Follow Up Time: Urgent

## 2021-11-06 NOTE — ED PROVIDER NOTE - OBJECTIVE STATEMENT
41y M PMHx bipolar disorder, HTN, & anxiety BIBEMS 41y M PMHx bipolar disorder, HTN, substance abuse previously on Suboxone, & anxiety BIBEMS from home c/o CP since 5p. Pt was eating when he noticed 5/10 constant, "pressure" that radiates to Lt shoulder & a/w mild SOB that progressively worsened to 9/10 so called EMS. States he was anxious, feels similar to previous panic attacks that usually respond to Ativan but ran out of prescription so did not did anything prior to coming in. Pt is a current 1/2 PPD smoker & denies recent drug use. EMS gave pt 324mg ASA en route. Denies abdominal pain, N/V/D, diaphoresis, dizziness, & lightheadedness. Most recent TTE performed during admission on 6/8/21 grossly unremarkable with LVEF 65-70%. 41y M PMHx bipolar disorder, HTN, substance abuse previously on Suboxone, & anxiety BIBEMS from home c/o CP since 5p. Pt was eating when he noticed 5/10 constant, "pressure" that radiates to Lt shoulder & a/w mild SOB that progressively worsened to 9/10 so called EMS. States he was anxious, feels similar to previous panic attacks that usually respond to Ativan but ran out of prescription so did not did anything prior to coming in. Pt is a current 1/2 PPD smoker & denies recent drug use. EMS gave pt 324mg ASA en route. Denies abdominal pain, N/V/D, diaphoresis, dizziness, & lightheadedness. Most recent TTE performed during admission on 6/8/21 grossly unremarkable with LVEF 65-70%. Pt's cardiologist is Arianna BARROS.

## 2021-11-06 NOTE — ED ADULT NURSE NOTE - OBJECTIVE STATEMENT
Patient with complains of chest pain started few days ago. Denies dizziness nausea vomiting and SOB.

## 2021-11-06 NOTE — ED PROVIDER NOTE - PROGRESS NOTE DETAILS
Pt reports improvement in chest "pressure," declines any analgesia at this time. Discussed results of EKG & labs with pt, states "that relieved a lot of anxiety" & states he is ready to go home. HEART score 0, so pt is safe to be d/c home with urgent outpatient cardiology f/u. Pt. re-evaluated. Pt. feeling better. NO chest pain. Pt. also c/o mild cough/wheezing. Pt. requesting An ABX because of his hx of coughing and smoking.

## 2021-11-06 NOTE — ED PROVIDER NOTE - PATIENT PORTAL LINK FT
You can access the FollowMyHealth Patient Portal offered by [facility] by registering at the following website: [email or web address]. By joining [employer]’s FollowMyHealth portal, you will also be able to view your health information using other applications (apps) compatible with our system.

## 2021-11-06 NOTE — ED PROVIDER NOTE - ATTENDING CONTRIBUTION TO CARE
Pt. awake and alert. No acute distress. Pt. slightly anxious. Abdomen soft/NT. Mild wheezing. I, Dr. Camilo, performed a face to face bedside interview with this patient regarding history of present illness, review of symptoms and relevant past medical, social and family history.  I completed an independent physical examination.  I have also reviewed the student's note(s) and discussed the plan with the student.

## 2021-11-08 PROBLEM — I10 HYPERTENSION, UNSPECIFIED TYPE: Status: ACTIVE | Noted: 2019-10-18

## 2021-11-08 PROBLEM — E78.5 HYPERLIPIDEMIA: Status: ACTIVE | Noted: 2019-01-17

## 2021-11-11 NOTE — ED ADULT TRIAGE NOTE - NS ED TRIAGE AVPU SCALE
Alert-The patient is alert, awake and responds to voice. The patient is oriented to time, place, and person. The triage nurse is able to obtain subjective information. Muscle Hinge Flap Text: The defect edges were debeveled with a #15 scalpel blade.  Given the size, depth and location of the defect and the proximity to free margins a muscle hinge flap was deemed most appropriate.  Using a sterile surgical marker, an appropriate hinge flap was drawn incorporating the defect. The area thus outlined was incised with a #15 scalpel blade.  The skin margins were undermined to an appropriate distance in all directions utilizing iris scissors.

## 2021-11-13 ENCOUNTER — EMERGENCY (EMERGENCY)
Facility: HOSPITAL | Age: 41
LOS: 1 days | Discharge: HOME | End: 2021-11-13
Attending: EMERGENCY MEDICINE
Payer: MEDICARE

## 2021-11-13 VITALS
RESPIRATION RATE: 14 BRPM | HEIGHT: 69 IN | WEIGHT: 229.94 LBS | DIASTOLIC BLOOD PRESSURE: 70 MMHG | SYSTOLIC BLOOD PRESSURE: 128 MMHG | HEART RATE: 90 BPM | OXYGEN SATURATION: 100 % | TEMPERATURE: 98 F

## 2021-11-13 DIAGNOSIS — Z96.653 PRESENCE OF ARTIFICIAL KNEE JOINT, BILATERAL: Chronic | ICD-10-CM

## 2021-11-13 DIAGNOSIS — Z53.29 PROCEDURE AND TREATMENT NOT CARRIED OUT BECAUSE OF PATIENT'S DECISION FOR OTHER REASONS: ICD-10-CM

## 2021-11-13 DIAGNOSIS — Z96.649 PRESENCE OF UNSPECIFIED ARTIFICIAL HIP JOINT: Chronic | ICD-10-CM

## 2021-11-13 DIAGNOSIS — R07.9 CHEST PAIN, UNSPECIFIED: ICD-10-CM

## 2021-11-13 PROCEDURE — 99283 EMERGENCY DEPT VISIT LOW MDM: CPT

## 2021-11-13 PROCEDURE — L9991: CPT

## 2021-11-13 PROCEDURE — 93010 ELECTROCARDIOGRAM REPORT: CPT

## 2021-11-13 PROCEDURE — 93005 ELECTROCARDIOGRAM TRACING: CPT

## 2021-11-13 RX ORDER — ACETAMINOPHEN 500 MG
650 TABLET ORAL ONCE
Refills: 0 | Status: DISCONTINUED | OUTPATIENT
Start: 2021-11-13 | End: 2021-11-17

## 2021-11-13 NOTE — ED ADULT NURSE NOTE - OBJECTIVE STATEMENT
Patient states he was watching television and developed left sided chest pain. He states it is a throbbing pain. He states pain is constant.

## 2021-11-13 NOTE — ED PROVIDER NOTE - CLINICAL SUMMARY MEDICAL DECISION MAKING FREE TEXT BOX
pt eloped from ED for unknown reason after RN trriage eval . he was never formally seen and history taken by myself

## 2021-11-13 NOTE — ED ADULT TRIAGE NOTE - CHIEF COMPLAINT QUOTE
Pt BIBEMS c/o left sided chest pain for a few hours. States the pain radiates down his left arm. Denies SOB. Hx of HTN. EKG in progress.

## 2021-12-03 NOTE — ED ADULT NURSE NOTE - HIV OFFER
Final Anesthesia Post-op Assessment    Patient: Sylvia Mitchell  Procedure(s) Performed: COLONOSCOPYEGD  Anesthesia type: MAC    Vitals Value Taken Time   Temp 37===97 12/03/21 1424   Pulse 88 12/03/21 1422   Resp 16 12/03/21 1424   SpO2 97 12/03/21 1424   /91 12/03/21 1423   Vitals shown include unvalidated device data.      Patient Location: Phase II  Post-op Vital Signs:stable  Level of Consciousness: awake and alert  Respiratory Status: spontaneous ventilation  Cardiovascular stable  Hydration: euvolemic  Pain Management: adequately controlled  Handoff: Handoff to receiving nurse was performed and questions were answered  Vomiting: none  Nausea: None  Airway Patency:patent  Post-op Assessment: no complications and patient tolerated procedure well with no complications      No complications documented.   
Previously Declined (within the last year)

## 2022-03-07 ENCOUNTER — INPATIENT (INPATIENT)
Facility: HOSPITAL | Age: 42
LOS: 0 days | Discharge: LEFT BEFORE DISCHARGE | DRG: 305 | End: 2022-03-07
Attending: INTERNAL MEDICINE | Admitting: INTERNAL MEDICINE
Payer: MEDICARE

## 2022-03-07 VITALS
HEART RATE: 91 BPM | DIASTOLIC BLOOD PRESSURE: 124 MMHG | OXYGEN SATURATION: 96 % | TEMPERATURE: 98 F | RESPIRATION RATE: 20 BRPM | SYSTOLIC BLOOD PRESSURE: 192 MMHG

## 2022-03-07 VITALS — WEIGHT: 229.94 LBS | HEIGHT: 69 IN

## 2022-03-07 DIAGNOSIS — R07.9 CHEST PAIN, UNSPECIFIED: ICD-10-CM

## 2022-03-07 DIAGNOSIS — Z53.29 PROCEDURE AND TREATMENT NOT CARRIED OUT BECAUSE OF PATIENT'S DECISION FOR OTHER REASONS: ICD-10-CM

## 2022-03-07 DIAGNOSIS — R77.8 OTHER SPECIFIED ABNORMALITIES OF PLASMA PROTEINS: ICD-10-CM

## 2022-03-07 DIAGNOSIS — Z96.653 PRESENCE OF ARTIFICIAL KNEE JOINT, BILATERAL: Chronic | ICD-10-CM

## 2022-03-07 DIAGNOSIS — I16.1 HYPERTENSIVE EMERGENCY: ICD-10-CM

## 2022-03-07 DIAGNOSIS — Z96.649 PRESENCE OF UNSPECIFIED ARTIFICIAL HIP JOINT: Chronic | ICD-10-CM

## 2022-03-07 LAB
ALBUMIN SERPL ELPH-MCNC: 4.5 G/DL — SIGNIFICANT CHANGE UP (ref 3.3–5.2)
ALP SERPL-CCNC: 130 U/L — HIGH (ref 40–120)
ALT FLD-CCNC: 19 U/L — SIGNIFICANT CHANGE UP
AMPHET UR-MCNC: POSITIVE
ANION GAP SERPL CALC-SCNC: 10 MMOL/L — SIGNIFICANT CHANGE UP (ref 5–17)
AST SERPL-CCNC: 21 U/L — SIGNIFICANT CHANGE UP
BARBITURATES UR SCN-MCNC: NEGATIVE — SIGNIFICANT CHANGE UP
BASOPHILS # BLD AUTO: 0.04 K/UL — SIGNIFICANT CHANGE UP (ref 0–0.2)
BASOPHILS NFR BLD AUTO: 0.7 % — SIGNIFICANT CHANGE UP (ref 0–2)
BENZODIAZ UR-MCNC: POSITIVE
BILIRUB SERPL-MCNC: <0.2 MG/DL — LOW (ref 0.4–2)
BUN SERPL-MCNC: 23.9 MG/DL — HIGH (ref 8–20)
CALCIUM SERPL-MCNC: 9.5 MG/DL — SIGNIFICANT CHANGE UP (ref 8.6–10.2)
CHLORIDE SERPL-SCNC: 101 MMOL/L — SIGNIFICANT CHANGE UP (ref 98–107)
CO2 SERPL-SCNC: 28 MMOL/L — SIGNIFICANT CHANGE UP (ref 22–29)
COCAINE METAB.OTHER UR-MCNC: NEGATIVE — SIGNIFICANT CHANGE UP
CREAT SERPL-MCNC: 1.3 MG/DL — SIGNIFICANT CHANGE UP (ref 0.5–1.3)
EGFR: 70 ML/MIN/1.73M2 — SIGNIFICANT CHANGE UP
EOSINOPHIL # BLD AUTO: 0.41 K/UL — SIGNIFICANT CHANGE UP (ref 0–0.5)
EOSINOPHIL NFR BLD AUTO: 7.1 % — HIGH (ref 0–6)
GLUCOSE SERPL-MCNC: 141 MG/DL — HIGH (ref 70–99)
HCT VFR BLD CALC: 35.7 % — LOW (ref 39–50)
HGB BLD-MCNC: 11.6 G/DL — LOW (ref 13–17)
IMM GRANULOCYTES NFR BLD AUTO: 0.3 % — SIGNIFICANT CHANGE UP (ref 0–1.5)
LYMPHOCYTES # BLD AUTO: 1.39 K/UL — SIGNIFICANT CHANGE UP (ref 1–3.3)
LYMPHOCYTES # BLD AUTO: 24.1 % — SIGNIFICANT CHANGE UP (ref 13–44)
MCHC RBC-ENTMCNC: 27.2 PG — SIGNIFICANT CHANGE UP (ref 27–34)
MCHC RBC-ENTMCNC: 32.5 GM/DL — SIGNIFICANT CHANGE UP (ref 32–36)
MCV RBC AUTO: 83.8 FL — SIGNIFICANT CHANGE UP (ref 80–100)
METHADONE UR-MCNC: NEGATIVE — SIGNIFICANT CHANGE UP
MONOCYTES # BLD AUTO: 0.32 K/UL — SIGNIFICANT CHANGE UP (ref 0–0.9)
MONOCYTES NFR BLD AUTO: 5.5 % — SIGNIFICANT CHANGE UP (ref 2–14)
NEUTROPHILS # BLD AUTO: 3.59 K/UL — SIGNIFICANT CHANGE UP (ref 1.8–7.4)
NEUTROPHILS NFR BLD AUTO: 62.3 % — SIGNIFICANT CHANGE UP (ref 43–77)
OPIATES UR-MCNC: NEGATIVE — SIGNIFICANT CHANGE UP
PCP SPEC-MCNC: SIGNIFICANT CHANGE UP
PCP UR-MCNC: NEGATIVE — SIGNIFICANT CHANGE UP
PLATELET # BLD AUTO: 182 K/UL — SIGNIFICANT CHANGE UP (ref 150–400)
POTASSIUM SERPL-MCNC: 4.9 MMOL/L — SIGNIFICANT CHANGE UP (ref 3.5–5.3)
POTASSIUM SERPL-SCNC: 4.9 MMOL/L — SIGNIFICANT CHANGE UP (ref 3.5–5.3)
PROT SERPL-MCNC: 7 G/DL — SIGNIFICANT CHANGE UP (ref 6.6–8.7)
RBC # BLD: 4.26 M/UL — SIGNIFICANT CHANGE UP (ref 4.2–5.8)
RBC # FLD: 13.6 % — SIGNIFICANT CHANGE UP (ref 10.3–14.5)
SARS-COV-2 RNA SPEC QL NAA+PROBE: SIGNIFICANT CHANGE UP
SODIUM SERPL-SCNC: 139 MMOL/L — SIGNIFICANT CHANGE UP (ref 135–145)
THC UR QL: NEGATIVE — SIGNIFICANT CHANGE UP
TROPONIN T SERPL-MCNC: 0.05 NG/ML — SIGNIFICANT CHANGE UP (ref 0–0.06)
TROPONIN T SERPL-MCNC: 0.07 NG/ML — HIGH (ref 0–0.06)
WBC # BLD: 5.77 K/UL — SIGNIFICANT CHANGE UP (ref 3.8–10.5)
WBC # FLD AUTO: 5.77 K/UL — SIGNIFICANT CHANGE UP (ref 3.8–10.5)

## 2022-03-07 PROCEDURE — 36415 COLL VENOUS BLD VENIPUNCTURE: CPT

## 2022-03-07 PROCEDURE — 93010 ELECTROCARDIOGRAM REPORT: CPT

## 2022-03-07 PROCEDURE — 96375 TX/PRO/DX INJ NEW DRUG ADDON: CPT

## 2022-03-07 PROCEDURE — 85025 COMPLETE CBC W/AUTO DIFF WBC: CPT

## 2022-03-07 PROCEDURE — 71045 X-RAY EXAM CHEST 1 VIEW: CPT

## 2022-03-07 PROCEDURE — 93005 ELECTROCARDIOGRAM TRACING: CPT

## 2022-03-07 PROCEDURE — 84484 ASSAY OF TROPONIN QUANT: CPT

## 2022-03-07 PROCEDURE — 80053 COMPREHEN METABOLIC PANEL: CPT

## 2022-03-07 PROCEDURE — 99223 1ST HOSP IP/OBS HIGH 75: CPT

## 2022-03-07 PROCEDURE — 71045 X-RAY EXAM CHEST 1 VIEW: CPT | Mod: 26

## 2022-03-07 PROCEDURE — 96374 THER/PROPH/DIAG INJ IV PUSH: CPT

## 2022-03-07 PROCEDURE — U0003: CPT

## 2022-03-07 PROCEDURE — U0005: CPT

## 2022-03-07 PROCEDURE — 80307 DRUG TEST PRSMV CHEM ANLYZR: CPT

## 2022-03-07 PROCEDURE — 83880 ASSAY OF NATRIURETIC PEPTIDE: CPT

## 2022-03-07 PROCEDURE — 99285 EMERGENCY DEPT VISIT HI MDM: CPT | Mod: 25

## 2022-03-07 PROCEDURE — 99291 CRITICAL CARE FIRST HOUR: CPT | Mod: GC

## 2022-03-07 RX ORDER — QUETIAPINE FUMARATE 200 MG/1
400 TABLET, FILM COATED ORAL
Refills: 0 | Status: DISCONTINUED | OUTPATIENT
Start: 2022-03-07 | End: 2022-03-07

## 2022-03-07 RX ORDER — LAMOTRIGINE 25 MG/1
1 TABLET, ORALLY DISINTEGRATING ORAL
Qty: 0 | Refills: 0 | DISCHARGE

## 2022-03-07 RX ORDER — RISPERIDONE 4 MG/1
3 TABLET ORAL DAILY
Refills: 0 | Status: DISCONTINUED | OUTPATIENT
Start: 2022-03-07 | End: 2022-03-07

## 2022-03-07 RX ORDER — LAMOTRIGINE 25 MG/1
150 TABLET, ORALLY DISINTEGRATING ORAL
Refills: 0 | Status: DISCONTINUED | OUTPATIENT
Start: 2022-03-07 | End: 2022-03-07

## 2022-03-07 RX ORDER — ACETAMINOPHEN 500 MG
650 TABLET ORAL EVERY 6 HOURS
Refills: 0 | Status: DISCONTINUED | OUTPATIENT
Start: 2022-03-07 | End: 2022-03-07

## 2022-03-07 RX ORDER — LABETALOL HCL 100 MG
10 TABLET ORAL ONCE
Refills: 0 | Status: DISCONTINUED | OUTPATIENT
Start: 2022-03-07 | End: 2022-03-07

## 2022-03-07 RX ORDER — ENOXAPARIN SODIUM 100 MG/ML
40 INJECTION SUBCUTANEOUS EVERY 24 HOURS
Refills: 0 | Status: DISCONTINUED | OUTPATIENT
Start: 2022-03-07 | End: 2022-03-07

## 2022-03-07 RX ORDER — BUPRENORPHINE AND NALOXONE 2; .5 MG/1; MG/1
1 TABLET SUBLINGUAL THREE TIMES A DAY
Refills: 0 | Status: DISCONTINUED | OUTPATIENT
Start: 2022-03-07 | End: 2022-03-07

## 2022-03-07 RX ORDER — ASPIRIN/CALCIUM CARB/MAGNESIUM 324 MG
324 TABLET ORAL ONCE
Refills: 0 | Status: COMPLETED | OUTPATIENT
Start: 2022-03-07 | End: 2022-03-07

## 2022-03-07 RX ORDER — ALPRAZOLAM 0.25 MG
1 TABLET ORAL
Refills: 0 | Status: DISCONTINUED | OUTPATIENT
Start: 2022-03-07 | End: 2022-03-07

## 2022-03-07 RX ORDER — KETOROLAC TROMETHAMINE 30 MG/ML
30 SYRINGE (ML) INJECTION ONCE
Refills: 0 | Status: DISCONTINUED | OUTPATIENT
Start: 2022-03-07 | End: 2022-03-07

## 2022-03-07 RX ORDER — LOSARTAN POTASSIUM 100 MG/1
200 TABLET, FILM COATED ORAL DAILY
Refills: 0 | Status: DISCONTINUED | OUTPATIENT
Start: 2022-03-07 | End: 2022-03-07

## 2022-03-07 RX ORDER — HYDRALAZINE HCL 50 MG
10 TABLET ORAL EVERY 6 HOURS
Refills: 0 | Status: DISCONTINUED | OUTPATIENT
Start: 2022-03-07 | End: 2022-03-07

## 2022-03-07 RX ORDER — BUPRENORPHINE AND NALOXONE 2; .5 MG/1; MG/1
1 TABLET SUBLINGUAL ONCE
Refills: 0 | Status: DISCONTINUED | OUTPATIENT
Start: 2022-03-07 | End: 2022-03-07

## 2022-03-07 RX ORDER — BUPRENORPHINE AND NALOXONE 2; .5 MG/1; MG/1
1 TABLET SUBLINGUAL
Refills: 0 | Status: DISCONTINUED | OUTPATIENT
Start: 2022-03-07 | End: 2022-03-07

## 2022-03-07 RX ORDER — ALPRAZOLAM 0.25 MG
0.5 TABLET ORAL THREE TIMES A DAY
Refills: 0 | Status: DISCONTINUED | OUTPATIENT
Start: 2022-03-07 | End: 2022-03-07

## 2022-03-07 RX ORDER — ALBUTEROL 90 UG/1
2 AEROSOL, METERED ORAL
Qty: 0 | Refills: 0 | DISCHARGE

## 2022-03-07 RX ORDER — LANOLIN ALCOHOL/MO/W.PET/CERES
3 CREAM (GRAM) TOPICAL AT BEDTIME
Refills: 0 | Status: DISCONTINUED | OUTPATIENT
Start: 2022-03-07 | End: 2022-03-07

## 2022-03-07 RX ORDER — BUPROPION HYDROCHLORIDE 150 MG/1
300 TABLET, EXTENDED RELEASE ORAL DAILY
Refills: 0 | Status: DISCONTINUED | OUTPATIENT
Start: 2022-03-07 | End: 2022-03-07

## 2022-03-07 RX ORDER — HYDRALAZINE HCL 50 MG
20 TABLET ORAL ONCE
Refills: 0 | Status: COMPLETED | OUTPATIENT
Start: 2022-03-07 | End: 2022-03-07

## 2022-03-07 RX ORDER — LOSARTAN POTASSIUM 100 MG/1
100 TABLET, FILM COATED ORAL ONCE
Refills: 0 | Status: COMPLETED | OUTPATIENT
Start: 2022-03-07 | End: 2022-03-07

## 2022-03-07 RX ORDER — BUPRENORPHINE AND NALOXONE 2; .5 MG/1; MG/1
3 TABLET SUBLINGUAL
Qty: 0 | Refills: 0 | DISCHARGE

## 2022-03-07 RX ORDER — GABAPENTIN 400 MG/1
800 CAPSULE ORAL EVERY 6 HOURS
Refills: 0 | Status: DISCONTINUED | OUTPATIENT
Start: 2022-03-07 | End: 2022-03-07

## 2022-03-07 RX ORDER — LOSARTAN POTASSIUM 100 MG/1
100 TABLET, FILM COATED ORAL DAILY
Refills: 0 | Status: DISCONTINUED | OUTPATIENT
Start: 2022-03-07 | End: 2022-03-07

## 2022-03-07 RX ORDER — PANTOPRAZOLE SODIUM 20 MG/1
40 TABLET, DELAYED RELEASE ORAL
Refills: 0 | Status: DISCONTINUED | OUTPATIENT
Start: 2022-03-07 | End: 2022-03-07

## 2022-03-07 RX ORDER — AMLODIPINE BESYLATE 2.5 MG/1
5 TABLET ORAL ONCE
Refills: 0 | Status: COMPLETED | OUTPATIENT
Start: 2022-03-07 | End: 2022-03-07

## 2022-03-07 RX ADMIN — ENOXAPARIN SODIUM 40 MILLIGRAM(S): 100 INJECTION SUBCUTANEOUS at 15:45

## 2022-03-07 RX ADMIN — Medication 0.3 MILLIGRAM(S): at 10:37

## 2022-03-07 RX ADMIN — Medication 0.3 MILLIGRAM(S): at 15:44

## 2022-03-07 RX ADMIN — Medication 30 MILLIGRAM(S): at 12:33

## 2022-03-07 RX ADMIN — Medication 30 MILLIGRAM(S): at 11:03

## 2022-03-07 RX ADMIN — Medication 324 MILLIGRAM(S): at 11:20

## 2022-03-07 RX ADMIN — Medication 2 MILLIGRAM(S): at 10:32

## 2022-03-07 RX ADMIN — LOSARTAN POTASSIUM 100 MILLIGRAM(S): 100 TABLET, FILM COATED ORAL at 10:43

## 2022-03-07 RX ADMIN — Medication 0.5 MILLIGRAM(S): at 13:59

## 2022-03-07 RX ADMIN — BUPRENORPHINE AND NALOXONE 1 TABLET(S): 2; .5 TABLET SUBLINGUAL at 16:51

## 2022-03-07 RX ADMIN — AMLODIPINE BESYLATE 5 MILLIGRAM(S): 2.5 TABLET ORAL at 15:45

## 2022-03-07 RX ADMIN — Medication 20 MILLIGRAM(S): at 13:59

## 2022-03-07 NOTE — H&P ADULT - HISTORY OF PRESENT ILLNESS
42yo M w/pmh HTN, DM, Bipolar disorder, anxiety presents for chest pain. Pt reports this morning he was running late for work so he did not take any of his meds (different accounts to different providers). He was at work when he started with chest pain, achy in nature located on the left side of his chest and radiating to his left shoulder lasted a few minutes. He has chest pain and on and off since but not as severe. No associated symptoms, but felt anxious. No aggravating or alleviating factors. In the ER, BP found to be 251/115 and his trop was 0.07 so Cardiology and Medicine were consulted for admission.

## 2022-03-07 NOTE — ED PROVIDER NOTE - ATTENDING CONTRIBUTION TO CARE
I, Xenia Powers DO, have personally provided 45 minutes of critical care time exclusive of time spent on separately billable procedures. Time includes review of laboratory data, radiology results, discussion with consultants, and monitoring for potential decompensation. Interventions were performed as documented above.     I personally saw the patient with the resident, and completed the key components of the history and physical exam. I then discussed the management plan with the resident.    42 y/o M with HTN, DM, anxiety presents for chest pain with elevated blood pressure, did not take his BP medications today, but did take Adderall, denies cocaine or other illicit drugs.    PE - NAD, appears uncomfortable, RRR, lungs CTA B/L, abd soft NT/ND, no LE edema, 2+ symmetrical distal pulses.    Patient with hypertensive emergency, elevated troponin - BP controlled, patient admitted.

## 2022-03-07 NOTE — CHART NOTE - NSCHARTNOTEFT_GEN_A_CORE
Patient requesting to sign out AMA. Patient reports that he needs his meds that we are not giving him.    Patient is alert and oriented x3 and has capacity to make decisions. I explained at length to the Patient the risks of signing out AMA including but not limited to harm, injury or death. I explained the risks, benefits and alternatives to treatment as well as the risks of refusing treatment at this time. I offered to answer any questions and fully addressed concerns and answered any such questions. Patient fully understands what has been explained and answered. Attending aware of above. Patient signed form to sign out AMA and he accepts responsibility for any and all results of this decision.

## 2022-03-07 NOTE — ED PROVIDER NOTE - PROGRESS NOTE DETAILS
Resident Parisa Vick: Reassessed pt, BP improved to 197/102. Still c/o CP. toradol given. Answered all questions. Resident Parisa Vick: BP 190s/120s, hydralazine 20mg IV ordered. Spoke to cardiology Dr. Lantigua at bedside, plan for admission, stress test. Resident Parisa Vick: troponin elevated 0.07. Aspirin ordered. Cardiology consulted. Beta blockers avoided due to concern for possible amphetamine or cocaine intoxication.

## 2022-03-07 NOTE — H&P ADULT - NSHPLABSRESULTS_GEN_ALL_CORE
.  LABS:                         11.6   5.77  )-----------( 182      ( 07 Mar 2022 10:31 )             35.7     03-07    139  |  101  |  23.9<H>  ----------------------------<  141<H>  4.9   |  28.0  |  1.30    Ca    9.5      07 Mar 2022 10:31    TPro  7.0  /  Alb  4.5  /  TBili  <0.2<L>  /  DBili  x   /  AST  21  /  ALT  19  /  AlkPhos  130<H>  03-07        Serum Pro-Brain Natriuretic Peptide: 83 pg/mL (03-07 @ 10:31)        RADIOLOGY, EKG & ADDITIONAL TESTS: Reviewed.

## 2022-03-07 NOTE — CONSULT NOTE ADULT - SUBJECTIVE AND OBJECTIVE BOX
[facility]                                              CARDIOLOGY AT [place] [address]                                             Telephone: [phone]. Fax:[phone]                                                       CARDIOLOGY CONSULTATION NOTE                                                                                             History obtained by: Patient and medical record  Community Cardiologist: [facility]    obtained: Yes [  ] No [ x ]  Reason for Consultation: Chest Pain  Available out pt records reviewed: Yes [  x] No [  ]    Chief complaint:    Patient is a 41y old  Male who presents with a chief complaint of chest pain.     HPI: 42 y/o M active smoker with a PMHx of HTN, Bipolar disorder, and CVA who presented to the ER with complaints of high blood pressure and chest pain. Patient states that this morning he took 3 adderall (prescribed dose is 1), and forgot to take his losartan, clonidine, and labetalol. 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 Patient states that the pain was a left sided, aching sensation, constant, and severe 8/10. Patient states that he was also experiencing continuous shortness of breath as well. Patient's symptoms did not improve, so he came to the ER to be evaluated. Patient's BP upon arrival was 257/115, and he was given clonidine, labetalol, and losartan. BP is better controlled, and his chest pain improved but did not resolve. Patient denies fevers, chills, syncope, near syncope, abdominal pain, N/V/D, headache, or dizziness.     CARDIAC TESTING   ECHO:  < from: TTE Echo Complete w/ Contrast w/ Doppler (06.08.21 @ 09:51) >  PHYSICIAN INTERPRETATION:  Left Ventricle: The left ventricular internal cavity size is normal. Left ventricular wall thickness is mildly increased.  Global LV systolic function was normal. Left ventricular ejection fraction, by visual estimation, is 65 to 70%.  Right Ventricle: The right ventricle is not well visualized, appears to have normal systolic function.  Left Atrium: The left atrium is normal in size.  Right Atrium: The right atrium is normal in size.  Pericardium: There is no evidence of pericardial effusion.  Mitral Valve: There is mild mitral annular calcification.  Tricuspid Valve: Adequate TR velocity was not obtained to accurately assess RVSP.  Aortic Valve: The aortic valve was not well visualized. Mild aortic valve leaflet thickening. No aortic valve stenosis.  Pulmonic Valve: The pulmonic valve was not well visualized.  Aorta: The aortic root and ascending aorta are structurally normal, with no evidence of dilitation.  Pulmonary Artery: The pulmonary artery is of normal size and origin.  Venous: The inferior vena cava was normal sized, with respiratory size variation greater than 50%.      Summary:   1. Normal global left ventricular systolic function.   2. Left ventricular ejection fraction, by visual estimation, is 65 to 70%.   3. Mildly increased LV wall thickness.   4. Normal left ventricular internal cavity size.   5. The right ventricle is not well visualized, appears to have normal systolic function.   6. The left atrium is normal in size.   7. The right atrium is normalin size.   8. Mild mitral annular calcification.   9. Mild aortic valve leaflet thickening. No aortic valve stenosis.  10. There is no evidence of pericardial effusion.  11. Recommend clinical correlation with the above findings.    Selvin Smith MD Electronically signed on 6/8/2021 at 12:10:37 PM      < end of copied text >    < from: CT Angio Cardiac w/ IV Cont (07.09.21 @ 16:20) >  FINDINGS:    VISUALIZED LUNGS: Clear  MEDIASTINUM:  no significant mediastinal adenopathy.  BONES:  grossly unremarkable  AORTA: No thoracic aortic dissection is noted in the visualized thoracic aorta.  PULMONARY ARTERIES: Inadequately opacified for detection of pulmonary emboli.  PERICARDIUM/CARDIAC STRUCTURES:  normal    CORONARY:  -DOMINANCE: right  -LEFT MAIN CORONARY ARTERY: Patent  -ANTERIOR DESCENDING ARTERY:       -PROX:  Patent       -MID:  Patent       -DISTAL:  Patent  -CIRCUMFLEX ARTERY:       -Inadequately visualized.  RIGHT CORONARY ARTERY:       -PROX:  Patent       -MID:  Patent       -DISTAL:  Patent      Myocardial Function:  The left ventricle is of normal size, wall thickness and function.Cine display demonstrates no regional wall motion abnormality. LVEDV= 198 cc; LVESV= 43 cc. Calculated ejection fraction is 78%.    CALCIUM SCORE  Agatston Equivalent Score    Segment                                Score  --------------------------------------------------  Left Main                                0  Left anterior Descending          0.8  Circumflex                              0  Right                                      0  --------------------------------------------------  Total                                      0.8    Age/Sex Adjusted Percentile Rating (Chris, Circulation 2000):   50th percentile for the patient's age and sex.    IMPRESSION:    CT CORONARY ANGIOGRAPHY SHOWS:  LMCA: Normal.  LAD: Widely patent.  LCx: Because the pulse was more rapid than ideal for this examination the circumflex artery was not adequately visualized.  RCA: Dominant, widely patent.        < end of copied text >      PAST MEDICAL HISTORY  Substance abuse    Anxiety    HTN (hypertension)    GERD (gastroesophageal reflux disease)    Bipolar disorder    Stroke    Seizure-like activity        PAST SURGICAL HISTORY  H/O total knee replacement, bilateral    History of hip replacement    No significant past surgical history        SOCIAL HISTORY: Works as a [employer]  CIGARETTES:   Active 1 PPD smoker  ALCOHOL: Denies  DRUGS: Denies illicit drug use.    FAMILY HISTORY:  FH: CAD (coronary artery disease)  grandfather      Family History of Cardiovascular Disease:  Yes [  ] No [  ]  Coronary Artery Disease in first degree relative: Yes [  ] No [  ]  Sudden Cardiac Death in First degree relative: Yes [  ] No [  ]    HOME MEDICATIONS:  ALPRAZolam 0.5 mg oral tablet: 1 tab(s) orally 3 times a day, As needed, anxiety (14 Jul 2021 10:28)  cloNIDine 0.2 mg oral tablet: 1 tab(s) orally 3 times a day (14 Jul 2021 10:28)  gabapentin 400 mg oral capsule: 2 cap(s) orally every 8 hours (14 Jul 2021 10:28)  labetalol 300 mg oral tablet: 1 tab(s) orally every 8 hours (14 Jul 2021 10:28)  pantoprazole 40 mg oral delayed release tablet: 1 tab(s) orally once a day (before a meal) (08 Jul 2021 07:28)  QUEtiapine 400 mg oral tablet: 1 tab(s) orally once a day (at bedtime) (14 Jul 2021 10:28)  risperiDONE 1 mg oral tablet: 1 tab(s) orally once a day (at bedtime) (14 Jul 2021 10:28)  Suboxone 8 mg-2 mg sublingual film: 3 film(s) sublingual daily (08 Jul 2021 07:28)  Ventolin HFA 90 mcg/inh inhalation aerosol: 2 puff(s) inhaled every 6 hours, As Needed (08 Jul 2021 07:28)      CURRENT CARDIAC MEDICATIONS:      CURRENT OTHER MEDICATIONS:      ALLERGIES:   No Known Allergies      REVIEW OF SYMPTOMS:   CONSTITUTIONAL: No fever, no chills, no weight loss, no weight gain, no fatigue   ENMT:  No vertigo; No sinus or throat pain  NECK: No pain or stiffness  CARDIOVASCULAR: No chest pain, no dyspnea, no syncope/presyncope, no palpitations, no dizziness, no Orthopnea, no Paroxsymal nocturnal dyspnea  RESPIRATORY: no Shortness of breath, no cough, no wheezing  : No dysuria, no hematuria   GI: No dark color stool, no nausea, no diarrhea, no constipation, no abdominal pain   NEURO: No headache, no slurred speech   MUSCULOSKELETAL: No joint pain or swelling; No muscle, back, or extremity pain  PSYCH: No agitation, no anxiety.    ALL OTHER REVIEW OF SYSTEMS ARE NEGATIVE.    VITAL SIGNS:  T(C): 36.7 (03-07-22 @ 10:11), Max: 36.7 (03-07-22 @ 10:11)  T(F): 98 (03-07-22 @ 10:11), Max: 98 (03-07-22 @ 10:11)  HR: 84 (03-07-22 @ 11:45) (83 - 99)  BP: 181/111 (03-07-22 @ 11:45) (181/111 - 257/151)  RR: 18 (03-07-22 @ 11:45) (16 - 20)  SpO2: 99% (03-07-22 @ 11:45) (98% - 99%)    INTAKE AND OUTPUT:       PHYSICAL EXAM:  Constitutional: Comfortable . No acute distress.   HEENT: Atraumatic and normocephalic , neck is supple . no JVD. No carotid bruit.  CNS: A&Ox3. No focal deficits.   Respiratory: CTAB, unlabored   Cardiovascular: RRR normal s1 s2. No murmur. No rubs or gallop.  Gastrointestinal: Soft, non-tender. +Bowel sounds.   Extremities: 2+ Peripheral Pulses, No clubbing, cyanosis, or edema  Psychiatric: Calm . no agitation.   Skin: Warm and dry, no ulcers on extremities     LABS:  ( 07 Mar 2022 10:31 )  Troponin T  0.07<H>,  CPK  X    , CKMB  X    , BNP 83                                 11.6   5.77  )-----------( 182      ( 07 Mar 2022 10:31 )             35.7     03-07    139  |  101  |  23.9<H>  ----------------------------<  141<H>  4.9   |  28.0  |  1.30    Ca    9.5      07 Mar 2022 10:31    TPro  7.0  /  Alb  4.5  /  TBili  <0.2<L>  /  DBili  x   /  AST  21  /  ALT  19  /  AlkPhos  130<H>  03-07                INTERPRETATION OF TELEMETRY:     ECG:   Prior ECG: Yes [  ] No [  ]    RADIOLOGY & ADDITIONAL STUDIES:    X-ray:    CT scan:   MRI:   US:                                                [facility]                                              CARDIOLOGY AT [place] [address]                                             Telephone: [phone]. Fax:[phone]                                                       CARDIOLOGY CONSULTATION NOTE                                                                                             History obtained by: Patient and medical record  Community Cardiologist: [facility]    obtained: Yes [  ] No [ x ]  Reason for Consultation: Chest Pain  Available out pt records reviewed: Yes [  x] No [  ]    Chief complaint:    Patient is a 41y old  Male who presents with a chief complaint of chest pain.     HPI: 42 y/o M active smoker with a PMHx of HTN, Bipolar disorder, and CVA who presented to the ER with complaints of high blood pressure and chest pain. Patient states that this morning he took 3 adderall (prescribed dose is 1), and forgot to take his losartan, clonidine, and labetalol. 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 Patient states that the pain was a left sided, aching sensation, constant, and severe 8/10. Patient states that he was also experiencing continuous shortness of breath as well. Patient's symptoms did not improve, so he came to the ER to be evaluated. Patient's BP upon arrival was 257/115, and he was given clonidine, labetalol, and losartan. BP is better controlled, and his chest pain improved but did not resolve. Patient denies fevers, chills, syncope, near syncope, abdominal pain, N/V/D, headache, or dizziness.     CARDIAC TESTING   ECHO:  < from: TTE Echo Complete w/ Contrast w/ Doppler (06.08.21 @ 09:51) >  PHYSICIAN INTERPRETATION:  Left Ventricle: The left ventricular internal cavity size is normal. Left ventricular wall thickness is mildly increased.  Global LV systolic function was normal. Left ventricular ejection fraction, by visual estimation, is 65 to 70%.  Right Ventricle: The right ventricle is not well visualized, appears to have normal systolic function.  Left Atrium: The left atrium is normal in size.  Right Atrium: The right atrium is normal in size.  Pericardium: There is no evidence of pericardial effusion.  Mitral Valve: There is mild mitral annular calcification.  Tricuspid Valve: Adequate TR velocity was not obtained to accurately assess RVSP.  Aortic Valve: The aortic valve was not well visualized. Mild aortic valve leaflet thickening. No aortic valve stenosis.  Pulmonic Valve: The pulmonic valve was not well visualized.  Aorta: The aortic root and ascending aorta are structurally normal, with no evidence of dilitation.  Pulmonary Artery: The pulmonary artery is of normal size and origin.  Venous: The inferior vena cava was normal sized, with respiratory size variation greater than 50%.      Summary:   1. Normal global left ventricular systolic function.   2. Left ventricular ejection fraction, by visual estimation, is 65 to 70%.   3. Mildly increased LV wall thickness.   4. Normal left ventricular internal cavity size.   5. The right ventricle is not well visualized, appears to have normal systolic function.   6. The left atrium is normal in size.   7. The right atrium is normalin size.   8. Mild mitral annular calcification.   9. Mild aortic valve leaflet thickening. No aortic valve stenosis.  10. There is no evidence of pericardial effusion.  11. Recommend clinical correlation with the above findings.    Selvin Smith MD Electronically signed on 6/8/2021 at 12:10:37 PM      < end of copied text >    < from: CT Angio Cardiac w/ IV Cont (07.09.21 @ 16:20) >  FINDINGS:    VISUALIZED LUNGS: Clear  MEDIASTINUM:  no significant mediastinal adenopathy.  BONES:  grossly unremarkable  AORTA: No thoracic aortic dissection is noted in the visualized thoracic aorta.  PULMONARY ARTERIES: Inadequately opacified for detection of pulmonary emboli.  PERICARDIUM/CARDIAC STRUCTURES:  normal    CORONARY:  -DOMINANCE: right  -LEFT MAIN CORONARY ARTERY: Patent  -ANTERIOR DESCENDING ARTERY:       -PROX:  Patent       -MID:  Patent       -DISTAL:  Patent  -CIRCUMFLEX ARTERY:       -Inadequately visualized.  RIGHT CORONARY ARTERY:       -PROX:  Patent       -MID:  Patent       -DISTAL:  Patent      Myocardial Function:  The left ventricle is of normal size, wall thickness and function.Cine display demonstrates no regional wall motion abnormality. LVEDV= 198 cc; LVESV= 43 cc. Calculated ejection fraction is 78%.    CALCIUM SCORE  Agatston Equivalent Score    Segment                                Score  --------------------------------------------------  Left Main                                0  Left anterior Descending          0.8  Circumflex                              0  Right                                      0  --------------------------------------------------  Total                                      0.8    Age/Sex Adjusted Percentile Rating (Chris, Circulation 2000):   50th percentile for the patient's age and sex.    IMPRESSION:    CT CORONARY ANGIOGRAPHY SHOWS:  LMCA: Normal.  LAD: Widely patent.  LCx: Because the pulse was more rapid than ideal for this examination the circumflex artery was not adequately visualized.  RCA: Dominant, widely patent.        < end of copied text >      PAST MEDICAL HISTORY  Substance abuse    Anxiety    HTN (hypertension)    GERD (gastroesophageal reflux disease)    Bipolar disorder    Stroke    Seizure-like activity        PAST SURGICAL HISTORY  H/O total knee replacement, bilateral    History of hip replacement    No significant past surgical history        SOCIAL HISTORY: Works as a [employer]  CIGARETTES:   Active 1 PPD smoker  ALCOHOL: Denies  DRUGS: Denies illicit drug use.    FAMILY HISTORY:  FH: CAD (coronary artery disease)  grandfather      Family History of Cardiovascular Disease:  Yes [  ] No [  ]  Coronary Artery Disease in first degree relative: Yes [  ] No [  ]  Sudden Cardiac Death in First degree relative: Yes [  ] No [  ]    HOME MEDICATIONS:  ALPRAZolam 0.5 mg oral tablet: 1 tab(s) orally 3 times a day, As needed, anxiety (14 Jul 2021 10:28)  cloNIDine 0.2 mg oral tablet: 1 tab(s) orally 3 times a day (14 Jul 2021 10:28)  gabapentin 400 mg oral capsule: 2 cap(s) orally every 8 hours (14 Jul 2021 10:28)  labetalol 300 mg oral tablet: 1 tab(s) orally every 8 hours (14 Jul 2021 10:28)  pantoprazole 40 mg oral delayed release tablet: 1 tab(s) orally once a day (before a meal) (08 Jul 2021 07:28)  QUEtiapine 400 mg oral tablet: 1 tab(s) orally once a day (at bedtime) (14 Jul 2021 10:28)  risperiDONE 1 mg oral tablet: 1 tab(s) orally once a day (at bedtime) (14 Jul 2021 10:28)  Suboxone 8 mg-2 mg sublingual film: 3 film(s) sublingual daily (08 Jul 2021 07:28)  Ventolin HFA 90 mcg/inh inhalation aerosol: 2 puff(s) inhaled every 6 hours, As Needed (08 Jul 2021 07:28)      CURRENT CARDIAC MEDICATIONS:      CURRENT OTHER MEDICATIONS:      ALLERGIES:   No Known Allergies      REVIEW OF SYMPTOMS:   CONSTITUTIONAL: No fever, no chills, no weight loss, no weight gain, no fatigue   ENMT:  No vertigo; No sinus or throat pain  NECK: No pain or stiffness  CARDIOVASCULAR: No chest pain, no dyspnea, no syncope/presyncope, no palpitations, no dizziness, no Orthopnea, no Paroxsymal nocturnal dyspnea  RESPIRATORY: no Shortness of breath, no cough, no wheezing  : No dysuria, no hematuria   GI: No dark color stool, no nausea, no diarrhea, no constipation, no abdominal pain   NEURO: No headache, no slurred speech   MUSCULOSKELETAL: No joint pain or swelling; No muscle, back, or extremity pain  PSYCH: No agitation, no anxiety.    ALL OTHER REVIEW OF SYSTEMS ARE NEGATIVE.    VITAL SIGNS:  T(C): 36.7 (03-07-22 @ 10:11), Max: 36.7 (03-07-22 @ 10:11)  T(F): 98 (03-07-22 @ 10:11), Max: 98 (03-07-22 @ 10:11)  HR: 84 (03-07-22 @ 11:45) (83 - 99)  BP: 181/111 (03-07-22 @ 11:45) (181/111 - 257/151)  RR: 18 (03-07-22 @ 11:45) (16 - 20)  SpO2: 99% (03-07-22 @ 11:45) (98% - 99%)    INTAKE AND OUTPUT:       PHYSICAL EXAM:  Constitutional: Comfortable . No acute distress.   HEENT: Atraumatic and normocephalic , neck is supple . no JVD. No carotid bruit.  CNS: A&Ox3. No focal deficits.   Respiratory: CTAB, unlabored   Cardiovascular: RRR normal s1 s2. No murmur. No rubs or gallop.  Gastrointestinal: Soft, non-tender. +Bowel sounds.   Extremities: 2+ Peripheral Pulses, No clubbing, cyanosis, or edema  Psychiatric: Calm . no agitation.   Skin: Warm and dry, no ulcers on extremities     LABS:  ( 07 Mar 2022 10:31 )  Troponin T  0.07<H>,  CPK  X    , CKMB  X    , BNP 83                                 11.6   5.77  )-----------( 182      ( 07 Mar 2022 10:31 )             35.7     03-07    139  |  101  |  23.9<H>  ----------------------------<  141<H>  4.9   |  28.0  |  1.30    Ca    9.5      07 Mar 2022 10:31    TPro  7.0  /  Alb  4.5  /  TBili  <0.2<L>  /  DBili  x   /  AST  21  /  ALT  19  /  AlkPhos  130<H>  03-07                INTERPRETATION OF TELEMETRY: SR    ECG: NSR, no acute ischemic changes  Prior ECG: Yes [ x ] No [  ]    RADIOLOGY & ADDITIONAL STUDIES:    X-ray:      CT scan:   MRI:   US:

## 2022-03-07 NOTE — PATIENT PROFILE ADULT - FUNCTIONAL ASSESSMENT - BASIC MOBILITY 5.
"lethargic, no appetite" as per nursing home paper "she is not opening her eyes, she said she is hot and sweating" patient alert awake and responsive at triage.
4 = No assist / stand by assistance

## 2022-03-07 NOTE — ED PROVIDER NOTE - CARE PLAN
Principal Discharge DX:	Chest pain   1 Principal Discharge DX:	Elevated troponin  Secondary Diagnosis:	Hypertension

## 2022-03-07 NOTE — CONSULT NOTE ADULT - ASSESSMENT
A/P:  40 y/o M active smoker with a PMHx of HTN, Bipolar disorder, and CVA who presented to the ER with complaints of high blood pressure and chest pain. Patient states that this morning he took 3 adderall (prescribed dose is 1), and forgot to take his losartan, clonidine, and labetalol. 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 Patient states that the pain was a left sided, aching sensation, constant, and severe 8/10. Patient states that he was also experiencing continuous shortness of breath as well. Patient's symptoms did not improve, so he came to the ER to be evaluated. Patient's BP upon arrival was 257/115, and he was given clonidine, labetalol, and losartan. BP is better controlled, and his chest pain improved but did not resolve. Patient denies fevers, chills, syncope, near syncope, abdominal pain, N/V/D, headache, or dizziness.   Troponin 0.07  BNP 83

## 2022-03-07 NOTE — CONSULT NOTE ADULT - ATTENDING COMMENTS
uncontrolled Hypertension .   utox,. call outpatient pharmacy to confirm compliance with anti hypertensives,.  clonidine. PRN hydralazine.    Xanax.   patient wants to leve ama. discussed with patient reassured him.   ordetred a dose of norvasc 5 mg once.   f/u trops and CPK. Bop controkl.  NPO after midnight. Nuclear stress test

## 2022-03-07 NOTE — ED PROVIDER NOTE - CLINICAL SUMMARY MEDICAL DECISION MAKING FREE TEXT BOX
40yo M w/pmh HTN, DM, anxiety presents for chest pain. Found 42yo M w/pmh HTN, DM, anxiety presents for chest pain. Found to be hypertensive 250s/150s in triage. Ativan given for amphetamine use. Home losartan and clonidine given for chronic HTN. Toradol given for pain. EKG shows NSR. Labs, CXR pending.

## 2022-03-07 NOTE — CONSULT NOTE ADULT - PROBLEM SELECTOR RECOMMENDATION 3
- Due to medication non-adherence.   - Restart home clonidine and losartan 100mg PO qday.   - Check Utox, if negative for cocaine then restart labetalol 300mg PO q8 (with holding parameters to hold if SBP<170).  - If Utox is positive, would D/C labetalol and start norvasc 5mg PO qday.  - Avoid lowering BP more then 25% in the first 24 hours.   - Goal /80s.   - Can give IV hydralazine PRN SBP>190.  - Will continue to adjust medications as needed.

## 2022-03-07 NOTE — H&P ADULT - ASSESSMENT
42yo M w/pmh HTN, DM, Bipolar disorder, anxiety presents for chest pain. Pt reports this morning he was running late for work so he did not take any of his meds (different accounts to different providers). He was at work when he started with chest pain, achy in nature located on the left side of his chest and radiating to his left shoulder lasted a few minutes. He has chest pain and on and off since but not as severe. No associated symptoms, but felt anxious. No aggravating or alleviating factors. In the ER, BP found to be 251/115 and his trop was 0.07 so Cardiology and Medicine were consulted for admission.     Chest pain  Trop 0.07  Ongoing on and off  Cycle trops  EKG reviewed   TTE ordered  Ha1c and FLP in AM   Per trop trend, may need stress test in AM  Cardiology consult     HTN urgency   BP improving   Takes Labetalol, Clonidine and Losartan at home   Restart Clonidine and Losartan   Hold BB, await UDS   Monitor BP, PRN hydralazine     Bipolar disorder   c/w home meds, confirmed with pharmacy     hx of opioid abuse   c/w Suboxone dose (confirmed with iSTOP)     DVT ppx: Lovenox  40yo M w/pmh HTN, DM, Bipolar disorder, anxiety presents for chest pain. Pt reports this morning he was running late for work so he did not take any of his meds (different accounts to different providers). He was at work when he started with chest pain, achy in nature located on the left side of his chest and radiating to his left shoulder lasted a few minutes. He has chest pain and on and off since but not as severe. No associated symptoms, but felt anxious. No aggravating or alleviating factors. In the ER, BP found to be 251/115 and his trop was 0.07 so Cardiology and Medicine were consulted for admission.     Chest pain  Trop 0.07  Ongoing on and off  Cycle trops  EKG reviewed   TTE ordered  Ha1c and FLP in AM   Per trop trend, may need stress test in AM  Cardiology consult     HTN urgency   BP improving   Takes Labetalol, Clonidine and Losartan at home   Restart Clonidine and Losartan   Hold BB, await UDS   Monitor BP, PRN hydralazine     Bipolar disorder   c/w home meds, confirmed with pharmacy   Requesting IV meds including benzos but had a long discussion with him, will resume home PO meds     hx of opioid abuse   c/w Suboxone dose (confirmed with iSTOP)     DVT ppx: Lovenox

## 2022-03-07 NOTE — CONSULT NOTE ADULT - PROBLEM SELECTOR RECOMMENDATION 9
- Troponin 0.07.   - Continue to trend troponins for a total of 3.   - Likely demand ischemia in the setting of hypertensive emergency.   - Patient with CTA cardiac 07/2021 with patent coronary arteries (LCx not well visualized).   - Continue aspirin daily.   - If troponin is increasing above 0.10, will move forward with [facility] tomorrow.   - If troponins normalize or downtrend, will perform non-invasive testing.

## 2022-03-07 NOTE — H&P ADULT - NSHPREVIEWOFSYSTEMS_GEN_ALL_CORE
REVIEW OF SYSTEMS:    CONSTITUTIONAL: No weakness, fevers or chills  EYES/ENT: No visual changes;  No vertigo or throat pain   NECK: No pain or stiffness  RESPIRATORY: No cough, wheezing, hemoptysis; No shortness of breath  CARDIOVASCULAR: No chest pain or palpitations  GASTROINTESTINAL: No abdominal or epigastric pain. No nausea, vomiting, or hematemesis; No diarrhea or constipation. No melena or hematochezia.  GENITOURINARY: No dysuria, frequency or hematuria  NEUROLOGICAL: No numbness or weakness  Psych: No depression, anxiety  SKIN: No itching, rashes REVIEW OF SYSTEMS:    CONSTITUTIONAL: No weakness, fevers or chills  EYES/ENT: No visual changes;  No vertigo or throat pain   NECK: No pain or stiffness  RESPIRATORY: No cough, wheezing, hemoptysis; No shortness of breath  CARDIOVASCULAR: No palpitations  GASTROINTESTINAL: No abdominal or epigastric pain. No nausea, vomiting, or hematemesis; No diarrhea or constipation. No melena or hematochezia.  GENITOURINARY: No dysuria, frequency or hematuria  NEUROLOGICAL: No numbness or weakness  Psych: No depression, ++ anxiety  SKIN: No itching, rashes

## 2022-03-07 NOTE — ED PROVIDER NOTE - OBJECTIVE STATEMENT
42yo M w/pmh HTN, DM, anxiety presents for chest pain. 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 Never had pain like this before. Reports he took Adderall this morning and did not take his home BP meds. Denies other drugs. Reports he is on labetalol 300mg BID, losartan 200mg daily, clonidine 0.3mg QID, Xanax. Reports he was running late for work and that's why he didn't take his medications.

## 2022-03-07 NOTE — H&P ADULT - NSHPPHYSICALEXAM_GEN_ALL_CORE
CONSTITUTIONAL: Awake, alert and in no apparent distress  ENMT: Airway patent, Nasal mucosa clear. Mouth with normal mucosa.   EYES: Clear bilaterally, pupils equal, round and reactive to light. EOMI.  CARDIAC: Normal rate, regular rhythm.  Heart sounds S1, S2.  No murmurs, rubs or gallops   RESPIRATORY: Breath sounds clear and equal bilaterally. No wheezes, rhales or rhonchi   ABDOMINAL: Nontender to palpation. No rebound/ guarding   MUSCULOSKELETAL: Spine appears normal, range of motion is not limited, no muscle or joint tenderness   EXTREMITIES: No edema, cyanosis or deformity   NEUROLOGICAL: Alert and oriented, no focal deficits, no motor or sensory deficits   SKIN: No rash, skin turgor CONSTITUTIONAL: Awake, alert and in no apparent distress  ENMT: Airway patent, Nasal mucosa clear. Mouth with normal mucosa.   EYES: Clear bilaterally, pupils equal, round and reactive to light. EOMI.  CARDIAC: Normal rate, regular rhythm.  Heart sounds S1, S2.  No murmurs, rubs or gallops   RESPIRATORY: Breath sounds clear and equal bilaterally. No wheezes, rhales or rhonchi   ABDOMINAL: Nontender to palpation. No rebound/ guarding   MUSCULOSKELETAL: Spine appears normal, range of motion is not limited, no muscle or joint tenderness   EXTREMITIES: No edema, cyanosis or deformity   NEUROLOGICAL: Alert and oriented, no focal deficits, no motor or sensory deficits, +anxious   SKIN: No rash, skin turgor, +tattoos noted

## 2022-03-07 NOTE — CONSULT NOTE ADULT - PROBLEM SELECTOR RECOMMENDATION 2
- Troponin 0.07.   - Continue to trend troponins.  - Likely demand ischemia in the setting of hypertensive emergency.   - Patient with CTA cardiac 07/2021 with patent coronary arteries (LCx not well visualized).   - Continue aspirin daily.   - If troponin is increasing above 0.10, will move forward with [facility] tomorrow.   - If troponins normalize or downtrend, then nuclear stress test tomorrow.   - Obtain TTE.   - Check fasting lipid profile and HgA1c in the AM.

## 2022-03-07 NOTE — ED ADULT NURSE NOTE - OBJECTIVE STATEMENT
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 PMHX of HTN, DM, anxiety. pt states he did not take his BP medications this morning, last dose last night. pt takes labetalol, losartan, and clonidine for HTN in AM usually. pt endorses taking adderall this morning, denies any other drugs/alcohol. respirations even and unlabored, O2 sat 100%. abd soft and nondistended. skin WNL for race. Silver Nitrate Text: The wound bed was treated with silver nitrate after the biopsy was performed.

## 2022-03-09 RX ORDER — METFORMIN ER 500 MG 500 MG/1
500 TABLET ORAL
Qty: 90 | Refills: 0 | Status: ACTIVE | COMMUNITY
Start: 2020-03-02 | End: 1900-01-01

## 2022-03-31 ENCOUNTER — EMERGENCY (EMERGENCY)
Facility: HOSPITAL | Age: 42
LOS: 1 days | Discharge: HOME | End: 2022-03-31
Attending: STUDENT IN AN ORGANIZED HEALTH CARE EDUCATION/TRAINING PROGRAM
Payer: MEDICARE

## 2022-03-31 VITALS
DIASTOLIC BLOOD PRESSURE: 92 MMHG | OXYGEN SATURATION: 98 % | HEART RATE: 70 BPM | SYSTOLIC BLOOD PRESSURE: 172 MMHG | RESPIRATION RATE: 17 BRPM

## 2022-03-31 VITALS
OXYGEN SATURATION: 98 % | RESPIRATION RATE: 20 BRPM | TEMPERATURE: 98 F | SYSTOLIC BLOOD PRESSURE: 210 MMHG | HEART RATE: 89 BPM | HEIGHT: 69 IN | DIASTOLIC BLOOD PRESSURE: 145 MMHG | WEIGHT: 229.94 LBS

## 2022-03-31 DIAGNOSIS — Z96.653 PRESENCE OF ARTIFICIAL KNEE JOINT, BILATERAL: Chronic | ICD-10-CM

## 2022-03-31 DIAGNOSIS — Z96.649 PRESENCE OF UNSPECIFIED ARTIFICIAL HIP JOINT: Chronic | ICD-10-CM

## 2022-03-31 LAB
ALBUMIN SERPL ELPH-MCNC: 4.3 G/DL — SIGNIFICANT CHANGE UP (ref 3.3–5.2)
ALP SERPL-CCNC: 128 U/L — HIGH (ref 40–120)
ALT FLD-CCNC: 19 U/L — SIGNIFICANT CHANGE UP
ANION GAP SERPL CALC-SCNC: 13 MMOL/L — SIGNIFICANT CHANGE UP (ref 5–17)
APPEARANCE UR: CLEAR — SIGNIFICANT CHANGE UP
AST SERPL-CCNC: 24 U/L — SIGNIFICANT CHANGE UP
BACTERIA # UR AUTO: ABNORMAL
BASOPHILS # BLD AUTO: 0.06 K/UL — SIGNIFICANT CHANGE UP (ref 0–0.2)
BASOPHILS NFR BLD AUTO: 1.1 % — SIGNIFICANT CHANGE UP (ref 0–2)
BILIRUB SERPL-MCNC: 0.2 MG/DL — LOW (ref 0.4–2)
BILIRUB UR-MCNC: NEGATIVE — SIGNIFICANT CHANGE UP
BUN SERPL-MCNC: 16.1 MG/DL — SIGNIFICANT CHANGE UP (ref 8–20)
CALCIUM SERPL-MCNC: 9.1 MG/DL — SIGNIFICANT CHANGE UP (ref 8.6–10.2)
CHLORIDE SERPL-SCNC: 103 MMOL/L — SIGNIFICANT CHANGE UP (ref 98–107)
CO2 SERPL-SCNC: 24 MMOL/L — SIGNIFICANT CHANGE UP (ref 22–29)
COLOR SPEC: YELLOW — SIGNIFICANT CHANGE UP
COMMENT - URINE: SIGNIFICANT CHANGE UP
COMMENT - URINE: SIGNIFICANT CHANGE UP
CREAT SERPL-MCNC: 1.12 MG/DL — SIGNIFICANT CHANGE UP (ref 0.5–1.3)
DIFF PNL FLD: NEGATIVE — SIGNIFICANT CHANGE UP
EGFR: 84 ML/MIN/1.73M2 — SIGNIFICANT CHANGE UP
EOSINOPHIL # BLD AUTO: 0.37 K/UL — SIGNIFICANT CHANGE UP (ref 0–0.5)
EOSINOPHIL NFR BLD AUTO: 6.5 % — HIGH (ref 0–6)
EPI CELLS # UR: SIGNIFICANT CHANGE UP
FLUAV AG NPH QL: SIGNIFICANT CHANGE UP
FLUBV AG NPH QL: SIGNIFICANT CHANGE UP
GLUCOSE SERPL-MCNC: 122 MG/DL — HIGH (ref 70–99)
GLUCOSE UR QL: NEGATIVE MG/DL — SIGNIFICANT CHANGE UP
HCT VFR BLD CALC: 33.7 % — LOW (ref 39–50)
HGB BLD-MCNC: 11.2 G/DL — LOW (ref 13–17)
HIV 1 & 2 AB SERPL IA.RAPID: SIGNIFICANT CHANGE UP
IMM GRANULOCYTES NFR BLD AUTO: 0.7 % — SIGNIFICANT CHANGE UP (ref 0–1.5)
KETONES UR-MCNC: NEGATIVE — SIGNIFICANT CHANGE UP
LEUKOCYTE ESTERASE UR-ACNC: NEGATIVE — SIGNIFICANT CHANGE UP
LIDOCAIN IGE QN: 21 U/L — LOW (ref 22–51)
LYMPHOCYTES # BLD AUTO: 1.6 K/UL — SIGNIFICANT CHANGE UP (ref 1–3.3)
LYMPHOCYTES # BLD AUTO: 28.1 % — SIGNIFICANT CHANGE UP (ref 13–44)
MCHC RBC-ENTMCNC: 27.7 PG — SIGNIFICANT CHANGE UP (ref 27–34)
MCHC RBC-ENTMCNC: 33.2 GM/DL — SIGNIFICANT CHANGE UP (ref 32–36)
MCV RBC AUTO: 83.2 FL — SIGNIFICANT CHANGE UP (ref 80–100)
MONOCYTES # BLD AUTO: 0.36 K/UL — SIGNIFICANT CHANGE UP (ref 0–0.9)
MONOCYTES NFR BLD AUTO: 6.3 % — SIGNIFICANT CHANGE UP (ref 2–14)
NEUTROPHILS # BLD AUTO: 3.27 K/UL — SIGNIFICANT CHANGE UP (ref 1.8–7.4)
NEUTROPHILS NFR BLD AUTO: 57.3 % — SIGNIFICANT CHANGE UP (ref 43–77)
NITRITE UR-MCNC: NEGATIVE — SIGNIFICANT CHANGE UP
PH UR: 6.5 — SIGNIFICANT CHANGE UP (ref 5–8)
PLATELET # BLD AUTO: 215 K/UL — SIGNIFICANT CHANGE UP (ref 150–400)
POTASSIUM SERPL-MCNC: 4.6 MMOL/L — SIGNIFICANT CHANGE UP (ref 3.5–5.3)
POTASSIUM SERPL-SCNC: 4.6 MMOL/L — SIGNIFICANT CHANGE UP (ref 3.5–5.3)
PROT SERPL-MCNC: 6.9 G/DL — SIGNIFICANT CHANGE UP (ref 6.6–8.7)
PROT UR-MCNC: 30 MG/DL
RBC # BLD: 4.05 M/UL — LOW (ref 4.2–5.8)
RBC # FLD: 13.7 % — SIGNIFICANT CHANGE UP (ref 10.3–14.5)
RBC CASTS # UR COMP ASSIST: SIGNIFICANT CHANGE UP /HPF (ref 0–4)
RSV RNA NPH QL NAA+NON-PROBE: SIGNIFICANT CHANGE UP
SARS-COV-2 RNA SPEC QL NAA+PROBE: SIGNIFICANT CHANGE UP
SODIUM SERPL-SCNC: 140 MMOL/L — SIGNIFICANT CHANGE UP (ref 135–145)
SP GR SPEC: 1.01 — SIGNIFICANT CHANGE UP (ref 1.01–1.02)
TROPONIN T SERPL-MCNC: <0.01 NG/ML — SIGNIFICANT CHANGE UP (ref 0–0.06)
TROPONIN T SERPL-MCNC: <0.01 NG/ML — SIGNIFICANT CHANGE UP (ref 0–0.06)
UROBILINOGEN FLD QL: NEGATIVE MG/DL — SIGNIFICANT CHANGE UP
WBC # BLD: 5.7 K/UL — SIGNIFICANT CHANGE UP (ref 3.8–10.5)
WBC # FLD AUTO: 5.7 K/UL — SIGNIFICANT CHANGE UP (ref 3.8–10.5)
WBC UR QL: SIGNIFICANT CHANGE UP /HPF (ref 0–5)

## 2022-03-31 PROCEDURE — 85025 COMPLETE CBC W/AUTO DIFF WBC: CPT

## 2022-03-31 PROCEDURE — 87637 SARSCOV2&INF A&B&RSV AMP PRB: CPT

## 2022-03-31 PROCEDURE — 83690 ASSAY OF LIPASE: CPT

## 2022-03-31 PROCEDURE — 81001 URINALYSIS AUTO W/SCOPE: CPT

## 2022-03-31 PROCEDURE — 99284 EMERGENCY DEPT VISIT MOD MDM: CPT | Mod: CS

## 2022-03-31 PROCEDURE — 84484 ASSAY OF TROPONIN QUANT: CPT

## 2022-03-31 PROCEDURE — 80053 COMPREHEN METABOLIC PANEL: CPT

## 2022-03-31 PROCEDURE — 86703 HIV-1/HIV-2 1 RESULT ANTBDY: CPT

## 2022-03-31 PROCEDURE — 71046 X-RAY EXAM CHEST 2 VIEWS: CPT

## 2022-03-31 PROCEDURE — 93010 ELECTROCARDIOGRAM REPORT: CPT

## 2022-03-31 PROCEDURE — 99285 EMERGENCY DEPT VISIT HI MDM: CPT | Mod: 25

## 2022-03-31 PROCEDURE — 71046 X-RAY EXAM CHEST 2 VIEWS: CPT | Mod: 26

## 2022-03-31 PROCEDURE — 36415 COLL VENOUS BLD VENIPUNCTURE: CPT

## 2022-03-31 PROCEDURE — 93005 ELECTROCARDIOGRAM TRACING: CPT

## 2022-03-31 NOTE — ED PROVIDER NOTE - OBJECTIVE STATEMENT
40 yo male with hx of HTN presents to the 42 yo male with hx of HTN presents to the ED for chest pain. Began 2h PTA, located center of the chest, nonradiating, pressure in nature, no exacerbating or alleviating sxs. Denies assc sxs. Has had pain in past and seen [facility] cards, had negative CTCA.

## 2022-03-31 NOTE — ED PROVIDER NOTE - PATIENT PORTAL LINK FT
You can access the FollowMyHealth Patient Portal offered by [facility] by registering at the following website: [email or web address]. By joining [employer]’s FollowMyHealth portal, you will also be able to view your health information using other applications (apps) compatible with our system.

## 2022-03-31 NOTE — ED PROVIDER NOTE - PROGRESS NOTE DETAILS
Patient resting comfortably. Labs and Vitals are as noted. Previous cardiologist note and CT angio cardiac reviewed. Repeat troponin is pending. If negative patient to f/u with pmd/cards outpatient for continued evaluation. Alden: Ambulating independently in ED. Tolerating PO. Stable for dc. Patient understands return precautions and f/u.

## 2022-03-31 NOTE — ED ADULT NURSE NOTE - NSIMPLEMENTINTERV_GEN_ALL_ED
Implemented All Universal Safety Interventions:  [place] to call system. Call bell, personal items and telephone within reach. Instruct patient to call for assistance. Room bathroom lighting operational. Non-slip footwear when patient is off stretcher. Physically safe environment: no spills, clutter or unnecessary equipment. Stretcher in lowest position, wheels locked, appropriate side rails in place.

## 2022-03-31 NOTE — ED ADULT NURSE NOTE - OBJECTIVE STATEMENT
Pt presents to ED awake, alert, oriented complaining of chest pain x 2 hours. Reports this has happened in the past. Denies shortness of breath.

## 2022-03-31 NOTE — ED PROVIDER NOTE - CARE PLAN
1 Principal Discharge DX:	Chest pain   Principal Discharge DX:	Chest pain  Secondary Diagnosis:	Hypertension

## 2022-04-09 ENCOUNTER — INPATIENT (INPATIENT)
Facility: HOSPITAL | Age: 42
LOS: 0 days | Discharge: HOME | DRG: 682 | End: 2022-04-09
Attending: STUDENT IN AN ORGANIZED HEALTH CARE EDUCATION/TRAINING PROGRAM | Admitting: STUDENT IN AN ORGANIZED HEALTH CARE EDUCATION/TRAINING PROGRAM
Payer: MEDICARE

## 2022-04-09 VITALS
HEART RATE: 78 BPM | DIASTOLIC BLOOD PRESSURE: 77 MMHG | RESPIRATION RATE: 18 BRPM | OXYGEN SATURATION: 95 % | SYSTOLIC BLOOD PRESSURE: 121 MMHG

## 2022-04-09 VITALS — HEIGHT: 69 IN | TEMPERATURE: 98 F | OXYGEN SATURATION: 95 % | RESPIRATION RATE: 14 BRPM | HEART RATE: 85 BPM

## 2022-04-09 DIAGNOSIS — Z96.649 PRESENCE OF UNSPECIFIED ARTIFICIAL HIP JOINT: Chronic | ICD-10-CM

## 2022-04-09 DIAGNOSIS — N17.9 ACUTE KIDNEY FAILURE, UNSPECIFIED: ICD-10-CM

## 2022-04-09 DIAGNOSIS — Z96.653 PRESENCE OF ARTIFICIAL KNEE JOINT, BILATERAL: Chronic | ICD-10-CM

## 2022-04-09 LAB
ALBUMIN SERPL ELPH-MCNC: 4.1 G/DL — SIGNIFICANT CHANGE UP (ref 3.3–5.2)
ALP SERPL-CCNC: 94 U/L — SIGNIFICANT CHANGE UP (ref 40–120)
ALT FLD-CCNC: 16 U/L — SIGNIFICANT CHANGE UP
ANION GAP SERPL CALC-SCNC: 12 MMOL/L — SIGNIFICANT CHANGE UP (ref 5–17)
ANION GAP SERPL CALC-SCNC: 15 MMOL/L — SIGNIFICANT CHANGE UP (ref 5–17)
APAP SERPL-MCNC: <3 UG/ML — LOW (ref 10–26)
AST SERPL-CCNC: 19 U/L — SIGNIFICANT CHANGE UP
BASE EXCESS BLDV CALC-SCNC: 0.3 MMOL/L — SIGNIFICANT CHANGE UP (ref -2–3)
BASE EXCESS BLDV CALC-SCNC: 1.2 MMOL/L — SIGNIFICANT CHANGE UP (ref -2–3)
BASOPHILS # BLD AUTO: 0.05 K/UL — SIGNIFICANT CHANGE UP (ref 0–0.2)
BASOPHILS NFR BLD AUTO: 0.9 % — SIGNIFICANT CHANGE UP (ref 0–2)
BILIRUB SERPL-MCNC: 0.3 MG/DL — LOW (ref 0.4–2)
BUN SERPL-MCNC: 34.7 MG/DL — HIGH (ref 8–20)
BUN SERPL-MCNC: 35.8 MG/DL — HIGH (ref 8–20)
CA-I SERPL-SCNC: 1.13 MMOL/L — LOW (ref 1.15–1.33)
CA-I SERPL-SCNC: 1.17 MMOL/L — SIGNIFICANT CHANGE UP (ref 1.15–1.33)
CALCIUM SERPL-MCNC: 8 MG/DL — LOW (ref 8.6–10.2)
CALCIUM SERPL-MCNC: 9.1 MG/DL — SIGNIFICANT CHANGE UP (ref 8.6–10.2)
CHLORIDE BLDV-SCNC: 106 MMOL/L — SIGNIFICANT CHANGE UP (ref 98–107)
CHLORIDE BLDV-SCNC: 110 MMOL/L — HIGH (ref 98–107)
CHLORIDE SERPL-SCNC: 102 MMOL/L — SIGNIFICANT CHANGE UP (ref 98–107)
CHLORIDE SERPL-SCNC: 106 MMOL/L — SIGNIFICANT CHANGE UP (ref 98–107)
CO2 SERPL-SCNC: 24 MMOL/L — SIGNIFICANT CHANGE UP (ref 22–29)
CO2 SERPL-SCNC: 24 MMOL/L — SIGNIFICANT CHANGE UP (ref 22–29)
CREAT SERPL-MCNC: 1.91 MG/DL — HIGH (ref 0.5–1.3)
CREAT SERPL-MCNC: 2.03 MG/DL — HIGH (ref 0.5–1.3)
EGFR: 41 ML/MIN/1.73M2 — LOW
EGFR: 44 ML/MIN/1.73M2 — LOW
EOSINOPHIL # BLD AUTO: 0.21 K/UL — SIGNIFICANT CHANGE UP (ref 0–0.5)
EOSINOPHIL NFR BLD AUTO: 3.8 % — SIGNIFICANT CHANGE UP (ref 0–6)
ETHANOL SERPL-MCNC: <10 MG/DL — SIGNIFICANT CHANGE UP (ref 0–9)
GAS PNL BLDV: 139 MMOL/L — SIGNIFICANT CHANGE UP (ref 136–145)
GAS PNL BLDV: 139 MMOL/L — SIGNIFICANT CHANGE UP (ref 136–145)
GAS PNL BLDV: SIGNIFICANT CHANGE UP
GLUCOSE BLDV-MCNC: 130 MG/DL — HIGH (ref 70–99)
GLUCOSE BLDV-MCNC: 84 MG/DL — SIGNIFICANT CHANGE UP (ref 70–99)
GLUCOSE SERPL-MCNC: 136 MG/DL — HIGH (ref 70–99)
GLUCOSE SERPL-MCNC: 91 MG/DL — SIGNIFICANT CHANGE UP (ref 70–99)
HCO3 BLDV-SCNC: 26 MMOL/L — SIGNIFICANT CHANGE UP (ref 22–29)
HCO3 BLDV-SCNC: 27 MMOL/L — SIGNIFICANT CHANGE UP (ref 22–29)
HCT VFR BLD CALC: 36.2 % — LOW (ref 39–50)
HCT VFR BLDA CALC: 29 % — SIGNIFICANT CHANGE UP
HCT VFR BLDA CALC: 35 % — SIGNIFICANT CHANGE UP
HGB BLD CALC-MCNC: 11.8 G/DL — LOW (ref 12.6–17.4)
HGB BLD CALC-MCNC: 9.7 G/DL — LOW (ref 12.6–17.4)
HGB BLD-MCNC: 11.7 G/DL — LOW (ref 13–17)
IMM GRANULOCYTES NFR BLD AUTO: 0.4 % — SIGNIFICANT CHANGE UP (ref 0–1.5)
LACTATE BLDV-MCNC: 0.8 MMOL/L — SIGNIFICANT CHANGE UP (ref 0.5–2)
LACTATE BLDV-MCNC: 3.2 MMOL/L — HIGH (ref 0.5–2)
LYMPHOCYTES # BLD AUTO: 1.53 K/UL — SIGNIFICANT CHANGE UP (ref 1–3.3)
LYMPHOCYTES # BLD AUTO: 27.7 % — SIGNIFICANT CHANGE UP (ref 13–44)
MCHC RBC-ENTMCNC: 27.3 PG — SIGNIFICANT CHANGE UP (ref 27–34)
MCHC RBC-ENTMCNC: 32.3 GM/DL — SIGNIFICANT CHANGE UP (ref 32–36)
MCV RBC AUTO: 84.6 FL — SIGNIFICANT CHANGE UP (ref 80–100)
MONOCYTES # BLD AUTO: 0.3 K/UL — SIGNIFICANT CHANGE UP (ref 0–0.9)
MONOCYTES NFR BLD AUTO: 5.4 % — SIGNIFICANT CHANGE UP (ref 2–14)
NEUTROPHILS # BLD AUTO: 3.42 K/UL — SIGNIFICANT CHANGE UP (ref 1.8–7.4)
NEUTROPHILS NFR BLD AUTO: 61.8 % — SIGNIFICANT CHANGE UP (ref 43–77)
NT-PROBNP SERPL-SCNC: 431 PG/ML — HIGH (ref 0–300)
PCO2 BLDV: 47 MMHG — SIGNIFICANT CHANGE UP (ref 42–55)
PCO2 BLDV: 50 MMHG — SIGNIFICANT CHANGE UP (ref 42–55)
PH BLDV: 7.34 — SIGNIFICANT CHANGE UP (ref 7.32–7.43)
PH BLDV: 7.35 — SIGNIFICANT CHANGE UP (ref 7.32–7.43)
PLATELET # BLD AUTO: 246 K/UL — SIGNIFICANT CHANGE UP (ref 150–400)
PO2 BLDV: 64 MMHG — HIGH (ref 25–45)
PO2 BLDV: 82 MMHG — HIGH (ref 25–45)
POTASSIUM BLDV-SCNC: 3.7 MMOL/L — SIGNIFICANT CHANGE UP (ref 3.5–5.1)
POTASSIUM BLDV-SCNC: 3.8 MMOL/L — SIGNIFICANT CHANGE UP (ref 3.5–5.1)
POTASSIUM SERPL-MCNC: 3.6 MMOL/L — SIGNIFICANT CHANGE UP (ref 3.5–5.3)
POTASSIUM SERPL-MCNC: 3.9 MMOL/L — SIGNIFICANT CHANGE UP (ref 3.5–5.3)
POTASSIUM SERPL-SCNC: 3.6 MMOL/L — SIGNIFICANT CHANGE UP (ref 3.5–5.3)
POTASSIUM SERPL-SCNC: 3.9 MMOL/L — SIGNIFICANT CHANGE UP (ref 3.5–5.3)
PROT SERPL-MCNC: 6.8 G/DL — SIGNIFICANT CHANGE UP (ref 6.6–8.7)
RAPID RVP RESULT: SIGNIFICANT CHANGE UP
RBC # BLD: 4.28 M/UL — SIGNIFICANT CHANGE UP (ref 4.2–5.8)
RBC # FLD: 14 % — SIGNIFICANT CHANGE UP (ref 10.3–14.5)
SALICYLATES SERPL-MCNC: <0.6 MG/DL — LOW (ref 10–20)
SAO2 % BLDV: 92 % — SIGNIFICANT CHANGE UP
SAO2 % BLDV: 95.4 % — SIGNIFICANT CHANGE UP
SARS-COV-2 RNA SPEC QL NAA+PROBE: SIGNIFICANT CHANGE UP
SODIUM SERPL-SCNC: 141 MMOL/L — SIGNIFICANT CHANGE UP (ref 135–145)
SODIUM SERPL-SCNC: 142 MMOL/L — SIGNIFICANT CHANGE UP (ref 135–145)
TROPONIN T SERPL-MCNC: 0.04 NG/ML — SIGNIFICANT CHANGE UP (ref 0–0.06)
TROPONIN T SERPL-MCNC: 0.07 NG/ML — HIGH (ref 0–0.06)
WBC # BLD: 5.53 K/UL — SIGNIFICANT CHANGE UP (ref 3.8–10.5)
WBC # FLD AUTO: 5.53 K/UL — SIGNIFICANT CHANGE UP (ref 3.8–10.5)

## 2022-04-09 PROCEDURE — 99285 EMERGENCY DEPT VISIT HI MDM: CPT | Mod: GC

## 2022-04-09 PROCEDURE — 70450 CT HEAD/BRAIN W/O DYE: CPT | Mod: 26,MA

## 2022-04-09 PROCEDURE — 99233 SBSQ HOSP IP/OBS HIGH 50: CPT

## 2022-04-09 PROCEDURE — 71045 X-RAY EXAM CHEST 1 VIEW: CPT | Mod: 26

## 2022-04-09 PROCEDURE — 93010 ELECTROCARDIOGRAM REPORT: CPT | Mod: 76

## 2022-04-09 RX ORDER — ACETAMINOPHEN 500 MG
650 TABLET ORAL EVERY 6 HOURS
Refills: 0 | Status: DISCONTINUED | OUTPATIENT
Start: 2022-04-09 | End: 2022-04-09

## 2022-04-09 RX ORDER — PANTOPRAZOLE SODIUM 20 MG/1
40 TABLET, DELAYED RELEASE ORAL
Refills: 0 | Status: DISCONTINUED | OUTPATIENT
Start: 2022-04-09 | End: 2022-04-09

## 2022-04-09 RX ORDER — HEPARIN SODIUM 5000 [USP'U]/ML
5000 INJECTION INTRAVENOUS; SUBCUTANEOUS EVERY 8 HOURS
Refills: 0 | Status: DISCONTINUED | OUTPATIENT
Start: 2022-04-09 | End: 2022-04-09

## 2022-04-09 RX ORDER — SODIUM CHLORIDE 9 MG/ML
1000 INJECTION INTRAMUSCULAR; INTRAVENOUS; SUBCUTANEOUS
Refills: 0 | Status: DISCONTINUED | OUTPATIENT
Start: 2022-04-09 | End: 2022-04-09

## 2022-04-09 RX ORDER — LANOLIN ALCOHOL/MO/W.PET/CERES
3 CREAM (GRAM) TOPICAL AT BEDTIME
Refills: 0 | Status: DISCONTINUED | OUTPATIENT
Start: 2022-04-09 | End: 2022-04-09

## 2022-04-09 RX ORDER — ONDANSETRON 8 MG/1
4 TABLET, FILM COATED ORAL EVERY 8 HOURS
Refills: 0 | Status: DISCONTINUED | OUTPATIENT
Start: 2022-04-09 | End: 2022-04-09

## 2022-04-09 RX ORDER — SODIUM CHLORIDE 9 MG/ML
1000 INJECTION INTRAMUSCULAR; INTRAVENOUS; SUBCUTANEOUS ONCE
Refills: 0 | Status: COMPLETED | OUTPATIENT
Start: 2022-04-09 | End: 2022-04-09

## 2022-04-09 RX ORDER — SODIUM CHLORIDE 9 MG/ML
2000 INJECTION INTRAMUSCULAR; INTRAVENOUS; SUBCUTANEOUS ONCE
Refills: 0 | Status: COMPLETED | OUTPATIENT
Start: 2022-04-09 | End: 2022-04-09

## 2022-04-09 RX ORDER — SODIUM CHLORIDE 9 MG/ML
1000 INJECTION, SOLUTION INTRAVENOUS ONCE
Refills: 0 | Status: COMPLETED | OUTPATIENT
Start: 2022-04-09 | End: 2022-04-09

## 2022-04-09 RX ORDER — NALOXONE HYDROCHLORIDE 4 MG/.1ML
2 SPRAY NASAL ONCE
Refills: 0 | Status: COMPLETED | OUTPATIENT
Start: 2022-04-09 | End: 2022-04-09

## 2022-04-09 RX ORDER — NALOXONE HYDROCHLORIDE 4 MG/.1ML
0.5 SPRAY NASAL ONCE
Refills: 0 | Status: DISCONTINUED | OUTPATIENT
Start: 2022-04-09 | End: 2022-04-09

## 2022-04-09 RX ADMIN — NALOXONE HYDROCHLORIDE 2 MILLIGRAM(S): 4 SPRAY NASAL at 05:29

## 2022-04-09 RX ADMIN — SODIUM CHLORIDE 125 MILLILITER(S): 9 INJECTION INTRAMUSCULAR; INTRAVENOUS; SUBCUTANEOUS at 11:00

## 2022-04-09 RX ADMIN — SODIUM CHLORIDE 1000 MILLILITER(S): 9 INJECTION INTRAMUSCULAR; INTRAVENOUS; SUBCUTANEOUS at 09:16

## 2022-04-09 RX ADMIN — SODIUM CHLORIDE 1000 MILLILITER(S): 9 INJECTION, SOLUTION INTRAVENOUS at 07:58

## 2022-04-09 RX ADMIN — SODIUM CHLORIDE 2000 MILLILITER(S): 9 INJECTION INTRAMUSCULAR; INTRAVENOUS; SUBCUTANEOUS at 04:00

## 2022-04-09 RX ADMIN — SODIUM CHLORIDE 1000 MILLILITER(S): 9 INJECTION, SOLUTION INTRAVENOUS at 08:45

## 2022-04-09 NOTE — H&P ADULT - ASSESSMENT
42 yo male with hx of Bipolar disorder, hypertension and DM was brought in for unresponsiveness and ?Shortness of breath. Patient in the ED was initially found to be altered and was somnolent with unable to provide history. Was given Naloxone and IV fluids to which his mental status has improved. In ED, patient was noted to be hypotensive and found to have an elevated Cr and medicine was called for admission. Upon ED evaluation, it was noted that patient took extra seroquel/Klonapin but upon my evaluation, patient denies that. He states that he is here only for dehydration and that his blood pressure is low. Denies every other complaint.      #LIV  IV fluids  UA  Urine studies  Renal US  Monitor Cr    #Hypotension likely from drug overdose  Utox  No signs of sepsis (No fevers, No elevated WBC, unlikely aspiration from CXR)  Fluids and reassess  Hold Clonidine, Labetalol  Hold Suboxone  Hold gabapentin  Hold Lamictal/Seroquel/Wellbutrin/Risperidone for now    #Metabolic encephalopathy  Likely secondary to drug overdose  UTOX still pending  Naloxone PRN  CT head negative     #Elevated Troponin  Trend troponins  Seen by Cardiology 1 month ago and was supposed to be scheduled for a NST but patient AMA prior to evaluation  Would consult Cardio tomorrow to assess if patient needs NST vs outpatient follow up    #Bipolar disorder  Hold meds until improvement in BP    #Hypertension  Hold meds until improvement in BP     #Nicotine abuse  Counseled  Smokes 1 ppd   Nicotine patch    DVT prophylaxis: Heparin

## 2022-04-09 NOTE — H&P ADULT - HISTORY OF PRESENT ILLNESS
40 yo male with hx of Bipolar disorder, hypertension and DM was brought in for unresponsiveness and ?Shortness of breath. Patient in the ED was initially found to be altered and was somnolent with unable to provide history. Was given Naloxone and IV fluids to which his mental status has improved. In ED, patient was noted to be hypotensive and found to have an elevated Cr and medicine was called for admission. Upon ED evaluation, it was noted that patient took extra seroquel/Klonapin but upon my evaluation, patient denies that. He states that he is here only for dehydration and that his blood pressure is low. Currently a poor historian and denies every other complaint.

## 2022-04-09 NOTE — ED PROVIDER NOTE - OBJECTIVE STATEMENT
41yom brought in by EMS from home complaining of shortness of breath. Pt is somnolent and provides little history, rouses to deep noxious stimuli and states he took klonopin and seroquel.

## 2022-04-09 NOTE — ED PROVIDER NOTE - CLINICAL SUMMARY MEDICAL DECISION MAKING FREE TEXT BOX
AMS and hypotension in the setting of polypharmacy abuse, BP responds to IV fluid bolus, had some improvement in mental status after naloxone, remains somnolent but protecting airway and oxygenating appropriately. Noted LIV and elevated troponin, ECG non-ischemic appearing, will continue to trend after volume replacement and correction of hypotension.

## 2022-04-09 NOTE — ED PROVIDER NOTE - BIRTH SEX
"Patient called back, he does not need a refill, he only takes this one time a month to calm his nerves. Pt said \"this pill does wonders and the next day I don't need it.\" Pt has plenty of medication at home, refill denied. Rx put on medlist as historical, fyi sent to PCP.   " Male

## 2022-04-09 NOTE — DISCHARGE NOTE PROVIDER - NSDCMRMEDTOKEN_GEN_ALL_CORE_FT
ALPRAZolam 1 mg oral tablet: 1 tab(s) orally 2 times a day, As Needed  cloNIDine 0.3 mg oral tablet: 1 tab(s) orally 4 times a day  gabapentin 800 mg oral tablet: 1 tab(s) orally every 6 hours  labetalol 300 mg oral tablet: 1 tab(s) orally 2 times a day  LaMICtal 150 mg oral tablet: 1 tab(s) orally 2 times a day  losartan 100 mg oral tablet: 1 tab(s) orally once a day  pantoprazole 40 mg oral delayed release tablet: 1 tab(s) orally once a day (before a meal)  QUEtiapine 400 mg oral tablet: 1 tab(s) orally 2 times a day  risperiDONE 3 mg oral tablet: 1 tab(s) orally once a day  Suboxone 8 mg-2 mg sublingual film: 1 film(s) sublingual 3 times a day  Ventolin HFA 90 mcg/inh inhalation aerosol: 2 puff(s) inhaled every 6 hours   Wellbutrin  mg/24 hours oral tablet, extended release: 1 tab(s) orally every 24 hours

## 2022-04-09 NOTE — ED ADULT NURSE NOTE - OBJECTIVE STATEMENT
received pt in critical, not able to answer questions keep falling asleep , pt states to take his usual dosage of meds, low bp, placed on monitor, IV in place labs sent, fluids are running, safety maintained,

## 2022-04-09 NOTE — DISCHARGE NOTE PROVIDER - HOSPITAL COURSE
42 yo male with hx of Bipolar disorder, hypertension and DM was brought in for unresponsiveness and ?Shortness of breath. Patient in the ED was initially found to be altered and was somnolent with unable to provide history. Was given Naloxone and IV fluids to which his mental status has improved. In ED, patient was noted to be hypotensive and found to have an elevated Cr and medicine was called for admission. Upon ED evaluation, it was noted that patient took extra seroquel/Klonapin but upon my evaluation, patient denies that. He states that he is here only for dehydration and that his blood pressure is low. Currently a poor historian and denies every other complaint. Initailly admitted for ARF and Hypotension. Mental status improved. Then patient AMA.

## 2022-04-09 NOTE — ED PROVIDER NOTE - PROGRESS NOTE DETAILS
Moise: Pt now more awake and alert; however with persistent hypotension and rising creatinine despite 2L fluids. BP improved with 3rd liter. Will admit.

## 2022-04-09 NOTE — H&P ADULT - NSHPPHYSICALEXAM_GEN_ALL_CORE
PHYSICAL EXAM:  Vital Signs Last 24 Hrs  T(F): 97.9 (09 Apr 2022 09:42), Max: 97.9 (09 Apr 2022 09:42)  HR: 81 (09 Apr 2022 09:42) (63 - 87)  BP: 84/49 (09 Apr 2022 09:42) (77/41 - 98/56)  RR: 16 (09 Apr 2022 09:42) (12 - 16)  SpO2: 95% (09 Apr 2022 09:42) (95% - 98%)    GENERAL: NAD, Ill appearing   Eyes: EOMI, PERRLA  ENMT: Conjunctiva and sclera clear; supple neck, No JVD  Cardiovascular: S1,S2, RRR, No murmur  Respiratory: CTA B/L, Non-labored breathing  GI: Bowel sounds present; Soft, Nontender, Nondistended. No hepatomegaly  Genitourinary: Deferred  Skin:  no breakdowns, ulcers or discharge, No rashes or lesions  Neurology: Awakes, alert to name, location and year, poor historian currently, Moves all extremities  Psych: Appropriate mood and affect, calm, pleasant, Responds appropriately to questions

## 2022-04-09 NOTE — ED ADULT NURSE REASSESSMENT NOTE - NS ED NURSE REASSESS COMMENT FT1
cell phone returned to patient per request, ok per MD.
Assumed care of patient at 0730, oriented x4, lethargic, slurring words. Undressed and placed in yellow gown. Belongings secured. Pt hypotensive, Dr Mullen made aware. IVF started and infusing. Will continue to monitor.

## 2022-04-09 NOTE — ED ADULT TRIAGE NOTE - CHIEF COMPLAINT QUOTE
Pt. BIBA for SOB. Pt. falling asleep in triage, pt. states he took his normal dose of Klonopin and Seroquel. pt. denies taking extra doses, denies SI.

## 2022-04-09 NOTE — CHART NOTE - NSCHARTNOTEFT_GEN_A_CORE
Called by RN, as patient wishes to leave AMA. Patient seen at bedside to further discuss plan of care. Discussed risks of leaving against medical advice, which includes worsening of current medical condition, up to and including death. Patient is alert and oriented to make decisions. Patient understands risks and still wishes to leave. AMA form was filled as per patient's wishses.

## 2022-04-11 ENCOUNTER — EMERGENCY (EMERGENCY)
Facility: HOSPITAL | Age: 42
LOS: 1 days | Discharge: HOME | End: 2022-04-11
Attending: EMERGENCY MEDICINE
Payer: MEDICARE

## 2022-04-11 VITALS
HEART RATE: 63 BPM | RESPIRATION RATE: 20 BRPM | OXYGEN SATURATION: 99 % | TEMPERATURE: 98 F | DIASTOLIC BLOOD PRESSURE: 94 MMHG | SYSTOLIC BLOOD PRESSURE: 131 MMHG

## 2022-04-11 VITALS
SYSTOLIC BLOOD PRESSURE: 111 MMHG | TEMPERATURE: 98 F | RESPIRATION RATE: 16 BRPM | WEIGHT: 229.94 LBS | HEART RATE: 73 BPM | HEIGHT: 69 IN | DIASTOLIC BLOOD PRESSURE: 75 MMHG | OXYGEN SATURATION: 99 %

## 2022-04-11 DIAGNOSIS — Z96.653 PRESENCE OF ARTIFICIAL KNEE JOINT, BILATERAL: Chronic | ICD-10-CM

## 2022-04-11 DIAGNOSIS — Z96.649 PRESENCE OF UNSPECIFIED ARTIFICIAL HIP JOINT: Chronic | ICD-10-CM

## 2022-04-11 LAB
ALBUMIN SERPL ELPH-MCNC: 3.6 G/DL — SIGNIFICANT CHANGE UP (ref 3.3–5.2)
ALP SERPL-CCNC: 78 U/L — SIGNIFICANT CHANGE UP (ref 40–120)
ALT FLD-CCNC: 15 U/L — SIGNIFICANT CHANGE UP
ANION GAP SERPL CALC-SCNC: 9 MMOL/L — SIGNIFICANT CHANGE UP (ref 5–17)
AST SERPL-CCNC: 16 U/L — SIGNIFICANT CHANGE UP
BASOPHILS # BLD AUTO: 0.03 K/UL — SIGNIFICANT CHANGE UP (ref 0–0.2)
BASOPHILS NFR BLD AUTO: 0.7 % — SIGNIFICANT CHANGE UP (ref 0–2)
BILIRUB SERPL-MCNC: <0.2 MG/DL — LOW (ref 0.4–2)
BUN SERPL-MCNC: 21.3 MG/DL — HIGH (ref 8–20)
CALCIUM SERPL-MCNC: 8.6 MG/DL — SIGNIFICANT CHANGE UP (ref 8.6–10.2)
CHLORIDE SERPL-SCNC: 109 MMOL/L — HIGH (ref 98–107)
CK SERPL-CCNC: 140 U/L — SIGNIFICANT CHANGE UP (ref 30–200)
CO2 SERPL-SCNC: 26 MMOL/L — SIGNIFICANT CHANGE UP (ref 22–29)
CREAT SERPL-MCNC: 1.16 MG/DL — SIGNIFICANT CHANGE UP (ref 0.5–1.3)
D DIMER BLD IA.RAPID-MCNC: 309 NG/ML DDU — HIGH
EGFR: 81 ML/MIN/1.73M2 — SIGNIFICANT CHANGE UP
EOSINOPHIL # BLD AUTO: 0.28 K/UL — SIGNIFICANT CHANGE UP (ref 0–0.5)
EOSINOPHIL NFR BLD AUTO: 6.6 % — HIGH (ref 0–6)
FLUAV AG NPH QL: SIGNIFICANT CHANGE UP
FLUBV AG NPH QL: SIGNIFICANT CHANGE UP
GLUCOSE SERPL-MCNC: 116 MG/DL — HIGH (ref 70–99)
HCT VFR BLD CALC: 29.3 % — LOW (ref 39–50)
HGB BLD-MCNC: 9.5 G/DL — LOW (ref 13–17)
IMM GRANULOCYTES NFR BLD AUTO: 0.9 % — SIGNIFICANT CHANGE UP (ref 0–1.5)
LIDOCAIN IGE QN: 17 U/L — LOW (ref 22–51)
LYMPHOCYTES # BLD AUTO: 1.57 K/UL — SIGNIFICANT CHANGE UP (ref 1–3.3)
LYMPHOCYTES # BLD AUTO: 36.8 % — SIGNIFICANT CHANGE UP (ref 13–44)
MAGNESIUM SERPL-MCNC: 1.8 MG/DL — SIGNIFICANT CHANGE UP (ref 1.6–2.6)
MCHC RBC-ENTMCNC: 27.2 PG — SIGNIFICANT CHANGE UP (ref 27–34)
MCHC RBC-ENTMCNC: 32.4 GM/DL — SIGNIFICANT CHANGE UP (ref 32–36)
MCV RBC AUTO: 84 FL — SIGNIFICANT CHANGE UP (ref 80–100)
MONOCYTES # BLD AUTO: 0.43 K/UL — SIGNIFICANT CHANGE UP (ref 0–0.9)
MONOCYTES NFR BLD AUTO: 10.1 % — SIGNIFICANT CHANGE UP (ref 2–14)
NEUTROPHILS # BLD AUTO: 1.92 K/UL — SIGNIFICANT CHANGE UP (ref 1.8–7.4)
NEUTROPHILS NFR BLD AUTO: 44.9 % — SIGNIFICANT CHANGE UP (ref 43–77)
NT-PROBNP SERPL-SCNC: 288 PG/ML — SIGNIFICANT CHANGE UP (ref 0–300)
PLATELET # BLD AUTO: 189 K/UL — SIGNIFICANT CHANGE UP (ref 150–400)
POTASSIUM SERPL-MCNC: 4 MMOL/L — SIGNIFICANT CHANGE UP (ref 3.5–5.3)
POTASSIUM SERPL-SCNC: 4 MMOL/L — SIGNIFICANT CHANGE UP (ref 3.5–5.3)
PROT SERPL-MCNC: 5.8 G/DL — LOW (ref 6.6–8.7)
RBC # BLD: 3.49 M/UL — LOW (ref 4.2–5.8)
RBC # FLD: 14.2 % — SIGNIFICANT CHANGE UP (ref 10.3–14.5)
RSV RNA NPH QL NAA+NON-PROBE: SIGNIFICANT CHANGE UP
SARS-COV-2 RNA SPEC QL NAA+PROBE: SIGNIFICANT CHANGE UP
SODIUM SERPL-SCNC: 144 MMOL/L — SIGNIFICANT CHANGE UP (ref 135–145)
TROPONIN T SERPL-MCNC: 0.03 NG/ML — SIGNIFICANT CHANGE UP (ref 0–0.06)
WBC # BLD: 4.27 K/UL — SIGNIFICANT CHANGE UP (ref 3.8–10.5)
WBC # FLD AUTO: 4.27 K/UL — SIGNIFICANT CHANGE UP (ref 3.8–10.5)

## 2022-04-11 PROCEDURE — 36415 COLL VENOUS BLD VENIPUNCTURE: CPT

## 2022-04-11 PROCEDURE — 71046 X-RAY EXAM CHEST 2 VIEWS: CPT | Mod: 26

## 2022-04-11 PROCEDURE — G1004: CPT

## 2022-04-11 PROCEDURE — 83690 ASSAY OF LIPASE: CPT

## 2022-04-11 PROCEDURE — 71046 X-RAY EXAM CHEST 2 VIEWS: CPT

## 2022-04-11 PROCEDURE — 96375 TX/PRO/DX INJ NEW DRUG ADDON: CPT | Mod: XU

## 2022-04-11 PROCEDURE — 82550 ASSAY OF CK (CPK): CPT

## 2022-04-11 PROCEDURE — 93010 ELECTROCARDIOGRAM REPORT: CPT

## 2022-04-11 PROCEDURE — 87637 SARSCOV2&INF A&B&RSV AMP PRB: CPT

## 2022-04-11 PROCEDURE — 71275 CT ANGIOGRAPHY CHEST: CPT | Mod: 26,ME

## 2022-04-11 PROCEDURE — 84484 ASSAY OF TROPONIN QUANT: CPT

## 2022-04-11 PROCEDURE — 99285 EMERGENCY DEPT VISIT HI MDM: CPT | Mod: 25

## 2022-04-11 PROCEDURE — 80053 COMPREHEN METABOLIC PANEL: CPT

## 2022-04-11 PROCEDURE — 80307 DRUG TEST PRSMV CHEM ANLYZR: CPT

## 2022-04-11 PROCEDURE — 85025 COMPLETE CBC W/AUTO DIFF WBC: CPT

## 2022-04-11 PROCEDURE — 83880 ASSAY OF NATRIURETIC PEPTIDE: CPT

## 2022-04-11 PROCEDURE — 83735 ASSAY OF MAGNESIUM: CPT

## 2022-04-11 PROCEDURE — 93005 ELECTROCARDIOGRAM TRACING: CPT

## 2022-04-11 PROCEDURE — 96374 THER/PROPH/DIAG INJ IV PUSH: CPT | Mod: XU

## 2022-04-11 PROCEDURE — 71275 CT ANGIOGRAPHY CHEST: CPT | Mod: ME

## 2022-04-11 PROCEDURE — 85379 FIBRIN DEGRADATION QUANT: CPT

## 2022-04-11 PROCEDURE — 99285 EMERGENCY DEPT VISIT HI MDM: CPT | Mod: CS,GC

## 2022-04-11 RX ORDER — FAMOTIDINE 10 MG/ML
20 INJECTION INTRAVENOUS ONCE
Refills: 0 | Status: COMPLETED | OUTPATIENT
Start: 2022-04-11 | End: 2022-04-11

## 2022-04-11 RX ADMIN — FAMOTIDINE 20 MILLIGRAM(S): 10 INJECTION INTRAVENOUS at 02:33

## 2022-04-11 RX ADMIN — Medication 100 MILLIGRAM(S): at 06:17

## 2022-04-11 NOTE — ED ADULT NURSE REASSESSMENT NOTE - NS ED NURSE REASSESS COMMENT FT1
oob and ambulating around the [place] with unsteady steady slouching gait. given a turkey sandwich and juice., requesting to leave, " I want to go " states he will walk home, md aware, to christy pt, no IV access noted in arms at this time

## 2022-04-11 NOTE — ED PROVIDER NOTE - ATTENDING CONTRIBUTION TO CARE
AJM: pt seen with dr bright in sign out and agree with her notes above. Pt is awake, alert, oriented. Walking well without assistance. Speaking clearly. Pt is clinically sober and safe for discharge. Counseled on cessation of alcohol.

## 2022-04-11 NOTE — ED ADULT NURSE REASSESSMENT NOTE - NS ED NURSE REASSESS COMMENT FT1
Report received from night shift RN, care of pt assumed at 0745, pt received somnolent  on stretcher  in B6L, arousable to loud voice, speech garbled and incoherent at times. denies any and all complaints, denies drug use recently jalil holden at bedside eval pt.;

## 2022-04-11 NOTE — ED PROVIDER NOTE - PROGRESS NOTE DETAILS
Hiram: Patient doing well on reassessment. More awake. Discussed findings of possible PNA and plan for oral abx with patient. Patient amendable to discharge at this time. Hiram: Pt more somnolent/difficult to arouse. No longer answering questions. Plan for IV abx, continued monitoring. Plan for Narcan if mental status does not improve. Patient with slowly improving somnolence, likely secondary to illicit substance use. Will continue to observe for improved mental state. Patient signed out to Dr. Parker. Lefty: I received signout on patient. Patient somnolent but able to tell me his last name, the month, and that he is at [facility] when repeatedly prompted. /94, HR 65, o2 99. Will continue to monitor. If no significant improvement in mental state in next 1- 2 hours may order CT head Lefty: Per RN, patient awake and ambulatory. RN going to order patient breakfast. Utox and head CT ordered Lefty: Pt awake, alert, and requesting to go home. States he will order uber home. Tolerated breakfast. AJM: pt seen with dr bright in sign out and agree with her notes above. Pt is awake, alert, oriented. Walking well without assistance. Speaking clearly. Pt is clinically sober and safe for discharge. Counseled on cessation of alcohol.

## 2022-04-11 NOTE — ED PROVIDER NOTE - OBJECTIVE STATEMENT
ALEN BRICEÑO is a 40yo M with PMH __ who was BIBEMS in c/o SOB. States the SOB started about 2 hours ago and persistas at baseline w/o regard to activity. Also endorses mild intermittent chest pain of the same duration. Pt denies any toxic ingestions and states he has only taken his Clonidine, labetalol and xanax as prescribed. He states he took 0.6mg of Clonidine and 1mg xanax.     He denies any other associated symptoms specifically denying Fever/Chills/N/V/Abdominal pain/Diarrhea/Constipation, CP/SOB, HA/Changes to vision/Fatigue/Weakness, Urinary symptoms. ALEN BRICEÑO is a 42yo M with PMH polysubstance use, HTN, DM, Anxiety who was BIBEMS in c/o SOB. States the SOB started about 2 hours ago and persistas at baseline w/o regard to activity. Also endorses mild intermittent chest pain of the same duration. Pt denies any toxic ingestions and states he has only taken his Clonidine, labetalol and xanax as prescribed. He states he took 0.6mg of Clonidine and 1mg xanax.     He denies any other associated symptoms specifically denying Fever/Chills/N/V/Abdominal pain/Diarrhea/Constipation, CP/SOB, HA/Changes to vision/Fatigue/Weakness, Urinary symptoms.

## 2022-04-11 NOTE — ED PROVIDER NOTE - NS ED ROS FT
Review of Systems  CONSTITUTIONAL: afebrile w/no diaphoresis or weight changes  SKIN: warm, dry w/ no rash or bleeding  EYES: no changes to vision  ENT: no changes in hearing, no sore throat  RESPIRATORY: +SOB  CARDIAC: +CHEST PAIN  GI: no abd pain, nausea, vomiting, diarrhea, constipation, or blood in stool/jennifer blood  GENITO-URINARY: no discharge, dysuria or hematuria,   MUSCULOSKELETAL:  no joint pain, swelling or redness  NEUROLOGIC: no weakness, headache, anesthesia or paresthesias  PSYCH: no anxiety, non suicidal, non homicidal, without hallucinations or depression

## 2022-04-11 NOTE — ED ADULT TRIAGE NOTE - CHIEF COMPLAINT QUOTE
Patient BIBEMS c/o shortness of breath for the last 2 hours. Denies chest pain. Patient was seen here yesterday for drug overdose and left AMA.

## 2022-04-11 NOTE — ED PROVIDER NOTE - WR ORDER ID 1
April 1, 2022      [facility]  [id number] [place] [address]  [facility] [place] [address]  Phone: [phone]  Fax: [phone]       Patient: Brody Berg   YOB: 1965  Date of Visit: 04/01/2022    To Whom It May Concern:    Conner Berg  was at [facility] on 04/01/2022. The patient may return to work/school on 4/4/22 with no restrictions. If you have any questions or concerns, or if I can be of further assistance, please do not hesitate to contact me.    Sincerely,        YURI Parrish Dr.      [id number]

## 2022-04-11 NOTE — ED PROVIDER NOTE - NSFOLLOWUPINSTRUCTIONS_ED_ALL_ED_FT
Pneumonia    Pneumonia is an infection of the lungs. Pneumonia may be caused by bacteria, viruses, or funguses. Symptoms include coughing, fever, chest pain when breathing deeply or coughing, shortness of breath, fatigue, or muscle aches. Pneumonia can be diagnosed with a medical history and physical exam, as well as other tests which may include a chest X-ray. If you were prescribed an antibiotic medicine, take it as told by your health care provider and do not stop taking the antibiotic even if you start to feel better. Do not use tobacco products, including cigarettes, chewing tobacco, and e-cigarettes.    SEEK IMMEDIATE MEDICAL CARE IF YOU HAVE ANY OF THE FOLLOWING SYMPTOMS: worsening shortness of breath, worsening chest pain, coughing up blood, change in mental status, lightheadedness/dizziness. Community-Acquired Pneumonia, Adult      Pneumonia is a lung infection that causes inflammation and the buildup of mucus and fluids in the lungs. This may cause coughing and difficulty breathing. Community-acquired pneumonia is pneumonia that develops in people who are not, and have not recently been, in a hospital or other health care facility.    Usually, pneumonia develops as a result of an illness that is caused by a virus, such as the common cold and the flu (influenza). It can also be caused by bacteria or fungi. While the common cold and influenza can pass from person to person (are contagious), pneumonia itself is not considered contagious.      What are the causes?     This condition may be caused by:  •Viruses.      •Bacteria.      •Fungi, such as molds or mushrooms.        What increases the risk?    The following factors may make you more likely to develop this condition:•Having certain medical conditions, such as:  •A long-term (chronic) disease, which may include chronic obstructive pulmonary disease (COPD), asthma, heart failure, cystic fibrosis, diabetes, kidney disease, sickle cell disease, and human immunodeficiency virus (HIV).      •A condition that increases the risk of breathing in (aspirating) mucus and other fluids from your mouth and nose.      •A weakened body defense system (immune system).        •Having had your spleen removed (splenectomy). The spleen is the organ that helps fight germs and infections.      •Not cleaning your teeth and gums well (poor dental hygiene).      •Using tobacco products.      •Traveling to places where germs that cause pneumonia are present.      •Being near certain animals, or animal habitats, that have germs that cause pneumonia.      •Being older than 65 years of age.        What are the signs or symptoms?    Symptoms of this condition include:  •A dry cough or a wet (productive) cough.      •A fever.      •Sweating or chills.      •Chest pain, especially when breathing deeply or coughing.      •Fast breathing, difficulty breathing, or shortness of breath.      •Tiredness (fatigue).      •Muscle aches.        How is this diagnosed?     This condition may be diagnosed based on your medical history or a physical exam. You may also have tests, including:  •Chest X-rays.      •Tests of the level of oxygen and other gases in your blood.    •Tests of:  •Your blood.      •Mucus from your lungs (sputum).      •Fluid around your lungs (pleural fluid).      •Your urine.        If your pneumonia is severe, other tests may be done to learn more about the cause.      How is this treated?    Treatment for this condition depends on many factors, such as the cause of your pneumonia, your medicines, and other medical conditions that you have.    For most adults, pneumonia may be treated at home. In some cases, treatment must happen in a hospital and may include:•Medicines that are given by mouth (orally) or through an IV, including:  •Antibiotic medicines, if bacteria caused the pneumonia.      •Medicines that kill viruses (antiviral medicines), if a virus caused the pneumonia.        •Oxygen therapy.      Severe pneumonia, although rare, may require the following treatments:  •Mechanical ventilation.This procedure uses a machine to help you breathe if you cannot breathe well on your own or maintain a safe level of blood oxygen.      •Thoracentesis. This procedure removes any buildup of pleural fluid to help with breathing.        Follow these instructions at home:     Medicines     •Take over-the-counter and prescription medicines only as told by your health care provider.      •Take cough medicine only if you have trouble sleeping. Cough medicine can prevent your body from removing mucus from your lungs.      •If you were prescribed an antibiotic medicine, take it as told by your health care provider. Do not stop taking the antibiotic even if you start to feel better.        Lifestyle                   • Do not drink alcohol.      • Do not use any products that contain nicotine or tobacco, such as cigarettes, e-cigarettes, and chewing tobacco. If you need help quitting, ask your health care provider.      •Eat a healthy diet. This includes plenty of vegetables, fruits, whole grains, low-fat dairy products, and lean protein.      General instructions     •Rest a lot and get at least 8 hours of sleep each night.      •Sleep in a partly upright position at night. Place a few pillows under your head or sleep in a reclining chair.      •Return to your normal activities as told by your health care provider. Ask your health care provider what activities are safe for you.      •Drink enough fluid to keep your urine pale yellow. This helps to thin the mucus in your lungs.      •If your throat is sore, gargle with a salt–water mixture 3–4 times a day or as needed. To make a salt–water mixture, completely dissolve ½–1 tsp (3–6 g) of salt in 1 cup (237 mL) of warm water.      •Keep all follow-up visits as told by your health care provider. This is important.        How is this prevented?    You can lower your risk of developing community-acquired pneumonia by:•Getting the pneumonia vaccine. There are different types and schedules of pneumonia vaccines. Ask your health care provider which option is best for you. Consider getting the pneumonia vaccine if:  •You are older than 65 years of age.      •You are 19–65 years of age and are receiving cancer treatment, have chronic lung disease, or have other medical conditions that affect your immune system. Ask your health care provider if this applies to you.        •Getting your influenza vaccine every year. Ask your health care provider which type of vaccine is best for you.      •Getting regular dental checkups.      •Washing your hands often with soap and water for at least 20 seconds. If soap and water are not available, use hand .        Contact a health care provider if you have:    •A fever.      •Trouble sleeping because you cannot control your cough with cough medicine.        Get help right away if:    •Your shortness of breath becomes worse.      •Your chest pain increases.      •Your sickness becomes worse, especially if you are an older adult or have a weak immune system.      •You cough up blood.      These symptoms may represent a serious problem that is an emergency. Do not wait to see if the symptoms will go away. Get medical help right away. Call your local emergency services (911 in the [place] Do not drive yourself to the hospital.       Summary    •Pneumonia is an infection of the lungs.      •Community-acquired pneumonia develops in people who have not been in the hospital. It can be caused by bacteria, viruses, or fungi.      •This condition may be treated with antibiotics or antiviral medicines.      •Severe pneumonia may require a hospital stay and treatment to help with breathing.      This information is not intended to replace advice given to you by your health care provider. Make sure you discuss any questions you have with your health care provider. -A prescription for doxycycline was sent to your pharmacy  -Please return for any new/worse/concerning symptoms     Community-Acquired Pneumonia, Adult      Pneumonia is a lung infection that causes inflammation and the buildup of mucus and fluids in the lungs. This may cause coughing and difficulty breathing. Community-acquired pneumonia is pneumonia that develops in people who are not, and have not recently been, in a hospital or other health care facility.    Usually, pneumonia develops as a result of an illness that is caused by a virus, such as the common cold and the flu (influenza). It can also be caused by bacteria or fungi. While the common cold and influenza can pass from person to person (are contagious), pneumonia itself is not considered contagious.      What are the causes?     This condition may be caused by:  •Viruses.      •Bacteria.      •Fungi, such as molds or mushrooms.        What increases the risk?    The following factors may make you more likely to develop this condition:•Having certain medical conditions, such as:  •A long-term (chronic) disease, which may include chronic obstructive pulmonary disease (COPD), asthma, heart failure, cystic fibrosis, diabetes, kidney disease, sickle cell disease, and human immunodeficiency virus (HIV).      •A condition that increases the risk of breathing in (aspirating) mucus and other fluids from your mouth and nose.      •A weakened body defense system (immune system).        •Having had your spleen removed (splenectomy). The spleen is the organ that helps fight germs and infections.      •Not cleaning your teeth and gums well (poor dental hygiene).      •Using tobacco products.      •Traveling to places where germs that cause pneumonia are present.      •Being near certain animals, or animal habitats, that have germs that cause pneumonia.      •Being older than 65 years of age.        What are the signs or symptoms?    Symptoms of this condition include:  •A dry cough or a wet (productive) cough.      •A fever.      •Sweating or chills.      •Chest pain, especially when breathing deeply or coughing.      •Fast breathing, difficulty breathing, or shortness of breath.      •Tiredness (fatigue).      •Muscle aches.        How is this diagnosed?     This condition may be diagnosed based on your medical history or a physical exam. You may also have tests, including:  •Chest X-rays.      •Tests of the level of oxygen and other gases in your blood.    •Tests of:  •Your blood.      •Mucus from your lungs (sputum).      •Fluid around your lungs (pleural fluid).      •Your urine.        If your pneumonia is severe, other tests may be done to learn more about the cause.      How is this treated?    Treatment for this condition depends on many factors, such as the cause of your pneumonia, your medicines, and other medical conditions that you have.    For most adults, pneumonia may be treated at home. In some cases, treatment must happen in a hospital and may include:•Medicines that are given by mouth (orally) or through an IV, including:  •Antibiotic medicines, if bacteria caused the pneumonia.      •Medicines that kill viruses (antiviral medicines), if a virus caused the pneumonia.        •Oxygen therapy.      Severe pneumonia, although rare, may require the following treatments:  •Mechanical ventilation.This procedure uses a machine to help you breathe if you cannot breathe well on your own or maintain a safe level of blood oxygen.      •Thoracentesis. This procedure removes any buildup of pleural fluid to help with breathing.        Follow these instructions at home:     Medicines     •Take over-the-counter and prescription medicines only as told by your health care provider.      •Take cough medicine only if you have trouble sleeping. Cough medicine can prevent your body from removing mucus from your lungs.      •If you were prescribed an antibiotic medicine, take it as told by your health care provider. Do not stop taking the antibiotic even if you start to feel better.        Lifestyle                   • Do not drink alcohol.      • Do not use any products that contain nicotine or tobacco, such as cigarettes, e-cigarettes, and chewing tobacco. If you need help quitting, ask your health care provider.      •Eat a healthy diet. This includes plenty of vegetables, fruits, whole grains, low-fat dairy products, and lean protein.      General instructions     •Rest a lot and get at least 8 hours of sleep each night.      •Sleep in a partly upright position at night. Place a few pillows under your head or sleep in a reclining chair.      •Return to your normal activities as told by your health care provider. Ask your health care provider what activities are safe for you.      •Drink enough fluid to keep your urine pale yellow. This helps to thin the mucus in your lungs.      •If your throat is sore, gargle with a salt–water mixture 3–4 times a day or as needed. To make a salt–water mixture, completely dissolve ½–1 tsp (3–6 g) of salt in 1 cup (237 mL) of warm water.      •Keep all follow-up visits as told by your health care provider. This is important.        How is this prevented?    You can lower your risk of developing community-acquired pneumonia by:•Getting the pneumonia vaccine. There are different types and schedules of pneumonia vaccines. Ask your health care provider which option is best for you. Consider getting the pneumonia vaccine if:  •You are older than 65 years of age.      •You are 19–65 years of age and are receiving cancer treatment, have chronic lung disease, or have other medical conditions that affect your immune system. Ask your health care provider if this applies to you.        •Getting your influenza vaccine every year. Ask your health care provider which type of vaccine is best for you.      •Getting regular dental checkups.      •Washing your hands often with soap and water for at least 20 seconds. If soap and water are not available, use hand .        Contact a health care provider if you have:    •A fever.      •Trouble sleeping because you cannot control your cough with cough medicine.        Get help right away if:    •Your shortness of breath becomes worse.      •Your chest pain increases.      •Your sickness becomes worse, especially if you are an older adult or have a weak immune system.      •You cough up blood.      These symptoms may represent a serious problem that is an emergency. Do not wait to see if the symptoms will go away. Get medical help right away. Call your local emergency services (911 in the [place] Do not drive yourself to the hospital.       Summary    •Pneumonia is an infection of the lungs.      •Community-acquired pneumonia develops in people who have not been in the hospital. It can be caused by bacteria, viruses, or fungi.      •This condition may be treated with antibiotics or antiviral medicines.      •Severe pneumonia may require a hospital stay and treatment to help with breathing.      This information is not intended to replace advice given to you by your health care provider. Make sure you discuss any questions you have with your health care provider.

## 2022-04-11 NOTE — ED PROVIDER NOTE - PHYSICAL EXAMINATION
VITAL SIGNS: I have reviewed vital signs  CONSTITUTIONAL:  LETHARGIC  SKIN: Warm, dry, no rash. no diaphoresis, erythema.  HEAD: Normocephalic, atraumatic.  EYES: PERRL, conjunctiva and sclera clear.  ENT: pink & moist mucosa, no pharyngeal erythema  NECK: Supple, non tender. No cervical lymphadenopathy.  CARD: Regular rate and rhythm. No murmurs.   RESP: No wheezes, rales or rhonchi. Good effort, maintaining sats.   ABD:  soft, non-distended, non-tender.   MSK:  Good ROM in upper/lower extremities w/o pain.   NEURO: Alert, oriented. Grossly unremarkable. No focal deficits. No hyperreflexia

## 2022-04-11 NOTE — ED ADULT NURSE NOTE - OBJECTIVE STATEMENT
Patient A&Ox4 complaining of SOB x2 hours.  Report waking up with these sx. Pt lethargic upon RN assessment, falling asleep while answering questions.   Denies ETOH and illicit drug use.  Pt refused to answer further RN questions.  Cardiac monitoring and pulse oximetry initiated, NSR  on monitor, O2 sat 97% on 3L NC. NAD noted, respirations even and unlabored.  Safety precautions in place.  Plan of care explained, pt verbalized understanding.

## 2022-04-11 NOTE — ED PROVIDER NOTE - PATIENT PORTAL LINK FT
You can access the FollowMyHealth Patient Portal offered by [facility] by registering at the following website: [email or web address]. By joining [employer]’s FollowMyHealth portal, you will also be able to view your health information using other applications (apps) compatible with our system. You can access the FollowMyHealth Patient Portal offered by [facility] by registering at the following website: [email or web address]. By joining [employer]’s FollowMyHealth portal, you will also be able to view your health information using other applications (apps) compatible with our system.

## 2022-04-11 NOTE — ED PROVIDER NOTE - CLINICAL SUMMARY MEDICAL DECISION MAKING FREE TEXT BOX
ASSESSMENT:   ZAKIYA BRICEÑO is a 42yo M who presented with SOB. Pt denies any substance use and other symptoms but markedly lethargic and slurring words on exam.     Concerning for MI vs overdose.    PLAN: Screen for ischemia, monitor vitals. Supportive care. Will plan for Narcan if pt develops respiratory depression.     Medications  famotidine Injectable    Studies  Xray Chest 2 Views PA/Lat  12 Lead ECG  Flu With COVID-19 By DARBY  D-Dimer Assay, Quantitative  Serum Pro-Brain Natriuretic Peptide  Magnesium, Serum  Lipase, Serum  Troponin T, Serum  Complete Blood Count + Automated Diff  [facility] Metabolic Panel

## 2022-04-12 ENCOUNTER — EMERGENCY (EMERGENCY)
Facility: HOSPITAL | Age: 42
LOS: 1 days | Discharge: HOME | End: 2022-04-12
Attending: STUDENT IN AN ORGANIZED HEALTH CARE EDUCATION/TRAINING PROGRAM
Payer: MEDICARE

## 2022-04-12 VITALS
HEIGHT: 69 IN | TEMPERATURE: 99 F | SYSTOLIC BLOOD PRESSURE: 180 MMHG | RESPIRATION RATE: 18 BRPM | DIASTOLIC BLOOD PRESSURE: 105 MMHG | WEIGHT: 229.94 LBS | HEART RATE: 70 BPM

## 2022-04-12 DIAGNOSIS — Z96.649 PRESENCE OF UNSPECIFIED ARTIFICIAL HIP JOINT: Chronic | ICD-10-CM

## 2022-04-12 DIAGNOSIS — Z96.653 PRESENCE OF ARTIFICIAL KNEE JOINT, BILATERAL: Chronic | ICD-10-CM

## 2022-04-12 PROCEDURE — 99284 EMERGENCY DEPT VISIT MOD MDM: CPT | Mod: FS

## 2022-04-12 PROCEDURE — 85027 COMPLETE CBC AUTOMATED: CPT

## 2022-04-12 PROCEDURE — 84100 ASSAY OF PHOSPHORUS: CPT

## 2022-04-12 PROCEDURE — 80048 BASIC METABOLIC PNL TOTAL CA: CPT

## 2022-04-12 PROCEDURE — 36415 COLL VENOUS BLD VENIPUNCTURE: CPT

## 2022-04-12 PROCEDURE — 99283 EMERGENCY DEPT VISIT LOW MDM: CPT

## 2022-04-12 PROCEDURE — 83735 ASSAY OF MAGNESIUM: CPT

## 2022-04-12 NOTE — ED ADULT TRIAGE NOTE - CHIEF COMPLAINT QUOTE
pt c/o abdominal pain, pt states he was sent by his MD office because his kidneys are failing . pt was seen at [facility] last week and left AMA.

## 2022-04-12 NOTE — ED PROVIDER NOTE - PHYSICAL EXAMINATION
Gen: Nontoxic, well appearing, in NAD.  Skin: Warm and dry as visualized.  Head: NC/AT.  Eyes: PERRLA. EOMI.  Neck: Supple, FROM. Trachea midline.   Resp: No distress.  Cardio: Well perfused.  Abd: Nondistended. Soft, nontender.   Ext: No deformities. MAEx4. FROM.   Neuro: A&Ox3. Appears nonfocal.   Psych: Normal affect and mood.

## 2022-04-12 NOTE — ED PROVIDER NOTE - IV ALTEPLASE EXCL ABS HIDDEN
Addendum  created 07/04/17 1753 by Connor Colon MD    Anesthesia Intra Blocks edited, Sign clinical note       show

## 2022-04-12 NOTE — ED PROVIDER NOTE - NSFOLLOWUPINSTRUCTIONS_ED_ALL_ED_FT
- Please  your prescription from the pharmacy.   - Please bring all documentation from your ED visit to any related future follow up appointment.  - Please call to schedule follow up appointment with your primary care physician within 24-48 hours.  - Please seek immediate medical attention or return to the ED for any new/worsening, signs/symptoms, or concerns.

## 2022-04-12 NOTE — ED PROVIDER NOTE - PATIENT PORTAL LINK FT
You can access the FollowMyHealth Patient Portal offered by [facility] by registering at the following website: [email or web address]. By joining [employer]’s FollowMyHealth portal, you will also be able to view your health information using other applications (apps) compatible with our system.

## 2022-04-12 NOTE — ED PROVIDER NOTE - CLINICAL SUMMARY MEDICAL DECISION MAKING FREE TEXT BOX
42 yo male PMHx opioid abuse, HTN, stroke, Bipolar disorder presents to ED sent in by PMD for "falling kidneys." No other complaints. Labs yesterday while in ED unremarkable, repeated today and stable. Patient advised to  abx prescribed yesterday for PNA. Patient to be discharged. Patient provided verbal/written discharge instructions and return precautions. Patient expresses understanding and agreement.

## 2022-04-12 NOTE — ED PROVIDER NOTE - OBJECTIVE STATEMENT
42 yo male PMHx opioid abuse, HTN, stroke, Bipolar disorder presents to ED for evaluation. Patient states saw PMD Monday, had blood work done. Received call today and told "my kidneys are failing" and to present to ED. States would not be here if he was not called. Patient evaluated by [facility] ED yesterday for SOB. PE study negative, diagnosed with PNA and discharge on doxycycline, but has not been taking as did not know anything was sent. No SOB.

## 2022-04-12 NOTE — ED PROVIDER NOTE - NS ED ATTENDING STATEMENT MOD
This was a shared visit with the LYSSA. I reviewed and verified the documentation and independently performed the documented:

## 2022-04-13 VITALS — OXYGEN SATURATION: 99 %

## 2022-04-13 LAB
ANION GAP SERPL CALC-SCNC: 11 MMOL/L — SIGNIFICANT CHANGE UP (ref 5–17)
BUN SERPL-MCNC: 21.9 MG/DL — HIGH (ref 8–20)
CALCIUM SERPL-MCNC: 8.9 MG/DL — SIGNIFICANT CHANGE UP (ref 8.6–10.2)
CHLORIDE SERPL-SCNC: 101 MMOL/L — SIGNIFICANT CHANGE UP (ref 98–107)
CO2 SERPL-SCNC: 27 MMOL/L — SIGNIFICANT CHANGE UP (ref 22–29)
CREAT SERPL-MCNC: 1.23 MG/DL — SIGNIFICANT CHANGE UP (ref 0.5–1.3)
EGFR: 75 ML/MIN/1.73M2 — SIGNIFICANT CHANGE UP
GLUCOSE SERPL-MCNC: 103 MG/DL — HIGH (ref 70–99)
HCT VFR BLD CALC: 29.9 % — LOW (ref 39–50)
HGB BLD-MCNC: 10 G/DL — LOW (ref 13–17)
MAGNESIUM SERPL-MCNC: 1.8 MG/DL — SIGNIFICANT CHANGE UP (ref 1.6–2.6)
MCHC RBC-ENTMCNC: 27.5 PG — SIGNIFICANT CHANGE UP (ref 27–34)
MCHC RBC-ENTMCNC: 33.4 GM/DL — SIGNIFICANT CHANGE UP (ref 32–36)
MCV RBC AUTO: 82.1 FL — SIGNIFICANT CHANGE UP (ref 80–100)
PHOSPHATE SERPL-MCNC: 3.4 MG/DL — SIGNIFICANT CHANGE UP (ref 2.4–4.7)
PLATELET # BLD AUTO: 220 K/UL — SIGNIFICANT CHANGE UP (ref 150–400)
POTASSIUM SERPL-MCNC: 4 MMOL/L — SIGNIFICANT CHANGE UP (ref 3.5–5.3)
POTASSIUM SERPL-SCNC: 4 MMOL/L — SIGNIFICANT CHANGE UP (ref 3.5–5.3)
RBC # BLD: 3.64 M/UL — LOW (ref 4.2–5.8)
RBC # FLD: 13.3 % — SIGNIFICANT CHANGE UP (ref 10.3–14.5)
SODIUM SERPL-SCNC: 139 MMOL/L — SIGNIFICANT CHANGE UP (ref 135–145)
WBC # BLD: 5.38 K/UL — SIGNIFICANT CHANGE UP (ref 3.8–10.5)
WBC # FLD AUTO: 5.38 K/UL — SIGNIFICANT CHANGE UP (ref 3.8–10.5)

## 2022-04-22 ENCOUNTER — APPOINTMENT (OUTPATIENT)
Dept: ENDOCRINOLOGY | Facility: CLINIC | Age: 42
End: 2022-04-22

## 2022-04-26 PROCEDURE — 83605 ASSAY OF LACTIC ACID: CPT

## 2022-04-26 PROCEDURE — 84132 ASSAY OF SERUM POTASSIUM: CPT

## 2022-04-26 PROCEDURE — 85018 HEMOGLOBIN: CPT

## 2022-04-26 PROCEDURE — 82947 ASSAY GLUCOSE BLOOD QUANT: CPT

## 2022-04-26 PROCEDURE — 83880 ASSAY OF NATRIURETIC PEPTIDE: CPT

## 2022-04-26 PROCEDURE — 80307 DRUG TEST PRSMV CHEM ANLYZR: CPT

## 2022-04-26 PROCEDURE — 80053 COMPREHEN METABOLIC PANEL: CPT

## 2022-04-26 PROCEDURE — 85014 HEMATOCRIT: CPT

## 2022-04-26 PROCEDURE — 70450 CT HEAD/BRAIN W/O DYE: CPT | Mod: MA

## 2022-04-26 PROCEDURE — 99285 EMERGENCY DEPT VISIT HI MDM: CPT

## 2022-04-26 PROCEDURE — 71045 X-RAY EXAM CHEST 1 VIEW: CPT

## 2022-04-26 PROCEDURE — 84484 ASSAY OF TROPONIN QUANT: CPT

## 2022-04-26 PROCEDURE — 82550 ASSAY OF CK (CPK): CPT

## 2022-04-26 PROCEDURE — 96374 THER/PROPH/DIAG INJ IV PUSH: CPT

## 2022-04-26 PROCEDURE — 82803 BLOOD GASES ANY COMBINATION: CPT

## 2022-04-26 PROCEDURE — 96361 HYDRATE IV INFUSION ADD-ON: CPT

## 2022-04-26 PROCEDURE — 80048 BASIC METABOLIC PNL TOTAL CA: CPT

## 2022-04-26 PROCEDURE — 84295 ASSAY OF SERUM SODIUM: CPT

## 2022-04-26 PROCEDURE — 0225U NFCT DS DNA&RNA 21 SARSCOV2: CPT

## 2022-04-26 PROCEDURE — 82435 ASSAY OF BLOOD CHLORIDE: CPT

## 2022-04-26 PROCEDURE — 36415 COLL VENOUS BLD VENIPUNCTURE: CPT

## 2022-04-26 PROCEDURE — 85025 COMPLETE CBC W/AUTO DIFF WBC: CPT

## 2022-04-26 PROCEDURE — 82330 ASSAY OF CALCIUM: CPT

## 2022-04-26 PROCEDURE — 93005 ELECTROCARDIOGRAM TRACING: CPT

## 2022-05-19 ENCOUNTER — INPATIENT (INPATIENT)
Facility: HOSPITAL | Age: 42
LOS: 0 days | Discharge: LEFT BEFORE DISCHARGE | DRG: 683 | End: 2022-05-20
Attending: STUDENT IN AN ORGANIZED HEALTH CARE EDUCATION/TRAINING PROGRAM | Admitting: INTERNAL MEDICINE
Payer: MEDICARE

## 2022-05-19 VITALS — WEIGHT: 250 LBS | HEIGHT: 69 IN

## 2022-05-19 DIAGNOSIS — Z96.649 PRESENCE OF UNSPECIFIED ARTIFICIAL HIP JOINT: Chronic | ICD-10-CM

## 2022-05-19 DIAGNOSIS — N17.9 ACUTE KIDNEY FAILURE, UNSPECIFIED: ICD-10-CM

## 2022-05-19 DIAGNOSIS — Z96.653 PRESENCE OF ARTIFICIAL KNEE JOINT, BILATERAL: Chronic | ICD-10-CM

## 2022-05-19 LAB
ALBUMIN SERPL ELPH-MCNC: 4.2 G/DL — SIGNIFICANT CHANGE UP (ref 3.3–5.2)
ALP SERPL-CCNC: 109 U/L — SIGNIFICANT CHANGE UP (ref 40–120)
ALT FLD-CCNC: 15 U/L — SIGNIFICANT CHANGE UP
ANION GAP SERPL CALC-SCNC: 13 MMOL/L — SIGNIFICANT CHANGE UP (ref 5–17)
APAP SERPL-MCNC: <3 UG/ML — LOW (ref 10–26)
AST SERPL-CCNC: 18 U/L — SIGNIFICANT CHANGE UP
BASOPHILS # BLD AUTO: 0.05 K/UL — SIGNIFICANT CHANGE UP (ref 0–0.2)
BASOPHILS NFR BLD AUTO: 0.8 % — SIGNIFICANT CHANGE UP (ref 0–2)
BILIRUB SERPL-MCNC: <0.2 MG/DL — LOW (ref 0.4–2)
BUN SERPL-MCNC: 26.4 MG/DL — HIGH (ref 8–20)
CALCIUM SERPL-MCNC: 9.3 MG/DL — SIGNIFICANT CHANGE UP (ref 8.6–10.2)
CHLORIDE SERPL-SCNC: 102 MMOL/L — SIGNIFICANT CHANGE UP (ref 98–107)
CO2 SERPL-SCNC: 25 MMOL/L — SIGNIFICANT CHANGE UP (ref 22–29)
CREAT SERPL-MCNC: 2.6 MG/DL — HIGH (ref 0.5–1.3)
EGFR: 30 ML/MIN/1.73M2 — LOW
EOSINOPHIL # BLD AUTO: 0.36 K/UL — SIGNIFICANT CHANGE UP (ref 0–0.5)
EOSINOPHIL NFR BLD AUTO: 5.9 % — SIGNIFICANT CHANGE UP (ref 0–6)
ETHANOL SERPL-MCNC: <10 MG/DL — SIGNIFICANT CHANGE UP (ref 0–9)
GLUCOSE SERPL-MCNC: 90 MG/DL — SIGNIFICANT CHANGE UP (ref 70–99)
HCT VFR BLD CALC: 33.8 % — LOW (ref 39–50)
HGB BLD-MCNC: 10.9 G/DL — LOW (ref 13–17)
IMM GRANULOCYTES NFR BLD AUTO: 0.3 % — SIGNIFICANT CHANGE UP (ref 0–1.5)
LYMPHOCYTES # BLD AUTO: 1.84 K/UL — SIGNIFICANT CHANGE UP (ref 1–3.3)
LYMPHOCYTES # BLD AUTO: 30 % — SIGNIFICANT CHANGE UP (ref 13–44)
MCHC RBC-ENTMCNC: 27.7 PG — SIGNIFICANT CHANGE UP (ref 27–34)
MCHC RBC-ENTMCNC: 32.2 GM/DL — SIGNIFICANT CHANGE UP (ref 32–36)
MCV RBC AUTO: 85.8 FL — SIGNIFICANT CHANGE UP (ref 80–100)
MONOCYTES # BLD AUTO: 0.5 K/UL — SIGNIFICANT CHANGE UP (ref 0–0.9)
MONOCYTES NFR BLD AUTO: 8.1 % — SIGNIFICANT CHANGE UP (ref 2–14)
NEUTROPHILS # BLD AUTO: 3.37 K/UL — SIGNIFICANT CHANGE UP (ref 1.8–7.4)
NEUTROPHILS NFR BLD AUTO: 54.9 % — SIGNIFICANT CHANGE UP (ref 43–77)
PLATELET # BLD AUTO: 236 K/UL — SIGNIFICANT CHANGE UP (ref 150–400)
POTASSIUM SERPL-MCNC: 4.2 MMOL/L — SIGNIFICANT CHANGE UP (ref 3.5–5.3)
POTASSIUM SERPL-SCNC: 4.2 MMOL/L — SIGNIFICANT CHANGE UP (ref 3.5–5.3)
PROT SERPL-MCNC: 6.6 G/DL — SIGNIFICANT CHANGE UP (ref 6.6–8.7)
RAPID RVP RESULT: SIGNIFICANT CHANGE UP
RBC # BLD: 3.94 M/UL — LOW (ref 4.2–5.8)
RBC # FLD: 14.5 % — SIGNIFICANT CHANGE UP (ref 10.3–14.5)
SALICYLATES SERPL-MCNC: <0.6 MG/DL — LOW (ref 10–20)
SARS-COV-2 RNA SPEC QL NAA+PROBE: SIGNIFICANT CHANGE UP
SODIUM SERPL-SCNC: 140 MMOL/L — SIGNIFICANT CHANGE UP (ref 135–145)
TSH SERPL-MCNC: 1.81 UIU/ML — SIGNIFICANT CHANGE UP (ref 0.27–4.2)
WBC # BLD: 6.14 K/UL — SIGNIFICANT CHANGE UP (ref 3.8–10.5)
WBC # FLD AUTO: 6.14 K/UL — SIGNIFICANT CHANGE UP (ref 3.8–10.5)

## 2022-05-19 PROCEDURE — 99291 CRITICAL CARE FIRST HOUR: CPT

## 2022-05-19 PROCEDURE — 99222 1ST HOSP IP/OBS MODERATE 55: CPT

## 2022-05-19 PROCEDURE — 93010 ELECTROCARDIOGRAM REPORT: CPT

## 2022-05-19 RX ORDER — HALOPERIDOL DECANOATE 100 MG/ML
5 INJECTION INTRAMUSCULAR ONCE
Refills: 0 | Status: COMPLETED | OUTPATIENT
Start: 2022-05-19 | End: 2022-05-19

## 2022-05-19 RX ORDER — SODIUM CHLORIDE 9 MG/ML
1000 INJECTION INTRAMUSCULAR; INTRAVENOUS; SUBCUTANEOUS
Refills: 0 | Status: DISCONTINUED | OUTPATIENT
Start: 2022-05-19 | End: 2022-05-20

## 2022-05-19 RX ORDER — KETAMINE HYDROCHLORIDE 100 MG/ML
400 INJECTION INTRAMUSCULAR; INTRAVENOUS ONCE
Refills: 0 | Status: DISCONTINUED | OUTPATIENT
Start: 2022-05-19 | End: 2022-05-19

## 2022-05-19 RX ORDER — HALOPERIDOL DECANOATE 100 MG/ML
2 INJECTION INTRAMUSCULAR ONCE
Refills: 0 | Status: COMPLETED | OUTPATIENT
Start: 2022-05-19 | End: 2022-05-19

## 2022-05-19 RX ORDER — SODIUM CHLORIDE 9 MG/ML
1000 INJECTION INTRAMUSCULAR; INTRAVENOUS; SUBCUTANEOUS ONCE
Refills: 0 | Status: COMPLETED | OUTPATIENT
Start: 2022-05-19 | End: 2022-05-19

## 2022-05-19 RX ORDER — MIDAZOLAM HYDROCHLORIDE 1 MG/ML
4 INJECTION, SOLUTION INTRAMUSCULAR; INTRAVENOUS ONCE
Refills: 0 | Status: DISCONTINUED | OUTPATIENT
Start: 2022-05-19 | End: 2022-05-19

## 2022-05-19 RX ORDER — HALOPERIDOL DECANOATE 100 MG/ML
2 INJECTION INTRAMUSCULAR EVERY 6 HOURS
Refills: 0 | Status: DISCONTINUED | OUTPATIENT
Start: 2022-05-19 | End: 2022-05-20

## 2022-05-19 RX ADMIN — MIDAZOLAM HYDROCHLORIDE 4 MILLIGRAM(S): 1 INJECTION, SOLUTION INTRAMUSCULAR; INTRAVENOUS at 19:27

## 2022-05-19 RX ADMIN — HALOPERIDOL DECANOATE 2 MILLIGRAM(S): 100 INJECTION INTRAMUSCULAR at 23:03

## 2022-05-19 RX ADMIN — KETAMINE HYDROCHLORIDE 400 MILLIGRAM(S): 100 INJECTION INTRAMUSCULAR; INTRAVENOUS at 23:08

## 2022-05-19 RX ADMIN — HALOPERIDOL DECANOATE 5 MILLIGRAM(S): 100 INJECTION INTRAMUSCULAR at 19:23

## 2022-05-19 RX ADMIN — HALOPERIDOL DECANOATE 5 MILLIGRAM(S): 100 INJECTION INTRAMUSCULAR at 19:38

## 2022-05-19 RX ADMIN — SODIUM CHLORIDE 1000 MILLILITER(S): 9 INJECTION INTRAMUSCULAR; INTRAVENOUS; SUBCUTANEOUS at 21:44

## 2022-05-19 NOTE — H&P ADULT - PROBLEM SELECTOR PLAN 1
unknown drug, per history used handful of pills and later got agitated, will keep on iv fluids, seizure precautions. follow am labs. 1:1.

## 2022-05-19 NOTE — ED PROVIDER NOTE - OBJECTIVE STATEMENT
Patient is a 43 yo male with PMHx opioid abuse, HTN, stroke, bipolar disorder, anxiety BIBEMS after mother called 911 after patient swallowed a handful of pills and developed aggressive behavior. As per EMS patient's mother called 911 after the patient swallowed a handful of his home meds after stating he wanted to cut his wrist; [facility] at bedside states patient was aggressive in the ambulance despite receiving 10 mg IM versed; patient aaox3, aggressive, cursing, spitting in triage, VSS, no signs of trauma, no FND, moving all extremities equally. Denies fevers, chills, dizziness, lightheadedness, dysphagia, dysarthria, diplopia, photophobia, syncope, cough, congestion, SOB, CP, abdominal pain, neck pain, back pain, diarrhea, dysuria, hematuria, hematochezia, hematemesis, n/v, recent travel, sick contacts, leg swelling.

## 2022-05-19 NOTE — ED CDU PROVIDER INITIAL DAY NOTE - OBJECTIVE STATEMENT
Patient is a 41 yo male with PMHx opioid abuse, HTN, stroke, bipolar disorder, anxiety BIBEMS after mother called 911 after patient swallowed a handful of pills and developed aggressive behavior. As per EMS patient's mother called 911 after the patient swallowed a handful of his home meds after stating he wanted to cut his wrist; [facility] at bedside states patient was aggressive in the ambulance despite receiving 10 mg IM versed; patient aaox3, aggressive, cursing, spitting in triage, VSS, no signs of trauma, no FND, moving all extremities equally. Denies fevers, chills, dizziness, lightheadedness, dysphagia, dysarthria, diplopia, photophobia, syncope, cough, congestion, SOB, CP, abdominal pain, neck pain, back pain, diarrhea, dysuria, hematuria, hematochezia, hematemesis, n/v, recent travel, sick contacts, leg swelling.

## 2022-05-19 NOTE — ED ADULT TRIAGE NOTE - CHIEF COMPLAINT QUOTE
from home mother called 911 due to patient taking handfulls of pills unknown substance. pt stating he wants to cut wrists, aggressive in EMS, [facility] at bedside in handcuffs. given versed 10mg IM by EMS. pt cursing and spitting in triage, MD at bedside

## 2022-05-19 NOTE — H&P ADULT - PROBLEM SELECTOR PLAN 2
will keep on iv fluids, npo at this point as gets on and off agitated, avoid nephrotoxic meds, follow am labs.

## 2022-05-19 NOTE — ED PROVIDER NOTE - PHYSICAL EXAMINATION
Constitutional: Well appearing, awake, alert, oriented to person, place, and time/situation and in no apparent distress  ENMT: Airway patent nasal mucosa clear. Mouth with normal mucosa. Throat has no vesicles no oropharyngeal exudates and uvula is midline. No blood in the oropharynx  EYES: clear bilaterally, pupils equal, round and reactive to light  Cardiac: Regular rate, regular rhythm. Heart sounds S1, S2. No murmurs, rubs or gallops. Good capillary refill, 2+ pulses, no peripheral edema  Respiratory: Lungs CTAB, no use of accessory muscles, no crackles, satting 99% on RA in no distress  Gastrointestinal: Abdomen nondistended, non-tender, no rebound guarding or peritoneal signs  Genitourinary: No CVA tenderness, pelvis nontender, bladder nondistended  Musculoskeletal: Spine appears normal, range of motion is not limited, no muscle or joint tenderness  Neurological: Alert and oriented, no focal deficits, no motor or sensory deficits. CN 2-12 intact, PERRLA, EOMI, No FND, moving all extremities equally, sensation intact, No dysmetria, no ataxia, negative heel-shin, 5/5 strength   Skin: Skin normal color for race, warm, dry and intact, No evidence of rash

## 2022-05-19 NOTE — ED PROVIDER NOTE - CLINICAL SUMMARY MEDICAL DECISION MAKING FREE TEXT BOX
Patient is a 41 yo male with PMHx opioid abuse, HTN, stroke, bipolar disorder, anxiety BIBEMS after mother called 911 after patient swallowed a handful of pills and developed aggressive behavior. As per EMS patient's mother called 911 after the patient swallowed a handful of his home meds after stating he wanted to cut his wrist; SCPD at bedside states patient was aggressive in the ambulance despite receiving 10 mg IM versed; patient aaox3, aggressive, cursing, spitting in triage, VSS, no signs of trauma, no FND, moving all extremities equally, lungs CTAB, belly soft nontender, no signs of trauma; patient aggressive, uncooperative. Haldol and Versed for agitation. Toxicology consulted. cbc cmp tsh to r/o electrolyte abnormalities. BAL, acetaminophen, salicylate level, UDS to r/o intoxication. Will continue to monitor.

## 2022-05-19 NOTE — H&P ADULT - HISTORY OF PRESENT ILLNESS
Patient is a 41 yo male with PMHx opioid abuse, HTN, stroke, bipolar disorder, anxiety BIBEMS after mother called 911 after patient swallowed a handful of pills and developed aggressive behavior. As per EMS patient's mother called 911 after the patient swallowed a handful of his home meds after stating he wanted to cut his wrist; SCPD at bedside states patient was aggressive in the ambulance despite receiving 10 mg IM versed; upon arrival patient aaox3 but aggressive, cursing, spitting in triage as per ER physician and resident. VSS, no signs of trauma, non focal deficits, moving all extremities equally. Denies fevers, chills, dizziness, lightheadedness, dysphagia, dysarthria, diplopia, photophobia, syncope, cough, congestion, SOB, CP, abdominal pain, neck pain, back pain, diarrhea, dysuria, hematuria, hematochezia, hematemesis, n/v, sick contacts, leg swelling. Most of the info as per ER chart, at the time of admission pt. again became agitated, is on 1 :1 , security responded , pt. given ketamine IMx 1 dose 400 mg, ER physician and resident at the bedside as well. ER resident has spoke to poison control team upon arrival. pt.'s admission is advised to monitor him for any seizures, iv fluids. ER physician has called psych consult as well.  Patient is a 43 yo male with PMHx opioid abuse, HTN, stroke, bipolar disorder, anxiety BIBEMS after mother called 911 after patient swallowed a handful of pills and developed aggressive behavior. As per EMS patient's mother called 911 after the patient swallowed a handful of his home meds after stating he wanted to cut his wrist; SCPD at bedside states patient was aggressive in the ambulance despite receiving 10 mg IM versed; upon arrival patient aaox3 but aggressive, cursing, spitting in triage as per ER physician and resident. VSS, no signs of trauma, non focal deficits, moving all extremities equally. Denies fevers, chills, dizziness, lightheadedness, dysphagia, dysarthria, diplopia, photophobia, syncope, cough, congestion, SOB, CP, abdominal pain, neck pain, back pain, diarrhea, dysuria, hematuria, hematochezia, hematemesis, n/v, sick contacts, leg swelling. Most of the info as per ER chart, at the time of admission pt. answered few questions, let me do exam, stated that he lives with his parents but not sure what meds he used, shortly after that pt. became agitated agian. pt.  is on 1 :1 , security responded , pt. given ketamine IMx 1 dose 400 mg, ER physician and resident at the bedside as well. ER resident has spoke to poison control team upon arrival. pt.'s admission is advised to monitor him for any seizures, iv fluids. ER physician has called psych consult as well.

## 2022-05-19 NOTE — ED PROVIDER NOTE - CARE PLAN
1 Principal Discharge DX:	Agitation   Principal Discharge DX:	Acute renal failure (ARF)  Secondary Diagnosis:	Drug overdose
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
## 2022-05-19 NOTE — ED PROVIDER NOTE - PROGRESS NOTE DETAILS
JK - Toxicology consulted, recommended admission for further monitoring.  consulted for SI and overdose. Patient found to be in acute renalf failure with Cr. 2.60. 1L fluids given. VSS, resting comfortably at this time. UA/UDS pending. Admitted to medicine for further monitoring and ARF.

## 2022-05-19 NOTE — H&P ADULT - PROBLEM SELECTOR PLAN 3
1:1 , haldol prn, psych consult pending. ct head to follow. 1:1 , haldol prn, psych consult pending. any fall after over dose ? ct head to follow.

## 2022-05-19 NOTE — ED CDU PROVIDER INITIAL DAY NOTE - MEDICAL DECISION MAKING DETAILS
pt with SI/agitation. required meds for agitation. observe and medical clearance, psych to see after observation period.

## 2022-05-19 NOTE — H&P ADULT - ASSESSMENT
Patient is a 41 yo male with PMHx opioid abuse, HTN, stroke, bipolar disorder, anxiety BIBEMS after mother called 911 after patient swallowed a handful of pills and developed aggressive behavior. As per EMS patient's mother called 911 after the patient swallowed a handful of his home meds after stating he wanted to cut his wrist; SCPD at bedside states patient was aggressive in the ambulance despite receiving 10 mg IM versed; upon arrival patient aaox3 but aggressive, cursing, spitting in triage as per ER physician and resident. VSS, no signs of trauma, non focal deficits, moving all extremities equally. Denies fevers, chills, dizziness, lightheadedness, dysphagia, dysarthria, diplopia, photophobia, syncope, cough, congestion, SOB, CP, abdominal pain, neck pain, back pain, diarrhea, dysuria, hematuria, hematochezia, hematemesis, n/v, sick contacts, leg swelling. Most of the info as per ER chart, at the time of admission pt. answered few questions, let me do exam, stated that he lives with his parents but not sure what meds he used, shortly after that pt. became agitated agian. pt.  is on 1 :1 , security responded , pt. given ketamine IMx 1 dose 400 mg, ER physician and resident at the bedside as well. ER resident has spoke to poison control team upon arrival. pt.'s admission is advised to monitor him for any seizures, iv fluids. ER physician has called psych consult as well.

## 2022-05-19 NOTE — H&P ADULT - NSHPPHYSICALEXAM_GEN_ALL_CORE
Vital Signs Last 24 Hrs  T(C): 36.6 (19 May 2022 21:41), Max: 36.6 (19 May 2022 21:41)  T(F): 97.8 (19 May 2022 21:41), Max: 97.8 (19 May 2022 21:41)  HR: 69 (19 May 2022 21:41) (69 - 87)  BP: 125/72 (19 May 2022 21:41) (99/66 - 125/72)  BP(mean): --  RR: 20 (19 May 2022 21:41) (18 - 20)  SpO2: 97% (19 May 2022 21:41) (97% - 98%)    General: pt. in bed somewhat sedated but answering questions slowly.   HEENT: AT, NC. PERRL. intact EOM. no throat erythema or exudate.   Neck: supple. no JVD.   Chest: CTA bilaterally  Heart: S1,S2. RRR. no heart murmur. no edema.   Abdomen: soft. pt. not in pain on abd. palpation, + BS.   Ext: no calf tenderness appreciated, distal pulses 2 +  Neuro: AAO x3. non focal, moves all his ext. fully when asked to do so.   Skin: warm and dry, multiple tattoos.   psych : gets agitated and tries to jump oob.

## 2022-05-20 VITALS
HEART RATE: 61 BPM | DIASTOLIC BLOOD PRESSURE: 89 MMHG | OXYGEN SATURATION: 96 % | SYSTOLIC BLOOD PRESSURE: 151 MMHG | RESPIRATION RATE: 18 BRPM | TEMPERATURE: 98 F

## 2022-05-20 DIAGNOSIS — F11.20 OPIOID DEPENDENCE, UNCOMPLICATED: ICD-10-CM

## 2022-05-20 DIAGNOSIS — R45.1 RESTLESSNESS AND AGITATION: ICD-10-CM

## 2022-05-20 DIAGNOSIS — N17.9 ACUTE KIDNEY FAILURE, UNSPECIFIED: ICD-10-CM

## 2022-05-20 DIAGNOSIS — F31.9 BIPOLAR DISORDER, UNSPECIFIED: ICD-10-CM

## 2022-05-20 DIAGNOSIS — T50.901A POISONING BY UNSPECIFIED DRUGS, MEDICAMENTS AND BIOLOGICAL SUBSTANCES, ACCIDENTAL (UNINTENTIONAL), INITIAL ENCOUNTER: ICD-10-CM

## 2022-05-20 LAB
ALBUMIN SERPL ELPH-MCNC: 3.9 G/DL — SIGNIFICANT CHANGE UP (ref 3.3–5.2)
ALP SERPL-CCNC: 105 U/L — SIGNIFICANT CHANGE UP (ref 40–120)
ALT FLD-CCNC: 13 U/L — SIGNIFICANT CHANGE UP
ANION GAP SERPL CALC-SCNC: 13 MMOL/L — SIGNIFICANT CHANGE UP (ref 5–17)
AST SERPL-CCNC: 26 U/L — SIGNIFICANT CHANGE UP
BASOPHILS # BLD AUTO: 0.06 K/UL — SIGNIFICANT CHANGE UP (ref 0–0.2)
BASOPHILS NFR BLD AUTO: 1.1 % — SIGNIFICANT CHANGE UP (ref 0–2)
BILIRUB SERPL-MCNC: 0.2 MG/DL — LOW (ref 0.4–2)
BUN SERPL-MCNC: 24.3 MG/DL — HIGH (ref 8–20)
CALCIUM SERPL-MCNC: 8.9 MG/DL — SIGNIFICANT CHANGE UP (ref 8.6–10.2)
CHLORIDE SERPL-SCNC: 108 MMOL/L — HIGH (ref 98–107)
CO2 SERPL-SCNC: 24 MMOL/L — SIGNIFICANT CHANGE UP (ref 22–29)
CREAT SERPL-MCNC: 1.87 MG/DL — HIGH (ref 0.5–1.3)
EGFR: 45 ML/MIN/1.73M2 — LOW
EOSINOPHIL # BLD AUTO: 0.28 K/UL — SIGNIFICANT CHANGE UP (ref 0–0.5)
EOSINOPHIL NFR BLD AUTO: 5.2 % — SIGNIFICANT CHANGE UP (ref 0–6)
GLUCOSE SERPL-MCNC: 122 MG/DL — HIGH (ref 70–99)
HCT VFR BLD CALC: 32.9 % — LOW (ref 39–50)
HGB BLD-MCNC: 10.4 G/DL — LOW (ref 13–17)
IMM GRANULOCYTES NFR BLD AUTO: 0.4 % — SIGNIFICANT CHANGE UP (ref 0–1.5)
LYMPHOCYTES # BLD AUTO: 1.72 K/UL — SIGNIFICANT CHANGE UP (ref 1–3.3)
LYMPHOCYTES # BLD AUTO: 32 % — SIGNIFICANT CHANGE UP (ref 13–44)
MCHC RBC-ENTMCNC: 27.5 PG — SIGNIFICANT CHANGE UP (ref 27–34)
MCHC RBC-ENTMCNC: 31.6 GM/DL — LOW (ref 32–36)
MCV RBC AUTO: 87 FL — SIGNIFICANT CHANGE UP (ref 80–100)
MONOCYTES # BLD AUTO: 0.37 K/UL — SIGNIFICANT CHANGE UP (ref 0–0.9)
MONOCYTES NFR BLD AUTO: 6.9 % — SIGNIFICANT CHANGE UP (ref 2–14)
NEUTROPHILS # BLD AUTO: 2.92 K/UL — SIGNIFICANT CHANGE UP (ref 1.8–7.4)
NEUTROPHILS NFR BLD AUTO: 54.4 % — SIGNIFICANT CHANGE UP (ref 43–77)
PLATELET # BLD AUTO: 206 K/UL — SIGNIFICANT CHANGE UP (ref 150–400)
POTASSIUM SERPL-MCNC: 4.4 MMOL/L — SIGNIFICANT CHANGE UP (ref 3.5–5.3)
POTASSIUM SERPL-SCNC: 4.4 MMOL/L — SIGNIFICANT CHANGE UP (ref 3.5–5.3)
PROT SERPL-MCNC: 6.4 G/DL — LOW (ref 6.6–8.7)
RBC # BLD: 3.78 M/UL — LOW (ref 4.2–5.8)
RBC # FLD: 14.7 % — HIGH (ref 10.3–14.5)
SODIUM SERPL-SCNC: 145 MMOL/L — SIGNIFICANT CHANGE UP (ref 135–145)
WBC # BLD: 5.37 K/UL — SIGNIFICANT CHANGE UP (ref 3.8–10.5)
WBC # FLD AUTO: 5.37 K/UL — SIGNIFICANT CHANGE UP (ref 3.8–10.5)

## 2022-05-20 PROCEDURE — 80053 COMPREHEN METABOLIC PANEL: CPT

## 2022-05-20 PROCEDURE — 84443 ASSAY THYROID STIM HORMONE: CPT

## 2022-05-20 PROCEDURE — 85025 COMPLETE CBC W/AUTO DIFF WBC: CPT

## 2022-05-20 PROCEDURE — 93005 ELECTROCARDIOGRAM TRACING: CPT

## 2022-05-20 PROCEDURE — 70450 CT HEAD/BRAIN W/O DYE: CPT | Mod: 26

## 2022-05-20 PROCEDURE — 99285 EMERGENCY DEPT VISIT HI MDM: CPT | Mod: 25

## 2022-05-20 PROCEDURE — 99223 1ST HOSP IP/OBS HIGH 75: CPT

## 2022-05-20 PROCEDURE — 36415 COLL VENOUS BLD VENIPUNCTURE: CPT

## 2022-05-20 PROCEDURE — 70450 CT HEAD/BRAIN W/O DYE: CPT

## 2022-05-20 PROCEDURE — 99233 SBSQ HOSP IP/OBS HIGH 50: CPT

## 2022-05-20 PROCEDURE — 96372 THER/PROPH/DIAG INJ SC/IM: CPT

## 2022-05-20 PROCEDURE — 0225U NFCT DS DNA&RNA 21 SARSCOV2: CPT

## 2022-05-20 PROCEDURE — 80307 DRUG TEST PRSMV CHEM ANLYZR: CPT

## 2022-05-20 RX ORDER — SODIUM CHLORIDE 9 MG/ML
1000 INJECTION INTRAMUSCULAR; INTRAVENOUS; SUBCUTANEOUS
Refills: 0 | Status: DISCONTINUED | OUTPATIENT
Start: 2022-05-20 | End: 2022-05-20

## 2022-05-20 RX ADMIN — SODIUM CHLORIDE 125 MILLILITER(S): 9 INJECTION INTRAMUSCULAR; INTRAVENOUS; SUBCUTANEOUS at 00:30

## 2022-05-20 NOTE — BH CONSULTATION LIAISON ASSESSMENT NOTE - HPI (INCLUDE ILLNESS QUALITY, SEVERITY, DURATION, TIMING, CONTEXT, MODIFYING FACTORS, ASSOCIATED SIGNS AND SYMPTOMS)
Father called 911 as he was "abusing his medications" for last several days Taking handfuls of various pills causing him to be altered At one point passing out on sofa Patient admits to obtaining Xanax but denies use of illicit drugs Mother contact for safety assessment She describes escalating substance use She denies he was aggressive to his parents just verbally loud "in my face" He denies making any suicidal threats Outpatient treatment via [facility] including antidepressants and Suboxone

## 2022-05-20 NOTE — CONSULT NOTE ADULT - ASSESSMENT
41yo male with PMHx opioid abuse, HTN, stroke, bipolar disorder, anxiety, pw ingestion of Rx medication this morning.     Recommendations  - patient is awake, alert, aggressive and refusing to provide more information about his overdose.   - Patient has normal vitals, EKG, and is moving all extremities, 3mm pupils, not confused or hallucinating.   - Based on patient's medication list, he is potentially at risk of developing delayed seizures from bupropion, NMS, serotonin syndrome, hypotension, bradycardia, electrolyte disturbance.   - Recommend 24 hour monitoring on telemetry for symptoms mentioned above.  - supportive treatement, with benzodiazepines for agitation.   - If patient continues to have stable vitals, normal EKG and normal physical exam 24 houris after ingestion, patient can be cleared from a toxicological standpoint.   - Please notify toxicology service for any acute change in patient status (seizure, hemodynamic instability fever, ams).   - discussed with attending  Thank you for this consult    Toxicology consults: [phone]

## 2022-05-20 NOTE — PROGRESS NOTE ADULT - ASSESSMENT
41 yo male with PMHx opioid abuse, HTN, stroke, bipolar disorder, anxiety BIBEMS after mother called 911 after patient swallowed a handful of pills and developed aggressive behavior.      #Drug overdose.   unknown drug  per history used handful of pills and later got agitated  C/w ivf  Seizure precautions  1:1  Toxicology recs appreciated  Hold any meds that can increase risk of seizure  Psych consult    #SI  1:1  Psych consult requested  Currently denies SI to me     #LIV (acute kidney injury).   IVF    #Mood disorder  Hold meds until 24 hours    Dispo: Pending course

## 2022-05-20 NOTE — BH CONSULTATION LIAISON ASSESSMENT NOTE - CURRENT MEDICATION
MEDICATIONS  (STANDING):  sodium chloride 0.9%. 1000 milliLiter(s) (125 mL/Hr) IV Continuous <Continuous>  sodium chloride 0.9%. 1000 milliLiter(s) (125 mL/Hr) IV Continuous <Continuous>    MEDICATIONS  (PRN):  haloperidol    Injectable 2 milliGRAM(s) IntraMuscular every 6 hours PRN Agitation

## 2022-05-20 NOTE — BH CONSULTATION LIAISON ASSESSMENT NOTE - OTHER PAST PSYCHIATRIC HISTORY (INCLUDE DETAILS REGARDING ONSET, COURSE OF ILLNESS, INPATIENT/OUTPATIENT TREATMENT)
bipolar disorder  several past admissions  outpatient provider [facility] [place]

## 2022-05-20 NOTE — CHART NOTE - NSCHARTNOTEFT_GEN_A_CORE
Called by RN, as patient wishes to leave AMA. Spoke with psych who states that patient is psychiatrically cleared for discharge. Patient seen at bedside to further discuss plan of care with psychiatry. Discussed risks of leaving against medical advice, which includes worsening of current medical condition, up to and including death. Patient is alert and oriented to make decisions. Patient understands risks and still wishes to leave. AMA form was filled as per patient's wishses.

## 2022-05-20 NOTE — PROGRESS NOTE ADULT - SUBJECTIVE AND OBJECTIVE BOX
Hospitalist Daily Progress Note    Chief Complaint:  Patient is a 42y old  Male who presents with a chief complaint of drug over dose /LIV (20 May 2022 11:14)      SUBJECTIVE / OVERNIGHT EVENTS:  Patient was seen and examined at bedside. 1:1 at bedside. Today he denies it was an SI. States that he does not have any intention for SI or HI. No active complaints. Wants to go home.   Patient denies chest pain, SOB, abd pain, N/V, fever, chills, dysuria or any other complaints. All remainder ROS negative.     MEDICATIONS  (STANDING):  sodium chloride 0.9%. 1000 milliLiter(s) (125 mL/Hr) IV Continuous <Continuous>  sodium chloride 0.9%. 1000 milliLiter(s) (125 mL/Hr) IV Continuous <Continuous>    MEDICATIONS  (PRN):  haloperidol    Injectable 2 milliGRAM(s) IntraMuscular every 6 hours PRN Agitation        I&O's Summary    19 May 2022 07:01  -  20 May 2022 07:00  --------------------------------------------------------  IN: 700 mL / OUT: 0 mL / NET: 700 mL        PHYSICAL EXAM:  Vital Signs Last 24 Hrs  T(C): 36.4 (20 May 2022 11:07), Max: 36.6 (19 May 2022 21:41)  T(F): 97.6 (20 May 2022 11:07), Max: 97.8 (19 May 2022 21:41)  HR: 61 (20 May 2022 11:07) (53 - 87)  BP: 151/89 (20 May 2022 11:07) (99/66 - 158/105)  BP(mean): --  RR: 18 (20 May 2022 11:07) (18 - 20)  SpO2: 96% (20 May 2022 11:07) (96% - 98%)      Constitutional: NAD, Resting, 1:1 at bedside  ENT: Supple, No JVD  Lungs: CTA B/L, Non-labored breathing  Cardio: RRR, S1/S2, No murmur  Abdomen: Soft, Nontender, Nondistended; Bowel sounds present  Extremities: No calf tenderness, No pitting edema  Musculoskeletal:   No clubbing or cyanosis of digits; no joint swelling or tenderness to palpation  Psych: Calm, cooperative affect appropriate  Neuro: Awake and alert, oriented x4   Skin: No rashes; no palpable lesions    LABS:                        10.4   5.37  )-----------( 206      ( 20 May 2022 03:02 )             32.9     05-20    145  |  108<H>  |  24.3<H>  ----------------------------<  122<H>  4.4   |  24.0  |  1.87<H>    Ca    8.9      20 May 2022 03:02    TPro  6.4<L>  /  Alb  3.9  /  TBili  0.2<L>  /  DBili  x   /  AST  26  /  ALT  13  /  AlkPhos  105  05-20              CAPILLARY BLOOD GLUCOSE            RADIOLOGY REVIEWED

## 2022-05-20 NOTE — DISCHARGE NOTE PROVIDER - HOSPITAL COURSE
41 yo male with PMHx opioid abuse, HTN, stroke, bipolar disorder, anxiety BIBEMS after mother called 911 after patient swallowed a handful of pills and developed aggressive behavior. Seen by toxicology who recommended assessment and evaluation for at least 24 hours. Seen by Psych who cleared patient for discharge. Still needed to be observed as per toxicology. Patient did not want to stay and wanted to sign out AMA.     Called by RN, as patient wishes to leave AMA. Patient seen at bedside to further discuss plan of care. Discussed risks of leaving against medical advice, which includes worsening of current medical condition, up to and including death. Patient is alert and oriented to make decisions. Patient understands risks and still wishes to leave. AMA form was filled as per patient's wishses.

## 2022-05-20 NOTE — DISCHARGE NOTE PROVIDER - NSDCMRMEDTOKEN_GEN_ALL_CORE_FT
ALPRAZolam 1 mg oral tablet: 1 tab(s) orally 2 times a day, As Needed  cloNIDine 0.3 mg oral tablet: 1 tab(s) orally 4 times a day  doxycycline hyclate 100 mg oral tablet: 1 tab(s) orally 2 times a day   gabapentin 800 mg oral tablet: 1 tab(s) orally every 6 hours  labetalol 300 mg oral tablet: 1 tab(s) orally 2 times a day  LaMICtal 150 mg oral tablet: 1 tab(s) orally 2 times a day  losartan 100 mg oral tablet: 1 tab(s) orally once a day  pantoprazole 40 mg oral delayed release tablet: 1 tab(s) orally once a day (before a meal)  QUEtiapine 400 mg oral tablet: 1 tab(s) orally 2 times a day  risperiDONE 3 mg oral tablet: 1 tab(s) orally once a day  Suboxone 8 mg-2 mg sublingual film: 1 film(s) sublingual 3 times a day  Ventolin HFA 90 mcg/inh inhalation aerosol: 2 puff(s) inhaled every 6 hours   Wellbutrin  mg/24 hours oral tablet, extended release: 1 tab(s) orally every 24 hours

## 2022-05-20 NOTE — BH CONSULTATION LIAISON ASSESSMENT NOTE - NSBHCHARTREVIEWVS_PSY_A_CORE FT
Vital Signs Last 24 Hrs  T(C): 36.4 (20 May 2022 11:07), Max: 36.6 (19 May 2022 21:41)  T(F): 97.6 (20 May 2022 11:07), Max: 97.8 (19 May 2022 21:41)  HR: 61 (20 May 2022 11:07) (53 - 87)  BP: 151/89 (20 May 2022 11:07) (99/66 - 158/105)  BP(mean): --  RR: 18 (20 May 2022 11:07) (18 - 20)  SpO2: 96% (20 May 2022 11:07) (96% - 98%)

## 2022-05-20 NOTE — BH CONSULTATION LIAISON ASSESSMENT NOTE - ADDITIONAL PSYCHIATRIC MEDICATIONS
ALPRAZolam 1 mg oral tablet: 1 tab(s) orally 2 times a day, As Needed (09 Apr 2022 10:18)  cloNIDine 0.3 mg oral tablet: 1 tab(s) orally 4 times a day (09 Apr 2022 10:18)  gabapentin 800 mg oral tablet: 1 tab(s) orally every 6 hours (09 Apr 2022 10:18)  labetalol 300 mg oral tablet: 1 tab(s) orally 2 times a day (09 Apr 2022 10:18)  LaMICtal 150 mg oral tablet: 1 tab(s) orally 2 times a day (09 Apr 2022 10:18)  pantoprazole 40 mg oral delayed release tablet: 1 tab(s) orally once a day (before a meal) (09 Apr 2022 10:18)  QUEtiapine 400 mg oral tablet: 1 tab(s) orally 2 times a day (09 Apr 2022 10:18)  risperiDONE 3 mg oral tablet: 1 tab(s) orally once a day (09 Apr 2022 10:18)  Suboxone 8 mg-2 mg sublingual film: 1 film(s) sublingual 3 times a day (09 Apr 2022 10:18)  Wellbutrin  mg/24 hours oral tablet, extended release: 1 tab(s) orally every 24 hours (09 Apr 2022 10:18)
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
## 2022-05-20 NOTE — BH CONSULTATION LIAISON ASSESSMENT NOTE - NSACTIVEVENT_PSY_ALL_CORE
recent detox reported at [facility]/Current or pending social isolation/Inadequate social supports

## 2022-05-20 NOTE — BH CONSULTATION LIAISON ASSESSMENT NOTE - SUMMARY
patient presents highly agitated and altered after unspecific abuse of his psychiatric medications He has an extensive history of substance abuse numerous treatment efforts including [facility] On buprenorphine for opioid use disorder On complex and somewhat concerning polypharmacy for Bipolar disorder He is presently at baseline Not overtly psychotic or altered Not expressing any suicidal or aggressive tendencies

## 2022-05-21 ENCOUNTER — EMERGENCY (EMERGENCY)
Facility: HOSPITAL | Age: 42
LOS: 1 days | Discharge: HOME | End: 2022-05-21
Attending: STUDENT IN AN ORGANIZED HEALTH CARE EDUCATION/TRAINING PROGRAM
Payer: MEDICARE

## 2022-05-21 VITALS
OXYGEN SATURATION: 99 % | TEMPERATURE: 98 F | DIASTOLIC BLOOD PRESSURE: 75 MMHG | WEIGHT: 210.1 LBS | HEIGHT: 69 IN | HEART RATE: 69 BPM | SYSTOLIC BLOOD PRESSURE: 119 MMHG | RESPIRATION RATE: 16 BRPM

## 2022-05-21 VITALS
HEART RATE: 70 BPM | OXYGEN SATURATION: 99 % | RESPIRATION RATE: 19 BRPM | TEMPERATURE: 98 F | SYSTOLIC BLOOD PRESSURE: 122 MMHG | DIASTOLIC BLOOD PRESSURE: 81 MMHG

## 2022-05-21 DIAGNOSIS — Z96.649 PRESENCE OF UNSPECIFIED ARTIFICIAL HIP JOINT: Chronic | ICD-10-CM

## 2022-05-21 DIAGNOSIS — Z96.653 PRESENCE OF ARTIFICIAL KNEE JOINT, BILATERAL: Chronic | ICD-10-CM

## 2022-05-21 LAB
AMPHET UR-MCNC: NEGATIVE — SIGNIFICANT CHANGE UP
ANION GAP SERPL CALC-SCNC: 12 MMOL/L — SIGNIFICANT CHANGE UP (ref 5–17)
APAP SERPL-MCNC: <3 UG/ML — LOW (ref 10–26)
APPEARANCE UR: CLEAR — SIGNIFICANT CHANGE UP
BACTERIA # UR AUTO: NEGATIVE — SIGNIFICANT CHANGE UP
BARBITURATES UR SCN-MCNC: NEGATIVE — SIGNIFICANT CHANGE UP
BASOPHILS # BLD AUTO: 0.04 K/UL — SIGNIFICANT CHANGE UP (ref 0–0.2)
BASOPHILS NFR BLD AUTO: 0.8 % — SIGNIFICANT CHANGE UP (ref 0–2)
BENZODIAZ UR-MCNC: POSITIVE
BILIRUB UR-MCNC: NEGATIVE — SIGNIFICANT CHANGE UP
BUN SERPL-MCNC: 21.9 MG/DL — HIGH (ref 8–20)
CALCIUM SERPL-MCNC: 9.7 MG/DL — SIGNIFICANT CHANGE UP (ref 8.6–10.2)
CHLORIDE SERPL-SCNC: 104 MMOL/L — SIGNIFICANT CHANGE UP (ref 98–107)
CO2 SERPL-SCNC: 23 MMOL/L — SIGNIFICANT CHANGE UP (ref 22–29)
COCAINE METAB.OTHER UR-MCNC: NEGATIVE — SIGNIFICANT CHANGE UP
COLOR SPEC: YELLOW — SIGNIFICANT CHANGE UP
CREAT SERPL-MCNC: 1.38 MG/DL — HIGH (ref 0.5–1.3)
DIFF PNL FLD: NEGATIVE — SIGNIFICANT CHANGE UP
EGFR: 65 ML/MIN/1.73M2 — SIGNIFICANT CHANGE UP
EOSINOPHIL # BLD AUTO: 0.25 K/UL — SIGNIFICANT CHANGE UP (ref 0–0.5)
EOSINOPHIL NFR BLD AUTO: 5.2 % — SIGNIFICANT CHANGE UP (ref 0–6)
EPI CELLS # UR: NEGATIVE — SIGNIFICANT CHANGE UP
ETHANOL SERPL-MCNC: <10 MG/DL — SIGNIFICANT CHANGE UP (ref 0–9)
GLUCOSE SERPL-MCNC: 117 MG/DL — HIGH (ref 70–99)
GLUCOSE UR QL: NEGATIVE MG/DL — SIGNIFICANT CHANGE UP
HCT VFR BLD CALC: 32.4 % — LOW (ref 39–50)
HGB BLD-MCNC: 10.6 G/DL — LOW (ref 13–17)
IMM GRANULOCYTES NFR BLD AUTO: 0.4 % — SIGNIFICANT CHANGE UP (ref 0–1.5)
KETONES UR-MCNC: NEGATIVE — SIGNIFICANT CHANGE UP
LEUKOCYTE ESTERASE UR-ACNC: ABNORMAL
LYMPHOCYTES # BLD AUTO: 1.14 K/UL — SIGNIFICANT CHANGE UP (ref 1–3.3)
LYMPHOCYTES # BLD AUTO: 23.7 % — SIGNIFICANT CHANGE UP (ref 13–44)
MCHC RBC-ENTMCNC: 27.5 PG — SIGNIFICANT CHANGE UP (ref 27–34)
MCHC RBC-ENTMCNC: 32.7 GM/DL — SIGNIFICANT CHANGE UP (ref 32–36)
MCV RBC AUTO: 84.2 FL — SIGNIFICANT CHANGE UP (ref 80–100)
METHADONE UR-MCNC: NEGATIVE — SIGNIFICANT CHANGE UP
MONOCYTES # BLD AUTO: 0.3 K/UL — SIGNIFICANT CHANGE UP (ref 0–0.9)
MONOCYTES NFR BLD AUTO: 6.2 % — SIGNIFICANT CHANGE UP (ref 2–14)
NEUTROPHILS # BLD AUTO: 3.07 K/UL — SIGNIFICANT CHANGE UP (ref 1.8–7.4)
NEUTROPHILS NFR BLD AUTO: 63.7 % — SIGNIFICANT CHANGE UP (ref 43–77)
NITRITE UR-MCNC: NEGATIVE — SIGNIFICANT CHANGE UP
OPIATES UR-MCNC: NEGATIVE — SIGNIFICANT CHANGE UP
PCP SPEC-MCNC: SIGNIFICANT CHANGE UP
PCP UR-MCNC: NEGATIVE — SIGNIFICANT CHANGE UP
PH UR: 7 — SIGNIFICANT CHANGE UP (ref 5–8)
PLATELET # BLD AUTO: 213 K/UL — SIGNIFICANT CHANGE UP (ref 150–400)
POTASSIUM SERPL-MCNC: 4.6 MMOL/L — SIGNIFICANT CHANGE UP (ref 3.5–5.3)
POTASSIUM SERPL-SCNC: 4.6 MMOL/L — SIGNIFICANT CHANGE UP (ref 3.5–5.3)
PROT UR-MCNC: NEGATIVE — SIGNIFICANT CHANGE UP
RAPID RVP RESULT: SIGNIFICANT CHANGE UP
RBC # BLD: 3.85 M/UL — LOW (ref 4.2–5.8)
RBC # FLD: 14.6 % — HIGH (ref 10.3–14.5)
RBC CASTS # UR COMP ASSIST: NEGATIVE /HPF — SIGNIFICANT CHANGE UP (ref 0–4)
SALICYLATES SERPL-MCNC: <0.6 MG/DL — LOW (ref 10–20)
SARS-COV-2 RNA SPEC QL NAA+PROBE: SIGNIFICANT CHANGE UP
SODIUM SERPL-SCNC: 139 MMOL/L — SIGNIFICANT CHANGE UP (ref 135–145)
SP GR SPEC: 1.01 — SIGNIFICANT CHANGE UP (ref 1.01–1.02)
THC UR QL: NEGATIVE — SIGNIFICANT CHANGE UP
UROBILINOGEN FLD QL: NEGATIVE MG/DL — SIGNIFICANT CHANGE UP
WBC # BLD: 4.82 K/UL — SIGNIFICANT CHANGE UP (ref 3.8–10.5)
WBC # FLD AUTO: 4.82 K/UL — SIGNIFICANT CHANGE UP (ref 3.8–10.5)
WBC UR QL: SIGNIFICANT CHANGE UP /HPF (ref 0–5)

## 2022-05-21 PROCEDURE — 80048 BASIC METABOLIC PNL TOTAL CA: CPT

## 2022-05-21 PROCEDURE — 36415 COLL VENOUS BLD VENIPUNCTURE: CPT

## 2022-05-21 PROCEDURE — 99284 EMERGENCY DEPT VISIT MOD MDM: CPT

## 2022-05-21 PROCEDURE — 81001 URINALYSIS AUTO W/SCOPE: CPT

## 2022-05-21 PROCEDURE — 0225U NFCT DS DNA&RNA 21 SARSCOV2: CPT

## 2022-05-21 PROCEDURE — 85025 COMPLETE CBC W/AUTO DIFF WBC: CPT

## 2022-05-21 PROCEDURE — 99284 EMERGENCY DEPT VISIT MOD MDM: CPT | Mod: 25

## 2022-05-21 PROCEDURE — 80307 DRUG TEST PRSMV CHEM ANLYZR: CPT

## 2022-05-21 NOTE — ED ADULT TRIAGE NOTE - CHIEF COMPLAINT QUOTE
pt sent in from [facility] for medical clearance, pt seeking detox from benzos, states he last used two days ago.

## 2022-05-21 NOTE — ED PROVIDER NOTE - CLINICAL SUMMARY MEDICAL DECISION MAKING FREE TEXT BOX
Patient is a 43 yo male with PMHx opioid abuse, HTN, stroke, bipolar disorder, anxiety, recent admission for overdose and ARF, requesting detoxification at [facility]. Patient left AMA from [facility] on 5/19 after being admitted s/p overdose for further monitoring; patient went home and presented to [facility] today requesting detox but was sent to the ED for medical clearance. Currently CIWA 1 for anxiety, no n/v, HA, diaphoresis, hallucinations, delusions, tremors, denies SI/HI. Patient last used benzos yesterday, VSS, aaox3, no focal deficits, calm, cooperative. Will consult  and contact [facility].

## 2022-05-21 NOTE — ED ADULT NURSE NOTE - CHIEF COMPLAINT QUOTE
pt sent in from [facility] for medical clearance, pt seeking detox from benzos, states he last used two days ago.

## 2022-05-21 NOTE — ED PROVIDER NOTE - OBJECTIVE STATEMENT
Patient is a 41 yo male with PMHx opioid abuse, HTN, stroke, bipolar disorder, anxiety, recent admission for overdose and ARF, requesting detoxification at [facility]. Patient left AMA from [facility] on 5/19 after being admitted s/p overdose for further monitoring; patient went home and presented to [facility] today requesting detox but was sent to the ED for medical clearance. Currently CIWA 1 for anxiety, no n/v, HA, diaphoresis, hallucinations, delusions, tremors. Patient last used benzos yesterday, VSS, aaox3, no focal deficits.  Denies fevers, chills, dizziness, lightheadedness, dysphagia, dysarthria, diplopia, photophobia, syncope, cough, congestion, SOB, CP, abdominal pain, neck pain, back pain, diarrhea, dysuria, hematuria, hematochezia, hematemesis, n/v, recent travel, sick contacts, leg swelling.

## 2022-05-21 NOTE — ED PROVIDER NOTE - ATTENDING CONTRIBUTION TO CARE
Pt was recently admitted here after a welbutrin overdose. Pt stayed and left aMA yesterday as tox wanted to observe him for a full 24 h but he did not want to stay. Pt today went to [facility] for detox from [facility] and he was sent to the ER for medical clearance. Pt currently has no complaints.    physical - rrr. ctab. abd - soft, nt. no edema. no rash.    plan - labs and ekg reviewed.  [facility] refused to take patient. pt medically cleared for discharge. offered rehab resources but does not want to go anywhere but [facility]. will d/c with outpatient f/up and return precautions.

## 2022-05-21 NOTE — ED PROVIDER NOTE - PATIENT PORTAL LINK FT
You can access the FollowMyHealth Patient Portal offered by [facility] by registering at the following website: [email or web address]. By joining [email or web address]’s FollowMyHealth portal, you will also be able to view your health information using other applications (apps) compatible with our system.

## 2022-05-21 NOTE — CHART NOTE - NSCHARTNOTEFT_GEN_A_CORE
Social Work Note: SW met with patient at bedside AOx4 to discuss dc planning. Patient reports he arrived at [facility] from [facility] for detox and reports they have a bed saved for him. MD worked with [facility] all day to get medical clearance and per [facility] they are unable to accept patient at this time. Patient made aware that [facility] is unable to accept and this writer offered [facility], [facility] and [facility] center. Patient reports he does not want to go to [facility] or [facility] and states he already called [facility] for assistance. Patient also states he reached out to [facility] clinic in [place] and reports he has an appointment on Monday. Patient in agreement with discharge home.

## 2022-08-19 ENCOUNTER — EMERGENCY (EMERGENCY)
Facility: HOSPITAL | Age: 42
LOS: 1 days | Discharge: HOME | End: 2022-08-19
Attending: STUDENT IN AN ORGANIZED HEALTH CARE EDUCATION/TRAINING PROGRAM
Payer: MEDICARE

## 2022-08-19 VITALS — HEART RATE: 86 BPM | OXYGEN SATURATION: 96 %

## 2022-08-19 VITALS
TEMPERATURE: 100 F | OXYGEN SATURATION: 93 % | RESPIRATION RATE: 18 BRPM | SYSTOLIC BLOOD PRESSURE: 141 MMHG | WEIGHT: 229.94 LBS | HEART RATE: 113 BPM | HEIGHT: 69 IN | DIASTOLIC BLOOD PRESSURE: 81 MMHG

## 2022-08-19 DIAGNOSIS — Z96.653 PRESENCE OF ARTIFICIAL KNEE JOINT, BILATERAL: Chronic | ICD-10-CM

## 2022-08-19 DIAGNOSIS — Z96.649 PRESENCE OF UNSPECIFIED ARTIFICIAL HIP JOINT: Chronic | ICD-10-CM

## 2022-08-19 PROCEDURE — 71046 X-RAY EXAM CHEST 2 VIEWS: CPT | Mod: 26

## 2022-08-19 PROCEDURE — 99283 EMERGENCY DEPT VISIT LOW MDM: CPT | Mod: FS

## 2022-08-19 PROCEDURE — 99283 EMERGENCY DEPT VISIT LOW MDM: CPT | Mod: 25

## 2022-08-19 PROCEDURE — 71046 X-RAY EXAM CHEST 2 VIEWS: CPT

## 2022-08-19 RX ORDER — ACETAMINOPHEN 500 MG
975 TABLET ORAL ONCE
Refills: 0 | Status: COMPLETED | OUTPATIENT
Start: 2022-08-19 | End: 2022-08-19

## 2022-08-19 RX ADMIN — Medication 975 MILLIGRAM(S): at 14:47

## 2022-08-19 NOTE — ED PROVIDER NOTE - PATIENT PORTAL LINK FT
You can access the FollowMyHealth Patient Portal offered by [facility] by registering at the following website: [email or web address]. By joining [employer]’s FollowMyHealth portal, you will also be able to view your health information using other applications (apps) compatible with our system.

## 2022-08-19 NOTE — ED PROVIDER NOTE - NS ED ROS FT
+ fever no chills   No photophobia/eye pain/changes in visio,   No ear pain/sore throat/dysphagia   No chest pain/palpitations  No SOB/wheeze/stridor   No abdominal pain, No N/V/D  No dysuria/frequency/discharge   No neck/back pain,   No rash  No changes in neurological status/function.   +cough

## 2022-08-19 NOTE — ED ADULT TRIAGE NOTE - CHIEF COMPLAINT QUOTE
pt c/o fever and fall since this morning, pt fell out of bed this morning, denies hitting head or LOC. pt denies cp @ this time.

## 2022-08-19 NOTE — ED PROVIDER NOTE - OBJECTIVE STATEMENT
Pt is a 41 yo M co fever, cough, body aches. Pt states that he woke up this morning with fever, nonproductive cough and body aches. Pt states that when he first got out of bed he felt a little lightheaded and fell but did not hurt himself. no n/v. no diarrhea. no fever/chills. no other complaints.

## 2022-08-19 NOTE — ED ADULT NURSE NOTE - OBJECTIVE STATEMENT
Pt is alert and oriented. Pt states that he had a headache and a 100.5 temp this morning. Pt states "I fell out of bed but I didn't hit my head." Pt denies blood thinners. Pt denies sob, chest pain, nausea, vomiting, and pain. Pt resp are even and unlabored, skin color carlito for race. Pt updated on plan of care.

## 2022-11-04 NOTE — BH CONSULTATION LIAISON ASSESSMENT NOTE - NSCURPASTPSYDX_PSY_ALL_CORE
Onset: week  Location / description: Burning, pulsating on fire. Cream was prescribed. Bactroban not helping. Looks infected. Still coming out yellow. Doing cream TID.   Precipitating Factors: skin tear on shin and not getting better  Pain Scale (1-10), 10 highest: 10/10  Associated Symptoms: chills and I am tired.   What improves / worsens symptoms:   Symptom specific medications: bactroban, taking norco at 1pm.   LMP : No LMP recorded (lmp unknown). Patient is postmenopausal.  Are you pregnant or breast feeding: no  Recent visits (last 3-4 weeks) for same reason or recent surgery: Seen in office on 10/31.     PLAN:   See Provider/Go to Urgent Care within next 4 hours    Patient/Caller agrees to follow recommendations.    Reason for Disposition  • [1] SEVERE pain AND [2] not improved 2 hours after pain medicine    Protocols used: CUTS AND [id number]       Mood disorder/Alcohol/Substance Use disorders

## 2022-12-01 ENCOUNTER — INPATIENT (INPATIENT)
Facility: HOSPITAL | Age: 42
LOS: 10 days | Discharge: HOME | DRG: 371 | End: 2022-12-12
Attending: GENERAL ACUTE CARE HOSPITAL | Admitting: SURGERY
Payer: MEDICARE

## 2022-12-01 VITALS
HEART RATE: 79 BPM | TEMPERATURE: 98 F | RESPIRATION RATE: 18 BRPM | OXYGEN SATURATION: 98 % | SYSTOLIC BLOOD PRESSURE: 101 MMHG | DIASTOLIC BLOOD PRESSURE: 72 MMHG

## 2022-12-01 DIAGNOSIS — Z96.649 PRESENCE OF UNSPECIFIED ARTIFICIAL HIP JOINT: Chronic | ICD-10-CM

## 2022-12-01 DIAGNOSIS — Z96.653 PRESENCE OF ARTIFICIAL KNEE JOINT, BILATERAL: Chronic | ICD-10-CM

## 2022-12-01 LAB
ANION GAP SERPL CALC-SCNC: 15 MMOL/L — SIGNIFICANT CHANGE UP (ref 5–17)
APAP SERPL-MCNC: <3 UG/ML — LOW (ref 10–26)
BASOPHILS # BLD AUTO: 0.04 K/UL — SIGNIFICANT CHANGE UP (ref 0–0.2)
BASOPHILS NFR BLD AUTO: 0.4 % — SIGNIFICANT CHANGE UP (ref 0–2)
BUN SERPL-MCNC: 34 MG/DL — HIGH (ref 8–20)
CALCIUM SERPL-MCNC: 10.3 MG/DL — SIGNIFICANT CHANGE UP (ref 8.4–10.5)
CHLORIDE SERPL-SCNC: 96 MMOL/L — SIGNIFICANT CHANGE UP (ref 96–108)
CO2 SERPL-SCNC: 23 MMOL/L — SIGNIFICANT CHANGE UP (ref 22–29)
CREAT SERPL-MCNC: 2.72 MG/DL — HIGH (ref 0.5–1.3)
EGFR: 29 ML/MIN/1.73M2 — LOW
EOSINOPHIL # BLD AUTO: 0.02 K/UL — SIGNIFICANT CHANGE UP (ref 0–0.5)
EOSINOPHIL NFR BLD AUTO: 0.2 % — SIGNIFICANT CHANGE UP (ref 0–6)
ETHANOL SERPL-MCNC: <10 MG/DL — SIGNIFICANT CHANGE UP (ref 0–9)
FLUAV AG NPH QL: SIGNIFICANT CHANGE UP
FLUBV AG NPH QL: SIGNIFICANT CHANGE UP
GLUCOSE SERPL-MCNC: 155 MG/DL — HIGH (ref 70–99)
HCT VFR BLD CALC: 41.2 % — SIGNIFICANT CHANGE UP (ref 39–50)
HGB BLD-MCNC: 13.1 G/DL — SIGNIFICANT CHANGE UP (ref 13–17)
IMM GRANULOCYTES NFR BLD AUTO: 0.2 % — SIGNIFICANT CHANGE UP (ref 0–0.9)
LYMPHOCYTES # BLD AUTO: 0.89 K/UL — LOW (ref 1–3.3)
LYMPHOCYTES # BLD AUTO: 8.9 % — LOW (ref 13–44)
MCHC RBC-ENTMCNC: 26.1 PG — LOW (ref 27–34)
MCHC RBC-ENTMCNC: 31.8 GM/DL — LOW (ref 32–36)
MCV RBC AUTO: 82.1 FL — SIGNIFICANT CHANGE UP (ref 80–100)
MONOCYTES # BLD AUTO: 1.28 K/UL — HIGH (ref 0–0.9)
MONOCYTES NFR BLD AUTO: 12.8 % — SIGNIFICANT CHANGE UP (ref 2–14)
NEUTROPHILS # BLD AUTO: 7.73 K/UL — HIGH (ref 1.8–7.4)
NEUTROPHILS NFR BLD AUTO: 77.5 % — HIGH (ref 43–77)
PLATELET # BLD AUTO: 267 K/UL — SIGNIFICANT CHANGE UP (ref 150–400)
POTASSIUM SERPL-MCNC: 7 MMOL/L — CRITICAL HIGH (ref 3.5–5.3)
POTASSIUM SERPL-SCNC: 7 MMOL/L — CRITICAL HIGH (ref 3.5–5.3)
RBC # BLD: 5.02 M/UL — SIGNIFICANT CHANGE UP (ref 4.2–5.8)
RBC # FLD: 13.2 % — SIGNIFICANT CHANGE UP (ref 10.3–14.5)
RSV RNA NPH QL NAA+NON-PROBE: SIGNIFICANT CHANGE UP
SALICYLATES SERPL-MCNC: <0.6 MG/DL — LOW (ref 10–20)
SARS-COV-2 RNA SPEC QL NAA+PROBE: SIGNIFICANT CHANGE UP
SODIUM SERPL-SCNC: 133 MMOL/L — LOW (ref 135–145)
WBC # BLD: 9.98 K/UL — SIGNIFICANT CHANGE UP (ref 3.8–10.5)
WBC # FLD AUTO: 9.98 K/UL — SIGNIFICANT CHANGE UP (ref 3.8–10.5)

## 2022-12-01 PROCEDURE — 99285 EMERGENCY DEPT VISIT HI MDM: CPT

## 2022-12-01 PROCEDURE — 93010 ELECTROCARDIOGRAM REPORT: CPT

## 2022-12-01 RX ORDER — IPRATROPIUM/ALBUTEROL SULFATE 18-103MCG
3 AEROSOL WITH ADAPTER (GRAM) INHALATION ONCE
Refills: 0 | Status: DISCONTINUED | OUTPATIENT
Start: 2022-12-01 | End: 2022-12-01

## 2022-12-01 RX ORDER — INSULIN HUMAN 100 [IU]/ML
10 INJECTION, SOLUTION SUBCUTANEOUS ONCE
Refills: 0 | Status: COMPLETED | OUTPATIENT
Start: 2022-12-01 | End: 2022-12-01

## 2022-12-01 RX ORDER — DEXTROSE 50 % IN WATER 50 %
50 SYRINGE (ML) INTRAVENOUS ONCE
Refills: 0 | Status: COMPLETED | OUTPATIENT
Start: 2022-12-01 | End: 2022-12-01

## 2022-12-01 RX ORDER — CALCIUM GLUCONATE 100 MG/ML
2 VIAL (ML) INTRAVENOUS ONCE
Refills: 0 | Status: COMPLETED | OUTPATIENT
Start: 2022-12-01 | End: 2022-12-01

## 2022-12-01 RX ORDER — SODIUM ZIRCONIUM CYCLOSILICATE 10 G/10G
10 POWDER, FOR SUSPENSION ORAL ONCE
Refills: 0 | Status: COMPLETED | OUTPATIENT
Start: 2022-12-01 | End: 2022-12-01

## 2022-12-01 RX ORDER — ALBUTEROL 90 UG/1
10 AEROSOL, METERED ORAL ONCE
Refills: 0 | Status: COMPLETED | OUTPATIENT
Start: 2022-12-01 | End: 2022-12-01

## 2022-12-01 RX ORDER — SODIUM CHLORIDE 9 MG/ML
1000 INJECTION INTRAMUSCULAR; INTRAVENOUS; SUBCUTANEOUS ONCE
Refills: 0 | Status: COMPLETED | OUTPATIENT
Start: 2022-12-01 | End: 2022-12-01

## 2022-12-01 RX ORDER — INSULIN HUMAN 100 [IU]/ML
10 INJECTION, SOLUTION SUBCUTANEOUS ONCE
Refills: 0 | Status: DISCONTINUED | OUTPATIENT
Start: 2022-12-01 | End: 2022-12-01

## 2022-12-01 RX ADMIN — INSULIN HUMAN 10 UNIT(S): 100 INJECTION, SOLUTION SUBCUTANEOUS at 22:12

## 2022-12-01 RX ADMIN — Medication 50 MILLILITER(S): at 22:04

## 2022-12-01 RX ADMIN — SODIUM ZIRCONIUM CYCLOSILICATE 10 GRAM(S): 10 POWDER, FOR SUSPENSION ORAL at 22:13

## 2022-12-01 RX ADMIN — SODIUM CHLORIDE 1000 MILLILITER(S): 9 INJECTION INTRAMUSCULAR; INTRAVENOUS; SUBCUTANEOUS at 22:05

## 2022-12-01 RX ADMIN — Medication 200 GRAM(S): at 22:01

## 2022-12-01 RX ADMIN — ALBUTEROL 10 MILLIGRAM(S): 90 AEROSOL, METERED ORAL at 22:15

## 2022-12-01 NOTE — ED PROVIDER NOTE - OBJECTIVE STATEMENT
43 y/o male with PMHx of heroin abuse presents to the ED from his sober house after he was found there by staff covered in his own feces and was lethargic per ems. Pt c/o abdominal pain. Pt denies any drugs or alcohol, denies fever. denies HA or neck pain. no chest pain or sob.  no n/v/d. no urinary f/u/d. no back pain. no motor or sensory deficits. denies illicit drug use. no recent travel. no rash. no other acute issues symptoms or concerns

## 2022-12-01 NOTE — ED PROVIDER NOTE - CARE PLAN
1 Principal Discharge DX:	Ischemic colitis  Secondary Diagnosis:	Acute renal failure  Secondary Diagnosis:	Hyperkalemia

## 2022-12-01 NOTE — ED ADULT NURSE NOTE - NSIMPLEMENTINTERV_GEN_ALL_ED
Implemented All Fall Risk Interventions:  [place] to call system. Call bell, personal items and telephone within reach. Instruct patient to call for assistance. Room bathroom lighting operational. Non-slip footwear when patient is off stretcher. Physically safe environment: no spills, clutter or unnecessary equipment. Stretcher in lowest position, wheels locked, appropriate side rails in place. Provide visual cue, wrist band, yellow gown, etc. Monitor gait and stability. Monitor for mental status changes and reorient to person, place, and time. Review medications for side effects contributing to fall risk. Reinforce activity limits and safety measures with patient and family.

## 2022-12-01 NOTE — ED ADULT NURSE NOTE - OBJECTIVE STATEMENT
Pt A&Ox3. Pt cooperative. HEENT clear. LS equal clear bilat. Denies cough or SOB. Denies CP. Denies abd pain. Pt states feelings of feeling generally "unwell" at this time. Has no specific complaints. No CO pain or discomfort @ this time. Urine cup provided. PIV inserted, blood drawn. Pt resting comfortably in bed. Pt A&Ox2-3, midly confused in conversation. Pt cooperative. HEENT clear. LS equal clear bilat. Denies cough or SOB. Denies CP. Denies abd pain. Pt states feelings of feeling generally "unwell" at this time. Has no specific complaints. No CO pain or discomfort @ this time. Urine cup provided. PIV inserted, blood drawn. Pt resting comfortably in bed.

## 2022-12-01 NOTE — ED ADULT TRIAGE NOTE - CHIEF COMPLAINT QUOTE
Patient with altered mental status per sober house he lives in.  patient arrives lethargic, think its 1999.  patient is covered in feces. dr kumar called to bedside for eval.

## 2022-12-01 NOTE — ED PROVIDER NOTE - PROGRESS NOTE DETAILS
Agree with resident evaluation and plan of care pt noted to by hyperkalemic, no hemolysis, insulin, calcium and albuterol ordered Caitie: Assumed care at [facility] pending treatment of hyperkalemia. Pt continued to have profuse non bloody diarrhea and on reassessment complains of severe generalized abdominal pain. CT abd order, remarkable for extensive pneumatosis concerning for ischemic bowel. General surgery consulted stat. Caitie: Assumed care at [facility] pending treatment of hyperkalemia. Pt continued to have profuse non bloody diarrhea and on reassessment complains of severe generalized abdominal pain. CT abd order, remarkable for pneumatosis of the distal colon concerning for ischemic colitis. General surgery consulted stat. Bernardinozack: Potassium remains elevated, creatinine worsening. Repeat insulin ordered for temporization, crystalloid bolus in progress. Nephrology consult placed to evaluate need for HD. Seen by surgery and will be admitted to monitored bed, likely upgrade pending SICU consult.

## 2022-12-01 NOTE — ED PROVIDER NOTE - PHYSICAL EXAMINATION
Gen: Well appearing, poor hygiene in NAD  Head: NC/AT  Neck: trachea midline  Card: regular rate and rhythm  Resp:  CTAB  Abd: soft, non-distended, non-tender  Ext: no deformities  Neuro:  A&O appears non focal  Skin:  Warm and dry as visualized  Psych:  Normal affect and mood

## 2022-12-02 DIAGNOSIS — R07.9 CHEST PAIN, UNSPECIFIED: ICD-10-CM

## 2022-12-02 DIAGNOSIS — K55.9 VASCULAR DISORDER OF INTESTINE, UNSPECIFIED: ICD-10-CM

## 2022-12-02 LAB
ALBUMIN SERPL ELPH-MCNC: 3.2 G/DL — LOW (ref 3.3–5.2)
ALP SERPL-CCNC: 130 U/L — HIGH (ref 40–120)
ALT FLD-CCNC: 17 U/L — SIGNIFICANT CHANGE UP
ANION GAP SERPL CALC-SCNC: 12 MMOL/L — SIGNIFICANT CHANGE UP (ref 5–17)
ANION GAP SERPL CALC-SCNC: 12 MMOL/L — SIGNIFICANT CHANGE UP (ref 5–17)
ANION GAP SERPL CALC-SCNC: 14 MMOL/L — SIGNIFICANT CHANGE UP (ref 5–17)
APPEARANCE UR: ABNORMAL
APTT BLD: 30.5 SEC — SIGNIFICANT CHANGE UP (ref 27.5–35.5)
AST SERPL-CCNC: 61 U/L — HIGH
BACTERIA # UR AUTO: ABNORMAL
BASE EXCESS BLDA CALC-SCNC: -6.7 MMOL/L — LOW (ref -2–3)
BASE EXCESS BLDV CALC-SCNC: -7.7 MMOL/L — LOW (ref -2–3)
BILIRUB SERPL-MCNC: 0.7 MG/DL — SIGNIFICANT CHANGE UP (ref 0.4–2)
BILIRUB UR-MCNC: NEGATIVE — SIGNIFICANT CHANGE UP
BLD GP AB SCN SERPL QL: SIGNIFICANT CHANGE UP
BLOOD GAS COMMENTS ARTERIAL: SIGNIFICANT CHANGE UP
BUN SERPL-MCNC: 42.4 MG/DL — HIGH (ref 8–20)
BUN SERPL-MCNC: 44.5 MG/DL — HIGH (ref 8–20)
BUN SERPL-MCNC: 56.1 MG/DL — HIGH (ref 8–20)
CA-I SERPL-SCNC: 1.3 MMOL/L — SIGNIFICANT CHANGE UP (ref 1.15–1.33)
CALCIUM SERPL-MCNC: 10.1 MG/DL — SIGNIFICANT CHANGE UP (ref 8.4–10.5)
CALCIUM SERPL-MCNC: 9.3 MG/DL — SIGNIFICANT CHANGE UP (ref 8.4–10.5)
CALCIUM SERPL-MCNC: 9.3 MG/DL — SIGNIFICANT CHANGE UP (ref 8.4–10.5)
CHLORIDE BLDV-SCNC: 103 MMOL/L — SIGNIFICANT CHANGE UP (ref 96–108)
CHLORIDE SERPL-SCNC: 100 MMOL/L — SIGNIFICANT CHANGE UP (ref 96–108)
CHLORIDE SERPL-SCNC: 96 MMOL/L — SIGNIFICANT CHANGE UP (ref 96–108)
CHLORIDE SERPL-SCNC: 99 MMOL/L — SIGNIFICANT CHANGE UP (ref 96–108)
CHLORIDE UR-SCNC: 109 MMOL/L — SIGNIFICANT CHANGE UP
CK MB CFR SERPL CALC: 4.1 NG/ML — SIGNIFICANT CHANGE UP (ref 0–6.7)
CK SERPL-CCNC: 199 U/L — SIGNIFICANT CHANGE UP (ref 30–200)
CO2 SERPL-SCNC: 18 MMOL/L — LOW (ref 22–29)
CO2 SERPL-SCNC: 20 MMOL/L — LOW (ref 22–29)
CO2 SERPL-SCNC: 23 MMOL/L — SIGNIFICANT CHANGE UP (ref 22–29)
COLOR SPEC: YELLOW — SIGNIFICANT CHANGE UP
CREAT ?TM UR-MCNC: 6 MG/DL — SIGNIFICANT CHANGE UP
CREAT ?TM UR-MCNC: 6 MG/DL — SIGNIFICANT CHANGE UP
CREAT SERPL-MCNC: 3.39 MG/DL — HIGH (ref 0.5–1.3)
CREAT SERPL-MCNC: 3.62 MG/DL — HIGH (ref 0.5–1.3)
CREAT SERPL-MCNC: 5.05 MG/DL — HIGH (ref 0.5–1.3)
DIFF PNL FLD: ABNORMAL
EGFR: 14 ML/MIN/1.73M2 — LOW
EGFR: 20 ML/MIN/1.73M2 — LOW
EGFR: 22 ML/MIN/1.73M2 — LOW
EPI CELLS # UR: SIGNIFICANT CHANGE UP
GAS PNL BLDA: SIGNIFICANT CHANGE UP
GAS PNL BLDA: SIGNIFICANT CHANGE UP
GAS PNL BLDV: 130 MMOL/L — LOW (ref 136–145)
GAS PNL BLDV: SIGNIFICANT CHANGE UP
GLUCOSE BLDC GLUCOMTR-MCNC: 123 MG/DL — HIGH (ref 70–99)
GLUCOSE BLDC GLUCOMTR-MCNC: 157 MG/DL — HIGH (ref 70–99)
GLUCOSE BLDC GLUCOMTR-MCNC: 179 MG/DL — HIGH (ref 70–99)
GLUCOSE BLDV-MCNC: 282 MG/DL — HIGH (ref 70–99)
GLUCOSE SERPL-MCNC: 132 MG/DL — HIGH (ref 70–99)
GLUCOSE SERPL-MCNC: 133 MG/DL — HIGH (ref 70–99)
GLUCOSE SERPL-MCNC: 268 MG/DL — HIGH (ref 70–99)
GLUCOSE UR QL: 100 MG/DL
HCO3 BLDA-SCNC: 20 MMOL/L — LOW (ref 21–28)
HCO3 BLDV-SCNC: 20 MMOL/L — LOW (ref 22–29)
HCT VFR BLD CALC: 34.3 % — LOW (ref 39–50)
HCT VFR BLDA CALC: 32 % — SIGNIFICANT CHANGE UP
HGB BLD CALC-MCNC: 10.6 G/DL — LOW (ref 12.6–17.4)
HGB BLD-MCNC: 11.5 G/DL — LOW (ref 13–17)
HOROWITZ INDEX BLDA+IHG-RTO: SIGNIFICANT CHANGE UP
INR BLD: 1.2 RATIO — HIGH (ref 0.88–1.16)
KETONES UR-MCNC: ABNORMAL
LACTATE BLDV-MCNC: 2.4 MMOL/L — HIGH (ref 0.5–2)
LACTATE SERPL-SCNC: 1 MMOL/L — SIGNIFICANT CHANGE UP (ref 0.5–2)
LEUKOCYTE ESTERASE UR-ACNC: ABNORMAL
MCHC RBC-ENTMCNC: 27.1 PG — SIGNIFICANT CHANGE UP (ref 27–34)
MCHC RBC-ENTMCNC: 33.5 GM/DL — SIGNIFICANT CHANGE UP (ref 32–36)
MCV RBC AUTO: 80.7 FL — SIGNIFICANT CHANGE UP (ref 80–100)
NITRITE UR-MCNC: NEGATIVE — SIGNIFICANT CHANGE UP
OSMOLALITY UR: 309 MOSM/KG — SIGNIFICANT CHANGE UP (ref 300–1000)
PCO2 BLDA: 39 MMHG — SIGNIFICANT CHANGE UP (ref 35–48)
PCO2 BLDV: 52 MMHG — SIGNIFICANT CHANGE UP (ref 42–55)
PH BLDA: 7.31 — LOW (ref 7.35–7.45)
PH BLDV: 7.2 — CRITICAL LOW (ref 7.32–7.43)
PH UR: 8 — SIGNIFICANT CHANGE UP (ref 5–8)
PLATELET # BLD AUTO: 175 K/UL — SIGNIFICANT CHANGE UP (ref 150–400)
PO2 BLDA: 89 MMHG — SIGNIFICANT CHANGE UP (ref 83–108)
PO2 BLDV: 81 MMHG — HIGH (ref 25–45)
POTASSIUM BLDV-SCNC: 5.5 MMOL/L — HIGH (ref 3.5–5.1)
POTASSIUM SERPL-MCNC: 5.5 MMOL/L — HIGH (ref 3.5–5.3)
POTASSIUM SERPL-MCNC: 6.5 MMOL/L — CRITICAL HIGH (ref 3.5–5.3)
POTASSIUM SERPL-MCNC: 7 MMOL/L — CRITICAL HIGH (ref 3.5–5.3)
POTASSIUM SERPL-SCNC: 5.5 MMOL/L — HIGH (ref 3.5–5.3)
POTASSIUM SERPL-SCNC: 6.5 MMOL/L — CRITICAL HIGH (ref 3.5–5.3)
POTASSIUM SERPL-SCNC: 7 MMOL/L — CRITICAL HIGH (ref 3.5–5.3)
PROT ?TM UR-MCNC: 339 MG/DL — HIGH (ref 0–12)
PROT SERPL-MCNC: 5.6 G/DL — LOW (ref 6.6–8.7)
PROT UR-MCNC: 100
PROT/CREAT UR-RTO: 56.5 RATIO — HIGH
PROTHROM AB SERPL-ACNC: 14 SEC — HIGH (ref 10.5–13.4)
RBC # BLD: 4.25 M/UL — SIGNIFICANT CHANGE UP (ref 4.2–5.8)
RBC # FLD: 13.2 % — SIGNIFICANT CHANGE UP (ref 10.3–14.5)
RBC CASTS # UR COMP ASSIST: ABNORMAL /HPF (ref 0–4)
SAO2 % BLDA: 95.5 % — SIGNIFICANT CHANGE UP (ref 94–98)
SAO2 % BLDV: 94 % — SIGNIFICANT CHANGE UP
SODIUM SERPL-SCNC: 131 MMOL/L — LOW (ref 135–145)
SODIUM SERPL-SCNC: 131 MMOL/L — LOW (ref 135–145)
SODIUM SERPL-SCNC: 132 MMOL/L — LOW (ref 135–145)
SODIUM UR-SCNC: 137 MMOL/L — SIGNIFICANT CHANGE UP
SP GR SPEC: 1.01 — SIGNIFICANT CHANGE UP (ref 1.01–1.02)
TROPONIN T SERPL-MCNC: 0.03 NG/ML — SIGNIFICANT CHANGE UP (ref 0–0.06)
UROBILINOGEN FLD QL: NEGATIVE MG/DL — SIGNIFICANT CHANGE UP
WBC # BLD: 7.4 K/UL — SIGNIFICANT CHANGE UP (ref 3.8–10.5)
WBC # FLD AUTO: 7.4 K/UL — SIGNIFICANT CHANGE UP (ref 3.8–10.5)
WBC UR QL: ABNORMAL /HPF (ref 0–5)

## 2022-12-02 PROCEDURE — 74176 CT ABD & PELVIS W/O CONTRAST: CPT | Mod: 26,MA

## 2022-12-02 PROCEDURE — 36556 INSERT NON-TUNNEL CV CATH: CPT | Mod: RT

## 2022-12-02 PROCEDURE — 70450 CT HEAD/BRAIN W/O DYE: CPT | Mod: 26,MA

## 2022-12-02 PROCEDURE — 99223 1ST HOSP IP/OBS HIGH 75: CPT

## 2022-12-02 PROCEDURE — 99222 1ST HOSP IP/OBS MODERATE 55: CPT

## 2022-12-02 PROCEDURE — 99232 SBSQ HOSP IP/OBS MODERATE 35: CPT | Mod: FS

## 2022-12-02 PROCEDURE — 71045 X-RAY EXAM CHEST 1 VIEW: CPT | Mod: 26

## 2022-12-02 PROCEDURE — 99291 CRITICAL CARE FIRST HOUR: CPT | Mod: FS

## 2022-12-02 PROCEDURE — 93010 ELECTROCARDIOGRAM REPORT: CPT

## 2022-12-02 RX ORDER — SODIUM CHLORIDE 9 MG/ML
2000 INJECTION INTRAMUSCULAR; INTRAVENOUS; SUBCUTANEOUS ONCE
Refills: 0 | Status: COMPLETED | OUTPATIENT
Start: 2022-12-02 | End: 2022-12-02

## 2022-12-02 RX ORDER — FENTANYL CITRATE 50 UG/ML
100 INJECTION INTRAVENOUS ONCE
Refills: 0 | Status: DISCONTINUED | OUTPATIENT
Start: 2022-12-02 | End: 2022-12-02

## 2022-12-02 RX ORDER — INSULIN HUMAN 100 [IU]/ML
10 INJECTION, SOLUTION SUBCUTANEOUS ONCE
Refills: 0 | Status: COMPLETED | OUTPATIENT
Start: 2022-12-02 | End: 2022-12-02

## 2022-12-02 RX ORDER — DEXTROSE 50 % IN WATER 50 %
50 SYRINGE (ML) INTRAVENOUS ONCE
Refills: 0 | Status: COMPLETED | OUTPATIENT
Start: 2022-12-02 | End: 2022-12-02

## 2022-12-02 RX ORDER — OLANZAPINE 15 MG/1
10 TABLET, FILM COATED ORAL EVERY 6 HOURS
Refills: 0 | Status: DISCONTINUED | OUTPATIENT
Start: 2022-12-02 | End: 2022-12-03

## 2022-12-02 RX ORDER — ATORVASTATIN CALCIUM 80 MG/1
1 TABLET, FILM COATED ORAL
Qty: 0 | Refills: 0 | DISCHARGE

## 2022-12-02 RX ORDER — CALCIUM GLUCONATE 100 MG/ML
2 VIAL (ML) INTRAVENOUS ONCE
Refills: 0 | Status: COMPLETED | OUTPATIENT
Start: 2022-12-02 | End: 2022-12-02

## 2022-12-02 RX ORDER — SODIUM CHLORIDE 9 MG/ML
1000 INJECTION INTRAMUSCULAR; INTRAVENOUS; SUBCUTANEOUS
Refills: 0 | Status: DISCONTINUED | OUTPATIENT
Start: 2022-12-02 | End: 2022-12-02

## 2022-12-02 RX ORDER — LEVETIRACETAM 250 MG/1
750 TABLET, FILM COATED ORAL EVERY 12 HOURS
Refills: 0 | Status: DISCONTINUED | OUTPATIENT
Start: 2022-12-03 | End: 2022-12-12

## 2022-12-02 RX ORDER — SODIUM BICARBONATE 1 MEQ/ML
50 SYRINGE (ML) INTRAVENOUS ONCE
Refills: 0 | Status: COMPLETED | OUTPATIENT
Start: 2022-12-02 | End: 2022-12-02

## 2022-12-02 RX ORDER — MORPHINE SULFATE 50 MG/1
2 CAPSULE, EXTENDED RELEASE ORAL EVERY 4 HOURS
Refills: 0 | Status: DISCONTINUED | OUTPATIENT
Start: 2022-12-02 | End: 2022-12-02

## 2022-12-02 RX ORDER — PIPERACILLIN AND TAZOBACTAM 4; .5 G/20ML; G/20ML
3.38 INJECTION, POWDER, LYOPHILIZED, FOR SOLUTION INTRAVENOUS ONCE
Refills: 0 | Status: COMPLETED | OUTPATIENT
Start: 2022-12-02 | End: 2022-12-02

## 2022-12-02 RX ORDER — HYDRALAZINE HCL 50 MG
10 TABLET ORAL EVERY 4 HOURS
Refills: 0 | Status: DISCONTINUED | OUTPATIENT
Start: 2022-12-02 | End: 2022-12-03

## 2022-12-02 RX ORDER — SODIUM CHLORIDE 9 MG/ML
1000 INJECTION, SOLUTION INTRAVENOUS
Refills: 0 | Status: DISCONTINUED | OUTPATIENT
Start: 2022-12-02 | End: 2022-12-03

## 2022-12-02 RX ORDER — SODIUM CHLORIDE 9 MG/ML
1000 INJECTION, SOLUTION INTRAVENOUS
Refills: 0 | Status: DISCONTINUED | OUTPATIENT
Start: 2022-12-02 | End: 2022-12-02

## 2022-12-02 RX ORDER — CALCIUM GLUCONATE 100 MG/ML
1 VIAL (ML) INTRAVENOUS ONCE
Refills: 0 | Status: COMPLETED | OUTPATIENT
Start: 2022-12-02 | End: 2022-12-02

## 2022-12-02 RX ORDER — FENTANYL CITRATE 50 UG/ML
50 INJECTION INTRAVENOUS ONCE
Refills: 0 | Status: DISCONTINUED | OUTPATIENT
Start: 2022-12-02 | End: 2022-12-02

## 2022-12-02 RX ORDER — INSULIN HUMAN 100 [IU]/ML
10 INJECTION, SOLUTION SUBCUTANEOUS ONCE
Refills: 0 | Status: DISCONTINUED | OUTPATIENT
Start: 2022-12-02 | End: 2022-12-02

## 2022-12-02 RX ORDER — QUETIAPINE FUMARATE 200 MG/1
1 TABLET, FILM COATED ORAL
Qty: 0 | Refills: 0 | DISCHARGE

## 2022-12-02 RX ORDER — BUPRENORPHINE AND NALOXONE 2; .5 MG/1; MG/1
1 TABLET SUBLINGUAL
Qty: 0 | Refills: 0 | DISCHARGE

## 2022-12-02 RX ORDER — LEVETIRACETAM 250 MG/1
1 TABLET, FILM COATED ORAL
Qty: 0 | Refills: 0 | DISCHARGE

## 2022-12-02 RX ORDER — HEPARIN SODIUM 5000 [USP'U]/ML
300 INJECTION INTRAVENOUS; SUBCUTANEOUS
Qty: 10000 | Refills: 0 | Status: DISCONTINUED | OUTPATIENT
Start: 2022-12-02 | End: 2022-12-03

## 2022-12-02 RX ORDER — ONDANSETRON 8 MG/1
4 TABLET, FILM COATED ORAL EVERY 6 HOURS
Refills: 0 | Status: DISCONTINUED | OUTPATIENT
Start: 2022-12-02 | End: 2022-12-12

## 2022-12-02 RX ORDER — BUPRENORPHINE AND NALOXONE 2; .5 MG/1; MG/1
3 TABLET SUBLINGUAL
Qty: 0 | Refills: 0 | DISCHARGE

## 2022-12-02 RX ORDER — BUPROPION HYDROCHLORIDE 150 MG/1
1 TABLET, EXTENDED RELEASE ORAL
Qty: 0 | Refills: 0 | DISCHARGE

## 2022-12-02 RX ORDER — DEXTROSE 50 % IN WATER 50 %
50 SYRINGE (ML) INTRAVENOUS ONCE
Refills: 0 | Status: DISCONTINUED | OUTPATIENT
Start: 2022-12-02 | End: 2022-12-02

## 2022-12-02 RX ORDER — HEPARIN SODIUM 5000 [USP'U]/ML
5000 INJECTION INTRAVENOUS; SUBCUTANEOUS EVERY 8 HOURS
Refills: 0 | Status: DISCONTINUED | OUTPATIENT
Start: 2022-12-02 | End: 2022-12-08

## 2022-12-02 RX ORDER — ENOXAPARIN SODIUM 100 MG/ML
40 INJECTION SUBCUTANEOUS EVERY 24 HOURS
Refills: 0 | Status: DISCONTINUED | OUTPATIENT
Start: 2022-12-02 | End: 2022-12-02

## 2022-12-02 RX ORDER — OLANZAPINE 15 MG/1
5 TABLET, FILM COATED ORAL EVERY 6 HOURS
Refills: 0 | Status: DISCONTINUED | OUTPATIENT
Start: 2022-12-02 | End: 2022-12-03

## 2022-12-02 RX ORDER — CHLORHEXIDINE GLUCONATE 213 G/1000ML
1 SOLUTION TOPICAL DAILY
Refills: 0 | Status: DISCONTINUED | OUTPATIENT
Start: 2022-12-02 | End: 2022-12-12

## 2022-12-02 RX ORDER — LAMOTRIGINE 25 MG/1
1 TABLET, ORALLY DISINTEGRATING ORAL
Qty: 0 | Refills: 0 | DISCHARGE

## 2022-12-02 RX ORDER — DEXMEDETOMIDINE HYDROCHLORIDE IN 0.9% SODIUM CHLORIDE 4 UG/ML
0.2 INJECTION INTRAVENOUS
Qty: 200 | Refills: 0 | Status: DISCONTINUED | OUTPATIENT
Start: 2022-12-02 | End: 2022-12-04

## 2022-12-02 RX ADMIN — Medication 50 MILLIEQUIVALENT(S): at 20:38

## 2022-12-02 RX ADMIN — PIPERACILLIN AND TAZOBACTAM 3.38 GRAM(S): 4; .5 INJECTION, POWDER, LYOPHILIZED, FOR SOLUTION INTRAVENOUS at 05:34

## 2022-12-02 RX ADMIN — SODIUM CHLORIDE 2000 MILLILITER(S): 9 INJECTION INTRAMUSCULAR; INTRAVENOUS; SUBCUTANEOUS at 07:28

## 2022-12-02 RX ADMIN — SODIUM CHLORIDE 2000 MILLILITER(S): 9 INJECTION INTRAMUSCULAR; INTRAVENOUS; SUBCUTANEOUS at 03:44

## 2022-12-02 RX ADMIN — HEPARIN SODIUM 0.6 UNIT(S)/HR: 5000 INJECTION INTRAVENOUS; SUBCUTANEOUS at 23:57

## 2022-12-02 RX ADMIN — Medication 100 GRAM(S): at 20:31

## 2022-12-02 RX ADMIN — FENTANYL CITRATE 50 MICROGRAM(S): 50 INJECTION INTRAVENOUS at 20:30

## 2022-12-02 RX ADMIN — HEPARIN SODIUM 5000 UNIT(S): 5000 INJECTION INTRAVENOUS; SUBCUTANEOUS at 22:12

## 2022-12-02 RX ADMIN — Medication 200 GRAM(S): at 05:19

## 2022-12-02 RX ADMIN — Medication 200 GRAM(S): at 18:04

## 2022-12-02 RX ADMIN — FENTANYL CITRATE 100 MICROGRAM(S): 50 INJECTION INTRAVENOUS at 20:45

## 2022-12-02 RX ADMIN — Medication 50 MILLILITER(S): at 05:26

## 2022-12-02 RX ADMIN — Medication 50 MILLIEQUIVALENT(S): at 20:39

## 2022-12-02 RX ADMIN — FENTANYL CITRATE 50 MICROGRAM(S): 50 INJECTION INTRAVENOUS at 22:20

## 2022-12-02 RX ADMIN — SODIUM CHLORIDE 120 MILLILITER(S): 9 INJECTION, SOLUTION INTRAVENOUS at 05:54

## 2022-12-02 RX ADMIN — SODIUM CHLORIDE 120 MILLILITER(S): 9 INJECTION, SOLUTION INTRAVENOUS at 07:00

## 2022-12-02 RX ADMIN — HEPARIN SODIUM 5000 UNIT(S): 5000 INJECTION INTRAVENOUS; SUBCUTANEOUS at 07:07

## 2022-12-02 RX ADMIN — HEPARIN SODIUM 5000 UNIT(S): 5000 INJECTION INTRAVENOUS; SUBCUTANEOUS at 13:48

## 2022-12-02 RX ADMIN — INSULIN HUMAN 10 UNIT(S): 100 INJECTION, SOLUTION SUBCUTANEOUS at 05:16

## 2022-12-02 RX ADMIN — SODIUM CHLORIDE 50 MILLILITER(S): 9 INJECTION, SOLUTION INTRAVENOUS at 22:12

## 2022-12-02 RX ADMIN — Medication 50 MILLILITER(S): at 18:04

## 2022-12-02 RX ADMIN — Medication 50 MILLILITER(S): at 05:17

## 2022-12-02 RX ADMIN — PIPERACILLIN AND TAZOBACTAM 25 GRAM(S): 4; .5 INJECTION, POWDER, LYOPHILIZED, FOR SOLUTION INTRAVENOUS at 13:47

## 2022-12-02 RX ADMIN — FENTANYL CITRATE 50 MICROGRAM(S): 50 INJECTION INTRAVENOUS at 22:11

## 2022-12-02 RX ADMIN — PIPERACILLIN AND TAZOBACTAM 200 GRAM(S): 4; .5 INJECTION, POWDER, LYOPHILIZED, FOR SOLUTION INTRAVENOUS at 03:45

## 2022-12-02 RX ADMIN — DEXMEDETOMIDINE HYDROCHLORIDE IN 0.9% SODIUM CHLORIDE 3.5 MICROGRAM(S)/KG/HR: 4 INJECTION INTRAVENOUS at 23:56

## 2022-12-02 RX ADMIN — OLANZAPINE 10 MILLIGRAM(S): 15 TABLET, FILM COATED ORAL at 23:56

## 2022-12-02 NOTE — BH CONSULTATION LIAISON ASSESSMENT NOTE - NSBHCHARTREVIEWLAB_PSY_A_CORE FT
Basic Metabolic Panel - STAT (12.02.22 @ 02:59)    Sodium, Serum: 131 mmol/L    Potassium, Serum: 6.5: Critical value:  TYPE:(C=Critical, N=Notification, A=Abnormal) C  TESTS: K  DATE/TIME CALLED: 12/02/2022 04:51:52 EST  CALLED TO: Dr. Rakan Blood  READ BACK (2 Patient Identifiers)(Y/N): Y  READ BACK VALUES (Y/N): Y  CALLED BY: SC mmol/L    Chloride, Serum: 96: Chloride reference range changed from ..10/26/2022 mmol/L    Carbon Dioxide, Serum: 23.0 mmol/L    Anion Gap, Serum: 12 mmol/L    Blood Urea Nitrogen, Serum: 42.4 mg/dL    Creatinine, Serum: 3.39 mg/dL    Glucose, Serum: 133 mg/dL    Calcium, Total Serum: 10.1: Prior Reference Range of 8.6 – 10.2 was amended as of 7/26/2022 to 8.4 –  10.5. mg/dL    eGFR: 22: The estimated glomerular filtration rate (eGFR) is calculated using the  2021 CKD-EPI creatinine equation, which does not have a coefficient for  race and is validated in individuals 18 years of age and older (N Engl J  Med 2021; 385:1334-6903). Creatinine-based eGFR may be inaccurate in  various situations including but not limited to extremes of muscle mass,  altered dietary protein intake, or medications that affect renal tubular  creatinine secretion. mL/min/1.73m2

## 2022-12-02 NOTE — PROGRESS NOTE ADULT - SUBJECTIVE AND OBJECTIVE BOX
Nephrology On Call PM Attending    Informed by primary team of severe hyperkalemic (on latest set of labs) associated with EKG changes.    Went to examine the patient at bedside. AAOx1, confused.     Latest labs showed potassium 7.0, Cr 5.05  ABG with lytes showed potassium 7.1     Assessment and Plan:    Life Threatening Hyperkalemia   -s/p regular insulin   -Sodium bicarb gtt ordered but not yet hung - will order 2 amps of bicarb push for time being  -Will also order calcium gluconate IV x 1   -Will need loera (loera still not in)  -Need need emergent HD tonight given hyperK with EKG changes   -Patient is confused and so cannot consent   -Reached out to emergency contact - motherJody at [phone] - however call was not answered  -Discussed with SICU team (whom is accepting the patient) - will need 2 physician consent for emergent hemodialysis tonight (signed by myself and SICU attending)  -Dialysis catheter placement will be deferred to primary team   -Emergent hemodialysis tonight   -Will reassess tomorrow junior Shane DO  Nephrology  [facility]  Office Number [phone]   Nephrology On Call PM Attending    Informed by primary team of severe hyperkalemic (on latest set of labs) associated with EKG changes.    Went to examine the patient at bedside.     Physical Exam  General: WDWN male lying flat in bed in NAD  Cardiac: S1S2 RRR  Respiratory: CTAB  Abdomen: Soft, NT to light palpation  Extremities: No appreciable edema  Neuro: AAOx1 (self only), confused.     Labs:   Latest labs showed potassium 7.0, Cr 5.05  ABG with lytes showed potassium 7.1     Assessment and Plan:     42 year old male with PMH of polysubstance abuse, bipolar disorder, HTN, GERD presents with severe diarrhea. Found to have pneumatosis in sigmoid colon. Also with LIV complicated by hyperK. Nephrology was called this evening given worsening renal function associated with recurrent hyperK, now with EKG changes.     LIV  -Suspecting prerenal etiology with progression to LIV  -Baseline creatinine is unclear  -Creatinine on admission was 2.7mg/dL; latest creatinine is 5.0mg/dL  -No urine studies available  -CT a/p without IV contrast is negative for hydronephrosis   -Reported by RN that the patient have not urinated "all day"  -Loera was ordered by not placed as patient "refused" despite being confused   -Will need emergent HD tonight (see below)    Life Threatening Hyperkalemia   -s/p regular insulin   -Sodium bicarb gtt ordered but not yet hung - will order 2 amps of bicarb push for time being  -Will also order calcium gluconate IV x 1   -Will need loera for strict I/O's   -Will need emergent HD tonight given hyperK with EKG changes   -Patient is confused and so cannot consent   -Reached out to emergency contact - Jody grant at [phone] - however call was not answered  -Discussed with SICU team (whom is accepting the patient) - will need 2 physician consent for emergent hemodialysis tonight (signed by myself and SICU attending)  -Dialysis catheter placement will be deferred to primary team   -Emergent hemodialysis tonight   -Will reassess need for HD tomorrow a.m.     High Anion Gap Metabolic Acidosis   -Will improve with HD     Selin Shane DO  Nephrology  [facility]  Office Number [phone]   Nephrology On Call PM Attending    Informed by primary team of severe hyperkalemic (on latest set of labs) associated with EKG changes.    Went to examine the patient at bedside.     Physical Exam  General: WDWN male lying flat in bed in NAD  Cardiac: S1S2 RRR  Respiratory: CTAB  Abdomen: Soft, NT to light palpation  Extremities: No appreciable edema  Neuro: AAOx1 (self only), confused.     Labs:   Latest labs showed potassium 7.0, Cr 5.05  ABG with lytes showed potassium 7.1     Assessment and Plan:     42 year old male with PMH of polysubstance abuse, bipolar disorder, HTN, GERD presents with severe diarrhea. Found to have pneumatosis in sigmoid colon. Also with LIV complicated by hyperK. Nephrology was called this evening given worsening renal function associated with recurrent hyperK, now with EKG changes.     LIV  -Suspecting prerenal etiology with progression to LIV  -Baseline creatinine is unclear  -Creatinine on admission was 2.7mg/dL; latest creatinine is 5.0mg/dL  -No urine studies available  -CT a/p without IV contrast is negative for hydronephrosis   -Reported by RN that the patient have not urinated "all day"  -Loera was ordered by not placed as patient "refused" despite being confused   -Will need emergent HD tonight (see below)    Life Threatening Hyperkalemia   -s/p regular insulin   -Sodium bicarb gtt ordered but not yet hung - will order 2 amps of bicarb push for time being  -Will also order calcium gluconate IV x 1   -Will need loera for strict I/O's   -Will need emergent HD tonight given hyperK with EKG changes   -Patient is confused and so cannot consent   -Reached out to emergency contact - Jody grant at [phone] - however call was not answered  -Discussed with SICU team (whom is accepting the patient) - will need 2 physician consent for emergent hemodialysis tonight (signed by myself and SICU attending)  -Dialysis catheter placement will be deferred to primary team   -Emergent hemodialysis tonight   -Will reassess need for HD tomorrow a.m.     High Anion Gap Metabolic Acidosis   -Will improve with HD     ADDENDUM (12/2 at ~9pm): On call HD RN is currently at another facility emergently dialyzing another patient. Consequently will place the patient on CRRT with the following orders: CVVHDF, blood flow rate 300, dialysate flow rate (0K/2.5Ca) 1500, prefilter replacement fluid (0K/2.5Ca), and postfilter replacement fluid (4K/2.5Ca), net even ultrafiltration. Will utilize heparin gtt through CRRT circuit. Will need ABG with lytes q3h. Plan was discussed with primary team.     Selin Shane DO  Nephrology  [facility]  Office Number [phone]   Nephrology On Call PM Attending    Informed by primary team of severe hyperkalemic (on latest set of labs) associated with EKG changes.    Went to examine the patient at bedside.     Physical Exam  General: WDWN male lying flat in bed in NAD  Cardiac: S1S2 RRR  Respiratory: CTAB  Abdomen: Soft, NT to light palpation  Extremities: No appreciable edema  Neuro: AAOx1 (self only), confused.     Labs:   Latest labs showed potassium 7.0, Cr 5.05  ABG with lytes showed potassium 7.1     Assessment and Plan:     42 year old male with PMH of polysubstance abuse, bipolar disorder, HTN, GERD presents with severe diarrhea. Found to have pneumatosis in sigmoid colon. Also with LIV complicated by hyperK. Nephrology was called this evening given worsening renal function associated with recurrent hyperK, now with EKG changes.     LIV  -Suspecting prerenal etiology with progression to LIV  -Baseline creatinine is unclear  -Creatinine on admission was 2.7mg/dL; latest creatinine is 5.0mg/dL  -No urine studies available  -CT a/p without IV contrast is negative for hydronephrosis   -Reported by RN that the patient have not urinated "all day"  -Loera was ordered by not placed as patient "refused" despite being confused   -Will need emergent HD tonight (see below)    Life Threatening Hyperkalemia   -s/p regular insulin   -Sodium bicarb gtt ordered but not yet hung - will order 2 amps of bicarb push for time being  -Will also order calcium gluconate IV x 1   -Will need loera for strict I/O's   -Will need emergent HD tonight given hyperK with EKG changes   -Patient is confused and so cannot consent   -Reached out to emergency contact - Jody grant at [phone] - however call was not answered  -Discussed with SICU team (whom is accepting the patient) - will need 2 physician consent for emergent hemodialysis tonight (signed by myself and SICU attending)  -Dialysis catheter placement will be deferred to primary team   -Emergent hemodialysis tonight   -Will reassess need for HD tomorrow a.m.     High Anion Gap Metabolic Acidosis   -Will improve with HD     ADDENDUM (12/2 at ~9pm): On call HD RN is currently at another facility emergently dialyzing another patient. Consequently will place the patient on CRRT with the following orders: CVVHDF, blood flow rate 300, dialysate flow rate (0K/2.5Ca) 1500, prefilter replacement fluid (0K/2.5Ca), and postfilter replacement fluid (4K/2.5Ca), net even ultrafiltration. Will utilize heparin gtt through CRRT circuit. Will need ABG with lytes q3h. Plan was discussed with primary team.     ADDENDUM (12/2 at 10:20pm): Please contact me once potassium is less than 4 on serial ABG with lytes. Will discuss with primary team plus ICU RN.     Selin Shane DO  Nephrology  [facility]  Office Number [phone]

## 2022-12-02 NOTE — BH CONSULTATION LIAISON ASSESSMENT NOTE - HPI (INCLUDE ILLNESS QUALITY, SEVERITY, DURATION, TIMING, CONTEXT, MODIFYING FACTORS, ASSOCIATED SIGNS AND SYMPTOMS)
Patient is a 42 year old male who is residing in a sober house, with past psychiatric history of Bipolar disorder, as well as multiple substance use disorders including cocaine, cannabis, heroin, alcohol, prescription drugs, who was brought to the ER from sober house with a 2 day history of severe diarrhea.     Patient seen and evaluated and found to be calm and cooperative but oriented to person only. Patient confused and making non sensical statements.

## 2022-12-02 NOTE — BH CONSULTATION LIAISON ASSESSMENT NOTE - CURRENT MEDICATION
MEDICATIONS  (STANDING):  heparin   Injectable 5000 Unit(s) SubCutaneous every 8 hours  lactated ringers. 1000 milliLiter(s) (120 mL/Hr) IV Continuous <Continuous>    MEDICATIONS  (PRN):  morphine  - Injectable 2 milliGRAM(s) IV Push every 4 hours PRN Moderate Pain (4 - 6)  ondansetron Injectable 4 milliGRAM(s) IV Push every 6 hours PRN Nausea

## 2022-12-02 NOTE — CHART NOTE - NSCHARTNOTEFT_GEN_A_CORE
Patient upgraded to SICU for emergent dialysis due to Acute renal failure and hyperkalemia.  RIJ catheter was inserted at SICU. No complaints. Patient is tachycardic with peak T waves. Denies fever, chills, nausea, vomiting,     Physical examination:    General: AAOx1  Abdomen tender in LLQ, nondistended, soft, no peritoneal signs, no rebounding/ guarding      Plan:   - c/w serial abdominal exams [id number]  - Stabilize Hyperkalemia  - pain control   - Patient is planned to get dialysis   - c/w treatment of hyperkalemia   - Next serial abdominal check @ 2:30am

## 2022-12-02 NOTE — ED ADULT NURSE REASSESSMENT NOTE - NS ED NURSE REASSESS COMMENT FT1
Assumed care of pt at 0506 in CC. pt received A&O2/3. PT oriented to his name and situation. MD Osei at bedside assessing pt. Pt received medication as per EMAR. Repeat labs done. SPO2 98% on RA, VS as charted, NSR on tele. Assumed care of pt at 0506 in CC. pt received A&O2/3. PT oriented to his name and situation. MD Osei at bedside assessing pt. Pt received medication as per EMAR. Repeat labs done. SPO2 98% on RA, VS as charted, NSR on tele. Pt does not appear to be in any acute distress and is responsive to verbal commands. As per previous RN, pt is voiding but incontinent. RR even and unlabored.

## 2022-12-02 NOTE — CONSULT NOTE ADULT - ATTENDING COMMENTS
Patient to be admitted to ICU for dialysis given refractory hyperkalemia likely secondary to acute renal failure.   Team will place central lines for dialysis and coordinate care with nephrologist.   Two physician consent obtained due to inability to reach next of kin/proxy and need for emergency management.     ~60mins spent by ICU team coordinating care and initiating treatment.
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
## 2022-12-02 NOTE — CHART NOTE - NSCHARTNOTEFT_GEN_A_CORE
We saw patient at the bedside. The patient is A&O x 1 and confused when talking with him. The patient is abdomen is tender to palpation of the lower left quadrant which has not changed from his initial exam from admission and morning rounds. During bedside visit it was report the patient had episodes of vtac and chest pain. The cardiology was consulted and reported that patient was not in vtac and was in sinus rhythm and chest pain is more likely to be abdominal pain. The current plan is to follow up troponin, ekg was ordered and read by cardiology, and have the patient on tele monitoring. Colorectal rectal surgery will continue to follow and assess the patient abdomen.

## 2022-12-02 NOTE — BH CONSULTATION LIAISON ASSESSMENT NOTE - NSBHCHARTREVIEWVS_PSY_A_CORE FT
Vital Signs Last 24 Hrs  T(C): 36.8 (02 Dec 2022 11:26), Max: 37.7 (02 Dec 2022 05:06)  T(F): 98.2 (02 Dec 2022 11:26), Max: 99.8 (02 Dec 2022 05:06)  HR: 99 (02 Dec 2022 11:26) (79 - 99)  BP: 155/99 (02 Dec 2022 11:26) (101/72 - 169/85)  BP(mean): 105 (02 Dec 2022 05:51) (105 - 114)  RR: 18 (02 Dec 2022 11:26) (17 - 18)  SpO2: 96% (02 Dec 2022 11:26) (95% - 99%)    Parameters below as of 02 Dec 2022 11:26  Patient On (Oxygen Delivery Method): room air

## 2022-12-02 NOTE — H&P ADULT - ASSESSMENT
ASSESSMENT: Patient is a 42y old male with possible low flow state to sigmoid colon secondary to 2 day history of persistent diarrhea. CT scan findings of pneumatosis in sigmoid colon. Also found to have worsening Cr and hyperkalemia     PLAN:    - Admit to Colorectal surgery under Dr. Sweeney to monitored bed  - IV Zosyn  - f/u Nephrology   - Management of hyperkalemia.   - Consult Behavioral health in AM  - NPO/IVF  - pain control  - DVT ppx  - OOB/ambulate  - strict I/Os  - Plan discussed with Attending, Dr. Sweeney

## 2022-12-02 NOTE — BH CONSULTATION LIAISON ASSESSMENT NOTE - SUMMARY
Patient is a 42 year old male who is residing in a sober house, with past psychiatric history of Bipolar disorder, as well as multiple substance use disorders including cocaine, cannabis, heroin, alcohol, prescription drugs, who was brought to the ER from sober house with a 2 day history of severe diarrhea.     Patient seen and evaluated and currently presents confused while oriented x1. Patient known to writer from previous encounters and at baseline is alert and oriented x3 with no cognitive deficits. Likely delirium     Recs.   -maintain delirium precautions  -U tox   -Frequent reorientation, hydration, try to avoid restraints   -Continue medical workup to r/u medical cause   -Avoid anticholinergic agents   -Currently NPO (home meds , Seroquel 400, Wellbutrin 300, Vistaril 50mg, Lamictal 150, Suboxone 8/2 QID)  -For agitation can give ZYprexa 5mg IM Q6 hours PRN for agitation   -will follow as needed

## 2022-12-02 NOTE — H&P ADULT - ATTENDING COMMENTS
Patient seen and examined at bedside this morning. At time of exam he was awake, somewhat alert, not fully oriented and reporting substernal chest pain. Prior to seeing him had runs of Vtach. Stat EKG, labs, trops and cardiology consultation were placed. Cardiology did see and evaluate the patient at bedside. No current interventions pending further object data. Abdomen is soft, nondistended, focal mild to moderate LLQ tenderness on deep palpation, no guarding or rebound tenderness.    BOWEL: Small hiatal hernia. No bowel obstruction. Appendix is not visualized. No evidence of inflammation in the pericecal region. There is diffuse mural thickening involving the distal transverse, descending, and sigmoid colon as well as the rectum. There is also pneumatosis of the colonic wall (series 3, image 142).  PERITONEUM: No ascites. No pneumoperitoneum.  VESSELS: Within normal limits.  RETROPERITONEUM/LYMPH NODES: No lymphadenopathy.  ABDOMINAL WALL: Within normal limits.  BONES: Left hip arthroplasty. Interval development of vertebral body sclerosis at L1-L2 secondary to degenerative changes.      IMPRESSION:  Diffuse mural thickening of the distal colon with pneumatosis, concerning for ischemic colitis. No evidence of free air. Correlation with serum lactate is recommended.    Markedly distended gallbladder measuring up to 7 cm in transverse axis, without stones or pericholecystic fluid.    Findings above and recommendations for further workup were discussed directly by telephone by the ED radiologist on call, Sachin Bass MD, with the emergency department attending physician, Rakan Blood M.D., at 3:15 AM on 12/2/2022.    Vital Signs Last 24 Hrs  T(C): 36.8 (02 Dec 2022 11:26), Max: 37.7 (02 Dec 2022 05:06)  T(F): 98.2 (02 Dec 2022 11:26), Max: 99.8 (02 Dec 2022 05:06)  HR: 99 (02 Dec 2022 11:26) (79 - 99)  BP: 155/99 (02 Dec 2022 11:26) (101/72 - 169/85)  BP(mean): 105 (02 Dec 2022 05:51) (105 - 114)  RR: 18 (02 Dec 2022 11:26) (17 - 18)  SpO2: 96% (02 Dec 2022 11:26) (95% - 99%)    Parameters below as of 02 Dec 2022 11:26  Patient On (Oxygen Delivery Method): room air                          13.1   9.98  )-----------( 267      ( 01 Dec 2022 19:50 )             41.2   12-02    131<L>  |  99  |  44.5<H>  ----------------------------<  268<H>  5.5<H>   |  20.0<L>  |  3.62<H>    Ca    9.3      02 Dec 2022 06:04    TPro  5.6<L>  /  Alb  3.2<L>  /  TBili  0.7  /  DBili  x   /  AST  61<H>  /  ALT  17  /  AlkPhos  130<H>  12-02    A/P: Ischemic colitis 2/2 substance use?  Ensure adequate pain control  Keep NPO for bowel rest, IV antibiotics  IV fluid resuscitation  Follow up lab work, LA, trops  Serial abdominal examination  Continue close supportive care

## 2022-12-02 NOTE — BH CONSULTATION LIAISON ASSESSMENT NOTE - NSACTIVEVENT_PSY_ALL_CORE
recent detox reported at [facility]/Current or pending social isolation/Inadequate social supports

## 2022-12-02 NOTE — BH CONSULTATION LIAISON ASSESSMENT NOTE - NSBHCHARTREVIEWINVESTIGATE_PSY_A_CORE FT
< from: 12 Lead ECG (05.19.22 @ 19:59) >      Ventricular Rate 62 BPM    Atrial Rate 62 BPM    P-R Interval 192 ms    QRS Duration 110 ms    Q-T Interval 414 ms    QTC Calculation(Bazett) 420 ms    P Axis 31 degrees    R Axis -6 degrees    T Axis -5 degrees    Diagnosis Line Normal sinus rhythm  Moderate voltage criteria for LVH, may be normal variant  Borderline ECG    Confirmed by Jonathan Myers ([id number]) on 5/20/2022 2:41:53 PM    < end of copied text >
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
## 2022-12-02 NOTE — BH CONSULTATION LIAISON ASSESSMENT NOTE - NSBHSUBSTUSED_PSY_A_CORE
[phone] (home)   Left message for patient to call back. Alcohol/Cannabis/Cocaine/Crack/Heroin/Opiates
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
## 2022-12-03 LAB
ALBUMIN SERPL ELPH-MCNC: 3 G/DL — LOW (ref 3.3–5.2)
ALBUMIN SERPL ELPH-MCNC: 3 G/DL — LOW (ref 3.3–5.2)
ALBUMIN, RANDOM URINE: 190.3 MG/DL — SIGNIFICANT CHANGE UP
ALBUMIN/CREATININE RATIO (ACR): HIGH MG/G (ref 0–30)
ALP SERPL-CCNC: 84 U/L — SIGNIFICANT CHANGE UP (ref 40–120)
ALP SERPL-CCNC: 87 U/L — SIGNIFICANT CHANGE UP (ref 40–120)
ALT FLD-CCNC: 13 U/L — SIGNIFICANT CHANGE UP
ALT FLD-CCNC: 14 U/L — SIGNIFICANT CHANGE UP
ANION GAP SERPL CALC-SCNC: 12 MMOL/L — SIGNIFICANT CHANGE UP (ref 5–17)
ANION GAP SERPL CALC-SCNC: 13 MMOL/L — SIGNIFICANT CHANGE UP (ref 5–17)
ANISOCYTOSIS BLD QL: SIGNIFICANT CHANGE UP
APTT BLD: 25.7 SEC — LOW (ref 27.5–35.5)
AST SERPL-CCNC: 20 U/L — SIGNIFICANT CHANGE UP
AST SERPL-CCNC: 22 U/L — SIGNIFICANT CHANGE UP
BASE EXCESS BLDA CALC-SCNC: 2 MMOL/L — SIGNIFICANT CHANGE UP (ref -2–3)
BASOPHILS # BLD AUTO: 0 K/UL — SIGNIFICANT CHANGE UP (ref 0–0.2)
BASOPHILS # BLD AUTO: 0.04 K/UL — SIGNIFICANT CHANGE UP (ref 0–0.2)
BASOPHILS NFR BLD AUTO: 0 % — SIGNIFICANT CHANGE UP (ref 0–2)
BASOPHILS NFR BLD AUTO: 0.9 % — SIGNIFICANT CHANGE UP (ref 0–2)
BILIRUB DIRECT SERPL-MCNC: 0.3 MG/DL — SIGNIFICANT CHANGE UP (ref 0–0.3)
BILIRUB INDIRECT FLD-MCNC: 0.3 MG/DL — SIGNIFICANT CHANGE UP (ref 0.2–1)
BILIRUB SERPL-MCNC: 0.5 MG/DL — SIGNIFICANT CHANGE UP (ref 0.4–2)
BILIRUB SERPL-MCNC: 0.5 MG/DL — SIGNIFICANT CHANGE UP (ref 0.4–2)
BLOOD GAS COMMENTS ARTERIAL: SIGNIFICANT CHANGE UP
BUN SERPL-MCNC: 39.2 MG/DL — HIGH (ref 8–20)
BUN SERPL-MCNC: 44.4 MG/DL — HIGH (ref 8–20)
BUN SERPL-MCNC: 44.7 MG/DL — HIGH (ref 8–20)
BUN SERPL-MCNC: 50.3 MG/DL — HIGH (ref 8–20)
C DIFF BY PCR RESULT: DETECTED
CALCIUM SERPL-MCNC: 8.6 MG/DL — SIGNIFICANT CHANGE UP (ref 8.4–10.5)
CALCIUM SERPL-MCNC: 8.9 MG/DL — SIGNIFICANT CHANGE UP (ref 8.4–10.5)
CALCIUM SERPL-MCNC: 9 MG/DL — SIGNIFICANT CHANGE UP (ref 8.4–10.5)
CALCIUM SERPL-MCNC: 9.4 MG/DL — SIGNIFICANT CHANGE UP (ref 8.4–10.5)
CHLORIDE SERPL-SCNC: 100 MMOL/L — SIGNIFICANT CHANGE UP (ref 96–108)
CHLORIDE SERPL-SCNC: 100 MMOL/L — SIGNIFICANT CHANGE UP (ref 96–108)
CHLORIDE SERPL-SCNC: 101 MMOL/L — SIGNIFICANT CHANGE UP (ref 96–108)
CHLORIDE SERPL-SCNC: 101 MMOL/L — SIGNIFICANT CHANGE UP (ref 96–108)
CO2 SERPL-SCNC: 23 MMOL/L — SIGNIFICANT CHANGE UP (ref 22–29)
CO2 SERPL-SCNC: 23 MMOL/L — SIGNIFICANT CHANGE UP (ref 22–29)
CO2 SERPL-SCNC: 24 MMOL/L — SIGNIFICANT CHANGE UP (ref 22–29)
CO2 SERPL-SCNC: 24 MMOL/L — SIGNIFICANT CHANGE UP (ref 22–29)
CREAT ?TM UR-MCNC: 4 MG/DL — SIGNIFICANT CHANGE UP
CREAT SERPL-MCNC: 4.03 MG/DL — HIGH (ref 0.5–1.3)
CREAT SERPL-MCNC: 4.51 MG/DL — HIGH (ref 0.5–1.3)
CREAT SERPL-MCNC: 4.91 MG/DL — HIGH (ref 0.5–1.3)
CREAT SERPL-MCNC: 5.29 MG/DL — HIGH (ref 0.5–1.3)
EGFR: 13 ML/MIN/1.73M2 — LOW
EGFR: 14 ML/MIN/1.73M2 — LOW
EGFR: 16 ML/MIN/1.73M2 — LOW
EGFR: 18 ML/MIN/1.73M2 — LOW
EOSINOPHIL # BLD AUTO: 0 K/UL — SIGNIFICANT CHANGE UP (ref 0–0.5)
EOSINOPHIL # BLD AUTO: 0.04 K/UL — SIGNIFICANT CHANGE UP (ref 0–0.5)
EOSINOPHIL NFR BLD AUTO: 0 % — SIGNIFICANT CHANGE UP (ref 0–6)
EOSINOPHIL NFR BLD AUTO: 0.8 % — SIGNIFICANT CHANGE UP (ref 0–6)
GAS PNL BLDA: SIGNIFICANT CHANGE UP
GIANT PLATELETS BLD QL SMEAR: PRESENT — SIGNIFICANT CHANGE UP
GLUCOSE BLDC GLUCOMTR-MCNC: 122 MG/DL — HIGH (ref 70–99)
GLUCOSE BLDC GLUCOMTR-MCNC: 125 MG/DL — HIGH (ref 70–99)
GLUCOSE BLDC GLUCOMTR-MCNC: 128 MG/DL — HIGH (ref 70–99)
GLUCOSE SERPL-MCNC: 115 MG/DL — HIGH (ref 70–99)
GLUCOSE SERPL-MCNC: 125 MG/DL — HIGH (ref 70–99)
GLUCOSE SERPL-MCNC: 126 MG/DL — HIGH (ref 70–99)
GLUCOSE SERPL-MCNC: 132 MG/DL — HIGH (ref 70–99)
HCO3 BLDA-SCNC: 27 MMOL/L — SIGNIFICANT CHANGE UP (ref 21–28)
HCT VFR BLD CALC: 28.6 % — LOW (ref 39–50)
HCT VFR BLD CALC: 28.7 % — LOW (ref 39–50)
HCT VFR BLD CALC: 29.1 % — LOW (ref 39–50)
HGB BLD-MCNC: 9.8 G/DL — LOW (ref 13–17)
HGB BLD-MCNC: 9.9 G/DL — LOW (ref 13–17)
HGB BLD-MCNC: 9.9 G/DL — LOW (ref 13–17)
INR BLD: 1.18 RATIO — HIGH (ref 0.88–1.16)
LYMPHOCYTES # BLD AUTO: 0.73 K/UL — LOW (ref 1–3.3)
LYMPHOCYTES # BLD AUTO: 0.76 K/UL — LOW (ref 1–3.3)
LYMPHOCYTES # BLD AUTO: 15.5 % — SIGNIFICANT CHANGE UP (ref 13–44)
LYMPHOCYTES # BLD AUTO: 16.7 % — SIGNIFICANT CHANGE UP (ref 13–44)
MAGNESIUM SERPL-MCNC: 2.5 MG/DL — SIGNIFICANT CHANGE UP (ref 1.6–2.6)
MAGNESIUM SERPL-MCNC: 2.5 MG/DL — SIGNIFICANT CHANGE UP (ref 1.6–2.6)
MAGNESIUM SERPL-MCNC: 2.7 MG/DL — HIGH (ref 1.6–2.6)
MAGNESIUM SERPL-MCNC: 2.8 MG/DL — HIGH (ref 1.6–2.6)
MANUAL SMEAR VERIFICATION: SIGNIFICANT CHANGE UP
MANUAL SMEAR VERIFICATION: SIGNIFICANT CHANGE UP
MCHC RBC-ENTMCNC: 27 PG — SIGNIFICANT CHANGE UP (ref 27–34)
MCHC RBC-ENTMCNC: 27.2 PG — SIGNIFICANT CHANGE UP (ref 27–34)
MCHC RBC-ENTMCNC: 27.4 PG — SIGNIFICANT CHANGE UP (ref 27–34)
MCHC RBC-ENTMCNC: 33.7 GM/DL — SIGNIFICANT CHANGE UP (ref 32–36)
MCHC RBC-ENTMCNC: 34.5 GM/DL — SIGNIFICANT CHANGE UP (ref 32–36)
MCHC RBC-ENTMCNC: 34.6 GM/DL — SIGNIFICANT CHANGE UP (ref 32–36)
MCV RBC AUTO: 78.4 FL — LOW (ref 80–100)
MCV RBC AUTO: 79.2 FL — LOW (ref 80–100)
MCV RBC AUTO: 80.8 FL — SIGNIFICANT CHANGE UP (ref 80–100)
METAMYELOCYTES # FLD: 0.9 % — HIGH (ref 0–0)
METAMYELOCYTES # FLD: 9.5 % — HIGH (ref 0–0)
MICROCYTES BLD QL: SIGNIFICANT CHANGE UP
MONOCYTES # BLD AUTO: 0.57 K/UL — SIGNIFICANT CHANGE UP (ref 0–0.9)
MONOCYTES # BLD AUTO: 0.92 K/UL — HIGH (ref 0–0.9)
MONOCYTES NFR BLD AUTO: 12.1 % — SIGNIFICANT CHANGE UP (ref 2–14)
MONOCYTES NFR BLD AUTO: 20.2 % — HIGH (ref 2–14)
MRSA PCR RESULT.: SIGNIFICANT CHANGE UP
MYELOCYTES NFR BLD: 1.7 % — HIGH (ref 0–0)
NEUTROPHILS # BLD AUTO: 2.76 K/UL — SIGNIFICANT CHANGE UP (ref 1.8–7.4)
NEUTROPHILS # BLD AUTO: 2.78 K/UL — SIGNIFICANT CHANGE UP (ref 1.8–7.4)
NEUTROPHILS NFR BLD AUTO: 45.7 % — SIGNIFICANT CHANGE UP (ref 43–77)
NEUTROPHILS NFR BLD AUTO: 52.6 % — SIGNIFICANT CHANGE UP (ref 43–77)
NEUTS BAND # BLD: 13.8 % — HIGH (ref 0–8)
NEUTS BAND # BLD: 7.9 % — SIGNIFICANT CHANGE UP (ref 0–8)
PCO2 BLDA: 42 MMHG — SIGNIFICANT CHANGE UP (ref 35–48)
PCP SPEC-MCNC: SIGNIFICANT CHANGE UP
PH BLDA: 7.41 — SIGNIFICANT CHANGE UP (ref 7.35–7.45)
PHOSPHATE SERPL-MCNC: 3.8 MG/DL — SIGNIFICANT CHANGE UP (ref 2.4–4.7)
PHOSPHATE SERPL-MCNC: 3.9 MG/DL — SIGNIFICANT CHANGE UP (ref 2.4–4.7)
PHOSPHATE SERPL-MCNC: 4 MG/DL — SIGNIFICANT CHANGE UP (ref 2.4–4.7)
PHOSPHATE SERPL-MCNC: 4.7 MG/DL — SIGNIFICANT CHANGE UP (ref 2.4–4.7)
PLAT MORPH BLD: NORMAL — SIGNIFICANT CHANGE UP
PLAT MORPH BLD: NORMAL — SIGNIFICANT CHANGE UP
PLATELET # BLD AUTO: 129 K/UL — LOW (ref 150–400)
PLATELET # BLD AUTO: 138 K/UL — LOW (ref 150–400)
PLATELET # BLD AUTO: 150 K/UL — SIGNIFICANT CHANGE UP (ref 150–400)
PO2 BLDA: 91 MMHG — SIGNIFICANT CHANGE UP (ref 83–108)
POLYCHROMASIA BLD QL SMEAR: SIGNIFICANT CHANGE UP
POTASSIUM SERPL-MCNC: 4.9 MMOL/L — SIGNIFICANT CHANGE UP (ref 3.5–5.3)
POTASSIUM SERPL-MCNC: 5.1 MMOL/L — SIGNIFICANT CHANGE UP (ref 3.5–5.3)
POTASSIUM SERPL-MCNC: 5.2 MMOL/L — SIGNIFICANT CHANGE UP (ref 3.5–5.3)
POTASSIUM SERPL-MCNC: 5.3 MMOL/L — SIGNIFICANT CHANGE UP (ref 3.5–5.3)
POTASSIUM SERPL-SCNC: 4.9 MMOL/L — SIGNIFICANT CHANGE UP (ref 3.5–5.3)
POTASSIUM SERPL-SCNC: 5.1 MMOL/L — SIGNIFICANT CHANGE UP (ref 3.5–5.3)
POTASSIUM SERPL-SCNC: 5.2 MMOL/L — SIGNIFICANT CHANGE UP (ref 3.5–5.3)
POTASSIUM SERPL-SCNC: 5.3 MMOL/L — SIGNIFICANT CHANGE UP (ref 3.5–5.3)
PROT SERPL-MCNC: 5.5 G/DL — LOW (ref 6.6–8.7)
PROT SERPL-MCNC: 5.7 G/DL — LOW (ref 6.6–8.7)
PROTHROM AB SERPL-ACNC: 13.7 SEC — HIGH (ref 10.5–13.4)
RBC # BLD: 3.6 M/UL — LOW (ref 4.2–5.8)
RBC # BLD: 3.61 M/UL — LOW (ref 4.2–5.8)
RBC # BLD: 3.66 M/UL — LOW (ref 4.2–5.8)
RBC # FLD: 13.2 % — SIGNIFICANT CHANGE UP (ref 10.3–14.5)
RBC # FLD: 13.2 % — SIGNIFICANT CHANGE UP (ref 10.3–14.5)
RBC # FLD: 13.5 % — SIGNIFICANT CHANGE UP (ref 10.3–14.5)
RBC BLD AUTO: ABNORMAL
RBC BLD AUTO: NORMAL — SIGNIFICANT CHANGE UP
S AUREUS DNA NOSE QL NAA+PROBE: DETECTED
SAO2 % BLDA: 96.2 % — SIGNIFICANT CHANGE UP (ref 94–98)
SMUDGE CELLS # BLD: PRESENT — SIGNIFICANT CHANGE UP
SODIUM SERPL-SCNC: 136 MMOL/L — SIGNIFICANT CHANGE UP (ref 135–145)
SODIUM SERPL-SCNC: 136 MMOL/L — SIGNIFICANT CHANGE UP (ref 135–145)
SODIUM SERPL-SCNC: 137 MMOL/L — SIGNIFICANT CHANGE UP (ref 135–145)
SODIUM SERPL-SCNC: 137 MMOL/L — SIGNIFICANT CHANGE UP (ref 135–145)
TROPONIN T SERPL-MCNC: 0.02 NG/ML — SIGNIFICANT CHANGE UP (ref 0–0.06)
VARIANT LYMPHS # BLD: 1.7 % — SIGNIFICANT CHANGE UP (ref 0–6)
WBC # BLD: 4.57 K/UL — SIGNIFICANT CHANGE UP (ref 3.8–10.5)
WBC # BLD: 4.68 K/UL — SIGNIFICANT CHANGE UP (ref 3.8–10.5)
WBC # BLD: 7.17 K/UL — SIGNIFICANT CHANGE UP (ref 3.8–10.5)
WBC # FLD AUTO: 4.57 K/UL — SIGNIFICANT CHANGE UP (ref 3.8–10.5)
WBC # FLD AUTO: 4.68 K/UL — SIGNIFICANT CHANGE UP (ref 3.8–10.5)
WBC # FLD AUTO: 7.17 K/UL — SIGNIFICANT CHANGE UP (ref 3.8–10.5)

## 2022-12-03 PROCEDURE — 36620 INSERTION CATHETER ARTERY: CPT | Mod: 76

## 2022-12-03 PROCEDURE — 99232 SBSQ HOSP IP/OBS MODERATE 35: CPT

## 2022-12-03 PROCEDURE — 99233 SBSQ HOSP IP/OBS HIGH 50: CPT

## 2022-12-03 RX ORDER — SODIUM CHLORIDE 9 MG/ML
1000 INJECTION INTRAMUSCULAR; INTRAVENOUS; SUBCUTANEOUS
Refills: 0 | Status: DISCONTINUED | OUTPATIENT
Start: 2022-12-03 | End: 2022-12-04

## 2022-12-03 RX ORDER — HALOPERIDOL DECANOATE 100 MG/ML
4 INJECTION INTRAMUSCULAR EVERY 6 HOURS
Refills: 0 | Status: DISCONTINUED | OUTPATIENT
Start: 2022-12-03 | End: 2022-12-03

## 2022-12-03 RX ORDER — BUPRENORPHINE AND NALOXONE 2; .5 MG/1; MG/1
1 TABLET SUBLINGUAL DAILY
Refills: 0 | Status: DISCONTINUED | OUTPATIENT
Start: 2022-12-03 | End: 2022-12-10

## 2022-12-03 RX ORDER — ATORVASTATIN CALCIUM 80 MG/1
20 TABLET, FILM COATED ORAL AT BEDTIME
Refills: 0 | Status: DISCONTINUED | OUTPATIENT
Start: 2022-12-03 | End: 2022-12-12

## 2022-12-03 RX ORDER — LABETALOL HCL 100 MG
10 TABLET ORAL ONCE
Refills: 0 | Status: COMPLETED | OUTPATIENT
Start: 2022-12-03 | End: 2022-12-03

## 2022-12-03 RX ORDER — RISPERIDONE 4 MG/1
3 TABLET ORAL DAILY
Refills: 0 | Status: DISCONTINUED | OUTPATIENT
Start: 2022-12-03 | End: 2022-12-12

## 2022-12-03 RX ORDER — BUPROPION HYDROCHLORIDE 150 MG/1
300 TABLET, EXTENDED RELEASE ORAL DAILY
Refills: 0 | Status: DISCONTINUED | OUTPATIENT
Start: 2022-12-03 | End: 2022-12-12

## 2022-12-03 RX ORDER — SODIUM CHLORIDE 9 MG/ML
10 INJECTION INTRAMUSCULAR; INTRAVENOUS; SUBCUTANEOUS
Refills: 0 | Status: DISCONTINUED | OUTPATIENT
Start: 2022-12-03 | End: 2022-12-12

## 2022-12-03 RX ORDER — QUETIAPINE FUMARATE 200 MG/1
400 TABLET, FILM COATED ORAL
Refills: 0 | Status: DISCONTINUED | OUTPATIENT
Start: 2022-12-03 | End: 2022-12-12

## 2022-12-03 RX ORDER — LABETALOL HCL 100 MG
300 TABLET ORAL
Refills: 0 | Status: DISCONTINUED | OUTPATIENT
Start: 2022-12-03 | End: 2022-12-12

## 2022-12-03 RX ORDER — VANCOMYCIN HCL 1 G
125 VIAL (EA) INTRAVENOUS EVERY 6 HOURS
Refills: 0 | Status: DISCONTINUED | OUTPATIENT
Start: 2022-12-03 | End: 2022-12-12

## 2022-12-03 RX ORDER — CHLORHEXIDINE GLUCONATE 213 G/1000ML
1 SOLUTION TOPICAL
Refills: 0 | Status: DISCONTINUED | OUTPATIENT
Start: 2022-12-03 | End: 2022-12-12

## 2022-12-03 RX ORDER — GABAPENTIN 400 MG/1
800 CAPSULE ORAL EVERY 6 HOURS
Refills: 0 | Status: DISCONTINUED | OUTPATIENT
Start: 2022-12-03 | End: 2022-12-06

## 2022-12-03 RX ORDER — METRONIDAZOLE 500 MG
500 TABLET ORAL EVERY 8 HOURS
Refills: 0 | Status: DISCONTINUED | OUTPATIENT
Start: 2022-12-03 | End: 2022-12-08

## 2022-12-03 RX ORDER — LAMOTRIGINE 25 MG/1
150 TABLET, ORALLY DISINTEGRATING ORAL
Refills: 0 | Status: DISCONTINUED | OUTPATIENT
Start: 2022-12-03 | End: 2022-12-12

## 2022-12-03 RX ORDER — LOSARTAN POTASSIUM 100 MG/1
100 TABLET, FILM COATED ORAL DAILY
Refills: 0 | Status: DISCONTINUED | OUTPATIENT
Start: 2022-12-03 | End: 2022-12-05

## 2022-12-03 RX ORDER — PANTOPRAZOLE SODIUM 20 MG/1
40 TABLET, DELAYED RELEASE ORAL DAILY
Refills: 0 | Status: DISCONTINUED | OUTPATIENT
Start: 2022-12-03 | End: 2022-12-06

## 2022-12-03 RX ORDER — FLUOXETINE HCL 10 MG
40 CAPSULE ORAL DAILY
Refills: 0 | Status: DISCONTINUED | OUTPATIENT
Start: 2022-12-03 | End: 2022-12-06

## 2022-12-03 RX ADMIN — CHLORHEXIDINE GLUCONATE 1 APPLICATION(S): 213 SOLUTION TOPICAL at 11:01

## 2022-12-03 RX ADMIN — BUPRENORPHINE AND NALOXONE 1 FILM(S): 2; .5 TABLET SUBLINGUAL at 13:04

## 2022-12-03 RX ADMIN — DEXMEDETOMIDINE HYDROCHLORIDE IN 0.9% SODIUM CHLORIDE 3.5 MICROGRAM(S)/KG/HR: 4 INJECTION INTRAVENOUS at 11:01

## 2022-12-03 RX ADMIN — Medication 125 MILLIGRAM(S): at 17:15

## 2022-12-03 RX ADMIN — GABAPENTIN 800 MILLIGRAM(S): 400 CAPSULE ORAL at 17:14

## 2022-12-03 RX ADMIN — LEVETIRACETAM 400 MILLIGRAM(S): 250 TABLET, FILM COATED ORAL at 00:02

## 2022-12-03 RX ADMIN — DEXMEDETOMIDINE HYDROCHLORIDE IN 0.9% SODIUM CHLORIDE 3.5 MICROGRAM(S)/KG/HR: 4 INJECTION INTRAVENOUS at 02:29

## 2022-12-03 RX ADMIN — Medication 300 MILLIGRAM(S): at 19:06

## 2022-12-03 RX ADMIN — Medication 100 MILLIGRAM(S): at 21:44

## 2022-12-03 RX ADMIN — LEVETIRACETAM 400 MILLIGRAM(S): 250 TABLET, FILM COATED ORAL at 11:02

## 2022-12-03 RX ADMIN — RISPERIDONE 3 MILLIGRAM(S): 4 TABLET ORAL at 13:03

## 2022-12-03 RX ADMIN — Medication 0.3 MILLIGRAM(S): at 13:02

## 2022-12-03 RX ADMIN — Medication 10 MILLIGRAM(S): at 12:01

## 2022-12-03 RX ADMIN — ATORVASTATIN CALCIUM 20 MILLIGRAM(S): 80 TABLET, FILM COATED ORAL at 21:44

## 2022-12-03 RX ADMIN — DEXMEDETOMIDINE HYDROCHLORIDE IN 0.9% SODIUM CHLORIDE 3.5 MICROGRAM(S)/KG/HR: 4 INJECTION INTRAVENOUS at 05:40

## 2022-12-03 RX ADMIN — BUPROPION HYDROCHLORIDE 300 MILLIGRAM(S): 150 TABLET, EXTENDED RELEASE ORAL at 13:03

## 2022-12-03 RX ADMIN — Medication 0.3 MILLIGRAM(S): at 21:43

## 2022-12-03 RX ADMIN — HEPARIN SODIUM 5000 UNIT(S): 5000 INJECTION INTRAVENOUS; SUBCUTANEOUS at 05:35

## 2022-12-03 RX ADMIN — Medication 100 MILLIGRAM(S): at 13:03

## 2022-12-03 RX ADMIN — SODIUM CHLORIDE 42 MILLILITER(S): 9 INJECTION INTRAMUSCULAR; INTRAVENOUS; SUBCUTANEOUS at 21:44

## 2022-12-03 RX ADMIN — QUETIAPINE FUMARATE 400 MILLIGRAM(S): 200 TABLET, FILM COATED ORAL at 17:14

## 2022-12-03 RX ADMIN — Medication 125 MILLIGRAM(S): at 11:13

## 2022-12-03 RX ADMIN — LAMOTRIGINE 150 MILLIGRAM(S): 25 TABLET, ORALLY DISINTEGRATING ORAL at 17:14

## 2022-12-03 RX ADMIN — HALOPERIDOL DECANOATE 4 MILLIGRAM(S): 100 INJECTION INTRAMUSCULAR at 09:36

## 2022-12-03 RX ADMIN — HEPARIN SODIUM 5000 UNIT(S): 5000 INJECTION INTRAVENOUS; SUBCUTANEOUS at 21:44

## 2022-12-03 RX ADMIN — Medication 40 MILLIGRAM(S): at 14:12

## 2022-12-03 RX ADMIN — LOSARTAN POTASSIUM 100 MILLIGRAM(S): 100 TABLET, FILM COATED ORAL at 14:12

## 2022-12-03 RX ADMIN — SODIUM CHLORIDE 42 MILLILITER(S): 9 INJECTION INTRAMUSCULAR; INTRAVENOUS; SUBCUTANEOUS at 17:16

## 2022-12-03 RX ADMIN — HEPARIN SODIUM 5000 UNIT(S): 5000 INJECTION INTRAVENOUS; SUBCUTANEOUS at 13:02

## 2022-12-03 NOTE — PATIENT PROFILE ADULT - FALL HARM RISK - HARM RISK INTERVENTIONS

## 2022-12-03 NOTE — PROGRESS NOTE ADULT - ATTENDING COMMENTS
42y Male with PMH of bipolar disorder, HTN, GERD, and polysubstance abuse who presents with abdominal pain and diarrhea, found to have acute renal failure and pneumatosis on CT concerning for ischemic colitis of sigmoid colon. Patient worsening anuric renal failure, metabolic acidosis and hyperkalemia with EKG changes. Admitted to SICU for emergent dialysis.        Neuro: Metabolic encephelopathy- improved. Bipolar disorder- hold all antipsychotics and Suboxone for now given NPO. Zyprexa prn for agitation. Keppra for compliance during dialysis, will d/c after. Start Keppra for seizures IV (normal dosing given he is on CVVHD).   On Precedex large dose, causing hypotension.  Will decrease precedex and add Haldol in order to stabilize hemodynamic values  Bilateral BS  Hemodynamically stabilizing with decrease . Tachycardia improving.     Pulm: Sating well on RA. PH improved.     GI: Strict NPO. Persistent diarrhea unclear source- stool sample sent culture, ova & parasites and c diff. CT concerning for pneumonosis f/u CRS   Acute renal failure secondary to ATN. Continue emergent dialysis (CVVHD performed due to NO access to dialysis overnight).     I

## 2022-12-03 NOTE — PROGRESS NOTE ADULT - ASSESSMENT
42 year old male with PMH of polysubstance abuse, bipolar disorder, HTN and GERD presents with severe diarrhea. Found to have pneumatosis in sigmoid colon. C diff positive. Hospital course is notable for LIV complicated by hyperK with EKG changes s/p emergent renal replacement therapy on 12/2.     -LIV secondary to prerenal etiology with progression to ATN vs ? infectious GN   -Baseline creatinine is unclear  -Creatinine on admission was 2.7mg/dL  - protein, large blood, 25-50 RBC, 6-10 WBC, occasional bacteria   -UPCR 56g/g; UACR 49g/g   -CT a/p without IV contrast is negative for hydronephrosis   -Significant proteinuria/albuminuria (with hematuria); serum albumin is relatively okay at 3.0 - will check complements, SHANTEL, dsDNA, ANCA, antiGBM, HIV, RPR, HepB, HepC, immunofixation, free serum light chains, [id number], hemoglobin A1c, lipid panel   -Creatinine peaked at 5.0mg/dL associated with hyperkalemia and EKG changes necessitating emergent renal replacement therapy on 12/2  -Access: R IJ non tunneled dialysis catheter   -Will discontinue CRRT and transition over the intermittent hemodialysis today   -Maintain loera for strict I/O's - non oliguric at this time with 840cc urine output over the past 24 hours  -Will monitor for renal recovery - optimistic for eventual renal recovery given non oliguria   -Okay for downgrade from SICU after completion of intermittent hemodialysis today from renal prospective     Selin Shane DO  Nephrology  [facility]  [address] [place] [address]  Office Number [phone]   42 year old male with PMH of polysubstance abuse, bipolar disorder, HTN and GERD presents with severe diarrhea. Found to have pneumatosis in sigmoid colon. C diff positive. Hospital course is notable for LIV complicated by hyperK with EKG changes s/p emergent renal replacement therapy on 12/2.     -LIV secondary to prerenal etiology with progression to ATN vs ? infectious GN   -Baseline creatinine is unclear  -Creatinine on admission was 2.7mg/dL  - protein, large blood, 25-50 RBC, 6-10 WBC, occasional bacteria   -UPCR 56g/g; UACR 49g/g   -CT a/p without IV contrast is negative for hydronephrosis   -Significant proteinuria/albuminuria (with hematuria); serum albumin is relatively okay at 3.0 - will check complements, SHANTEL, dsDNA, ANCA, antiGBM, HIV, RPR, HepB, HepC, immunofixation, free serum light chains, [id number], hemoglobin A1c, lipid panel   -Creatinine peaked at 5.0mg/dL associated with hyperkalemia and EKG changes necessitating emergent renal replacement therapy on 12/2  -Access: R IJ non tunneled dialysis catheter   -Will discontinue CRRT and transition over the intermittent hemodialysis today   -Maintain loera for strict I/O's - non oliguric at this time with 840cc urine output over the past 24 hours  -Will monitor for renal recovery - optimistic for eventual renal recovery given non oliguria   -Okay for downgrade from SICU after completion of intermittent hemodialysis today from renal prospective     ADDENDUM (12/3 at 11:35am): Patient's mother (Jody Murray listed as emergency contact at [phone]) was not reachable last night. I called her again at above listed number - again call was not answered. Left message; awaiting call back from her at this time.     Selin Shane DO  Nephrology  [facility]  [address] [place] [address]  Office Number [phone]

## 2022-12-03 NOTE — PROGRESS NOTE ADULT - ASSESSMENT
42y Male with PMH of bipolar disorder, HTN, GERD, and polysubstance abuse who presents with abdominal pain and diarrhea, found to have acute renal failure and pneumatosis on CT concerning for ischemic colitis of sigmoid colon. Patient worsening anuric renal failure, metabolic acidosis and hyperkalemia with ecg changes. Admitted to SICU for emergent dialysis.        Neuro: Metabolic encephelopathy- improved. Bipolar disorder- hold all antipsychotics and suboxone for now given NPO. Zyprexa prn for agitation. Keppra for compliance during dialysis, will d/c after. Start keppra for seizures IV (normal dosing given he is on CVVHD).     Card: Hemodynamically well. Tachycardia improving.     Pulm: Sating well on RA. PH improved.     GI: Strict NPO. Persistent diarrhea unclear source- stool sample sent culture, ova & parasites and c diff. CT concerning for pneumonosis f/u CRS     : Acute renal failure secondary to ATN. Continue emergent dialysis (CVVHD performed due to NO access to dialysis overnight). Hyperkalemia - primasol for now, will monitor K closely.     Endo: BS q4 hrs.     Hem/DVT: SCDs, SQH    ID: Bandemia, remains afebrile     Lines: RIJ HD. May require additional lines     Dispo: SICU

## 2022-12-03 NOTE — PROGRESS NOTE ADULT - ATTENDING COMMENTS
Patient seen and examined at bedside this morning in the SICU. Receiving Precedex for agitation. Overall the patient appears more alert today.  Abdomen is soft, nondistended, tenderness unable to be determined.    Vital Signs Last 24 Hrs  T(C): 37.3 (03 Dec 2022 13:30), Max: 37.8 (03 Dec 2022 04:30)  T(F): 99.1 (03 Dec 2022 13:30), Max: 100 (03 Dec 2022 04:30)  HR: 104 (03 Dec 2022 13:30) (85 - 117)  BP: 145/92 (03 Dec 2022 13:30) (124/69 - 179/153)  BP(mean): 107 (03 Dec 2022 13:30) (83 - 163)  RR: 8 (03 Dec 2022 13:30) (8 - 22)  SpO2: 98% (03 Dec 2022 13:30) (95% - 99%)    Parameters below as of 03 Dec 2022 07:00  Patient On (Oxygen Delivery Method): room air                          9.9    4.68  )-----------( 138      ( 03 Dec 2022 05:30 )             28.6     12-03    136  |  100  |  39.2<H>  ----------------------------<  132<H>  4.9   |  24.0  |  4.03<H>    Ca    8.6      03 Dec 2022 08:48  Phos  4.0     12-03  Mg     2.5     12-03    TPro  5.7<L>  /  Alb  3.0<L>  /  TBili  0.5  /  DBili  x   /  AST  20  /  ALT  14  /  AlkPhos  84  12-03      A/P:   Ensure adequate pain control  Ok for clear liquids  IV fluid resuscitation  Serial abdominal examination  Continue with strict Is and Os, monitor urine output  C.diff+ - Being started on PO Vanc and IV Flagyl  CRRT to be transitioned to HD  Continue close supportive care . Patient seen and examined at bedside this morning in the SICU. Receiving Precedex for agitation. Overall the patient appears more alert today.  Abdomen is soft, nondistended, tenderness unable to be determined.    Vital Signs Last 24 Hrs  T(C): 37.3 (03 Dec 2022 13:30), Max: 37.8 (03 Dec 2022 04:30)  T(F): 99.1 (03 Dec 2022 13:30), Max: 100 (03 Dec 2022 04:30)  HR: 104 (03 Dec 2022 13:30) (85 - 117)  BP: 145/92 (03 Dec 2022 13:30) (124/69 - 179/153)  BP(mean): 107 (03 Dec 2022 13:30) (83 - 163)  RR: 8 (03 Dec 2022 13:30) (8 - 22)  SpO2: 98% (03 Dec 2022 13:30) (95% - 99%)    Parameters below as of 03 Dec 2022 07:00  Patient On (Oxygen Delivery Method): room air                          9.9    4.68  )-----------( 138      ( 03 Dec 2022 05:30 )             28.6     12-03    136  |  100  |  39.2<H>  ----------------------------<  132<H>  4.9   |  24.0  |  4.03<H>    Ca    8.6      03 Dec 2022 08:48  Phos  4.0     12-03  Mg     2.5     12-03    TPro  5.7<L>  /  Alb  3.0<L>  /  TBili  0.5  /  DBili  x   /  AST  20  /  ALT  14  /  AlkPhos  84  12-03      A/P:   Ensure adequate pain control  Ok for clear liquids  IV fluid resuscitation  Serial abdominal examination  Continue with strict Is and Os, monitor urine output  C.diff+ - Being started on PO Vanc and IV Flagyl  CVVHDT to be transitioned to HD  Continue close supportive care .

## 2022-12-03 NOTE — PROVIDER CONTACT NOTE (CRITICAL VALUE NOTIFICATION) - NS PROVIDER READ BACK
Oral Chemotherapy Monitoring Program  Lab Follow Up    Reviewed lab results from 8/15/22.    ORAL CHEMOTHERAPY 3/28/2022 4/25/2022 5/23/2022 5/23/2022 7/18/2022 7/18/2022 8/15/2022   Assessment Type Lab Monitoring Refill Lab Monitoring Refill Lab Monitoring Refill Lab Monitoring   Diagnosis Code Multiple Myeloma Multiple Myeloma Multiple Myeloma Multiple Myeloma Multiple Myeloma Multiple Myeloma Multiple Myeloma   [facility]   Clinic Name/Location [facility]   Drug Name Revlimid (lenalidomide) Revlimid (lenalidomide) Revlimid (lenalidomide) Revlimid (lenalidomide) Revlimid (lenalidomide) Revlimid (lenalidomide) Revlimid (lenalidomide)   Dose 10 mg 10 mg 10 mg 10 mg 10 mg 10 mg 10 mg   Current Schedule Daily Daily Daily Daily Daily Daily Daily   Cycle Details Continuous Continuous Continuous Continuous Continuous Continuous Continuous       Labs:  _  Result Component Current Result Ref Range   Sodium 144 (8/15/2022) 133 - 144 mmol/L     _  Result Component Current Result Ref Range   Potassium 3.4 (8/15/2022) 3.4 - 5.3 mmol/L     _  Result Component Current Result Ref Range   Calcium 8.6 (8/15/2022) 8.5 - 10.1 mg/dL     No results found for Mag within last 30 days.     No results found for Phos within last 30 days.     _  Result Component Current Result Ref Range   Albumin 3.2 (L) (8/15/2022) 3.4 - 5.0 g/dL     _  Result Component Current Result Ref Range   Urea Nitrogen 13 (8/15/2022) 7 - 30 mg/dL     _  Result Component Current Result Ref Range   Creatinine 0.88 (8/15/2022) 0.52 - 1.04 mg/dL     _  Result Component Current Result Ref Range   AST 20 (8/15/2022) 0 - 45 U/L     _  Result Component Current Result Ref Range   ALT 28 (8/15/2022) 0 - 50 U/L     _  Result Component Current Result Ref Range   Bilirubin Total 0.5 (8/15/2022) 0.2 - 1.3 mg/dL     _  Result Component Current Result Ref Range 
  WBC Count 2.6 (L) (8/15/2022) 4.0 - 11.0 10e3/uL     _  Result Component Current Result Ref Range   Hemoglobin 10.5 (L) (8/15/2022) 11.7 - 15.7 g/dL     _  Result Component Current Result Ref Range   Platelet Count 109 (L) (8/15/2022) 150 - 450 10e3/uL     _  Result Component Current Result Ref Range   Absolute Neutrophils 1.3 (L) (8/15/2022) 1.6 - 8.3 10e3/uL     No results found for ANC within last 30 days.        Assessment & Plan:  The patient has worsening pancytopenias with WBC count of 2.6, hemoglobin of 10.5, platelet count of 109, and ANC of 1.3. There are no dose adjustments required at this time, we will continue to monitor. The patient was notified via Nu3 message.      Follow-Up:  9/12/22 for lab appointment      Zulma Robison  [facility]  [phone]      
yes

## 2022-12-03 NOTE — PROGRESS NOTE ADULT - SUBJECTIVE AND OBJECTIVE BOX
HPI/OVERNIGHT EVENTS: Patient seen and examined at bedside this AM. Patient upgraded to SICU for emergent dialysis due to Acute renal failure and hyperkalemia. No overnight events.     Vital Signs Last 24 Hrs  T(C): 37.5 (03 Dec 2022 02:00), Max: 37.7 (02 Dec 2022 05:06)  T(F): 99.5 (03 Dec 2022 02:00), Max: 99.9 (02 Dec 2022 22:00)  HR: 106 (03 Dec 2022 02:00) (91 - 117)  BP: 127/81 (03 Dec 2022 02:00) (124/69 - 179/153)  BP(mean): 94 (03 Dec 2022 02:00) (83 - 163)  RR: 14 (03 Dec 2022 02:00) (10 - 22)  SpO2: 95% (03 Dec 2022 02:00) (95% - 98%)    Parameters below as of 03 Dec 2022 00:00  Patient On (Oxygen Delivery Method): room air        I&O's Detail    02 Dec 2022 07:01  -  03 Dec 2022 02:56  --------------------------------------------------------  IN:    Dexmedetomidine: 40 mL    dextrose 5% w/ Additives: 300 mL    IV PiggyBack: 100 mL  Total IN: 440 mL    OUT:    Indwelling Catheter - Urethral (mL): 560 mL    Other (mL): 116 mL  Total OUT: 676 mL    Total NET: -236 mL          Constitutional: patient resting comfortably in bed, in no acute distress  HEENT: EOMI, PERRLA, MMM.  Respiratory: Non labored breathing on RA  Cardiovascular: RRR  Gastrointestinal: Abdomen soft, non-tender, non-distended, no rebound tenderness / guarding  Musculoskeletal: No joint pain, swelling or deformity; no limitation of movement  Vascular: Extremities warm and well perfused.     LABS:                        9.9    4.57  )-----------( 150      ( 03 Dec 2022 02:10 )             28.7     12-03    136  |  100  |  50.3<H>  ----------------------------<  126<H>  5.3   |  23.0  |  4.91<H>    Ca    9.4      03 Dec 2022 02:10  Phos  3.9     12-  Mg     2.8     12-    TPro  5.5<L>  /  Alb  3.0<L>  /  TBili  0.5  /  DBili  0.3  /  AST  22  /  ALT  13  /  AlkPhos  87  12-03    PT/INR - ( 02 Dec 2022 04:05 )   PT: 14.0 sec;   INR: 1.20 ratio         PTT - ( 02 Dec 2022 04:05 )  PTT:30.5 sec  Urinalysis Basic - ( 02 Dec 2022 22:56 )    Color: Yellow / Appearance: very cloudy / S.015 / pH: x  Gluc: x / Ketone: Trace  / Bili: Negative / Urobili: Negative mg/dL   Blood: x / Protein: 100 / Nitrite: Negative   Leuk Esterase: Moderate / RBC: 25-50 /HPF / WBC 6-10 /HPF   Sq Epi: x / Non Sq Epi: Few / Bacteria: Occasional        MEDICATIONS  (STANDING):  chlorhexidine 2% Cloths 1 Application(s) Topical daily  CRRT Treatment    <Continuous>  dexMEDEtomidine Infusion 0.2 MICROgram(s)/kG/Hr (3.5 mL/Hr) IV Continuous <Continuous>  dextrose 5% 1000 milliLiter(s) (50 mL/Hr) IV Continuous <Continuous>  heparin   Injectable 5000 Unit(s) SubCutaneous every 8 hours  heparin  Infusion Syringe 300 Unit(s)/Hr (0.6 mL/Hr) CRRT <Continuous>  levETIRAcetam  IVPB 750 milliGRAM(s) IV Intermittent every 12 hours  PrismaSOL Filtration BGK 0 / 2.5 5000 milliLiter(s) (1500 mL/Hr) CRRT <Continuous>  PrismaSOL Filtration BGK 0 / 2.5 5000 milliLiter(s) (750 mL/Hr) CRRT <Continuous>  PrismaSOL Filtration BGK 4 / 2.5 5000 milliLiter(s) (750 mL/Hr) CRRT <Continuous>    MEDICATIONS  (PRN):  hydrALAZINE Injectable 10 milliGRAM(s) IV Push every 4 hours PRN SBP> 180 DBP >120  OLANZapine Injectable 5 milliGRAM(s) IntraMuscular every 6 hours PRN agitation  OLANZapine Injectable 10 milliGRAM(s) IntraMuscular every 6 hours PRN agitation  ondansetron Injectable 4 milliGRAM(s) IV Push every 6 hours PRN Nausea      MICRO:   Cultures     STUDIES:   EKG, CXR, U/S, CT, MRI

## 2022-12-03 NOTE — PROGRESS NOTE ADULT - ASSESSMENT
jerad is a 42y old male with possible low flow state to sigmoid colon secondary to 2 day history of persistent diarrhea. CT scan findings of pneumatosis in sigmoid colon. Also found to have worsening Cr and hyperkalemia     Plan:   - upgraded to SICU  - c/w serial abdominal exams   - dialysis   - Stabilize Hyperkalemia  - pain control   - Patient is planned to get dialysis   - c/w treatment of hyperkalemia

## 2022-12-03 NOTE — PROGRESS NOTE ADULT - SUBJECTIVE AND OBJECTIVE BOX
24 HOUR EVENTS: Patient had a few episodes of agitation, continues to be impulsive, with continued high pressure on CVVHD.  Patient now on precedex- RASS is appropriate and patient more cooperative. Hyperkalemia and acidosis with improvement. Bicarb gtt was discontinued.  Will continue q3hr labs, when K<4, prismasol will be changed.  Patient sating well. Patient remains oliguric. Patient had multiple bowel movements of foul smelling diarrhea, stool sample sent for cdiff and culture. Patient has no nausea or vomiting. Minimal abdominal tenderness.     SUBJECTIVE/ROS:  [x ] A ten-point review of systems was otherwise negative except as noted.  [ ] Due to altered mental status/intubation, subjective information were not able to be obtained from the patient. History was obtained, to the extent possible, from review of the chart and collateral sources of information.      NEURO  RASS: 0 - -1    GCS: 14   CAM ICU: positive   Exam: AAOx1, CAM positive, intermittently following commands, agitated and impulsive.   Meds: dexMEDEtomidine Infusion 0.2 MICROgram(s)/kG/Hr IV Continuous <Continuous>  levETIRAcetam  IVPB 750 milliGRAM(s) IV Intermittent every 12 hours  OLANZapine Injectable 5 milliGRAM(s) IntraMuscular every 6 hours PRN agitation  OLANZapine Injectable 10 milliGRAM(s) IntraMuscular every 6 hours PRN agitation  ondansetron Injectable 4 milliGRAM(s) IV Push every 6 hours PRN Nausea    [x] Adequacy of sedation and pain control has been assessed and adjusted      RESPIRATORY  RR: 14 (12-03-22 @ 02:00) (10 - 22)  SpO2: 95% (12-03-22 @ 02:00) (95% - 98%)  Wt(kg): --  Exam: unlabored, clear to auscultation bilaterally  Mechanical Ventilation:   ABG - ( 03 Dec 2022 02:09 )  pH: 7.410 /  pCO2: 39    /  pO2: 93    / HCO3: 25    / Base Excess: 0.1   /  SaO2: 96.9    Lactate: x                [ ] Extubation Readiness Assessed  Meds:       CARDIOVASCULAR  HR: 106 (12-03-22 @ 02:00) (91 - 117)  BP: 127/81 (12-03-22 @ 02:00) (124/69 - 179/153)  BP(mean): 94 (12-03-22 @ 02:00) (83 - 163)  ABP: --  ABP(mean): --  Wt(kg): --  CVP(cm H2O): --  VBG - ( 02 Dec 2022 06:04 )  pH: 7.200 /  pCO2: 52    /  pO2: 81    / HCO3: 20    / Base Excess: -7.7  /  SaO2: 94.0   Lactate: 2.40             Lactate, Blood: 1.0 mmol/L (12-02 @ 16:24)    Exam: sinus tachycardia   Cardiac Rhythm: sinus tachycardia   Perfusion     [x ]Adequate   [ ]Inadequate  Mentation   [x ]Normal       [ ]Reduced  Extremities  [x ]Warm         [ ]Cool  Volume Status [ ]Hypervolemic [x ]Euvolemic [ ]Hypovolemic  Meds: hydrALAZINE Injectable 10 milliGRAM(s) IV Push every 4 hours PRN SBP> 180 DBP >120        GI/NUTRITION  Exam: soft, non distended, +ttp of LLQ, no guarding or rebound tenderness   Diet: NPO   Meds:     GENITOURINARY  I&O's Detail    12-02 @ 07:01  -  12-03 @ 03:24  --------------------------------------------------------  IN:    Dexmedetomidine: 40 mL    dextrose 5% w/ Additives: 300 mL    IV PiggyBack: 100 mL  Total IN: 440 mL    OUT:    Indwelling Catheter - Urethral (mL): 560 mL    Other (mL): 116 mL  Total OUT: 676 mL    Total NET: -236 mL        Weight (kg): 101.5 (12-02 @ 20:30)  12-03    136  |  100  |  50.3<H>  ----------------------------<  126<H>  5.3   |  23.0  |  4.91<H>    Ca    9.4      03 Dec 2022 02:10  Phos  3.9     12-03  Mg     2.8     12-03    TPro  5.5<L>  /  Alb  3.0<L>  /  TBili  0.5  /  DBili  0.3  /  AST  22  /  ALT  13  /  AlkPhos  87  12-03    [x ] Perez catheter, indication: critically ill   Meds:     Ext: 2+ peripheral pulses     HEMATOLOGIC  Meds: heparin   Injectable 5000 Unit(s) SubCutaneous every 8 hours  heparin  Infusion Syringe 300 Unit(s)/Hr CRRT <Continuous>    [x] VTE Prophylaxis                        9.9    4.57  )-----------( 150      ( 03 Dec 2022 02:10 )             28.7     PT/INR - ( 02 Dec 2022 04:05 )   PT: 14.0 sec;   INR: 1.20 ratio         PTT - ( 02 Dec 2022 04:05 )  PTT:30.5 sec  Transfusion     [ ] PRBC   [ ] Platelets   [ ] FFP   [ ] Cryoprecipitate      INFECTIOUS DISEASES  T(C): 37.5 (12-03-22 @ 02:00), Max: 37.7 (12-02-22 @ 05:06)  Wt(kg): --  WBC Count: 4.57 K/uL (12-03 @ 02:10)  WBC Count: 7.40 K/uL (12-02 @ 16:24)    Recent Cultures:    Meds:       ENDOCRINE  Capillary Blood Glucose    Meds:       ACCESS DEVICES:  [ ] Peripheral IV  [ ] Central Venous Line	[ ] R	[ ] L	[ ] IJ	[ ] Fem	[ ] SC	Placed:   [ ] Arterial Line		[ ] R	[ ] L	[ ] Fem	[ ] Rad	[ ] Ax	Placed:   [ ] PICC:					[ ] Mediport  [ ] Urinary Catheter, Date Placed:   [ ] Necessity of urinary, arterial, and venous catheters discussed    OTHER MEDICATIONS:  chlorhexidine 2% Cloths 1 Application(s) Topical daily  CRRT Treatment    <Continuous>  PrismaSOL Filtration BGK 0 / 2.5 5000 milliLiter(s) CRRT <Continuous>  PrismaSOL Filtration BGK 0 / 2.5 5000 milliLiter(s) CRRT <Continuous>  PrismaSOL Filtration BGK 4 / 2.5 5000 milliLiter(s) CRRT <Continuous>      CODE STATUS:     IMAGING:

## 2022-12-03 NOTE — CHART NOTE - NSCHARTNOTEFT_GEN_A_CORE
The patient was seen at bedside. C. diff precaution worn. The patient is drowsy, but answer yes and no question such as if he is in pain. The patient reports that he is not in pain at this time. During abdominal exam, the patient did not have a reaction to palpation and and report that palpation of his abdomen did no hurt. The plan is to continue conservative management, npo/ivf, sicu management( HD), abx for treatment of his c. diff, and monitor the patient lab work.

## 2022-12-04 LAB
A1C WITH ESTIMATED AVERAGE GLUCOSE RESULT: 5.8 % — HIGH (ref 4–5.6)
ANION GAP SERPL CALC-SCNC: 11 MMOL/L — SIGNIFICANT CHANGE UP (ref 5–17)
BUN SERPL-MCNC: 30.5 MG/DL — HIGH (ref 8–20)
CALCIUM SERPL-MCNC: 8.2 MG/DL — LOW (ref 8.4–10.5)
CHLORIDE SERPL-SCNC: 98 MMOL/L — SIGNIFICANT CHANGE UP (ref 96–108)
CHOLEST SERPL-MCNC: 172 MG/DL — SIGNIFICANT CHANGE UP
CO2 SERPL-SCNC: 26 MMOL/L — SIGNIFICANT CHANGE UP (ref 22–29)
CREAT SERPL-MCNC: 4.28 MG/DL — HIGH (ref 0.5–1.3)
CULTURE RESULTS: NO GROWTH — SIGNIFICANT CHANGE UP
EGFR: 17 ML/MIN/1.73M2 — LOW
ESTIMATED AVERAGE GLUCOSE: 120 MG/DL — HIGH (ref 68–114)
GLUCOSE SERPL-MCNC: 115 MG/DL — HIGH (ref 70–99)
HBV CORE AB SER-ACNC: SIGNIFICANT CHANGE UP
HBV SURFACE AB SER-ACNC: 104.5 MIU/ML — SIGNIFICANT CHANGE UP
HBV SURFACE AB SER-ACNC: REACTIVE
HBV SURFACE AG SER-ACNC: SIGNIFICANT CHANGE UP
HDLC SERPL-MCNC: 15 MG/DL — LOW
HIV 1 & 2 AB SERPL IA.RAPID: SIGNIFICANT CHANGE UP
LIPID PNL WITH DIRECT LDL SERPL: 80 MG/DL — SIGNIFICANT CHANGE UP
MAGNESIUM SERPL-MCNC: 2.3 MG/DL — SIGNIFICANT CHANGE UP (ref 1.6–2.6)
NON HDL CHOLESTEROL: 157 MG/DL — HIGH
PHOSPHATE SERPL-MCNC: 4 MG/DL — SIGNIFICANT CHANGE UP (ref 2.4–4.7)
POTASSIUM SERPL-MCNC: 4.6 MMOL/L — SIGNIFICANT CHANGE UP (ref 3.5–5.3)
POTASSIUM SERPL-SCNC: 4.6 MMOL/L — SIGNIFICANT CHANGE UP (ref 3.5–5.3)
PROT SERPL-MCNC: 5.3 G/DL — LOW (ref 6–8.3)
PROT SERPL-MCNC: 5.3 G/DL — LOW (ref 6–8.3)
SODIUM SERPL-SCNC: 135 MMOL/L — SIGNIFICANT CHANGE UP (ref 135–145)
SPECIMEN SOURCE: SIGNIFICANT CHANGE UP
TRIGL SERPL-MCNC: 384 MG/DL — HIGH

## 2022-12-04 PROCEDURE — 99233 SBSQ HOSP IP/OBS HIGH 50: CPT

## 2022-12-04 PROCEDURE — 99232 SBSQ HOSP IP/OBS MODERATE 35: CPT

## 2022-12-04 RX ORDER — LEVOTHYROXINE SODIUM 125 MCG
50 TABLET ORAL DAILY
Refills: 0 | Status: DISCONTINUED | OUTPATIENT
Start: 2022-12-04 | End: 2022-12-12

## 2022-12-04 RX ADMIN — RISPERIDONE 3 MILLIGRAM(S): 4 TABLET ORAL at 11:14

## 2022-12-04 RX ADMIN — LEVETIRACETAM 400 MILLIGRAM(S): 250 TABLET, FILM COATED ORAL at 11:14

## 2022-12-04 RX ADMIN — BUPRENORPHINE AND NALOXONE 1 FILM(S): 2; .5 TABLET SUBLINGUAL at 12:51

## 2022-12-04 RX ADMIN — QUETIAPINE FUMARATE 400 MILLIGRAM(S): 200 TABLET, FILM COATED ORAL at 05:24

## 2022-12-04 RX ADMIN — LAMOTRIGINE 150 MILLIGRAM(S): 25 TABLET, ORALLY DISINTEGRATING ORAL at 17:25

## 2022-12-04 RX ADMIN — HEPARIN SODIUM 5000 UNIT(S): 5000 INJECTION INTRAVENOUS; SUBCUTANEOUS at 05:23

## 2022-12-04 RX ADMIN — Medication 125 MILLIGRAM(S): at 00:31

## 2022-12-04 RX ADMIN — Medication 40 MILLIGRAM(S): at 11:14

## 2022-12-04 RX ADMIN — Medication 100 MILLIGRAM(S): at 23:02

## 2022-12-04 RX ADMIN — HEPARIN SODIUM 5000 UNIT(S): 5000 INJECTION INTRAVENOUS; SUBCUTANEOUS at 13:02

## 2022-12-04 RX ADMIN — GABAPENTIN 800 MILLIGRAM(S): 400 CAPSULE ORAL at 23:01

## 2022-12-04 RX ADMIN — QUETIAPINE FUMARATE 400 MILLIGRAM(S): 200 TABLET, FILM COATED ORAL at 17:25

## 2022-12-04 RX ADMIN — Medication 0.3 MILLIGRAM(S): at 05:22

## 2022-12-04 RX ADMIN — CHLORHEXIDINE GLUCONATE 1 APPLICATION(S): 213 SOLUTION TOPICAL at 05:25

## 2022-12-04 RX ADMIN — GABAPENTIN 800 MILLIGRAM(S): 400 CAPSULE ORAL at 17:25

## 2022-12-04 RX ADMIN — BUPROPION HYDROCHLORIDE 300 MILLIGRAM(S): 150 TABLET, EXTENDED RELEASE ORAL at 11:14

## 2022-12-04 RX ADMIN — GABAPENTIN 800 MILLIGRAM(S): 400 CAPSULE ORAL at 00:31

## 2022-12-04 RX ADMIN — Medication 0.3 MILLIGRAM(S): at 23:02

## 2022-12-04 RX ADMIN — GABAPENTIN 800 MILLIGRAM(S): 400 CAPSULE ORAL at 12:50

## 2022-12-04 RX ADMIN — Medication 100 MILLIGRAM(S): at 05:26

## 2022-12-04 RX ADMIN — Medication 100 MILLIGRAM(S): at 13:22

## 2022-12-04 RX ADMIN — LOSARTAN POTASSIUM 100 MILLIGRAM(S): 100 TABLET, FILM COATED ORAL at 05:23

## 2022-12-04 RX ADMIN — Medication 125 MILLIGRAM(S): at 23:02

## 2022-12-04 RX ADMIN — Medication 125 MILLIGRAM(S): at 17:25

## 2022-12-04 RX ADMIN — Medication 300 MILLIGRAM(S): at 05:22

## 2022-12-04 RX ADMIN — CHLORHEXIDINE GLUCONATE 1 APPLICATION(S): 213 SOLUTION TOPICAL at 11:15

## 2022-12-04 RX ADMIN — GABAPENTIN 800 MILLIGRAM(S): 400 CAPSULE ORAL at 05:22

## 2022-12-04 RX ADMIN — LEVETIRACETAM 400 MILLIGRAM(S): 250 TABLET, FILM COATED ORAL at 00:31

## 2022-12-04 RX ADMIN — LAMOTRIGINE 150 MILLIGRAM(S): 25 TABLET, ORALLY DISINTEGRATING ORAL at 05:22

## 2022-12-04 RX ADMIN — HEPARIN SODIUM 5000 UNIT(S): 5000 INJECTION INTRAVENOUS; SUBCUTANEOUS at 23:02

## 2022-12-04 RX ADMIN — PANTOPRAZOLE SODIUM 40 MILLIGRAM(S): 20 TABLET, DELAYED RELEASE ORAL at 12:51

## 2022-12-04 RX ADMIN — ATORVASTATIN CALCIUM 20 MILLIGRAM(S): 80 TABLET, FILM COATED ORAL at 23:01

## 2022-12-04 RX ADMIN — Medication 125 MILLIGRAM(S): at 11:15

## 2022-12-04 RX ADMIN — Medication 0.3 MILLIGRAM(S): at 13:02

## 2022-12-04 RX ADMIN — Medication 125 MILLIGRAM(S): at 05:23

## 2022-12-04 NOTE — PROGRESS NOTE ADULT - SUBJECTIVE AND OBJECTIVE BOX
24h Events: Patient's abdominal pain significantly improved. All home meds restarted yesterday. Diet was advanced today and IVL. Received HD overnight without issues. Patient stable for transfer to floor today.     ICU Vital Signs Last 24 Hrs  T(C): 37.3 (04 Dec 2022 13:00), Max: 37.5 (03 Dec 2022 19:30)  T(F): 99.1 (04 Dec 2022 13:00), Max: 99.5 (03 Dec 2022 19:30)  HR: 91 (04 Dec 2022 13:00) (86 - 133)  BP: 120/83 (04 Dec 2022 13:00) (92/64 - 164/118)  BP(mean): 95 (04 Dec 2022 13:00) (73 - 128)  ABP: --  ABP(mean): --  RR: 14 (04 Dec 2022 13:00) (8 - 17)  SpO2: 94% (04 Dec 2022 13:00) (92% - 100%)    O2 Parameters below as of 04 Dec 2022 12:00  Patient On (Oxygen Delivery Method): room air      I&O's Detail    03 Dec 2022 07:01  -  04 Dec 2022 07:00  --------------------------------------------------------  IN:    Dexmedetomidine: 223.4 mL    IV PiggyBack: 200 mL    IV PiggyBack: 200 mL    Oral Fluid: 480 mL    sodium chloride 0.9%: 714 mL  Total IN: 1817.4 mL    OUT:    Indwelling Catheter - Urethral (mL): 1730 mL    Other (mL): 0 mL    Other (mL): 0 mL    Rectal Tube (mL): 450 mL  Total OUT: 2180 mL    Total NET: -362.6 mL      04 Dec 2022 07:01  -  04 Dec 2022 13:53  --------------------------------------------------------  IN:    IV PiggyBack: 100 mL    IV PiggyBack: 100 mL    Oral Fluid: 900 mL    sodium chloride 0.9%: 126 mL  Total IN: 1226 mL    OUT:    Indwelling Catheter - Urethral (mL): 370 mL  Total OUT: 370 mL    Total NET: 856 mL      ABG - ( 03 Dec 2022 18:19 )  pH, Arterial: 7.410 pH, Blood: x     /  pCO2: 42    /  pO2: 91    / HCO3: 27    / Base Excess: 2.0   /  SaO2: 96.2        MEDICATIONS  (STANDING):  atorvastatin 20 milliGRAM(s) Oral at bedtime  buprenorphine 8 mG/naloxone 2 mG SL Film 1 Film(s) SubLingual daily  buPROPion XL (24-Hour) . 300 milliGRAM(s) Oral daily  chlorhexidine 2% Cloths 1 Application(s) Topical daily  chlorhexidine 4% Liquid 1 Application(s) Topical <User Schedule>  cloNIDine 0.3 milliGRAM(s) Oral three times a day  FLUoxetine 40 milliGRAM(s) Oral daily  gabapentin 800 milliGRAM(s) Oral every 6 hours  heparin   Injectable 5000 Unit(s) SubCutaneous every 8 hours  labetalol 300 milliGRAM(s) Oral two times a day  lamoTRIgine 150 milliGRAM(s) Oral two times a day  levETIRAcetam  IVPB 750 milliGRAM(s) IV Intermittent every 12 hours  losartan 100 milliGRAM(s) Oral daily  metroNIDAZOLE  IVPB 500 milliGRAM(s) IV Intermittent every 8 hours  pantoprazole  Injectable 40 milliGRAM(s) IV Push daily  QUEtiapine 400 milliGRAM(s) Oral two times a day  risperiDONE   Tablet 3 milliGRAM(s) Oral daily  vancomycin    Solution 125 milliGRAM(s) Oral every 6 hours    MEDICATIONS  (PRN):  ondansetron Injectable 4 milliGRAM(s) IV Push every 6 hours PRN Nausea  sodium chloride 0.9% lock flush 10 milliLiter(s) IV Push every 1 hour PRN Pre/post blood products, medications, blood draw, and to maintain line patency      Physical Exam:    Neuro: awake and alert, pain controlled    Pulmonary: Room air, lung sounds clear to auscultation bilaterally    Cardiovascular: regular rate & rhythm    Gastrointestinal: soft, non-tender, non-distended, no rebound / guarding    : Perez in place draining yellow urine    Skin: [facility] ([place], [place])ley catheter with dressing c/d/i, warm, dry, no diaphoresis, no pallor, cyanosis, or jaundice    LABS:  CBC Full  -  ( 03 Dec 2022 18:00 )  WBC Count : 7.17 K/uL  RBC Count : 3.60 M/uL  Hemoglobin : 9.8 g/dL  Hematocrit : 29.1 %  Platelet Count - Automated : 129 K/uL  Mean Cell Volume : 80.8 fl  Mean Cell Hemoglobin : 27.2 pg  Mean Cell Hemoglobin Concentration : 33.7 gm/dL  Auto Neutrophil # : x  Auto Lymphocyte # : x  Auto Monocyte # : x  Auto Eosinophil # : x  Auto Basophil # : x  Auto Neutrophil % : x  Auto Lymphocyte % : x  Auto Monocyte % : x  Auto Eosinophil % : x  Auto Basophil % : x        135  |  98  |  30.5<H>  ----------------------------<  115<H>  4.6   |  26.0  |  4.28<H>    Ca    8.2<L>      04 Dec 2022 03:50  Phos  4.0       Mg     2.3         TPro  5.3<L>  /  Alb  x   /  TBili  x   /  DBili  x   /  AST  x   /  ALT  x   /  AlkPhos  x   12    PT/INR - ( 03 Dec 2022 05:30 )   PT: 13.7 sec;   INR: 1.18 ratio         PTT - ( 03 Dec 2022 05:30 )  PTT:25.7 sec  Urinalysis Basic - ( 02 Dec 2022 22:56 )    Color: Yellow / Appearance: very cloudy / S.015 / pH: x  Gluc: x / Ketone: Trace  / Bili: Negative / Urobili: Negative mg/dL   Blood: x / Protein: 100 / Nitrite: Negative   Leuk Esterase: Moderate / RBC: 25-50 /HPF / WBC 6-10 /HPF   Sq Epi: x / Non Sq Epi: Few / Bacteria: Occasional      RECENT CULTURES:   Catheterized Catheterized XXXX XXXX   No growth        LIVER FUNCTIONS - ( 04 Dec 2022 03:50 )  Alb: x     / Pro: 5.3 g/dL / ALK PHOS: x     / ALT: x     / AST: x     / GGT: x           CARDIAC MARKERS ( 03 Dec 2022 02:10 )  x     / 0.02 ng/mL / x     / x     / x      CARDIAC MARKERS ( 02 Dec 2022 16:24 )  x     / 0.03 ng/mL / x     / x     / x

## 2022-12-04 NOTE — DIETITIAN INITIAL EVALUATION ADULT - ETIOLOGY
related to inability to meet sufficient protein-energy needs in setting of C. diff, LIV, colon pneumatosis

## 2022-12-04 NOTE — DIETITIAN INITIAL EVALUATION ADULT - PERTINENT MEDS FT
MEDICATIONS  (STANDING):  atorvastatin 20 milliGRAM(s) Oral at bedtime  buprenorphine 8 mG/naloxone 2 mG SL Film 1 Film(s) SubLingual daily  buPROPion XL (24-Hour) . 300 milliGRAM(s) Oral daily  chlorhexidine 2% Cloths 1 Application(s) Topical daily  chlorhexidine 4% Liquid 1 Application(s) Topical <User Schedule>  cloNIDine 0.3 milliGRAM(s) Oral three times a day  FLUoxetine 40 milliGRAM(s) Oral daily  gabapentin 800 milliGRAM(s) Oral every 6 hours  heparin   Injectable 5000 Unit(s) SubCutaneous every 8 hours  labetalol 300 milliGRAM(s) Oral two times a day  lamoTRIgine 150 milliGRAM(s) Oral two times a day  levETIRAcetam  IVPB 750 milliGRAM(s) IV Intermittent every 12 hours  losartan 100 milliGRAM(s) Oral daily  metroNIDAZOLE  IVPB 500 milliGRAM(s) IV Intermittent every 8 hours  pantoprazole  Injectable 40 milliGRAM(s) IV Push daily  QUEtiapine 400 milliGRAM(s) Oral two times a day  risperiDONE   Tablet 3 milliGRAM(s) Oral daily  vancomycin    Solution 125 milliGRAM(s) Oral every 6 hours    MEDICATIONS  (PRN):  ondansetron Injectable 4 milliGRAM(s) IV Push every 6 hours PRN Nausea  sodium chloride 0.9% lock flush 10 milliLiter(s) IV Push every 1 hour PRN Pre/post blood products, medications, blood draw, and to maintain line patency

## 2022-12-04 NOTE — PROGRESS NOTE ADULT - ATTENDING COMMENTS
Patient seen and examined at bedside this morning in the SICU. Patient is awake, alert and conversing appropriately, he is tolerating clears, denies N/V, continues to have loose bowel movements 2/2 c.diff. Abdomen is soft, nondistended, mild tenderness in the LLQ, no guarding or rebound    I&O's Detail    03 Dec 2022 07:01  -  04 Dec 2022 07:00  --------------------------------------------------------  IN:    Dexmedetomidine: 223.4 mL    IV PiggyBack: 200 mL    IV PiggyBack: 200 mL    Oral Fluid: 480 mL    sodium chloride 0.9%: 714 mL  Total IN: 1817.4 mL    OUT:    Indwelling Catheter - Urethral (mL): 1730 mL    Other (mL): 0 mL    Other (mL): 0 mL    Rectal Tube (mL): 450 mL  Total OUT: 2180 mL    Total NET: -362.6 mL      04 Dec 2022 07:01  -  04 Dec 2022 14:45  --------------------------------------------------------  IN:    IV PiggyBack: 100 mL    IV PiggyBack: 100 mL    Oral Fluid: 900 mL    sodium chloride 0.9%: 126 mL  Total IN: 1226 mL  OUT:    Indwelling Catheter - Urethral (mL): 430 mL  Total OUT: 430 mL  Total NET: 796 mL    Vital Signs Last 24 Hrs  T(C): 37.3 (04 Dec 2022 13:00), Max: 37.5 (03 Dec 2022 19:30)  T(F): 99.1 (04 Dec 2022 13:00), Max: 99.5 (03 Dec 2022 19:30)  HR: 91 (04 Dec 2022 13:00) (86 - 133)  BP: 120/83 (04 Dec 2022 13:00) (92/64 - 164/118)  BP(mean): 95 (04 Dec 2022 13:00) (73 - 128)  RR: 14 (04 Dec 2022 13:00) (8 - 17)  SpO2: 94% (04 Dec 2022 13:00) (92% - 100%)    Parameters below as of 04 Dec 2022 12:00  Patient On (Oxygen Delivery Method): room air                          9.8    7.17  )-----------( 129      ( 03 Dec 2022 18:00 )             29.1     12-04    135  |  98  |  30.5<H>  ----------------------------<  115<H>  4.6   |  26.0  |  4.28<H>    Ca    8.2<L>      04 Dec 2022 03:50  Phos  4.0     12-04  Mg     2.3     12-04    TPro  5.3<L>  /  Alb  x   /  TBili  x   /  DBili  x   /  AST  x   /  ALT  x   /  AlkPhos  x   12-04    A/P:   Ensure adequate pain control  Continue with diet as tolerated  Serial abdominal examination  PO Vanc and IV Flagyl for C.diff  Continue with strict Is and Os, monitor urine output  Nephrology on board, no indication for dialysis today  Continue close supportive care

## 2022-12-04 NOTE — PROGRESS NOTE ADULT - ASSESSMENT
Assessment: 42y Male with PMH of bipolar disorder, HTN, GERD, and polysubstance abuse who presents with abdominal pain and diarrhea, found to have acute renal failure and pneumatosis on CT concerning for ischemic colitis of sigmoid colon. Patient worsening anuric renal failure, metabolic acidosis and hyperkalemia with ECG changes. Admitted to SICU for emergent dialysis.     Plan:     Neuro: Metabolic encephelopathy- recovered. Bipolar disorder- All home meds restarted. Keppra 750 BID.    Card: Hemodynamically well. Normal Sinus rhythm    Pulm: Sating well on RA.     GI: DASH diet. C-diff positive. Isolation precautions.    : Acute renal failure secondary to ATN. Continue intermittent dialysis per Renal. Not required today. Will re-eval tomorrow.      Endo: BS q4 hrs.     Hem/DVT: SCDs, SQH    ID: Remains afebrile. No leukocytosis    Lines: RIJ HD. PIV's    Dispo: Stable for downgrade to floor level of care.

## 2022-12-04 NOTE — PROGRESS NOTE ADULT - SUBJECTIVE AND OBJECTIVE BOX
Patient is lucid and AAOx3 this a.m. Able to recall that "he was pooping all over the place" prior to hospitalization. Stated that his mother is not his emergency contact and instead it is his cousin. Calm and watching TV this a.m.     Vital Signs Last 24 Hrs  T(C): 37.5 (04 Dec 2022 10:00), Max: 37.5 (03 Dec 2022 19:30)  T(F): 99.5 (04 Dec 2022 10:00), Max: 99.5 (03 Dec 2022 19:30)  HR: 87 (04 Dec 2022 10:00) (86 - 133)  BP: 92/64 (04 Dec 2022 10:00) (92/64 - 164/118)  BP(mean): 73 (04 Dec 2022 10:00) (73 - 128)  RR: 11 (04 Dec 2022 10:00) (8 - 16)  SpO2: 96% (04 Dec 2022 10:00) (93% - 100%)    Parameters below as of 04 Dec 2022 08:00  Patient On (Oxygen Delivery Method): room air    I&O's Summary    03 Dec 2022 07:01  -  04 Dec 2022 07:00  --------------------------------------------------------  IN: 1817.4 mL / OUT: 2180 mL / NET: -362.6 mL    04 Dec 2022 07:01  -  04 Dec 2022 11:21  --------------------------------------------------------  IN: 726 mL / OUT: 195 mL / NET: 531 mL    Physical Exam  General: WDWN male lying flat in bed in NAD  Cardiac: S1S2 RRR  Respiratory: CTAB  Abdomen: Soft, NT   Extremities: No appreciable edema  Neuro: AAOx3     12-04    135  |  98  |  30.5<H>  ----------------------------<  115<H>  4.6   |  26.0  |  4.28<H>    Ca    8.2<L>      04 Dec 2022 03:50  Phos  4.0     12-04  Mg     2.3     12-04    TPro  5.3<L>  /  Alb  x   /  TBili  x   /  DBili  x   /  AST  x   /  ALT  x   /  AlkPhos  x   12-04                        9.8    7.17  )-----------( 129      ( 03 Dec 2022 18:00 )             29.1     MEDICATIONS  (STANDING):  atorvastatin 20 milliGRAM(s) Oral at bedtime  buprenorphine 8 mG/naloxone 2 mG SL Film 1 Film(s) SubLingual daily  buPROPion XL (24-Hour) . 300 milliGRAM(s) Oral daily  chlorhexidine 2% Cloths 1 Application(s) Topical daily  chlorhexidine 4% Liquid 1 Application(s) Topical <User Schedule>  cloNIDine 0.3 milliGRAM(s) Oral three times a day  FLUoxetine 40 milliGRAM(s) Oral daily  gabapentin 800 milliGRAM(s) Oral every 6 hours  heparin   Injectable 5000 Unit(s) SubCutaneous every 8 hours  labetalol 300 milliGRAM(s) Oral two times a day  lamoTRIgine 150 milliGRAM(s) Oral two times a day  levETIRAcetam  IVPB 750 milliGRAM(s) IV Intermittent every 12 hours  losartan 100 milliGRAM(s) Oral daily  metroNIDAZOLE  IVPB 500 milliGRAM(s) IV Intermittent every 8 hours  pantoprazole  Injectable 40 milliGRAM(s) IV Push daily  QUEtiapine 400 milliGRAM(s) Oral two times a day  risperiDONE   Tablet 3 milliGRAM(s) Oral daily  vancomycin    Solution 125 milliGRAM(s) Oral every 6 hours    MEDICATIONS  (PRN):  ondansetron Injectable 4 milliGRAM(s) IV Push every 6 hours PRN Nausea  sodium chloride 0.9% lock flush 10 milliLiter(s) IV Push every 1 hour PRN Pre/post blood products, medications, blood draw, and to maintain line patency

## 2022-12-04 NOTE — DIETITIAN INITIAL EVALUATION ADULT - PERTINENT LABORATORY DATA
12-04    135  |  98  |  30.5<H>  ----------------------------<  115<H>  4.6   |  26.0  |  4.28<H>    Ca    8.2<L>      04 Dec 2022 03:50  Phos  4.0     12-04  Mg     2.3     12-04    TPro  5.3<L>  /  Alb  x   /  TBili  x   /  DBili  x   /  AST  x   /  ALT  x   /  AlkPhos  x   12-04  POCT Blood Glucose.: 128 mg/dL (12-03-22 @ 17:34)  A1C with Estimated Average Glucose Result: 5.8 % (12-04-22 @ 03:50)

## 2022-12-04 NOTE — DIETITIAN INITIAL EVALUATION ADULT - OTHER INFO
42y Male with PMH of bipolar disorder, HTN, GERD, and polysubstance abuse who was brought to the ER from sober house with a 2 day history of severe diarrhea. Patient arrived to the ER and had multiple episodes of non bloody diarrhea. Found to have LIV and 1L of IVF and CT abdomen w/o IV was ordered which demonstrated pneumatosis in sigmoid colon. Colorectal surgery was consulted for the mentioned findings and patient was evaluated. He was found to be in no distress c/o minimal lower abdominal pain and persistent diarrhea. Last meal was yesterday which he tolerated well. Pt admitted with LIV on admission on CRRT.

## 2022-12-04 NOTE — DIETITIAN INITIAL EVALUATION ADULT - NSFNSGIIOFT_GEN_A_CORE
12-03-22 @ 07:01  -  12-04-22 @ 07:00  --------------------------------------------------------  OUT:    Rectal Tube (mL): 450 mL  Total OUT: 450 mL    Total NET: -450 mL

## 2022-12-04 NOTE — PROGRESS NOTE ADULT - CRITICAL CARE ATTENDING COMMENT
No acute events overnight.  Patient is appearing well, denies abdominal pain.  He is alert and somewhat oriented.  He is in NSR, HDS, saturating well on room air.  He is now on iHD and no longer on CRRT.  He is tolerating CLD, with a benign abdomen, and we will advance his diet.  He can be downgraded to the floor.

## 2022-12-04 NOTE — PROGRESS NOTE ADULT - ASSESSMENT
42 year old male with PMH of polysubstance abuse, bipolar disorder, HTN and GERD presents with severe diarrhea. Found to have pneumatosis in sigmoid colon. C diff positive. Hospital course is notable for LIV complicated by hyperK with EKG changes s/p emergent renal replacement therapy on 12/2.     -LIV secondary to prerenal etiology with progression to ATN vs ? infectious GN   -Baseline creatinine is unclear  -Creatinine on admission was 2.7mg/dL  - protein, large blood, 25-50 RBC, 6-10 WBC, occasional bacteria   -UPCR 56g/g; UACR 49g/g   -Significant proteinuria/albuminuria (with hematuria); serum albumin is relatively okay at 3.0 - HIV and Hep B are negative; remainder of serologies (complements, SHANTEL, dsDNA, ANCA, antiGBM, RPR, HepC, immunofixation, free serum light chains, imrcUGB2E, hemoglobin A1c, lipid panel) are pending    -CT a/p without IV contrast is negative for hydronephrosis   -Creatinine peaked at 5.0mg/dL associated with hyperkalemia and EKG changes necessitating emergent CRRT on 12/2  -Transitioned to intermittent HD on 12/3  -Access: R IJ non tunneled dialysis catheter   -No acute indication for dialysis today; will re-assess tomorrow   -Non oliguric with 1730cc over the past 24 hours  -Patient lucid and AAOx3 this a.m. - patient informed of his hospital course and possible need to continue intermittent hemodialysis (pending trend of creatinine / if lack of evidence of renal recovery) - patient provided verbal consent for dialysis (currently on isolation contact for C diff)  -Maintain strict I/O's    Selin Shane DO  Nephrology  [facility]  [address] [place] [address]  Office Number [phone]

## 2022-12-04 NOTE — CHART NOTE - NSCHARTNOTEFT_GEN_A_CORE
SICU TRANSFER NOTE  -----------------------------  ICU Admission Date: 12/2/22  Transfer Date: 12-04-22 @ 14:14    Admission Diagnosis: Pneumotosis, ARF w/ hyperkalemia    Active Problems/injuries: C-diff, ARF    Procedures: None    Consultants:  [X] Behavioral Health  [X] Cardiology  [ ] Endocrine  [ ] Infectious Disease  [ ] Medicine  [X] Nephrology  [ ] Neurosurgery  [ ] Ortho       [ ] Weight Bearing Status:  [ ] Palliative       [ ] Advanced Directives:    [ ] Physical Medicine and Rehab       [ ] Disposition :   [ ] Plastics  [ ] Pulmonary    Medications  atorvastatin 20 milliGRAM(s) Oral at bedtime  buprenorphine 8 mG/naloxone 2 mG SL Film 1 Film(s) SubLingual daily  buPROPion XL (24-Hour) . 300 milliGRAM(s) Oral daily  chlorhexidine 2% Cloths 1 Application(s) Topical daily  chlorhexidine 4% Liquid 1 Application(s) Topical <User Schedule>  cloNIDine 0.3 milliGRAM(s) Oral three times a day  FLUoxetine 40 milliGRAM(s) Oral daily  gabapentin 800 milliGRAM(s) Oral every 6 hours  heparin   Injectable 5000 Unit(s) SubCutaneous every 8 hours  labetalol 300 milliGRAM(s) Oral two times a day  lamoTRIgine 150 milliGRAM(s) Oral two times a day  levETIRAcetam  IVPB 750 milliGRAM(s) IV Intermittent every 12 hours  losartan 100 milliGRAM(s) Oral daily  metroNIDAZOLE  IVPB 500 milliGRAM(s) IV Intermittent every 8 hours  ondansetron Injectable 4 milliGRAM(s) IV Push every 6 hours PRN  pantoprazole  Injectable 40 milliGRAM(s) IV Push daily  QUEtiapine 400 milliGRAM(s) Oral two times a day  risperiDONE   Tablet 3 milliGRAM(s) Oral daily  sodium chloride 0.9% lock flush 10 milliLiter(s) IV Push every 1 hour PRN  vancomycin    Solution 125 milliGRAM(s) Oral every 6 hours      Antibiotics:  metroNIDAZOLE  IVPB 500 milliGRAM(s) IV Intermittent every 8 hours  vancomycin    Solution 125 milliGRAM(s) Oral every 6 hours    Indication: C-Diff End Date: Flagyl DC date 12/10, Vanco DC date 12/13      I have discussed this case with Moe Godoy and Rubia Payne upon transfer and all questions regarding ICU course were answered.  The following items are to be followed up:  1. C-Diff - Continue PO Vanc and IV Flagyl. Abdominal exam improved.  2. DASH diet. IVL  3. Renal Failure - Nephrology following. Will assess need to intermittent HD. Last HD session 12/3 overnight. Non-Oliguric  4. Metabolic encephalopathy - improved. awake and alert and answering questions appropriately now.   5. All home anti-psychotics restarted  6. Can likely DC Perez in AM if continued to make good urine.  7. SCD's and SQH for DVT ppx  8. Remainder of care per primary team

## 2022-12-04 NOTE — PROGRESS NOTE ADULT - SUBJECTIVE AND OBJECTIVE BOX
Subjective:  Overnight serial abdominal exam: Went to the patient to SICU. The patient vitals were 99.3/124/ (150/97)/ 96 that is currently on labetalol for his increase heart rate. The patient respond to questions but is still altered. The patient reports that he is not having any abdominal pain and abdominal exam of nontender to palpation.      Overnight the patient continue to be on crrt, . The patient pain is well controlled on current pain regimen.   The patient does not report fever, chills, nausea vomiting, chest pain, and shortness of breath. The patient does not report any acute complaints.     MEDICATIONS  (STANDING):  atorvastatin 20 milliGRAM(s) Oral at bedtime  buprenorphine 8 mG/naloxone 2 mG SL Film 1 Film(s) SubLingual daily  buPROPion XL (24-Hour) . 300 milliGRAM(s) Oral daily  chlorhexidine 2% Cloths 1 Application(s) Topical daily  chlorhexidine 4% Liquid 1 Application(s) Topical <User Schedule>  cloNIDine 0.3 milliGRAM(s) Oral three times a day  dexMEDEtomidine Infusion 0.2 MICROgram(s)/kG/Hr (3.5 mL/Hr) IV Continuous <Continuous>  FLUoxetine 40 milliGRAM(s) Oral daily  gabapentin 800 milliGRAM(s) Oral every 6 hours  heparin   Injectable 5000 Unit(s) SubCutaneous every 8 hours  labetalol 300 milliGRAM(s) Oral two times a day  lamoTRIgine 150 milliGRAM(s) Oral two times a day  levETIRAcetam  IVPB 750 milliGRAM(s) IV Intermittent every 12 hours  losartan 100 milliGRAM(s) Oral daily  metroNIDAZOLE  IVPB 500 milliGRAM(s) IV Intermittent every 8 hours  pantoprazole  Injectable 40 milliGRAM(s) IV Push daily  QUEtiapine 400 milliGRAM(s) Oral two times a day  risperiDONE   Tablet 3 milliGRAM(s) Oral daily  sodium chloride 0.9%. 1000 milliLiter(s) (42 mL/Hr) IV Continuous <Continuous>  vancomycin    Solution 125 milliGRAM(s) Oral every 6 hours    MEDICATIONS  (PRN):  ondansetron Injectable 4 milliGRAM(s) IV Push every 6 hours PRN Nausea  sodium chloride 0.9% lock flush 10 milliLiter(s) IV Push every 1 hour PRN Pre/post blood products, medications, blood draw, and to maintain line patency      Vital Signs Last 24 Hrs  T(C): 37.4 (04 Dec 2022 01:00), Max: 37.8 (03 Dec 2022 04:30)  T(F): 99.3 (04 Dec 2022 01:00), Max: 100 (03 Dec 2022 04:30)  HR: 93 (04 Dec 2022 01:00) (85 - 133)  BP: 110/69 (04 Dec 2022 01:00) (96/60 - 164/118)  BP(mean): 83 (04 Dec 2022 01:00) (73 - 128)  RR: 14 (04 Dec 2022 01:00) (8 - 16)  SpO2: 95% (04 Dec 2022 01:) (94% - 100%)    Parameters below as of 04 Dec 2022 00:30  Patient On (Oxygen Delivery Method): room air      --------------------------------------------------------  IN:    Dexmedetomidine: 167.6 mL    dextrose 5% w/ Additives: 300 mL    IV PiggyBack: 100 mL  Total IN: 567.6 mL    OUT:    Indwelling Catheter - Urethral (mL): 840 mL    Other (mL): 116 mL  Total OUT: 956 mL    Total NET: -388.4 mL        --------------------------------------------------------  IN:    Dexmedetomidine: 223.4 mL    IV PiggyBack: 200 mL    IV PiggyBack: 200 mL    Oral Fluid: 480 mL    sodium chloride 0.9%: 462 mL  Total IN: 1565.4 mL    OUT:    Indwelling Catheter - Urethral (mL): 1180 mL    Other (mL): 0 mL    Other (mL): 0 mL    Rectal Tube (mL): 450 mL  Total OUT: 1630 mL    Total NET: -64.6 mL          Physical Exam:    Constitutional: NAD, lying bed   HEENT: atraumatic head, without/with nasal canula   Neck: Trachea midline, no injuries   Respiratory: Respirations non-labored and equal chest rises.   Cardiovascular: Regular rate & rhythm  Gastrointestinal: distended and nontender to palpation   Extremities: moving all limbs   Neurological: A&O x 1 and altered       LABS:                        9.8    7.17  )-----------( 129      ( 03 Dec 2022 18:00 )             29.1     12-03    137  |  101  |  44.4<H>  ----------------------------<  115<H>  5.2   |  24.0  |  5.29<H>    Ca    8.9      03 Dec 2022 18:00  Phos  4.7     12-  Mg     2.7     12-03    TPro  5.7<L>  /  Alb  3.0<L>  /  TBili  0.5  /  DBili  x   /  AST  20  /  ALT  14  /  AlkPhos  84  12-03    PT/INR - ( 03 Dec 2022 05:30 )   PT: 13.7 sec;   INR: 1.18 ratio         PTT - ( 03 Dec 2022 05:30 )  PTT:25.7 sec  Urinalysis Basic - ( 02 Dec 2022 22:56 )    Color: Yellow / Appearance: very cloudy / S.015 / pH: x  Gluc: x / Ketone: Trace  / Bili: Negative / Urobili: Negative mg/dL   Blood: x / Protein: 100 / Nitrite: Negative   Leuk Esterase: Moderate / RBC: 25-50 /HPF / WBC 6-10 /HPF   Sq Epi: x / Non Sq Epi: Few / Bacteria: Occasional        Assessment:  42M pmh of polysubstance use and bipolar disorder that comes in the hospital in altered state and the patient  with possible low flow state to sigmoid colon secondary to 2 day history of persistent diarrhea. CT scan findings of pneumatosis in sigmoid colon. Also found to have worsening Cr and hyperkalemia that was admitted to the sicu for management of cr and hyperkalemia.     Plan:  - Continue SICU level of care  - c/w serial abdominal exams   - CRRT  - Stabilize Hyperkalemia and improved K on morning labs  - pain control   - Patient is planned to get dialysis   - c/w treatment of hyperkalemia and elevated creatinine  - patient diagnosed with C. diff and treat with antibiotic

## 2022-12-04 NOTE — DIETITIAN INITIAL EVALUATION ADULT - ORAL INTAKE PTA/DIET HISTORY
Pt lethargic/confused unable to complete interview at this time. Aware with c/o severe diarrhea PTA 2/2 colon pneumatosis. Fecal incontinence noted with continued liquid BMs, found C.diff+. Per RN Pt tolerating CLD with suboptimal PO intake. Previous triage notes with weight history 230lbs March 2022. RD to remain available

## 2022-12-04 NOTE — PROGRESS NOTE ADULT - SUBJECTIVE AND OBJECTIVE BOX
ZAKIYA KEYANNA  ----------------------------------------  The patient was seen at bedside. Patient with acute kidney injury and clostridium difficile colitis. Somnolent on examination but awakened with voice, reports some persistent abdominal pain.    Vital Signs Last 24 Hrs  T(C): 37.1 (04 Dec 2022 16:00), Max: 37.5 (03 Dec 2022 19:30)  T(F): 98.8 (04 Dec 2022 16:00), Max: 99.5 (03 Dec 2022 19:30)  HR: 94 (04 Dec 2022 16:00) (86 - 133)  BP: 131/78 (04 Dec 2022 16:00) (92/64 - 164/118)  BP(mean): 94 (04 Dec 2022 16:00) (73 - 128)  RR: 14 (04 Dec 2022 16:00) (9 - 17)  SpO2: 97% (04 Dec 2022 16:00) (92% - 100%)    Parameters below as of 04 Dec 2022 16:00  Patient On (Oxygen Delivery Method): room air    CAPILLARY BLOOD GLUCOSE  POCT Blood Glucose.: 128 mg/dL (03 Dec 2022 17:34)    PHYSICAL EXAMINATION:  ----------------------------------------  General appearance: No acute distress, Awake, Alert  HEENT: Normocephalic, Atraumatic, Conjunctiva clear, EOMI  Neck: Supple, No JVD, No tenderness, Right hemodialysis catheter in place  Lungs: Breath sound equal bilaterally, No wheezes, No rales  Cardiovascular: S1S2, Regular rhythm  Abdomen: Soft, Nontender, Nondistended, No guarding/rebound, Positive bowel sounds  Extremities: No clubbing, No cyanosis, No edema, No calf tenderness  Neuro: Strength equal bilaterally, No tremors  Psychiatric: Appropriate mood, Normal affect    LABORATORY STUDIES:  ----------------------------------------             9.8    7.17  )-----------( 129      ( 03 Dec 2022 18:00 )             29.1         135  |  98  |  30.5<H>  ----------------------------<  115<H>  4.6   |  26.0  |  4.28<H>    Ca    8.2<L>      04 Dec 2022 03:50  Phos  4.0       Mg     2.3         TPro  5.3<L>  /  Alb  x   /  TBili  x   /  DBili  x   /  AST  x   /  ALT  x   /  AlkPhos  x       LIVER FUNCTIONS - ( 04 Dec 2022 03:50 )  Alb: x     / Pro: 5.3 g/dL / ALK PHOS: x     / ALT: x     / AST: x     / GGT: x           PT/INR - ( 03 Dec 2022 05:30 )   PT: 13.7 sec;   INR: 1.18 ratio    PTT - ( 03 Dec 2022 05:30 )  PTT:25.7 sec    CARDIAC MARKERS ( 03 Dec 2022 02:10 )  x     / 0.02 ng/mL / x     / x     / x      CARDIAC MARKERS ( 02 Dec 2022 16:24 )  x     / 0.03 ng/mL / x     / x     / x        22 @ 07:  -  22 @ 07:00  --------------------------------------------------------  IN: 1817.4 mL / OUT: 2180 mL / NET: -362.6 mL    22 @ 07:01  -  22 @ 16:08  --------------------------------------------------------  IN: 1226 mL / OUT: 550 mL / NET: 676 mL    Urinalysis Basic - ( 02 Dec 2022 22:56 )  Color: Yellow / Appearance: very cloudy / S.015 / pH: x  Gluc: x / Ketone: Trace  / Bili: Negative / Urobili: Negative mg/dL   Blood: x / Protein: 100 / Nitrite: Negative   Leuk Esterase: Moderate / RBC: 25-50 /HPF / WBC 6-10 /HPF   Sq Epi: x / Non Sq Epi: Few / Bacteria: Occasional    Culture - Stool (collected 03 Dec 2022 05:30)  Source: .Stool Feces  Preliminary Report (04 Dec 2022 16:03):    No enteric pathogens to date: Final culture pending    Culture - Urine (collected 02 Dec 2022 22:56)  Source: Catheterized Catheterized  Final Report (04 Dec 2022 08:28):    No growth    MEDICATIONS  (STANDING):  atorvastatin 20 milliGRAM(s) Oral at bedtime  buprenorphine 8 mG/naloxone 2 mG SL Film 1 Film(s) SubLingual daily  buPROPion XL (24-Hour) . 300 milliGRAM(s) Oral daily  chlorhexidine 2% Cloths 1 Application(s) Topical daily  chlorhexidine 4% Liquid 1 Application(s) Topical <User Schedule>  cloNIDine 0.3 milliGRAM(s) Oral three times a day  FLUoxetine 40 milliGRAM(s) Oral daily  gabapentin 800 milliGRAM(s) Oral every 6 hours  heparin   Injectable 5000 Unit(s) SubCutaneous every 8 hours  labetalol 300 milliGRAM(s) Oral two times a day  lamoTRIgine 150 milliGRAM(s) Oral two times a day  levETIRAcetam  IVPB 750 milliGRAM(s) IV Intermittent every 12 hours  losartan 100 milliGRAM(s) Oral daily  metroNIDAZOLE  IVPB 500 milliGRAM(s) IV Intermittent every 8 hours  pantoprazole  Injectable 40 milliGRAM(s) IV Push daily  QUEtiapine 400 milliGRAM(s) Oral two times a day  risperiDONE   Tablet 3 milliGRAM(s) Oral daily  vancomycin    Solution 125 milliGRAM(s) Oral every 6 hours    MEDICATIONS  (PRN):  ondansetron Injectable 4 milliGRAM(s) IV Push every 6 hours PRN Nausea  sodium chloride 0.9% lock flush 10 milliLiter(s) IV Push every 1 hour PRN Pre/post blood products, medications, blood draw, and to maintain line patency      ASSESSMENT / PLAN:  ----------------------------------------  42M with a history of bipolar disorder, substance abuse, hypertension, and prior stroke who [facility] with [facility]argy and abdominal pain. He was found to have acute kidney injury with metabolic acidosis and hyperkalemia. CT of the abdomen noted mural thickening of the distal colon with pneumatosis for which he was admitted to the Surgical Intensive Care Unit for further management. Renal replacement therapy was initiated. He had persistent diarrhea and was noted to be positive for clostridium difficile for which vancomycin was initiated.    Acute kidney injury with hyperkalemia - Improved with renal replacement therapy. Nephrology consultation noted. To continue to monitor for renal recovery. No further dialysis planned at this time. Serology results to be reviewed when available. Monitoring urine output. For trial of void when feasible.    Clostridium difficile colitis - Discussed with Colorectal Surgery. Unlikely ischemic colitis. To continue on vancomycin. Abdominal examination was without significant tenderness of distension.    Bipolar disorder - On multiple psychiatric medications. On bupropion, fluoxetine, lamotrigine, quetiapine, and risperidone.     Substance abuse - On buprenorphine/naloxone.    Hypertension - Close blood pressure monitoring. On clonidine, labetalol, and losartan.    Hypothyroidism - For resumption of levothyroxine. ZAKIYA KEYANNA  ----------------------------------------  The patient was seen at bedside. Patient with acute kidney injury and clostridium difficile colitis. Somnolent on examination but awakened with voice, reports some persistent abdominal pain.    Vital Signs Last 24 Hrs  T(C): 37.1 (04 Dec 2022 16:00), Max: 37.5 (03 Dec 2022 19:30)  T(F): 98.8 (04 Dec 2022 16:00), Max: 99.5 (03 Dec 2022 19:30)  HR: 94 (04 Dec 2022 16:00) (86 - 133)  BP: 131/78 (04 Dec 2022 16:00) (92/64 - 164/118)  BP(mean): 94 (04 Dec 2022 16:00) (73 - 128)  RR: 14 (04 Dec 2022 16:00) (9 - 17)  SpO2: 97% (04 Dec 2022 16:00) (92% - 100%)    Parameters below as of 04 Dec 2022 16:00  Patient On (Oxygen Delivery Method): room air    CAPILLARY BLOOD GLUCOSE  POCT Blood Glucose.: 128 mg/dL (03 Dec 2022 17:34)    PHYSICAL EXAMINATION:  ----------------------------------------  General appearance: No acute distress, Awake, Alert  HEENT: Normocephalic, Atraumatic, Conjunctiva clear, EOMI  Neck: Supple, No JVD, No tenderness, Right hemodialysis catheter in place  Lungs: Breath sound equal bilaterally, No wheezes, No rales  Cardiovascular: S1S2, Regular rhythm  Abdomen: Soft, Nontender, Nondistended, No guarding/rebound, Positive bowel sounds  Extremities: No clubbing, No cyanosis, No edema, No calf tenderness  Neuro: Strength equal bilaterally, No tremors  Psychiatric: Appropriate mood, Normal affect    LABORATORY STUDIES:  ----------------------------------------             9.8    7.17  )-----------( 129      ( 03 Dec 2022 18:00 )             29.1         135  |  98  |  30.5<H>  ----------------------------<  115<H>  4.6   |  26.0  |  4.28<H>    Ca    8.2<L>      04 Dec 2022 03:50  Phos  4.0       Mg     2.3         TPro  5.3<L>  /  Alb  x   /  TBili  x   /  DBili  x   /  AST  x   /  ALT  x   /  AlkPhos  x       LIVER FUNCTIONS - ( 04 Dec 2022 03:50 )  Alb: x     / Pro: 5.3 g/dL / ALK PHOS: x     / ALT: x     / AST: x     / GGT: x           PT/INR - ( 03 Dec 2022 05:30 )   PT: 13.7 sec;   INR: 1.18 ratio    PTT - ( 03 Dec 2022 05:30 )  PTT:25.7 sec    CARDIAC MARKERS ( 03 Dec 2022 02:10 )  x     / 0.02 ng/mL / x     / x     / x      CARDIAC MARKERS ( 02 Dec 2022 16:24 )  x     / 0.03 ng/mL / x     / x     / x        22 @ 07:  -  22 @ 07:00  --------------------------------------------------------  IN: 1817.4 mL / OUT: 2180 mL / NET: -362.6 mL    22 @ 07:01  -  22 @ 16:08  --------------------------------------------------------  IN: 1226 mL / OUT: 550 mL / NET: 676 mL    Urinalysis Basic - ( 02 Dec 2022 22:56 )  Color: Yellow / Appearance: very cloudy / S.015 / pH: x  Gluc: x / Ketone: Trace  / Bili: Negative / Urobili: Negative mg/dL   Blood: x / Protein: 100 / Nitrite: Negative   Leuk Esterase: Moderate / RBC: 25-50 /HPF / WBC 6-10 /HPF   Sq Epi: x / Non Sq Epi: Few / Bacteria: Occasional    Culture - Stool (collected 03 Dec 2022 05:30)  Source: .Stool Feces  Preliminary Report (04 Dec 2022 16:03):    No enteric pathogens to date: Final culture pending    Culture - Urine (collected 02 Dec 2022 22:56)  Source: Catheterized Catheterized  Final Report (04 Dec 2022 08:28):    No growth    MEDICATIONS  (STANDING):  atorvastatin 20 milliGRAM(s) Oral at bedtime  buprenorphine 8 mG/naloxone 2 mG SL Film 1 Film(s) SubLingual daily  buPROPion XL (24-Hour) . 300 milliGRAM(s) Oral daily  chlorhexidine 2% Cloths 1 Application(s) Topical daily  chlorhexidine 4% Liquid 1 Application(s) Topical <User Schedule>  cloNIDine 0.3 milliGRAM(s) Oral three times a day  FLUoxetine 40 milliGRAM(s) Oral daily  gabapentin 800 milliGRAM(s) Oral every 6 hours  heparin   Injectable 5000 Unit(s) SubCutaneous every 8 hours  labetalol 300 milliGRAM(s) Oral two times a day  lamoTRIgine 150 milliGRAM(s) Oral two times a day  levETIRAcetam  IVPB 750 milliGRAM(s) IV Intermittent every 12 hours  losartan 100 milliGRAM(s) Oral daily  metroNIDAZOLE  IVPB 500 milliGRAM(s) IV Intermittent every 8 hours  pantoprazole  Injectable 40 milliGRAM(s) IV Push daily  QUEtiapine 400 milliGRAM(s) Oral two times a day  risperiDONE   Tablet 3 milliGRAM(s) Oral daily  vancomycin    Solution 125 milliGRAM(s) Oral every 6 hours    MEDICATIONS  (PRN):  ondansetron Injectable 4 milliGRAM(s) IV Push every 6 hours PRN Nausea  sodium chloride 0.9% lock flush 10 milliLiter(s) IV Push every 1 hour PRN Pre/post blood products, medications, blood draw, and to maintain line patency      ASSESSMENT / PLAN:  ----------------------------------------  42M with a history of bipolar disorder, substance abuse, hypertension, and prior stroke who [facility] with [facility]argy and abdominal pain. He was found to have acute kidney injury with metabolic acidosis and hyperkalemia. CT of the abdomen noted mural thickening of the distal colon with pneumatosis for which he was admitted to the Surgical Intensive Care Unit for further management. Renal replacement therapy was initiated. He had persistent diarrhea and was noted to be positive for clostridium difficile for which vancomycin was initiated.    Acute kidney injury with hyperkalemia - Improved with renal replacement therapy. Nephrology consultation noted. To continue to monitor for renal recovery. No further dialysis planned at this time. Serology results to be reviewed when available. Monitoring urine output. For trial of void when feasible.    Clostridium difficile colitis - Discussed with Colorectal Surgery. Unlikely ischemic colitis. To continue on vancomycin. Abdominal examination was without significant tenderness of distension. No fever or leukocytosis. Lactate was not elevated.    Bipolar disorder - On multiple psychiatric medications. On bupropion, fluoxetine, lamotrigine, quetiapine, and risperidone.     Substance abuse - On buprenorphine/naloxone.    Hypertension - Close blood pressure monitoring. On clonidine, labetalol, and losartan.    Hypothyroidism - For resumption of levothyroxine.

## 2022-12-05 DIAGNOSIS — A04.72 ENTEROCOLITIS DUE TO CLOSTRIDIUM DIFFICILE, NOT SPECIFIED AS RECURRENT: ICD-10-CM

## 2022-12-05 LAB
ANION GAP SERPL CALC-SCNC: 12 MMOL/L — SIGNIFICANT CHANGE UP (ref 5–17)
BASOPHILS # BLD AUTO: 0.08 K/UL — SIGNIFICANT CHANGE UP (ref 0–0.2)
BASOPHILS NFR BLD AUTO: 0.9 % — SIGNIFICANT CHANGE UP (ref 0–2)
BUN SERPL-MCNC: 49.4 MG/DL — HIGH (ref 8–20)
C3 SERPL-MCNC: 101 MG/DL — SIGNIFICANT CHANGE UP (ref 81–157)
C4 SERPL-MCNC: 15 MG/DL — SIGNIFICANT CHANGE UP (ref 13–39)
CALCIUM SERPL-MCNC: 8.2 MG/DL — LOW (ref 8.4–10.5)
CHLORIDE SERPL-SCNC: 101 MMOL/L — SIGNIFICANT CHANGE UP (ref 96–108)
CO2 SERPL-SCNC: 23 MMOL/L — SIGNIFICANT CHANGE UP (ref 22–29)
CREAT SERPL-MCNC: 7.81 MG/DL — HIGH (ref 0.5–1.3)
CULTURE RESULTS: SIGNIFICANT CHANGE UP
DSDNA AB SER-ACNC: <12 IU/ML — SIGNIFICANT CHANGE UP
EGFR: 8 ML/MIN/1.73M2 — LOW
EOSINOPHIL # BLD AUTO: 0.3 K/UL — SIGNIFICANT CHANGE UP (ref 0–0.5)
EOSINOPHIL NFR BLD AUTO: 3.5 % — SIGNIFICANT CHANGE UP (ref 0–6)
GLUCOSE SERPL-MCNC: 105 MG/DL — HIGH (ref 70–99)
HCT VFR BLD CALC: 26.7 % — LOW (ref 39–50)
HGB BLD-MCNC: 8.5 G/DL — LOW (ref 13–17)
KAPPA LC SER QL IFE: 3.89 MG/DL — HIGH (ref 0.33–1.94)
KAPPA/LAMBDA FREE LIGHT CHAIN RATIO, SERUM: 1.03 RATIO — SIGNIFICANT CHANGE UP (ref 0.26–1.65)
LAMBDA LC SER QL IFE: 3.78 MG/DL — HIGH (ref 0.57–2.63)
LYMPHOCYTES # BLD AUTO: 1.26 K/UL — SIGNIFICANT CHANGE UP (ref 1–3.3)
LYMPHOCYTES # BLD AUTO: 14.9 % — SIGNIFICANT CHANGE UP (ref 13–44)
MAGNESIUM SERPL-MCNC: 2.5 MG/DL — SIGNIFICANT CHANGE UP (ref 1.6–2.6)
MANUAL SMEAR VERIFICATION: YES — SIGNIFICANT CHANGE UP
MCHC RBC-ENTMCNC: 26 PG — LOW (ref 27–34)
MCHC RBC-ENTMCNC: 31.8 GM/DL — LOW (ref 32–36)
MCV RBC AUTO: 81.7 FL — SIGNIFICANT CHANGE UP (ref 80–100)
MONOCYTES # BLD AUTO: 1.41 K/UL — HIGH (ref 0–0.9)
MONOCYTES NFR BLD AUTO: 16.7 % — HIGH (ref 2–14)
NEUTROPHILS # BLD AUTO: 5.32 K/UL — SIGNIFICANT CHANGE UP (ref 1.8–7.4)
NEUTROPHILS NFR BLD AUTO: 60.5 % — SIGNIFICANT CHANGE UP (ref 43–77)
NEUTS BAND # BLD: 2.6 % — SIGNIFICANT CHANGE UP (ref 0–8)
PHOSPHATE SERPL-MCNC: 4.6 MG/DL — SIGNIFICANT CHANGE UP (ref 2.4–4.7)
PLAT MORPH BLD: NORMAL — SIGNIFICANT CHANGE UP
PLATELET # BLD AUTO: 147 K/UL — LOW (ref 150–400)
POTASSIUM SERPL-MCNC: 4.7 MMOL/L — SIGNIFICANT CHANGE UP (ref 3.5–5.3)
POTASSIUM SERPL-SCNC: 4.7 MMOL/L — SIGNIFICANT CHANGE UP (ref 3.5–5.3)
RBC # BLD: 3.27 M/UL — LOW (ref 4.2–5.8)
RBC # FLD: 13.4 % — SIGNIFICANT CHANGE UP (ref 10.3–14.5)
RBC BLD AUTO: ABNORMAL
SODIUM SERPL-SCNC: 136 MMOL/L — SIGNIFICANT CHANGE UP (ref 135–145)
SPECIMEN SOURCE: SIGNIFICANT CHANGE UP
T PALLIDUM AB TITR SER: NEGATIVE — SIGNIFICANT CHANGE UP
VARIANT LYMPHS # BLD: 0.9 % — SIGNIFICANT CHANGE UP (ref 0–6)
WBC # BLD: 8.43 K/UL — SIGNIFICANT CHANGE UP (ref 3.8–10.5)
WBC # FLD AUTO: 8.43 K/UL — SIGNIFICANT CHANGE UP (ref 3.8–10.5)

## 2022-12-05 PROCEDURE — 99233 SBSQ HOSP IP/OBS HIGH 50: CPT

## 2022-12-05 PROCEDURE — 99232 SBSQ HOSP IP/OBS MODERATE 35: CPT

## 2022-12-05 PROCEDURE — 99223 1ST HOSP IP/OBS HIGH 75: CPT

## 2022-12-05 RX ORDER — SODIUM CHLORIDE 9 MG/ML
1000 INJECTION INTRAMUSCULAR; INTRAVENOUS; SUBCUTANEOUS
Refills: 0 | Status: DISCONTINUED | OUTPATIENT
Start: 2022-12-05 | End: 2022-12-10

## 2022-12-05 RX ADMIN — LEVETIRACETAM 400 MILLIGRAM(S): 250 TABLET, FILM COATED ORAL at 00:39

## 2022-12-05 RX ADMIN — LAMOTRIGINE 150 MILLIGRAM(S): 25 TABLET, ORALLY DISINTEGRATING ORAL at 05:19

## 2022-12-05 RX ADMIN — GABAPENTIN 800 MILLIGRAM(S): 400 CAPSULE ORAL at 17:23

## 2022-12-05 RX ADMIN — QUETIAPINE FUMARATE 400 MILLIGRAM(S): 200 TABLET, FILM COATED ORAL at 17:23

## 2022-12-05 RX ADMIN — BUPRENORPHINE AND NALOXONE 1 FILM(S): 2; .5 TABLET SUBLINGUAL at 12:29

## 2022-12-05 RX ADMIN — Medication 50 MICROGRAM(S): at 05:20

## 2022-12-05 RX ADMIN — GABAPENTIN 800 MILLIGRAM(S): 400 CAPSULE ORAL at 05:19

## 2022-12-05 RX ADMIN — Medication 300 MILLIGRAM(S): at 05:20

## 2022-12-05 RX ADMIN — BUPROPION HYDROCHLORIDE 300 MILLIGRAM(S): 150 TABLET, EXTENDED RELEASE ORAL at 12:28

## 2022-12-05 RX ADMIN — Medication 100 MILLIGRAM(S): at 05:18

## 2022-12-05 RX ADMIN — HEPARIN SODIUM 5000 UNIT(S): 5000 INJECTION INTRAVENOUS; SUBCUTANEOUS at 12:28

## 2022-12-05 RX ADMIN — QUETIAPINE FUMARATE 400 MILLIGRAM(S): 200 TABLET, FILM COATED ORAL at 05:20

## 2022-12-05 RX ADMIN — Medication 0.3 MILLIGRAM(S): at 12:28

## 2022-12-05 RX ADMIN — LEVETIRACETAM 400 MILLIGRAM(S): 250 TABLET, FILM COATED ORAL at 23:08

## 2022-12-05 RX ADMIN — CHLORHEXIDINE GLUCONATE 1 APPLICATION(S): 213 SOLUTION TOPICAL at 05:20

## 2022-12-05 RX ADMIN — CHLORHEXIDINE GLUCONATE 1 APPLICATION(S): 213 SOLUTION TOPICAL at 12:29

## 2022-12-05 RX ADMIN — PANTOPRAZOLE SODIUM 40 MILLIGRAM(S): 20 TABLET, DELAYED RELEASE ORAL at 12:31

## 2022-12-05 RX ADMIN — LOSARTAN POTASSIUM 100 MILLIGRAM(S): 100 TABLET, FILM COATED ORAL at 05:20

## 2022-12-05 RX ADMIN — LAMOTRIGINE 150 MILLIGRAM(S): 25 TABLET, ORALLY DISINTEGRATING ORAL at 17:23

## 2022-12-05 RX ADMIN — Medication 100 MILLIGRAM(S): at 23:10

## 2022-12-05 RX ADMIN — LEVETIRACETAM 400 MILLIGRAM(S): 250 TABLET, FILM COATED ORAL at 13:38

## 2022-12-05 RX ADMIN — Medication 0.3 MILLIGRAM(S): at 05:19

## 2022-12-05 RX ADMIN — Medication 0.3 MILLIGRAM(S): at 22:09

## 2022-12-05 RX ADMIN — Medication 100 MILLIGRAM(S): at 12:29

## 2022-12-05 RX ADMIN — Medication 125 MILLIGRAM(S): at 12:28

## 2022-12-05 RX ADMIN — Medication 125 MILLIGRAM(S): at 05:19

## 2022-12-05 RX ADMIN — HEPARIN SODIUM 5000 UNIT(S): 5000 INJECTION INTRAVENOUS; SUBCUTANEOUS at 05:19

## 2022-12-05 RX ADMIN — SODIUM CHLORIDE 75 MILLILITER(S): 9 INJECTION INTRAMUSCULAR; INTRAVENOUS; SUBCUTANEOUS at 17:36

## 2022-12-05 RX ADMIN — Medication 125 MILLIGRAM(S): at 23:08

## 2022-12-05 RX ADMIN — Medication 125 MILLIGRAM(S): at 17:24

## 2022-12-05 RX ADMIN — GABAPENTIN 800 MILLIGRAM(S): 400 CAPSULE ORAL at 12:27

## 2022-12-05 RX ADMIN — ATORVASTATIN CALCIUM 20 MILLIGRAM(S): 80 TABLET, FILM COATED ORAL at 22:08

## 2022-12-05 RX ADMIN — RISPERIDONE 3 MILLIGRAM(S): 4 TABLET ORAL at 12:27

## 2022-12-05 RX ADMIN — GABAPENTIN 800 MILLIGRAM(S): 400 CAPSULE ORAL at 22:08

## 2022-12-05 NOTE — PROGRESS NOTE ADULT - ASSESSMENT
42M with a history of bipolar disorder, substance abuse, hypertension, and prior stroke who initially presented [facility] with lethargy and abdominal pain. He was found to have acute kidney injury with metabolic acidosis and hyperkalemia. CT of the abdomen noted mural thickening of the distal colon with pneumatosis for which he was admitted to the Surgical Intensive Care Unit for further management. Renal replacement therapy was initiated. noted to be positive for clostridium difficile for which vancomycin/ flagyl  was initiated. downgraded to medicine 12/4    >isac / with hyperkalemia   -started on renal replacement therapy  -monitor for renal recovery  - nephro following   - Serology results pending to be reviewed when available  - monitor i/os   - tov today     > diarrhea / Clostridium difficile colitis   - seen by Colorectal Surgery. Unlikely ischemic colitis  -c/w po vancomycin and iv flagyl for severe colitis   - id consulted   - f/u bcxs     >Bipolar disorder   -c/w  bupropion, fluoxetine, lamotrigine, quetiapine, and risperidone  -  consulted   - will need psych clearance prior to dc     >Substance abuse - On buprenorphine/naloxone.    >Hypertension  - c/w clonidine, labetalol, and losartan  - monitor     >Hypothyroidism  - c/w  levothyroxine    > dvt ppx: sq heparin     > dispo: remains acute , need to monitor renal function , stool out put , needs psych clearance prior to dc

## 2022-12-05 NOTE — PROGRESS NOTE ADULT - SUBJECTIVE AND OBJECTIVE BOX
cc: isac , cdiff colitis     interval hx: patient seen and eval. comfortable. in no acute distress. denies any nausea or vomiting. has mild abd discomfort on deep palpation otherwise comfortable.     Vital Signs Last 24 Hrs  T(C): 37.2 (05 Dec 2022 11:27), Max: 37.5 (04 Dec 2022 23:00)  T(F): 98.9 (05 Dec 2022 11:27), Max: 99.5 (04 Dec 2022 23:00)  HR: 93 (05 Dec 2022 11:27) (86 - 107)  BP: 135/74 (05 Dec 2022 11:27) (107/64 - 145/92)  BP(mean): 76 (05 Dec 2022 02:23) (76 - 104)  RR: 18 (05 Dec 2022 11:27) (9 - 18)  SpO2: 95% (05 Dec 2022 11:27) (92% - 98%)    Parameters below as of 05 Dec 2022 11:27  Patient On (Oxygen Delivery Method): room air    PHYSICAL EXAMINATION:  General appearance: No acute distress, Awake, Alert  HEENT: mm moist   Neck: Supple, shiley cath in place   Lungs: Breath sound equal bilaterally, No wheezes, No rales  Cardiovascular: S1S2, Regular rhythm  Abdomen: Soft, nondistended, mild tenderness on deep palpation in mid / lower abd ,  No guarding/rebound, Positive bowel sounds  Extremities: No clubbing, No cyanosis, No edema, No calf tenderness  Neuro: Strength equal bilaterally, No tremors  Psychiatric: Appropriate mood, Normal affect                          8.5    8.43  )-----------( 147      ( 05 Dec 2022 08:34 )             26.7   12-05    136  |  101  |  49.4<H>  ----------------------------<  105<H>  4.7   |  23.0  |  7.81<H>    Ca    8.2<L>      05 Dec 2022 08:34  Phos  4.6     12-05  Mg     2.5     12-05    TPro  5.3<L>  /  Alb  x   /  TBili  x   /  DBili  x   /  AST  x   /  ALT  x   /  AlkPhos  x   12-04      MEDICATIONS  (STANDING):  atorvastatin 20 milliGRAM(s) Oral at bedtime  buprenorphine 8 mG/naloxone 2 mG SL Film 1 Film(s) SubLingual daily  buPROPion XL (24-Hour) . 300 milliGRAM(s) Oral daily  chlorhexidine 2% Cloths 1 Application(s) Topical daily  chlorhexidine 4% Liquid 1 Application(s) Topical <User Schedule>  cloNIDine 0.3 milliGRAM(s) Oral three times a day  FLUoxetine 40 milliGRAM(s) Oral daily  gabapentin 800 milliGRAM(s) Oral every 6 hours  heparin   Injectable 5000 Unit(s) SubCutaneous every 8 hours  labetalol 300 milliGRAM(s) Oral two times a day  lamoTRIgine 150 milliGRAM(s) Oral two times a day  levETIRAcetam  IVPB 750 milliGRAM(s) IV Intermittent every 12 hours  levothyroxine 50 MICROGram(s) Oral daily  losartan 100 milliGRAM(s) Oral daily  metroNIDAZOLE  IVPB 500 milliGRAM(s) IV Intermittent every 8 hours  pantoprazole  Injectable 40 milliGRAM(s) IV Push daily  QUEtiapine 400 milliGRAM(s) Oral two times a day  risperiDONE   Tablet 3 milliGRAM(s) Oral daily  vancomycin    Solution 125 milliGRAM(s) Oral every 6 hours    MEDICATIONS  (PRN):  ondansetron Injectable 4 milliGRAM(s) IV Push every 6 hours PRN Nausea  sodium chloride 0.9% lock flush 10 milliLiter(s) IV Push every 1 hour PRN Pre/post blood products, medications, blood draw, and to maintain line patency
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
## 2022-12-05 NOTE — PROGRESS NOTE ADULT - SUBJECTIVE AND OBJECTIVE BOX
LATE ENTRY - PATIENT SEEN @ 6:30AM     HPI/OVERNIGHT EVENTS: Patient seen and examined at bedside this AM. Downgraded to [facility] surg floor from ICU overnight. Tolerating regular diet with no abdominal pain, nausea or vomiting.   Denies fever, chills, nausea, vomitting, chest pain, SOB, dizziness, abd pain or any other concerning symptoms    Vital Signs Last 24 Hrs  T(C): 37.2 (05 Dec 2022 11:27), Max: 37.5 (04 Dec 2022 23:00)  T(F): 98.9 (05 Dec 2022 11:27), Max: 99.5 (04 Dec 2022 23:00)  HR: 93 (05 Dec 2022 11:27) (86 - 107)  BP: 135/74 (05 Dec 2022 11:27) (107/64 - 145/92)  BP(mean): 76 (05 Dec 2022 02:23) (76 - 104)  RR: 18 (05 Dec 2022 11:27) (9 - 18)  SpO2: 95% (05 Dec 2022 11:27) (94% - 98%)    Parameters below as of 05 Dec 2022 11:27  Patient On (Oxygen Delivery Method): room air        I&O's Detail    04 Dec 2022 07:01  -  05 Dec 2022 07:00  --------------------------------------------------------  IN:    IV PiggyBack: 200 mL    IV PiggyBack: 200 mL    Oral Fluid: 1470 mL    sodium chloride 0.9%: 126 mL  Total IN: 1996 mL    OUT:    Indwelling Catheter - Urethral (mL): 925 mL    Rectal Tube (mL): 400 mL  Total OUT: 1325 mL    Total NET: 671 mL    Constitutional: Patient resting comfortably in bed, in no acute distress  HEENT: NCAT   Respiratory: Non labored breathing with no accessory muscle use or conversational dyspnea  Cardiovascular: Normal S1 and S2   Gastrointestinal: Abdomen soft, non-tender, non-distended, no rebound tenderness / guarding  Rectal: Deferred    Neurological: GCS: 15 (4/5/6) A&O x 3; no gross sensory / motor / coordination deficits  Psychiatric: Normal mood, normal affect  Musculoskeletal: No joint pain, swelling or deformity    LABS:                        8.5    8.43  )-----------( 147      ( 05 Dec 2022 08:34 )             26.7     12-05    136  |  101  |  49.4<H>  ----------------------------<  105<H>  4.7   |  23.0  |  7.81<H>    Ca    8.2<L>      05 Dec 2022 08:34  Phos  4.6     12-05  Mg     2.5     12-05    TPro  5.3<L>  /  Alb  x   /  TBili  x   /  DBili  x   /  AST  x   /  ALT  x   /  AlkPhos  x   12-04    MEDICATIONS  (STANDING):  atorvastatin 20 milliGRAM(s) Oral at bedtime  buprenorphine 8 mG/naloxone 2 mG SL Film 1 Film(s) SubLingual daily  buPROPion XL (24-Hour) . 300 milliGRAM(s) Oral daily  chlorhexidine 2% Cloths 1 Application(s) Topical daily  chlorhexidine 4% Liquid 1 Application(s) Topical <User Schedule>  cloNIDine 0.3 milliGRAM(s) Oral three times a day  FLUoxetine 40 milliGRAM(s) Oral daily  gabapentin 800 milliGRAM(s) Oral every 6 hours  heparin   Injectable 5000 Unit(s) SubCutaneous every 8 hours  labetalol 300 milliGRAM(s) Oral two times a day  lamoTRIgine 150 milliGRAM(s) Oral two times a day  levETIRAcetam  IVPB 750 milliGRAM(s) IV Intermittent every 12 hours  levothyroxine 50 MICROGram(s) Oral daily  losartan 100 milliGRAM(s) Oral daily  metroNIDAZOLE  IVPB 500 milliGRAM(s) IV Intermittent every 8 hours  pantoprazole  Injectable 40 milliGRAM(s) IV Push daily  QUEtiapine 400 milliGRAM(s) Oral two times a day  risperiDONE   Tablet 3 milliGRAM(s) Oral daily  vancomycin    Solution 125 milliGRAM(s) Oral every 6 hours    MEDICATIONS  (PRN):  ondansetron Injectable 4 milliGRAM(s) IV Push every 6 hours PRN Nausea  sodium chloride 0.9% lock flush 10 milliLiter(s) IV Push every 1 hour PRN Pre/post blood products, medications, blood draw, and to maintain line patency

## 2022-12-05 NOTE — PROGRESS NOTE ADULT - TIME BILLING
Patient seen and examined, chart including vitals, labs, imaging reviewed.  No acute issues overnight.  [facility] without any abdominal pain whatsoever.  No nausea/vomiting.  Passing flatus and BMs.  On exam abdomen is soft, nondistended, and nontender, without rebound/guarding.  Labs without leukocytosis; Cr still elevated.    -- No indication for acute surgical intervention at this point.  Patient is tolerating a diet, continue oral intake.  Exam has been benign.  -- Dialysis as per primary and Nephrology  -- Continue treatment of C diff colitis and diarrhea with vancomycin and Flagyl - ID following  -- Colorectal Surgery will sign off at this time.  Please call with any further questions/concerns.

## 2022-12-05 NOTE — PROGRESS NOTE ADULT - SUBJECTIVE AND OBJECTIVE BOX
Reason for visit: LIV    Subjective: No acute overnight event. Patient denied any cardiac or urinary complains. No fever/chills. Reports abdominal pain improving, accepting PO.    ROS: All systems were reviewed in detail pertinent positive and negative mentioned above, rest are negative.    Physical Exam:  Gen: no acute distress  MS: alert, conversing normally  Eyes: EOMI, no icterus  HENT: NCAT, MMM  CV: rhythm reg reg, rate normal, no m/g/r  Chest: CTAB, no w/r/r,  Abd: soft, NT, ND  Extremities: No edema    =======================================================  Vital Signs Last 24 Hrs  T(C): 37.1 (05 Dec 2022 16:37), Max: 37.5 (04 Dec 2022 23:00)  T(F): 98.8 (05 Dec 2022 16:37), Max: 99.5 (04 Dec 2022 23:00)  HR: 93 (05 Dec 2022 16:37) (89 - 107)  BP: 102/69 (05 Dec 2022 16:37) (102/69 - 145/92)  BP(mean): 76 (05 Dec 2022 02:23) (76 - 104)  RR: 18 (05 Dec 2022 16:37) (10 - 18)  SpO2: 93% (05 Dec 2022 16:37) (93% - 98%)    Parameters below as of 05 Dec 2022 16:37  Patient On (Oxygen Delivery Method): room air      I&O's Summary    04 Dec 2022 07:01  -  05 Dec 2022 07:00  --------------------------------------------------------  IN: 1996 mL / OUT: 1325 mL / NET: 671 mL    05 Dec 2022 07:01  -  05 Dec 2022 17:19  --------------------------------------------------------  IN: 0 mL / OUT: 550 mL / NET: -550 mL      =======================================================  Current Antibiotics:  metroNIDAZOLE  IVPB 500 milliGRAM(s) IV Intermittent every 8 hours  vancomycin    Solution 125 milliGRAM(s) Oral every 6 hours    Other medications:  atorvastatin 20 milliGRAM(s) Oral at bedtime  buprenorphine 8 mG/naloxone 2 mG SL Film 1 Film(s) SubLingual daily  buPROPion XL (24-Hour) . 300 milliGRAM(s) Oral daily  chlorhexidine 2% Cloths 1 Application(s) Topical daily  chlorhexidine 4% Liquid 1 Application(s) Topical <User Schedule>  cloNIDine 0.3 milliGRAM(s) Oral three times a day  FLUoxetine 40 milliGRAM(s) Oral daily  gabapentin 800 milliGRAM(s) Oral every 6 hours  heparin   Injectable 5000 Unit(s) SubCutaneous every 8 hours  labetalol 300 milliGRAM(s) Oral two times a day  lamoTRIgine 150 milliGRAM(s) Oral two times a day  levETIRAcetam  IVPB 750 milliGRAM(s) IV Intermittent every 12 hours  levothyroxine 50 MICROGram(s) Oral daily  losartan 100 milliGRAM(s) Oral daily  pantoprazole  Injectable 40 milliGRAM(s) IV Push daily  QUEtiapine 400 milliGRAM(s) Oral two times a day  risperiDONE   Tablet 3 milliGRAM(s) Oral daily    =======================================================  12-05    136  |  101  |  49.4<H>  ----------------------------<  105<H>  4.7   |  23.0  |  7.81<H>    Ca    8.2<L>      05 Dec 2022 08:34  Phos  4.6     12-05  Mg     2.5     12-05    TPro  5.3<L>  /  Alb  x   /  TBili  x   /  DBili  x   /  AST  x   /  ALT  x   /  AlkPhos  x   12-04    Creatinine, Serum: 7.81 mg/dL (12-05-22 @ 08:34)  Creatinine, Serum: 4.28 mg/dL (12-04-22 @ 03:50)  Creatinine, Serum: 5.29 mg/dL (12-03-22 @ 18:00)  Creatinine, Serum: 4.03 mg/dL (12-03-22 @ 08:48)  Creatinine, Serum: 4.51 mg/dL (12-03-22 @ 05:30)  Creatinine, Serum: 4.91 mg/dL (12-03-22 @ 02:10)  Creatinine, Serum: 5.05 mg/dL (12-02-22 @ 16:24)  Creatinine, Serum: 3.62 mg/dL (12-02-22 @ 06:04)  Creatinine, Serum: 3.39 mg/dL (12-02-22 @ 02:59)  Creatinine, Serum: 2.72 mg/dL (12-01-22 @ 19:50)      =======================================================

## 2022-12-05 NOTE — PROGRESS NOTE ADULT - SUBJECTIVE AND OBJECTIVE BOX
INTERVAL HPI/OVERNIGHT EVENTS:    Patient evaluated at bedside. Resting comfortably. NAOE. No N/V/CP/SOB, no subjective fevers or chills. Pain well controlled. Tolerating diet.    MEDICATIONS  (STANDING):  atorvastatin 20 milliGRAM(s) Oral at bedtime  buprenorphine 8 mG/naloxone 2 mG SL Film 1 Film(s) SubLingual daily  buPROPion XL (24-Hour) . 300 milliGRAM(s) Oral daily  chlorhexidine 2% Cloths 1 Application(s) Topical daily  chlorhexidine 4% Liquid 1 Application(s) Topical <User Schedule>  cloNIDine 0.3 milliGRAM(s) Oral three times a day  FLUoxetine 40 milliGRAM(s) Oral daily  gabapentin 800 milliGRAM(s) Oral every 6 hours  heparin   Injectable 5000 Unit(s) SubCutaneous every 8 hours  labetalol 300 milliGRAM(s) Oral two times a day  lamoTRIgine 150 milliGRAM(s) Oral two times a day  levETIRAcetam  IVPB 750 milliGRAM(s) IV Intermittent every 12 hours  levothyroxine 50 MICROGram(s) Oral daily  metroNIDAZOLE  IVPB 500 milliGRAM(s) IV Intermittent every 8 hours  pantoprazole  Injectable 40 milliGRAM(s) IV Push daily  QUEtiapine 400 milliGRAM(s) Oral two times a day  risperiDONE   Tablet 3 milliGRAM(s) Oral daily  sodium chloride 0.9%. 1000 milliLiter(s) (75 mL/Hr) IV Continuous <Continuous>  vancomycin    Solution 125 milliGRAM(s) Oral every 6 hours    MEDICATIONS  (PRN):  ondansetron Injectable 4 milliGRAM(s) IV Push every 6 hours PRN Nausea  sodium chloride 0.9% lock flush 10 milliLiter(s) IV Push every 1 hour PRN Pre/post blood products, medications, blood draw, and to maintain line patency      Vital Signs Last 24 Hrs  T(C): 37.1 (05 Dec 2022 19:59), Max: 37.5 (05 Dec 2022 01:00)  T(F): 98.7 (05 Dec 2022 19:59), Max: 99.5 (05 Dec 2022 01:00)  HR: 95 (05 Dec 2022 19:59) (89 - 107)  BP: 126/80 (05 Dec 2022 19:59) (102/69 - 140/81)  BP(mean): 76 (05 Dec 2022 02:23) (76 - 98)  RR: 18 (05 Dec 2022 19:59) (10 - 18)  SpO2: 95% (05 Dec 2022 19:59) (93% - 98%)    Parameters below as of 05 Dec 2022 19:59  Patient On (Oxygen Delivery Method): room air      Constitutional: Patient resting comfortably in bed, in no acute distress  HEENT: NCAT   Respiratory: Non labored breathing with no accessory muscle use or conversational dyspnea  Cardiovascular: RRR  Gastrointestinal: Abdomen soft, non-tender, non-distended, no rebound tenderness / guarding  Neurological: A&O x 3; no gross deficits  Psychiatric: Normal mood, normal affect  Musculoskeletal: No joint pain, swelling or deformity      I&O's Detail    04 Dec 2022 07:01  -  05 Dec 2022 07:00  --------------------------------------------------------  IN:    IV PiggyBack: 200 mL    IV PiggyBack: 200 mL    Oral Fluid: 1470 mL    sodium chloride 0.9%: 126 mL  Total IN: 1996 mL    OUT:    Indwelling Catheter - Urethral (mL): 925 mL    Rectal Tube (mL): 400 mL  Total OUT: 1325 mL    Total NET: 671 mL      05 Dec 2022 07:01  -  05 Dec 2022 23:52  --------------------------------------------------------  IN:  Total IN: 0 mL    OUT:    Rectal Tube (mL): 500 mL    Voided (mL): 50 mL  Total OUT: 550 mL    Total NET: -550 mL          LABS:                        8.5    8.43  )-----------( 147      ( 05 Dec 2022 08:34 )             26.7     12-05    136  |  101  |  49.4<H>  ----------------------------<  105<H>  4.7   |  23.0  |  7.81<H>    Ca    8.2<L>      05 Dec 2022 08:34  Phos  4.6     12-05  Mg     2.5     12-05    TPro  5.3<L>  /  Alb  x   /  TBili  x   /  DBili  x   /  AST  x   /  ALT  x   /  AlkPhos  x   12-04

## 2022-12-05 NOTE — PROGRESS NOTE ADULT - ASSESSMENT
42 year old male currently afebrile and hemodynamically stable initially admitted for r/u ischemic sigmoid colitis requiring bowel rest and IV ABx however decompensated secondary to metabolic encephalopathy requiring emergent upgrade to ICU and initiation of dialysis via [facility]. Hospital course also complicated with C diff colitis - now currently on oral vanc and IV flagyl. Cr noted to be 7.81 today (12/5) from 4.28 yesterday - renal team to be notified. Rest of management per primary team, colorectal surgery following.

## 2022-12-05 NOTE — PROGRESS NOTE ADULT - ASSESSMENT
42M w/ pmh of polysubstance use and bipolar disorder that comes in the hospital in altered state and the patient with possible low flow state to sigmoid colon secondary to 2 day history of persistent diarrhea. CT scan findings of pneumatosis in sigmoid colon. Found to be C. diff positive. Also found to have worsening Cr - 7.81 from 4.28. Patient now on med/surg floor.    Plan:  - c/w serial abdominal exams   - f/u AM labs, replete electrolytes PRN  - trend potassium  - pain control   - f/u nephro re: HD needs  - C. diff receiving PO Vanc and IV Flagyl

## 2022-12-06 LAB
ALBUMIN SERPL ELPH-MCNC: 2.7 G/DL — LOW (ref 3.3–5.2)
ALP SERPL-CCNC: 75 U/L — SIGNIFICANT CHANGE UP (ref 40–120)
ALT FLD-CCNC: 14 U/L — SIGNIFICANT CHANGE UP
ANA TITR SER: NEGATIVE — SIGNIFICANT CHANGE UP
ANION GAP SERPL CALC-SCNC: 14 MMOL/L — SIGNIFICANT CHANGE UP (ref 5–17)
AST SERPL-CCNC: 18 U/L — SIGNIFICANT CHANGE UP
AUTO DIFF PNL BLD: NEGATIVE — SIGNIFICANT CHANGE UP
BILIRUB SERPL-MCNC: 0.3 MG/DL — LOW (ref 0.4–2)
BUN SERPL-MCNC: 64.8 MG/DL — HIGH (ref 8–20)
C-ANCA SER-ACNC: NEGATIVE — SIGNIFICANT CHANGE UP
CALCIUM SERPL-MCNC: 8.6 MG/DL — SIGNIFICANT CHANGE UP (ref 8.4–10.5)
CHLORIDE SERPL-SCNC: 100 MMOL/L — SIGNIFICANT CHANGE UP (ref 96–108)
CO2 SERPL-SCNC: 23 MMOL/L — SIGNIFICANT CHANGE UP (ref 22–29)
CREAT SERPL-MCNC: 9.5 MG/DL — HIGH (ref 0.5–1.3)
CULTURE RESULTS: SIGNIFICANT CHANGE UP
EGFR: 6 ML/MIN/1.73M2 — LOW
GBM IGG SER-ACNC: <0.2 — SIGNIFICANT CHANGE UP (ref 0–0.9)
GLUCOSE SERPL-MCNC: 138 MG/DL — HIGH (ref 70–99)
HCT VFR BLD CALC: 25.5 % — LOW (ref 39–50)
HGB BLD-MCNC: 8.3 G/DL — LOW (ref 13–17)
MAGNESIUM SERPL-MCNC: 2.5 MG/DL — SIGNIFICANT CHANGE UP (ref 1.6–2.6)
MCHC RBC-ENTMCNC: 26.7 PG — LOW (ref 27–34)
MCHC RBC-ENTMCNC: 32.5 GM/DL — SIGNIFICANT CHANGE UP (ref 32–36)
MCV RBC AUTO: 82 FL — SIGNIFICANT CHANGE UP (ref 80–100)
MPO AB + PR3 PNL SER: SIGNIFICANT CHANGE UP
P-ANCA SER-ACNC: NEGATIVE — SIGNIFICANT CHANGE UP
PHOSPHATE SERPL-MCNC: 5 MG/DL — HIGH (ref 2.4–4.7)
PLATELET # BLD AUTO: 140 K/UL — LOW (ref 150–400)
POTASSIUM SERPL-MCNC: 4.2 MMOL/L — SIGNIFICANT CHANGE UP (ref 3.5–5.3)
POTASSIUM SERPL-SCNC: 4.2 MMOL/L — SIGNIFICANT CHANGE UP (ref 3.5–5.3)
PROT SERPL-MCNC: 5.7 G/DL — LOW (ref 6.6–8.7)
RBC # BLD: 3.11 M/UL — LOW (ref 4.2–5.8)
RBC # FLD: 13.3 % — SIGNIFICANT CHANGE UP (ref 10.3–14.5)
SODIUM SERPL-SCNC: 137 MMOL/L — SIGNIFICANT CHANGE UP (ref 135–145)
SPECIMEN SOURCE: SIGNIFICANT CHANGE UP
WBC # BLD: 9.08 K/UL — SIGNIFICANT CHANGE UP (ref 3.8–10.5)
WBC # FLD AUTO: 9.08 K/UL — SIGNIFICANT CHANGE UP (ref 3.8–10.5)

## 2022-12-06 PROCEDURE — 99233 SBSQ HOSP IP/OBS HIGH 50: CPT

## 2022-12-06 PROCEDURE — 99232 SBSQ HOSP IP/OBS MODERATE 35: CPT

## 2022-12-06 RX ORDER — GABAPENTIN 400 MG/1
300 CAPSULE ORAL DAILY
Refills: 0 | Status: DISCONTINUED | OUTPATIENT
Start: 2022-12-07 | End: 2022-12-12

## 2022-12-06 RX ORDER — TUBERCULIN PURIFIED PROTEIN DERIVATIVE 5 [IU]/.1ML
5 INJECTION, SOLUTION INTRADERMAL ONCE
Refills: 0 | Status: COMPLETED | OUTPATIENT
Start: 2022-12-06 | End: 2022-12-06

## 2022-12-06 RX ADMIN — Medication 0.3 MILLIGRAM(S): at 12:06

## 2022-12-06 RX ADMIN — Medication 40 MILLIGRAM(S): at 12:05

## 2022-12-06 RX ADMIN — Medication 0.3 MILLIGRAM(S): at 05:29

## 2022-12-06 RX ADMIN — BUPROPION HYDROCHLORIDE 300 MILLIGRAM(S): 150 TABLET, EXTENDED RELEASE ORAL at 12:06

## 2022-12-06 RX ADMIN — Medication 100 MILLIGRAM(S): at 05:30

## 2022-12-06 RX ADMIN — TUBERCULIN PURIFIED PROTEIN DERIVATIVE 5 UNIT(S): 5 INJECTION, SOLUTION INTRADERMAL at 13:01

## 2022-12-06 RX ADMIN — LAMOTRIGINE 150 MILLIGRAM(S): 25 TABLET, ORALLY DISINTEGRATING ORAL at 05:28

## 2022-12-06 RX ADMIN — Medication 300 MILLIGRAM(S): at 05:29

## 2022-12-06 RX ADMIN — LAMOTRIGINE 150 MILLIGRAM(S): 25 TABLET, ORALLY DISINTEGRATING ORAL at 18:20

## 2022-12-06 RX ADMIN — Medication 300 MILLIGRAM(S): at 18:20

## 2022-12-06 RX ADMIN — Medication 50 MICROGRAM(S): at 05:29

## 2022-12-06 RX ADMIN — CHLORHEXIDINE GLUCONATE 1 APPLICATION(S): 213 SOLUTION TOPICAL at 06:00

## 2022-12-06 RX ADMIN — Medication 125 MILLIGRAM(S): at 12:04

## 2022-12-06 RX ADMIN — LEVETIRACETAM 400 MILLIGRAM(S): 250 TABLET, FILM COATED ORAL at 15:08

## 2022-12-06 RX ADMIN — GABAPENTIN 800 MILLIGRAM(S): 400 CAPSULE ORAL at 05:29

## 2022-12-06 RX ADMIN — ATORVASTATIN CALCIUM 20 MILLIGRAM(S): 80 TABLET, FILM COATED ORAL at 22:45

## 2022-12-06 RX ADMIN — QUETIAPINE FUMARATE 400 MILLIGRAM(S): 200 TABLET, FILM COATED ORAL at 18:18

## 2022-12-06 RX ADMIN — Medication 100 MILLIGRAM(S): at 12:18

## 2022-12-06 RX ADMIN — Medication 125 MILLIGRAM(S): at 18:20

## 2022-12-06 RX ADMIN — Medication 0.3 MILLIGRAM(S): at 22:45

## 2022-12-06 RX ADMIN — HEPARIN SODIUM 5000 UNIT(S): 5000 INJECTION INTRAVENOUS; SUBCUTANEOUS at 05:29

## 2022-12-06 RX ADMIN — HEPARIN SODIUM 5000 UNIT(S): 5000 INJECTION INTRAVENOUS; SUBCUTANEOUS at 22:45

## 2022-12-06 RX ADMIN — SODIUM CHLORIDE 75 MILLILITER(S): 9 INJECTION INTRAMUSCULAR; INTRAVENOUS; SUBCUTANEOUS at 05:26

## 2022-12-06 RX ADMIN — RISPERIDONE 3 MILLIGRAM(S): 4 TABLET ORAL at 12:06

## 2022-12-06 RX ADMIN — BUPRENORPHINE AND NALOXONE 1 FILM(S): 2; .5 TABLET SUBLINGUAL at 13:01

## 2022-12-06 RX ADMIN — Medication 125 MILLIGRAM(S): at 05:30

## 2022-12-06 RX ADMIN — CHLORHEXIDINE GLUCONATE 1 APPLICATION(S): 213 SOLUTION TOPICAL at 12:18

## 2022-12-06 RX ADMIN — HEPARIN SODIUM 5000 UNIT(S): 5000 INJECTION INTRAVENOUS; SUBCUTANEOUS at 12:16

## 2022-12-06 RX ADMIN — QUETIAPINE FUMARATE 400 MILLIGRAM(S): 200 TABLET, FILM COATED ORAL at 05:30

## 2022-12-06 RX ADMIN — Medication 100 MILLIGRAM(S): at 22:46

## 2022-12-06 NOTE — BH CONSULTATION LIAISON PROGRESS NOTE - NSBHCHARTREVIEWLAB_PSY_A_CORE FT
Basic Metabolic Panel - STAT (12.02.22 @ 02:59)    Sodium, Serum: 131 mmol/L    Potassium, Serum: 6.5: Critical value:  TYPE:(C=Critical, N=Notification, A=Abnormal) C  TESTS: K  DATE/TIME CALLED: 12/02/2022 04:51:52 EST  CALLED TO: Dr. Rakan Blood  READ BACK (2 Patient Identifiers)(Y/N): Y  READ BACK VALUES (Y/N): Y  CALLED BY: SC mmol/L    Chloride, Serum: 96: Chloride reference range changed from ..10/26/2022 mmol/L    Carbon Dioxide, Serum: 23.0 mmol/L    Anion Gap, Serum: 12 mmol/L    Blood Urea Nitrogen, Serum: 42.4 mg/dL    Creatinine, Serum: 3.39 mg/dL    Glucose, Serum: 133 mg/dL    Calcium, Total Serum: 10.1: Prior Reference Range of 8.6 – 10.2 was amended as of 7/26/2022 to 8.4 –  10.5. mg/dL    eGFR: 22: The estimated glomerular filtration rate (eGFR) is calculated using the  2021 CKD-EPI creatinine equation, which does not have a coefficient for  race and is validated in individuals 18 years of age and older (N Engl J  Med 2021; 385:7687-7350). Creatinine-based eGFR may be inaccurate in  various situations including but not limited to extremes of muscle mass,  altered dietary protein intake, or medications that affect renal tubular  creatinine secretion. mL/min/1.73m2

## 2022-12-06 NOTE — PROCEDURE NOTE - NSPROCDETAILS_GEN_ALL_CORE
guidewire recovered/lumen(s) aspirated and flushed/sterile dressing applied/sterile technique, catheter placed/ultrasound guidance with use of sterile gel and probe cove
location identified, draped/prepped, sterile technique used/blood seen on insertion/dressing applied/flushes easily/secured in place/sterile technique, catheter placed
location identified, draped/prepped, sterile technique used, needle inserted/introduced/positive blood return obtained via catheter/connected to a pressurized flush line/sutured in place/hemostasis with direct pressure, dressing applied/Seldinger technique/all materials/supplies accounted for at end of procedure

## 2022-12-06 NOTE — BH CONSULTATION LIAISON PROGRESS NOTE - NSBHCHARTREVIEWVS_PSY_A_CORE FT
Vital Signs Last 24 Hrs  T(C): 36.7 (06 Dec 2022 11:12), Max: 37.1 (05 Dec 2022 19:59)  T(F): 98.1 (06 Dec 2022 11:12), Max: 98.7 (05 Dec 2022 19:59)  HR: 88 (06 Dec 2022 12:08) (83 - 96)  BP: 155/99 (06 Dec 2022 12:08) (126/80 - 155/99)  BP(mean): --  RR: 18 (06 Dec 2022 11:12) (18 - 18)  SpO2: 93% (06 Dec 2022 11:12) (93% - 95%)    Parameters below as of 06 Dec 2022 11:12  Patient On (Oxygen Delivery Method): room air

## 2022-12-06 NOTE — PROGRESS NOTE ADULT - ASSESSMENT
- HPI: 42M with a history of bipolar disorder, substance abuse, hypertension, and prior stroke who [facility] with lethargy, abdominal pain, and diarrhea. He was found to have acute kidney injury with metabolic acidosis and hyperkalemia. CT of the abdomen noted mural thickening of the distal colon with pneumatosis for which he was admitted to the Surgical Intensive Care Unit. Renal replacement therapy was initiated. Tested positive for C. diff treated with oral vancomycin and IV metronidazole. No surgical intervention performed. Transferred out of the SICU on 12/4.     ID consulted on hospital day 4 for management of severe C. diff colitis.     Impression:  Severe C. diff colitis  LIV requiring RRT  Polysubstance abuse    Plan:  - continue oral vanco 125 mg po q6h and IV metronidazole for now  - no surgical intervention needed despite CT findings  - Stool culture neg  - HIV neg   - Followed by nephrology for LIV - worsening Cr.   - Monitor fever  - Monitor WBC    Will follow

## 2022-12-06 NOTE — PROGRESS NOTE ADULT - SUBJECTIVE AND OBJECTIVE BOX
cc: isac , cdiff colitis     interval hx: patient seen and eval. comfortable. in no acute distress. denies any nausea or vomiting. has mild abd discomfort on deep palpation otherwise comfortable.   passed void trial. rectal tube dcd.     Vital Signs Last 24 Hrs  T(C): 37.1 (06 Dec 2022 15:16), Max: 37.1 (05 Dec 2022 19:59)  T(F): 98.7 (06 Dec 2022 15:16), Max: 98.7 (05 Dec 2022 19:59)  HR: 87 (06 Dec 2022 15:16) (83 - 96)  BP: 156/84 (06 Dec 2022 15:16) (126/80 - 156/84)  BP(mean): --  RR: 18 (06 Dec 2022 15:16) (18 - 18)  SpO2: 93% (06 Dec 2022 11:12) (93% - 95%)    Parameters below as of 06 Dec 2022 11:12  Patient On (Oxygen Delivery Method): room air        PHYSICAL EXAMINATION:  General appearance: No acute distress, Awake, Alert  HEENT: mm moist   Neck: Supple, shiley cath in place   Lungs: Breath sound equal bilaterally, No wheezes, No rales  Cardiovascular: S1S2, Regular rhythm  Abdomen: Soft, nondistended, mild tenderness on deep palpation in mid / lower abd ,  No guarding/rebound, Positive bowel sounds  Extremities: No clubbing, No cyanosis, No edema, No calf tenderness  Neuro: Strength equal bilaterally, No tremors  Psychiatric: Appropriate mood, Normal affect                           8.5    8.43  )-----------( 147      ( 05 Dec 2022 08:34 )             26.7   12-05    136  |  101  |  49.4<H>  ----------------------------<  105<H>  4.7   |  23.0  |  7.81<H>    Ca    8.2<L>      05 Dec 2022 08:34  Phos  4.6     12-05  Mg     2.5     12-05    MEDICATIONS  (STANDING):  atorvastatin 20 milliGRAM(s) Oral at bedtime  buprenorphine 8 mG/naloxone 2 mG SL Film 1 Film(s) SubLingual daily  buPROPion XL (24-Hour) . 300 milliGRAM(s) Oral daily  chlorhexidine 2% Cloths 1 Application(s) Topical daily  chlorhexidine 4% Liquid 1 Application(s) Topical <User Schedule>  cloNIDine 0.3 milliGRAM(s) Oral three times a day  heparin   Injectable 5000 Unit(s) SubCutaneous every 8 hours  labetalol 300 milliGRAM(s) Oral two times a day  lamoTRIgine 150 milliGRAM(s) Oral two times a day  levETIRAcetam  IVPB 750 milliGRAM(s) IV Intermittent every 12 hours  levothyroxine 50 MICROGram(s) Oral daily  metroNIDAZOLE  IVPB 500 milliGRAM(s) IV Intermittent every 8 hours  QUEtiapine 400 milliGRAM(s) Oral two times a day  risperiDONE   Tablet 3 milliGRAM(s) Oral daily  sodium chloride 0.9%. 1000 milliLiter(s) (75 mL/Hr) IV Continuous <Continuous>  vancomycin    Solution 125 milliGRAM(s) Oral every 6 hours    MEDICATIONS  (PRN):  ondansetron Injectable 4 milliGRAM(s) IV Push every 6 hours PRN Nausea  sodium chloride 0.9% lock flush 10 milliLiter(s) IV Push every 1 hour PRN Pre/post blood products, medications, blood draw, and to maintain line patency

## 2022-12-06 NOTE — PROGRESS NOTE ADULT - ASSESSMENT
42 year old male with PMH of polysubstance abuse, bipolar disorder, HTN and GERD presents with severe diarrhea. Found to have pneumatosis in sigmoid colon. C diff positive. Hospital course is notable for LIV complicated by hyperK with EKG changes s/p emergent renal replacement therapy on 12/2. Remains dependent on hemodialysis at this time.     -LIV secondary to prerenal etiology with progression to ATN vs ? infectious GN   -Baseline creatinine is unclear  -On admission, creatinine was 2.7mg/dL  -Creatinine peaked at 5.0mg/dL associated with hyperkalemia and EKG changes necessitating emergent CRRT on 12/2     protein, large blood, 25-50 RBC, 6-10 WBC, occasional bacteria     UPCR 56g/g; UACR 49g/g     Negative for HIV, Hep B, RPR, ANCA, immunofixation, dsDNA; normal complements    Pending: Hep C, antiGBM, [id number]    Notable for hemoglobin A1c 5.6, elevated triglycerides    CT a/p without IV contrast is negative for hydronephrosis   -No evidence of renal recovery at this time  -Will dialyze today   -Access: R IJ non tunneled dialysis catheter   -Maintain strict I/O's  -Will continue to monitor for renal recovery   -Given LIV (currently on HD), will lower dose of gabapentin to 300mg daily     Selin Shane DO  Nephrology  [facility] [place] [address]  Office Number [phone]

## 2022-12-06 NOTE — BH CONSULTATION LIAISON PROGRESS NOTE - NSBHFUPINTERVALHXFT_PSY_A_CORE
Patient seen for follow up and found to be awake, alert and oriented x3 with significant improvement. Patient states he is feeling well, denies any s/h ideation or AVH and hopes he can get back into his sober house once discharged and states he has been folloign up with a psychiatrist through [facility]

## 2022-12-06 NOTE — CHART NOTE - NSCHARTNOTEFT_GEN_A_CORE
Patient seen and examined with Dr navarro. Clinically doing well. Improving throughout hospital stay. Currently tolerating diet. afebrile and no leukocytosis.   abd soft nd nttp       continue with diet   no concerning signs or symptoms from colorectal standpoint   abd exam benign   no surgical intervention required. Please recall or re consult CRS if exam or clinical picture worsens Patient seen and examined with Dr navarro. Clinically doing well. Improving throughout hospital stay. Currently tolerating diet. afebrile and no leukocytosis.   abd soft nd nttp       continue with diet   no concerning signs or symptoms from colorectal standpoint   abd exam benign   no surgical intervention required. Please recall or re consult CRS if exam or clinical picture worsens      ATTENDING ADDENDUM 12/6/2022 10:55am - patient seen and examined, agree with above.  Please see addendum to progress note for full details.  Will sign off, please call with any questions/concerns.  Thank you for this consult.

## 2022-12-06 NOTE — PROGRESS NOTE ADULT - SUBJECTIVE AND OBJECTIVE BOX
Remains on contact precaution for C diff. Cr is uptrending.     Vital Signs Last 24 Hrs  T(C): 36.7 (06 Dec 2022 11:12), Max: 37.1 (05 Dec 2022 16:37)  T(F): 98.1 (06 Dec 2022 11:12), Max: 98.8 (05 Dec 2022 16:37)  HR: 88 (06 Dec 2022 12:08) (83 - 96)  BP: 155/99 (06 Dec 2022 12:08) (102/69 - 155/99)  BP(mean): --  RR: 18 (06 Dec 2022 11:12) (18 - 18)  SpO2: 93% (06 Dec 2022 11:12) (93% - 95%)    Parameters below as of 06 Dec 2022 11:12  Patient On (Oxygen Delivery Method): room air    I&O's Detail    05 Dec 2022 07:01  -  06 Dec 2022 07:00  --------------------------------------------------------  IN:    Oral Fluid: 600 mL    sodium chloride 0.9%: 975 mL  Total IN: 1575 mL    OUT:    Rectal Tube (mL): 500 mL    Voided (mL): 550 mL  Total OUT: 1050 mL    Total NET: 525 mL    Physical Exam  General: WDWN male lying flat in bed in NAD  Cardiac: S1S2 RRR  Respiratory: CTAB  Abdomen: Soft, NT   Extremities: No appreciable edema  Neuro: AAOx3     12-05    136  |  101  |  49.4<H>  ----------------------------<  105<H>  4.7   |  23.0  |  7.81<H>    Ca    8.2<L>      05 Dec 2022 08:34  Phos  4.6     12-05  Mg     2.5     12-05                        8.5    8.43  )-----------( 147      ( 05 Dec 2022 08:34 )             26.7     MEDICATIONS  (STANDING):  atorvastatin 20 milliGRAM(s) Oral at bedtime  buprenorphine 8 mG/naloxone 2 mG SL Film 1 Film(s) SubLingual daily  buPROPion XL (24-Hour) . 300 milliGRAM(s) Oral daily  chlorhexidine 2% Cloths 1 Application(s) Topical daily  chlorhexidine 4% Liquid 1 Application(s) Topical <User Schedule>  cloNIDine 0.3 milliGRAM(s) Oral three times a day  FLUoxetine 40 milliGRAM(s) Oral daily  heparin   Injectable 5000 Unit(s) SubCutaneous every 8 hours  labetalol 300 milliGRAM(s) Oral two times a day  lamoTRIgine 150 milliGRAM(s) Oral two times a day  levETIRAcetam  IVPB 750 milliGRAM(s) IV Intermittent every 12 hours  levothyroxine 50 MICROGram(s) Oral daily  metroNIDAZOLE  IVPB 500 milliGRAM(s) IV Intermittent every 8 hours  QUEtiapine 400 milliGRAM(s) Oral two times a day  risperiDONE   Tablet 3 milliGRAM(s) Oral daily  sodium chloride 0.9%. 1000 milliLiter(s) (75 mL/Hr) IV Continuous <Continuous>  vancomycin    Solution 125 milliGRAM(s) Oral every 6 hours    MEDICATIONS  (PRN):  ondansetron Injectable 4 milliGRAM(s) IV Push every 6 hours PRN Nausea  sodium chloride 0.9% lock flush 10 milliLiter(s) IV Push every 1 hour PRN Pre/post blood products, medications, blood draw, and to maintain line patency

## 2022-12-06 NOTE — PROGRESS NOTE ADULT - ASSESSMENT
42M with a history of bipolar disorder, substance abuse, hypertension, and prior stroke who initially [facility] with lethargy and abdominal pain. He was found to have acute kidney injury with metabolic acidosis and hyperkalemia. CT of the abdomen noted mural thickening of the distal colon with pneumatosis for which he was admitted to the Surgical Intensive Care Unit for further management. Renal replacement therapy was initiated. noted to be positive for clostridium difficile for which vancomycin/ flagyl  was initiated. downgraded to medicine 12/4    >isac / with hyperkalemia   -started on renal replacement therapy  -monitor for renal recovery  - nephro following   - Serology results pending to be reviewed when available  - monitor i/os   - loera dcd 12/5 , passed tov     > diarrhea / Clostridium difficile colitis   - seen by Colorectal Surgery. Unlikely ischemic colitis  -c/w po vancomycin and iv flagyl for severe colitis   - id consulted   - f/u bcxs   - rectal tube dcd 12/5     >Bipolar disorder   -c/w  bupropion, fluoxetine, lamotrigine, quetiapine, and risperidone  -  consulted   - no psych contraindications to dc    >Substance abuse - On buprenorphine/naloxone.    >Hypertension  - c/w clonidine, labetalol, and losartan  - monitor     >Hypothyroidism  - c/w  levothyroxine    > dvt ppx: sq heparin     > dispo: remains acute , need to monitor renal function , may need LT hd to be arranged prior to dc

## 2022-12-06 NOTE — BH CONSULTATION LIAISON PROGRESS NOTE - NSBHASSESSMENTFT_PSY_ALL_CORE
Patient is a 42 year old male who is residing in a sober house, with past psychiatric history of Bipolar disorder, as well as multiple substance use disorders including cocaine, cannabis, heroin, alcohol, prescription drugs, who was brought to the ER from sober house with a 2 day history of severe diarrhea.     Patient seen and with significant improvement in his mental status.     Recs.   -maintain delirium precautions  -Frequent reorientation, hydration, try to avoid restraints   -Continue medical workup to r/o medical cause   -Avoid anticholinergic agents   -home meds , Seroquel 400, Wellbutrin 300, Vistaril 50mg, Lamictal 150, Suboxone 8/2 TID    -will follow as needed

## 2022-12-06 NOTE — BH CONSULTATION LIAISON PROGRESS NOTE - CURRENT MEDICATION
MEDICATIONS  (STANDING):  atorvastatin 20 milliGRAM(s) Oral at bedtime  buprenorphine 8 mG/naloxone 2 mG SL Film 1 Film(s) SubLingual daily  buPROPion XL (24-Hour) . 300 milliGRAM(s) Oral daily  chlorhexidine 2% Cloths 1 Application(s) Topical daily  chlorhexidine 4% Liquid 1 Application(s) Topical <User Schedule>  cloNIDine 0.3 milliGRAM(s) Oral three times a day  heparin   Injectable 5000 Unit(s) SubCutaneous every 8 hours  labetalol 300 milliGRAM(s) Oral two times a day  lamoTRIgine 150 milliGRAM(s) Oral two times a day  levETIRAcetam  IVPB 750 milliGRAM(s) IV Intermittent every 12 hours  levothyroxine 50 MICROGram(s) Oral daily  metroNIDAZOLE  IVPB 500 milliGRAM(s) IV Intermittent every 8 hours  QUEtiapine 400 milliGRAM(s) Oral two times a day  risperiDONE   Tablet 3 milliGRAM(s) Oral daily  sodium chloride 0.9%. 1000 milliLiter(s) (75 mL/Hr) IV Continuous <Continuous>  vancomycin    Solution 125 milliGRAM(s) Oral every 6 hours    MEDICATIONS  (PRN):  ondansetron Injectable 4 milliGRAM(s) IV Push every 6 hours PRN Nausea  sodium chloride 0.9% lock flush 10 milliLiter(s) IV Push every 1 hour PRN Pre/post blood products, medications, blood draw, and to maintain line patency

## 2022-12-06 NOTE — PROGRESS NOTE ADULT - SUBJECTIVE AND OBJECTIVE BOX
[facility]  INFECTIOUS DISEASES at [place] and [place]  ===============================================================                               Tonny Jerez MD*     Eleanor Gamez MD*                         Mercy Cabezas MD*       Tory Rodriguez MD*            Diplomates American Board of Internal Medicine & Infectious Diseases                * [place] Office - Appt - Tel  [phone] Fax [phone]                * [place] Office - Appt - Tel [phone] Fax [phone]                                  Hospital Consult line:  [phone]  ==============================================================    ZAKIYA BRICEÑO [id number]    Follow up: severe C. diff colitis    No acute events  VSS  Cr trending up     I have personally reviewed the labs and data; pertinent labs and data are listed in this note; please see below.     _______________________________________________________________  REVIEW OF SYSTEMS  Reports 1 episode of diarrhea this morning. No other complaints Remainder ROS neg   ________________________________________________________________  Allergies:  No Known Allergies    ________________________________________________________________  PHYSICAL EXAM  GEN: NAD, lying in bed.   HEENT: Moist mucous membranes.  LUNGS: eupneic. CTA B/L.  HEART: RRR, no m/r/g  ABDOMEN: Soft, NT, ND, +BS.    : . No Perez catheter  EXTREMITIES: well perfused  LINES: PIV  ________________________________________________________________  Vitals:  T(F): 98.1 (06 Dec 2022 11:12), Max: 98.8 (05 Dec 2022 16:37)  HR: 88 (06 Dec 2022 12:08)  BP: 155/99 (06 Dec 2022 12:08)  RR: 18 (06 Dec 2022 11:12)  SpO2: 93% (06 Dec 2022 11:12) (93% - 95%)  temp max in last 48H T(F): , Max: 99.5 (12-04-22 @ 23:00)    Current Antibiotics:  metroNIDAZOLE  IVPB 500 milliGRAM(s) IV Intermittent every 8 hours  vancomycin    Solution 125 milliGRAM(s) Oral every 6 hours    Other medications:  atorvastatin 20 milliGRAM(s) Oral at bedtime  buprenorphine 8 mG/naloxone 2 mG SL Film 1 Film(s) SubLingual daily  buPROPion XL (24-Hour) . 300 milliGRAM(s) Oral daily  chlorhexidine 2% Cloths 1 Application(s) Topical daily  chlorhexidine 4% Liquid 1 Application(s) Topical <User Schedule>  cloNIDine 0.3 milliGRAM(s) Oral three times a day  FLUoxetine 40 milliGRAM(s) Oral daily  heparin   Injectable 5000 Unit(s) SubCutaneous every 8 hours  labetalol 300 milliGRAM(s) Oral two times a day  lamoTRIgine 150 milliGRAM(s) Oral two times a day  levETIRAcetam  IVPB 750 milliGRAM(s) IV Intermittent every 12 hours  levothyroxine 50 MICROGram(s) Oral daily  QUEtiapine 400 milliGRAM(s) Oral two times a day  risperiDONE   Tablet 3 milliGRAM(s) Oral daily  sodium chloride 0.9%. 1000 milliLiter(s) IV Continuous <Continuous>                            8.5    8.43  )-----------( 147      ( 05 Dec 2022 08:34 )             26.7     12-05    136  |  101  |  49.4<H>  ----------------------------<  105<H>  4.7   |  23.0  |  7.81<H>    Ca    8.2<L>      05 Dec 2022 08:34  Phos  4.6     12-05  Mg     2.5     12-05      RECENT CULTURES:  12-03 @ 05:30 .Stool Feces     No enteric pathogens isolated.  (Stool culture examined for Salmonella,  Shigella, Campylobacter, Aeromonas, Plesiomonas,  Vibrio, E.coli O157 and Yersinia)    12-02 @ 22:56 Catheterized Catheterized     No growth    Clostridium difficile Toxin by PCR (12.03.22 @ 05:30)    C Diff by PCR Result: Detected    WBC Count: 8.43 K/uL (12-05-22 @ 08:34)  WBC Count: 7.17 K/uL (12-03-22 @ 18:00)  WBC Count: 4.68 K/uL (12-03-22 @ 05:30)  WBC Count: 4.57 K/uL (12-03-22 @ 02:10)  WBC Count: 7.40 K/uL (12-02-22 @ 16:24)  WBC Count: 9.98 K/uL (12-01-22 @ 19:50)    Creatinine, Serum: 7.81 mg/dL (12-05-22 @ 08:34)  Creatinine, Serum: 4.28 mg/dL (12-04-22 @ 03:50)  Creatinine, Serum: 5.29 mg/dL (12-03-22 @ 18:00)  Creatinine, Serum: 4.03 mg/dL (12-03-22 @ 08:48)  Creatinine, Serum: 4.51 mg/dL (12-03-22 @ 05:30)  Creatinine, Serum: 4.91 mg/dL (12-03-22 @ 02:10)  Creatinine, Serum: 5.05 mg/dL (12-02-22 @ 16:24)  Creatinine, Serum: 3.62 mg/dL (12-02-22 @ 06:04)  Creatinine, Serum: 3.39 mg/dL (12-02-22 @ 02:59)  Creatinine, Serum: 2.72 mg/dL (12-01-22 @ 19:50)       SARS-CoV-2 Result: NotDetec (12-01-22 @ 19:50)    ________________________________________________________________  RADIOLOGY  < from: CT Abdomen and Pelvis No Cont (12.02.22 @ 02:23) >    IMPRESSION:  Diffuse mural thickening of the distal colon with pneumatosis, concerning   for ischemic colitis. No evidence of free air. Correlation with serum   lactate is recommended.    Markedly distended gallbladder measuring up to 7 cm in transverse axis,   without stones or pericholecystic fluid.    Findings above and recommendations for further workup were discussed   directly by telephone by the ED radiologist on call, Sachin Bass MD,   with the emergency department attending physician, Rakan Blood M.D., at 3:15 AM on 12/2/2022.    < end of copied text >

## 2022-12-07 LAB
HBV CORE IGM SER-ACNC: SIGNIFICANT CHANGE UP
HCV AB S/CO SERPL IA: 0.09 S/CO — SIGNIFICANT CHANGE UP (ref 0–0.99)
HCV AB SERPL-IMP: SIGNIFICANT CHANGE UP
PHOSPHOLIPASE A2 RECEPTOR ELISA: <1.8 RU/ML — SIGNIFICANT CHANGE UP (ref 0–19.9)

## 2022-12-07 PROCEDURE — 99233 SBSQ HOSP IP/OBS HIGH 50: CPT

## 2022-12-07 PROCEDURE — 99232 SBSQ HOSP IP/OBS MODERATE 35: CPT

## 2022-12-07 RX ORDER — LANOLIN ALCOHOL/MO/W.PET/CERES
3 CREAM (GRAM) TOPICAL AT BEDTIME
Refills: 0 | Status: DISCONTINUED | OUTPATIENT
Start: 2022-12-07 | End: 2022-12-12

## 2022-12-07 RX ADMIN — Medication 0.3 MILLIGRAM(S): at 13:44

## 2022-12-07 RX ADMIN — Medication 125 MILLIGRAM(S): at 06:38

## 2022-12-07 RX ADMIN — Medication 0.3 MILLIGRAM(S): at 06:37

## 2022-12-07 RX ADMIN — Medication 125 MILLIGRAM(S): at 12:05

## 2022-12-07 RX ADMIN — CHLORHEXIDINE GLUCONATE 1 APPLICATION(S): 213 SOLUTION TOPICAL at 06:33

## 2022-12-07 RX ADMIN — Medication 100 MILLIGRAM(S): at 13:43

## 2022-12-07 RX ADMIN — GABAPENTIN 300 MILLIGRAM(S): 400 CAPSULE ORAL at 12:05

## 2022-12-07 RX ADMIN — RISPERIDONE 3 MILLIGRAM(S): 4 TABLET ORAL at 12:05

## 2022-12-07 RX ADMIN — LAMOTRIGINE 150 MILLIGRAM(S): 25 TABLET, ORALLY DISINTEGRATING ORAL at 06:38

## 2022-12-07 RX ADMIN — BUPROPION HYDROCHLORIDE 300 MILLIGRAM(S): 150 TABLET, EXTENDED RELEASE ORAL at 12:05

## 2022-12-07 RX ADMIN — CHLORHEXIDINE GLUCONATE 1 APPLICATION(S): 213 SOLUTION TOPICAL at 12:06

## 2022-12-07 RX ADMIN — LAMOTRIGINE 150 MILLIGRAM(S): 25 TABLET, ORALLY DISINTEGRATING ORAL at 18:00

## 2022-12-07 RX ADMIN — Medication 100 MILLIGRAM(S): at 06:44

## 2022-12-07 RX ADMIN — Medication 100 MILLIGRAM(S): at 23:28

## 2022-12-07 RX ADMIN — BUPRENORPHINE AND NALOXONE 1 FILM(S): 2; .5 TABLET SUBLINGUAL at 12:05

## 2022-12-07 RX ADMIN — QUETIAPINE FUMARATE 400 MILLIGRAM(S): 200 TABLET, FILM COATED ORAL at 20:20

## 2022-12-07 RX ADMIN — HEPARIN SODIUM 5000 UNIT(S): 5000 INJECTION INTRAVENOUS; SUBCUTANEOUS at 13:44

## 2022-12-07 RX ADMIN — LEVETIRACETAM 400 MILLIGRAM(S): 250 TABLET, FILM COATED ORAL at 12:05

## 2022-12-07 RX ADMIN — LEVETIRACETAM 400 MILLIGRAM(S): 250 TABLET, FILM COATED ORAL at 01:10

## 2022-12-07 RX ADMIN — ATORVASTATIN CALCIUM 20 MILLIGRAM(S): 80 TABLET, FILM COATED ORAL at 22:00

## 2022-12-07 RX ADMIN — Medication 0.3 MILLIGRAM(S): at 22:00

## 2022-12-07 RX ADMIN — Medication 125 MILLIGRAM(S): at 00:26

## 2022-12-07 RX ADMIN — Medication 50 MICROGRAM(S): at 06:38

## 2022-12-07 RX ADMIN — Medication 300 MILLIGRAM(S): at 06:37

## 2022-12-07 RX ADMIN — Medication 125 MILLIGRAM(S): at 18:00

## 2022-12-07 RX ADMIN — QUETIAPINE FUMARATE 400 MILLIGRAM(S): 200 TABLET, FILM COATED ORAL at 09:33

## 2022-12-07 RX ADMIN — HEPARIN SODIUM 5000 UNIT(S): 5000 INJECTION INTRAVENOUS; SUBCUTANEOUS at 06:37

## 2022-12-07 RX ADMIN — HEPARIN SODIUM 5000 UNIT(S): 5000 INJECTION INTRAVENOUS; SUBCUTANEOUS at 22:00

## 2022-12-07 RX ADMIN — Medication 300 MILLIGRAM(S): at 18:00

## 2022-12-07 NOTE — PROGRESS NOTE ADULT - ASSESSMENT
42M with a history of bipolar disorder, substance abuse, hypertension, and prior stroke who [facility] with lethargy, abdominal pain, and diarrhea. He was found to have acute kidney injury with metabolic acidosis and hyperkalemia. CT of the abdomen noted mural thickening of the distal colon with pneumatosis for which he was admitted to the Surgical Intensive Care Unit. Renal replacement therapy was initiated. Tested positive for C. diff treated with oral vancomycin and IV metronidazole. No surgical intervention performed. Transferred out of the SICU on 12/4.     ID consulted on hospital day 4 for management of severe C. diff colitis.     Impression:  Severe C. diff colitis  LIV requiring RRT  Polysubstance abuse    Plan:  - continue oral vanco 125 mg po q6h for total 10 days (through 12/12)  - discontinue IV metronidazole  - no surgical intervention needed   - Stool culture neg  - HIV neg   - Followed by nephrology for LIV due to worsening Cr.   - Clinically stable from an ID perspective - no leukocytosis, fever. Tolerating diet.     ID will sign off. Please call with questions.

## 2022-12-07 NOTE — PROGRESS NOTE ADULT - SUBJECTIVE AND OBJECTIVE BOX
Reason for visit: LIV    Subjective: No acute overnight event. Patient denied any cardiac or urinary complains. No fever/chills. Reports abdominal pain improving, accepting PO.    ROS: All systems were reviewed in detail pertinent positive and negative mentioned above, rest are negative.    Physical Exam:  Gen: no acute distress  MS: alert, conversing normally  Eyes: EOMI, no icterus  HENT: NCAT, MMM  CV: rhythm reg reg, rate normal, no m/g/r  Chest: CTAB, no w/r/r,  Abd: soft, NT, ND  Extremities: No edema    =======================================================  Vital Signs Last 24 Hrs  T(C): 36.9 (07 Dec 2022 16:42), Max: 37.2 (06 Dec 2022 22:34)  T(F): 98.4 (07 Dec 2022 16:42), Max: 99 (07 Dec 2022 10:39)  HR: 95 (07 Dec 2022 16:42) (85 - 95)  BP: 130/76 (07 Dec 2022 16:42) (125/72 - 164/109)  BP(mean): --  RR: 18 (07 Dec 2022 16:42) (18 - 18)  SpO2: 93% (07 Dec 2022 16:42) (93% - 96%)    Parameters below as of 07 Dec 2022 16:42  Patient On (Oxygen Delivery Method): room air      I&O's Summary    06 Dec 2022 07:01  -  07 Dec 2022 07:00  --------------------------------------------------------  IN: 230 mL / OUT: 2100 mL / NET: -1870 mL      =======================================================  Current Antibiotics:  metroNIDAZOLE  IVPB 500 milliGRAM(s) IV Intermittent every 8 hours  vancomycin    Solution 125 milliGRAM(s) Oral every 6 hours    Other medications:  atorvastatin 20 milliGRAM(s) Oral at bedtime  buprenorphine 8 mG/naloxone 2 mG SL Film 1 Film(s) SubLingual daily  buPROPion XL (24-Hour) . 300 milliGRAM(s) Oral daily  chlorhexidine 2% Cloths 1 Application(s) Topical daily  chlorhexidine 4% Liquid 1 Application(s) Topical <User Schedule>  cloNIDine 0.3 milliGRAM(s) Oral three times a day  gabapentin 300 milliGRAM(s) Oral daily  heparin   Injectable 5000 Unit(s) SubCutaneous every 8 hours  labetalol 300 milliGRAM(s) Oral two times a day  lamoTRIgine 150 milliGRAM(s) Oral two times a day  levETIRAcetam  IVPB 750 milliGRAM(s) IV Intermittent every 12 hours  levothyroxine 50 MICROGram(s) Oral daily  QUEtiapine 400 milliGRAM(s) Oral two times a day  risperiDONE   Tablet 3 milliGRAM(s) Oral daily  sodium chloride 0.9%. 1000 milliLiter(s) IV Continuous <Continuous>    =======================================================  12-06    137  |  100  |  64.8<H>  ----------------------------<  138<H>  4.2   |  23.0  |  9.50<H>    Ca    8.6      06 Dec 2022 19:00  Phos  5.0     12-06  Mg     2.5     12-06    TPro  5.7<L>  /  Alb  2.7<L>  /  TBili  0.3<L>  /  DBili  x   /  AST  18  /  ALT  14  /  AlkPhos  75  12-06    Creatinine, Serum: 9.50 mg/dL (12-06-22 @ 19:00)  Creatinine, Serum: 7.81 mg/dL (12-05-22 @ 08:34)  Creatinine, Serum: 4.28 mg/dL (12-04-22 @ 03:50)  Creatinine, Serum: 5.29 mg/dL (12-03-22 @ 18:00)  Creatinine, Serum: 4.03 mg/dL (12-03-22 @ 08:48)  Creatinine, Serum: 4.51 mg/dL (12-03-22 @ 05:30)  Creatinine, Serum: 4.91 mg/dL (12-03-22 @ 02:10)      =======================================================       Reason for visit: LIV    Subjective: No acute overnight event. Patient denied any cardiac or urinary complains. No fever/chills. Reports abdominal pain improving, accepting PO but continues to have diarrhea.     ROS: All systems were reviewed in detail pertinent positive and negative mentioned above, rest are negative.    Physical Exam:  Gen: no acute distress  MS: alert, conversing normally  Eyes: EOMI, no icterus  HENT: NCAT, MMM  CV: rhythm reg reg, rate normal, no m/g/r  Chest: CTAB, no w/r/r,  Abd: soft, NT, ND  Extremities: No edema    =======================================================  Vital Signs Last 24 Hrs  T(C): 36.9 (07 Dec 2022 16:42), Max: 37.2 (06 Dec 2022 22:34)  T(F): 98.4 (07 Dec 2022 16:42), Max: 99 (07 Dec 2022 10:39)  HR: 95 (07 Dec 2022 16:42) (85 - 95)  BP: 130/76 (07 Dec 2022 16:42) (125/72 - 164/109)  RR: 18 (07 Dec 2022 16:42) (18 - 18)  SpO2: 93% (07 Dec 2022 16:42) (93% - 96%)    Parameters below as of 07 Dec 2022 16:42  Patient On (Oxygen Delivery Method): room air      I&O's Summary    06 Dec 2022 07:01  -  07 Dec 2022 07:00  --------------------------------------------------------  IN: 230 mL / OUT: 2100 mL / NET: -1870 mL      =======================================================  Current Antibiotics:  metroNIDAZOLE  IVPB 500 milliGRAM(s) IV Intermittent every 8 hours  vancomycin    Solution 125 milliGRAM(s) Oral every 6 hours    Other medications:  atorvastatin 20 milliGRAM(s) Oral at bedtime  buprenorphine 8 mG/naloxone 2 mG SL Film 1 Film(s) SubLingual daily  buPROPion XL (24-Hour) . 300 milliGRAM(s) Oral daily  chlorhexidine 2% Cloths 1 Application(s) Topical daily  chlorhexidine 4% Liquid 1 Application(s) Topical <User Schedule>  cloNIDine 0.3 milliGRAM(s) Oral three times a day  gabapentin 300 milliGRAM(s) Oral daily  heparin   Injectable 5000 Unit(s) SubCutaneous every 8 hours  labetalol 300 milliGRAM(s) Oral two times a day  lamoTRIgine 150 milliGRAM(s) Oral two times a day  levETIRAcetam  IVPB 750 milliGRAM(s) IV Intermittent every 12 hours  levothyroxine 50 MICROGram(s) Oral daily  QUEtiapine 400 milliGRAM(s) Oral two times a day  risperiDONE   Tablet 3 milliGRAM(s) Oral daily  sodium chloride 0.9%. 1000 milliLiter(s) IV Continuous <Continuous>    =======================================================  12-06    137  |  100  |  64.8<H>  ----------------------------<  138<H>  4.2   |  23.0  |  9.50<H>    Ca    8.6      06 Dec 2022 19:00  Phos  5.0     12-06  Mg     2.5     12-06    TPro  5.7<L>  /  Alb  2.7<L>  /  TBili  0.3<L>  /  DBili  x   /  AST  18  /  ALT  14  /  AlkPhos  75  12-06    Creatinine, Serum: 9.50 mg/dL (12-06-22 @ 19:00)  Creatinine, Serum: 7.81 mg/dL (12-05-22 @ 08:34)  Creatinine, Serum: 4.28 mg/dL (12-04-22 @ 03:50)  Creatinine, Serum: 5.29 mg/dL (12-03-22 @ 18:00)  Creatinine, Serum: 4.03 mg/dL (12-03-22 @ 08:48)  Creatinine, Serum: 4.51 mg/dL (12-03-22 @ 05:30)  Creatinine, Serum: 4.91 mg/dL (12-03-22 @ 02:10)      =======================================================

## 2022-12-07 NOTE — PROGRESS NOTE ADULT - SUBJECTIVE AND OBJECTIVE BOX
[facility]  INFECTIOUS DISEASES at [place] and [place]  ===============================================================                               oTnny Jerez MD*     Eleanor Gamez MD*                         Mercy Cabezas MD*       Tory Rodriguez MD*            Diplomates American Board of Internal Medicine & Infectious Diseases                * [place] Office - Appt - Tel  [phone] Fax [phone]                * [place] Office - Appt - Tel [phone] Fax [phone]                                  Hospital Consult line:  [phone]  ==============================================================    ZAKIYA BRICEÑO [id number]    Follow up: Severe c. diff colitis    No acute events  VSS    I have personally reviewed the labs and data; pertinent labs and data are listed in this note; please see below.     _______________________________________________________________  REVIEW OF SYSTEMS  Feeling better. No complaints. One loose BM per patient  ________________________________________________________________  Allergies:  No Known Allergies    _______________________________________________________________  PHYSICAL EXAM  GEN: NAD, lying in bed. Obese  HEENT: Anicteric sclerae. Moist mucous membranes. No mucosal lesions.   LUNGS: eupneic. CTA b/l  HEART: RRR, no murmurs  ABDOMEN: Soft, NT, ND    :  No Perez catheter  EXTREMITIES: well perfused, without  edema.  NEUROLOGIC: Grossly no focal deficits   PSYCHIATRIC: Appropriate affect and mood  SKIN: No rash, wounds or jaundice  LINES: PIV  ________________________________________________________________  Vitals:  T(F): 99 (07 Dec 2022 10:39), Max: 99 (07 Dec 2022 10:39)  HR: 88 (07 Dec 2022 10:39)  BP: 131/76 (07 Dec 2022 10:39)  RR: 18 (07 Dec 2022 10:39)  SpO2: 96% (07 Dec 2022 04:55) (93% - 96%)  temp max in last 48H T(F): , Max: 99 (12-07-22 @ 10:39)    Current Antibiotics:  metroNIDAZOLE  IVPB 500 milliGRAM(s) IV Intermittent every 8 hours  vancomycin    Solution 125 milliGRAM(s) Oral every 6 hours    Other medications:  atorvastatin 20 milliGRAM(s) Oral at bedtime  buprenorphine 8 mG/naloxone 2 mG SL Film 1 Film(s) SubLingual daily  buPROPion XL (24-Hour) . 300 milliGRAM(s) Oral daily  chlorhexidine 2% Cloths 1 Application(s) Topical daily  chlorhexidine 4% Liquid 1 Application(s) Topical <User Schedule>  cloNIDine 0.3 milliGRAM(s) Oral three times a day  gabapentin 300 milliGRAM(s) Oral daily  heparin   Injectable 5000 Unit(s) SubCutaneous every 8 hours  labetalol 300 milliGRAM(s) Oral two times a day  lamoTRIgine 150 milliGRAM(s) Oral two times a day  levETIRAcetam  IVPB 750 milliGRAM(s) IV Intermittent every 12 hours  levothyroxine 50 MICROGram(s) Oral daily  QUEtiapine 400 milliGRAM(s) Oral two times a day  risperiDONE   Tablet 3 milliGRAM(s) Oral daily  sodium chloride 0.9%. 1000 milliLiter(s) IV Continuous <Continuous>                            8.3    9.08  )-----------( 140      ( 06 Dec 2022 19:00 )             25.5     12-06    137  |  100  |  64.8<H>  ----------------------------<  138<H>  4.2   |  23.0  |  9.50<H>    Ca    8.6      06 Dec 2022 19:00  Phos  5.0     12-06  Mg     2.5     12-06    TPro  5.7<L>  /  Alb  2.7<L>  /  TBili  0.3<L>  /  DBili  x   /  AST  18  /  ALT  14  /  AlkPhos  75  12-06    RECENT CULTURES:  12-05 @ 11:10 .Blood Blood-Peripheral     No growth to date.    12-05 @ 11:05 .Blood Blood-Peripheral     No growth to date.    12-03 @ 05:30 .Stool Feces     No enteric pathogens isolated.  (Stool culture examined for Salmonella,  Shigella, Campylobacter, Aeromonas, Plesiomonas,  Vibrio, E.coli O157 and Yersinia)    Clostridium difficile Toxin by PCR (12.03.22 @ 05:30)    C Diff by PCR Result: Detected    12-02 @ 22:56 Catheterized Catheterized     No growth    WBC Count: 9.08 K/uL (12-06-22 @ 19:00)  WBC Count: 8.43 K/uL (12-05-22 @ 08:34)  WBC Count: 7.17 K/uL (12-03-22 @ 18:00)  WBC Count: 4.68 K/uL (12-03-22 @ 05:30)  WBC Count: 4.57 K/uL (12-03-22 @ 02:10)  WBC Count: 7.40 K/uL (12-02-22 @ 16:24)    Creatinine, Serum: 9.50 mg/dL (12-06-22 @ 19:00)  Creatinine, Serum: 7.81 mg/dL (12-05-22 @ 08:34)  Creatinine, Serum: 4.28 mg/dL (12-04-22 @ 03:50)  Creatinine, Serum: 5.29 mg/dL (12-03-22 @ 18:00)  Creatinine, Serum: 4.03 mg/dL (12-03-22 @ 08:48)  Creatinine, Serum: 4.51 mg/dL (12-03-22 @ 05:30)  Creatinine, Serum: 4.91 mg/dL (12-03-22 @ 02:10)  Creatinine, Serum: 5.05 mg/dL (12-02-22 @ 16:24)      SARS-CoV-2 Result: NotDetec (12-01-22 @ 19:50)    ________________________________________________________________  RADIOLOGY  < from: CT Abdomen and Pelvis No Cont (12.02.22 @ 02:23) >  IMPRESSION:  Diffuse mural thickening of the distal colon with pneumatosis, concerning   for ischemic colitis. No evidence of free air. Correlation with serum   lactate is recommended.    Markedly distended gallbladder measuring up to 7 cm in transverse axis,   without stones or pericholecystic fluid.    < end of copied text >

## 2022-12-07 NOTE — PROGRESS NOTE ADULT - SUBJECTIVE AND OBJECTIVE BOX
cc: isac , cdiff colitis     interval hx: patient seen and eval. comfortable. in no acute distress. denies any nausea or vomiting. has mild abd discomfort on deep palpation otherwise comfortable.       Vital Signs Last 24 Hrs  T(C): 37.2 (07 Dec 2022 10:39), Max: 37.2 (06 Dec 2022 22:34)  T(F): 99 (07 Dec 2022 10:39), Max: 99 (07 Dec 2022 10:39)  HR: 88 (07 Dec 2022 10:39) (85 - 93)  BP: 131/76 (07 Dec 2022 10:39) (125/72 - 164/109)  BP(mean): --  RR: 18 (07 Dec 2022 10:39) (18 - 18)  SpO2: 96% (07 Dec 2022 04:55) (93% - 96%)    Parameters below as of 06 Dec 2022 22:34  Patient On (Oxygen Delivery Method): room air          PHYSICAL EXAMINATION:  General appearance: No acute distress, Awake, Alert  HEENT: mm moist   Neck: Supple, shiley cath in place   Lungs: Breath sound equal bilaterally, No wheezes, No rales  Cardiovascular: S1S2, Regular rhythm  Abdomen: Soft, nondistended, mild tenderness on deep palpation in mid / lower abd ,  No guarding/rebound, Positive bowel sounds  Extremities: No clubbing, No cyanosis, No edema, No calf tenderness  Neuro: Strength equal bilaterally, No tremors  Psychiatric: Appropriate mood, Normal affect       MEDICATIONS  (STANDING):  atorvastatin 20 milliGRAM(s) Oral at bedtime  buprenorphine 8 mG/naloxone 2 mG SL Film 1 Film(s) SubLingual daily  buPROPion XL (24-Hour) . 300 milliGRAM(s) Oral daily  chlorhexidine 2% Cloths 1 Application(s) Topical daily  chlorhexidine 4% Liquid 1 Application(s) Topical <User Schedule>  cloNIDine 0.3 milliGRAM(s) Oral three times a day  gabapentin 300 milliGRAM(s) Oral daily  heparin   Injectable 5000 Unit(s) SubCutaneous every 8 hours  labetalol 300 milliGRAM(s) Oral two times a day  lamoTRIgine 150 milliGRAM(s) Oral two times a day  levETIRAcetam  IVPB 750 milliGRAM(s) IV Intermittent every 12 hours  levothyroxine 50 MICROGram(s) Oral daily  metroNIDAZOLE  IVPB 500 milliGRAM(s) IV Intermittent every 8 hours  QUEtiapine 400 milliGRAM(s) Oral two times a day  risperiDONE   Tablet 3 milliGRAM(s) Oral daily  sodium chloride 0.9%. 1000 milliLiter(s) (75 mL/Hr) IV Continuous <Continuous>  vancomycin    Solution 125 milliGRAM(s) Oral every 6 hours    MEDICATIONS  (PRN):  ondansetron Injectable 4 milliGRAM(s) IV Push every 6 hours PRN Nausea  sodium chloride 0.9% lock flush 10 milliLiter(s) IV Push every 1 hour PRN Pre/post blood products, medications, blood draw, and to maintain line patency                            8.3    9.08  )-----------( 140      ( 06 Dec 2022 19:00 )             25.5   12-06    137  |  100  |  64.8<H>  ----------------------------<  138<H>  4.2   |  23.0  |  9.50<H>    Ca    8.6      06 Dec 2022 19:00  Phos  5.0     12-06  Mg     2.5     12-06    TPro  5.7<L>  /  Alb  2.7<L>  /  TBili  0.3<L>  /  DBili  x   /  AST  18  /  ALT  14  /  AlkPhos  75  12-06

## 2022-12-07 NOTE — PROGRESS NOTE ADULT - ASSESSMENT
42M with a history of bipolar disorder, substance abuse, hypertension, and prior stroke who [facility] with lethargy and abdominal pain. He was found to have acute kidney injury with metabolic acidosis and hyperkalemia. CT of the abdomen noted mural thickening of the distal colon with pneumatosis for which he was admitted to the Surgical Intensive Care Unit for further management. Renal replacement therapy was initiated. noted to be positive for clostridium difficile for which vancomycin/ flagyl  was initiated. downgraded to medicine 12/4    >isac / with hyperkalemia   -started on renal replacement therapy  -monitor for renal recovery  - nephro following   - Serology results pending to be reviewed when available  - monitor i/os   - loera dcd 12/5 , passed tov     > diarrhea / Clostridium difficile colitis   - seen by Colorectal Surgery. Unlikely ischemic colitis  -c/w po vancomycin   - dc  iv flagyl as discussed with id    - bcxs neg   - rectal tube dcd 12/5     >Bipolar disorder   -c/w  bupropion, fluoxetine, lamotrigine, quetiapine, and risperidone  -  consulted   - no psych contraindications to dc    >Substance abuse - On buprenorphine/naloxone.    >Hypertension  - c/w clonidine, labetalol, and losartan  - monitor     >Hypothyroidism  - c/w  levothyroxine    > dvt ppx: sq heparin     > dispo: remains acute , need to monitor renal function , may need LT hd to be arranged prior to dc

## 2022-12-07 NOTE — PROGRESS NOTE ADULT - ASSESSMENT
43 YO M w/ HTN, polysubstance abuse who was [facility] the ER from [facility] with a 2 day history of severe non bloody diarrhea. Found to have LIV (3.6 from 1.2-1.4 b/l)and hyperkalemia w/ acidosis. CT abdomen w/o IV demonstrated pneumatosis in sigmoid colon. Patient found to be +ve for C.Diff. He required CRRT transiently for hyperkalemia    1. Acute kidney injury:  -most likely Non oliguric ATN from prolonged prerenal azotemia in setting of watery diarrhea  -serology/immunological w/u pending   -UA clear and bland  -CT abd w/ no HDN  -Will place on gentle hydration w/ IV NS @ 75 cc/hr  -Will discontinue losartan   -Scr worsening might need iHD tomorrow, no emergent need today. Will reassess in AM.    2. Hyperkalemia:  -Improved    3. C. Diff Colitis/Diarrhea: On Abx vanc + metronidazole  4. Colonic pneumatosis: conservative Rx, colorectal Sx on board.       41 YO M w/ HTN, polysubstance abuse who was brought [facility] the ER from [facility] with a 2 day history of severe non bloody diarrhea. Found to have LIV (3.6 from 1.2-1.4 b/l)and hyperkalemia w/ acidosis. CT abdomen w/o IV demonstrated pneumatosis in sigmoid colon. Patient found to be +ve for C.Diff. He required CRRT transiently for hyperkalemia    1. Acute kidney injury:  -most likely Non oliguric ATN from prolonged prerenal azotemia in setting of watery diarrhea  -serology/immunological w/u negative such far  -UA clear and bland  -CT abd w/ no HDN  -continue w/ gentle hydration w/ IV NS @ 75 cc/hr  -Last HD yesterday, still wait n watch for possible improvement (will hold tunnelled HD cathter for now)    2. Hyperkalemia:  -Improved    3. C. Diff Colitis/Diarrhea: On Abx vanc + metronidazole  4. Colonic pneumatosis: conservative Rx.

## 2022-12-08 LAB
ANION GAP SERPL CALC-SCNC: 10 MMOL/L — SIGNIFICANT CHANGE UP (ref 5–17)
BUN SERPL-MCNC: 33.3 MG/DL — HIGH (ref 8–20)
CALCIUM SERPL-MCNC: 8.4 MG/DL — SIGNIFICANT CHANGE UP (ref 8.4–10.5)
CHLORIDE SERPL-SCNC: 101 MMOL/L — SIGNIFICANT CHANGE UP (ref 96–108)
CO2 SERPL-SCNC: 27 MMOL/L — SIGNIFICANT CHANGE UP (ref 22–29)
CREAT SERPL-MCNC: 4.47 MG/DL — HIGH (ref 0.5–1.3)
EGFR: 16 ML/MIN/1.73M2 — LOW
GLUCOSE SERPL-MCNC: 139 MG/DL — HIGH (ref 70–99)
HCT VFR BLD CALC: 22.9 % — LOW (ref 39–50)
HGB BLD-MCNC: 7.5 G/DL — LOW (ref 13–17)
MCHC RBC-ENTMCNC: 26.4 PG — LOW (ref 27–34)
MCHC RBC-ENTMCNC: 32.8 GM/DL — SIGNIFICANT CHANGE UP (ref 32–36)
MCV RBC AUTO: 80.6 FL — SIGNIFICANT CHANGE UP (ref 80–100)
OB PNL STL: POSITIVE
PLATELET # BLD AUTO: 199 K/UL — SIGNIFICANT CHANGE UP (ref 150–400)
POTASSIUM SERPL-MCNC: 3.3 MMOL/L — LOW (ref 3.5–5.3)
POTASSIUM SERPL-SCNC: 3.3 MMOL/L — LOW (ref 3.5–5.3)
RBC # BLD: 2.84 M/UL — LOW (ref 4.2–5.8)
RBC # FLD: 13.2 % — SIGNIFICANT CHANGE UP (ref 10.3–14.5)
SODIUM SERPL-SCNC: 138 MMOL/L — SIGNIFICANT CHANGE UP (ref 135–145)
WBC # BLD: 8.95 K/UL — SIGNIFICANT CHANGE UP (ref 3.8–10.5)
WBC # FLD AUTO: 8.95 K/UL — SIGNIFICANT CHANGE UP (ref 3.8–10.5)

## 2022-12-08 PROCEDURE — 99233 SBSQ HOSP IP/OBS HIGH 50: CPT

## 2022-12-08 RX ORDER — PANTOPRAZOLE SODIUM 20 MG/1
40 TABLET, DELAYED RELEASE ORAL EVERY 12 HOURS
Refills: 0 | Status: DISCONTINUED | OUTPATIENT
Start: 2022-12-08 | End: 2022-12-12

## 2022-12-08 RX ORDER — PANTOPRAZOLE SODIUM 20 MG/1
40 TABLET, DELAYED RELEASE ORAL ONCE
Refills: 0 | Status: COMPLETED | OUTPATIENT
Start: 2022-12-08 | End: 2022-12-08

## 2022-12-08 RX ADMIN — Medication 300 MILLIGRAM(S): at 06:03

## 2022-12-08 RX ADMIN — HEPARIN SODIUM 5000 UNIT(S): 5000 INJECTION INTRAVENOUS; SUBCUTANEOUS at 06:03

## 2022-12-08 RX ADMIN — CHLORHEXIDINE GLUCONATE 1 APPLICATION(S): 213 SOLUTION TOPICAL at 13:28

## 2022-12-08 RX ADMIN — RISPERIDONE 3 MILLIGRAM(S): 4 TABLET ORAL at 11:35

## 2022-12-08 RX ADMIN — LEVETIRACETAM 400 MILLIGRAM(S): 250 TABLET, FILM COATED ORAL at 00:42

## 2022-12-08 RX ADMIN — LAMOTRIGINE 150 MILLIGRAM(S): 25 TABLET, ORALLY DISINTEGRATING ORAL at 17:47

## 2022-12-08 RX ADMIN — BUPRENORPHINE AND NALOXONE 1 FILM(S): 2; .5 TABLET SUBLINGUAL at 11:35

## 2022-12-08 RX ADMIN — LEVETIRACETAM 400 MILLIGRAM(S): 250 TABLET, FILM COATED ORAL at 13:28

## 2022-12-08 RX ADMIN — Medication 125 MILLIGRAM(S): at 06:04

## 2022-12-08 RX ADMIN — SODIUM CHLORIDE 75 MILLILITER(S): 9 INJECTION INTRAMUSCULAR; INTRAVENOUS; SUBCUTANEOUS at 02:10

## 2022-12-08 RX ADMIN — Medication 125 MILLIGRAM(S): at 00:43

## 2022-12-08 RX ADMIN — LAMOTRIGINE 150 MILLIGRAM(S): 25 TABLET, ORALLY DISINTEGRATING ORAL at 06:03

## 2022-12-08 RX ADMIN — Medication 125 MILLIGRAM(S): at 22:04

## 2022-12-08 RX ADMIN — Medication 0.3 MILLIGRAM(S): at 06:02

## 2022-12-08 RX ADMIN — Medication 50 MICROGRAM(S): at 06:03

## 2022-12-08 RX ADMIN — Medication 0.3 MILLIGRAM(S): at 13:29

## 2022-12-08 RX ADMIN — Medication 300 MILLIGRAM(S): at 17:47

## 2022-12-08 RX ADMIN — QUETIAPINE FUMARATE 400 MILLIGRAM(S): 200 TABLET, FILM COATED ORAL at 17:47

## 2022-12-08 RX ADMIN — Medication 3 MILLIGRAM(S): at 00:45

## 2022-12-08 RX ADMIN — LEVETIRACETAM 400 MILLIGRAM(S): 250 TABLET, FILM COATED ORAL at 22:03

## 2022-12-08 RX ADMIN — CHLORHEXIDINE GLUCONATE 1 APPLICATION(S): 213 SOLUTION TOPICAL at 05:56

## 2022-12-08 RX ADMIN — GABAPENTIN 300 MILLIGRAM(S): 400 CAPSULE ORAL at 11:35

## 2022-12-08 RX ADMIN — Medication 0.3 MILLIGRAM(S): at 22:03

## 2022-12-08 RX ADMIN — BUPROPION HYDROCHLORIDE 300 MILLIGRAM(S): 150 TABLET, EXTENDED RELEASE ORAL at 11:36

## 2022-12-08 RX ADMIN — Medication 100 MILLIGRAM(S): at 06:03

## 2022-12-08 RX ADMIN — HEPARIN SODIUM 5000 UNIT(S): 5000 INJECTION INTRAVENOUS; SUBCUTANEOUS at 13:29

## 2022-12-08 RX ADMIN — SODIUM CHLORIDE 75 MILLILITER(S): 9 INJECTION INTRAMUSCULAR; INTRAVENOUS; SUBCUTANEOUS at 22:04

## 2022-12-08 RX ADMIN — PANTOPRAZOLE SODIUM 40 MILLIGRAM(S): 20 TABLET, DELAYED RELEASE ORAL at 22:26

## 2022-12-08 RX ADMIN — QUETIAPINE FUMARATE 400 MILLIGRAM(S): 200 TABLET, FILM COATED ORAL at 06:03

## 2022-12-08 RX ADMIN — Medication 125 MILLIGRAM(S): at 17:48

## 2022-12-08 RX ADMIN — Medication 125 MILLIGRAM(S): at 11:36

## 2022-12-08 RX ADMIN — SODIUM CHLORIDE 75 MILLILITER(S): 9 INJECTION INTRAMUSCULAR; INTRAVENOUS; SUBCUTANEOUS at 13:29

## 2022-12-08 RX ADMIN — ATORVASTATIN CALCIUM 20 MILLIGRAM(S): 80 TABLET, FILM COATED ORAL at 22:03

## 2022-12-08 NOTE — PROGRESS NOTE ADULT - ASSESSMENT
42 year old male with PMH of polysubstance abuse, bipolar disorder, HTN and GERD presents with severe diarrhea. Found to have pneumatosis in sigmoid colon. C diff positive. Hospital course is notable for LIV complicated by hyperK with EKG changes s/p emergent renal replacement therapy on 12/2. Remains dependent on hemodialysis at this time.     -LIV secondary to prerenal etiology with progression to ATN   -Baseline creatinine is unclear  -On admission, creatinine was 2.7mg/dL  -Creatinine peaked at 5.0mg/dL associated with hyperkalemia and EKG changes necessitating emergent CRRT on 12/2     protein, large blood, 25-50 RBC, 6-10 WBC, occasional bacteria     UPCR 56g/g; UACR 49g/g     Negative for HIV, Hep B, Hep C, RPR, ANCA, antiglomerular basement membrane antibody, immunofixation, dsDNA, [id number]; normal complements    Notable for hemoglobin A1c 5.6, elevated triglycerides    CT a/p without IV contrast is negative for hydronephrosis   -No evidence of renal recovery at this time - remains dependent on hemodialysis at this time  -Last dialyzed this Tuesday; will dialyze again today   -Access: R IJ non tunneled dialysis catheter --> reached out to Vascular Surgery for placement of tunneled dialysis catheter (to be placed pending resolution of C diff colitis)   -Maintain strict I/O's  -Will continue to monitor for renal recovery     Selin Shane DO  Nephrology  [facility] [place] [address]  Office Number [phone]

## 2022-12-08 NOTE — PROGRESS NOTE ADULT - SUBJECTIVE AND OBJECTIVE BOX
Still with multiple episodes of watery diarrhea per patient. No improvement to stool output and frequency as per patient.    Vital Signs Last 24 Hrs  T(C): 36.9 (08 Dec 2022 11:06), Max: 37.1 (08 Dec 2022 04:49)  T(F): 98.4 (08 Dec 2022 11:06), Max: 98.7 (08 Dec 2022 04:49)  HR: 84 (08 Dec 2022 11:06) (81 - 95)  BP: 168/90 (08 Dec 2022 13:15) (130/76 - 168/90)  BP(mean): --  RR: 18 (08 Dec 2022 11:06) (18 - 19)  SpO2: 94% (08 Dec 2022 11:06) (93% - 97%)    Parameters below as of 08 Dec 2022 11:06  Patient On (Oxygen Delivery Method): room air    I&O's Summary    07 Dec 2022 07:01  -  08 Dec 2022 07:00  --------------------------------------------------------  IN: 1130 mL / OUT: 800 mL / NET: 330 mL    08 Dec 2022 07:01  -  08 Dec 2022 15:20  --------------------------------------------------------  IN: 240 mL / OUT: 500 mL / NET: -260 mL    Physical Exam  General: WDWN male lying flat in bed in NAD  Cardiac: S1S2 RRR  Respiratory: CTAB  Abdomen: Soft, NT   Extremities: No appreciable edema  Neuro: AAOx3     12-06    137  |  100  |  64.8<H>  ----------------------------<  138<H>  4.2   |  23.0  |  9.50<H>    Ca    8.6      06 Dec 2022 19:00  Phos  5.0     12-06  Mg     2.5     12-06    TPro  5.7<L>  /  Alb  2.7<L>  /  TBili  0.3<L>  /  DBili  x   /  AST  18  /  ALT  14  /  AlkPhos  75  12-06                        8.3    9.08  )-----------( 140      ( 06 Dec 2022 19:00 )             25.5     MEDICATIONS  (STANDING):  atorvastatin 20 milliGRAM(s) Oral at bedtime  buprenorphine 8 mG/naloxone 2 mG SL Film 1 Film(s) SubLingual daily  buPROPion XL (24-Hour) . 300 milliGRAM(s) Oral daily  chlorhexidine 2% Cloths 1 Application(s) Topical daily  chlorhexidine 4% Liquid 1 Application(s) Topical <User Schedule>  cloNIDine 0.3 milliGRAM(s) Oral three times a day  gabapentin 300 milliGRAM(s) Oral daily  heparin   Injectable 5000 Unit(s) SubCutaneous every 8 hours  labetalol 300 milliGRAM(s) Oral two times a day  lamoTRIgine 150 milliGRAM(s) Oral two times a day  levETIRAcetam  IVPB 750 milliGRAM(s) IV Intermittent every 12 hours  levothyroxine 50 MICROGram(s) Oral daily  QUEtiapine 400 milliGRAM(s) Oral two times a day  risperiDONE   Tablet 3 milliGRAM(s) Oral daily  sodium chloride 0.9%. 1000 milliLiter(s) (75 mL/Hr) IV Continuous <Continuous>  vancomycin    Solution 125 milliGRAM(s) Oral every 6 hours    MEDICATIONS  (PRN):  melatonin 3 milliGRAM(s) Oral at bedtime PRN Insomnia  ondansetron Injectable 4 milliGRAM(s) IV Push every 6 hours PRN Nausea  sodium chloride 0.9% lock flush 10 milliLiter(s) IV Push every 1 hour PRN Pre/post blood products, medications, blood draw, and to maintain line patency

## 2022-12-08 NOTE — PROGRESS NOTE ADULT - ASSESSMENT
42M with a history of bipolar disorder, substance abuse, hypertension, and prior stroke who [facility] with lethargy and abdominal pain. He was found to have acute kidney injury with metabolic acidosis and hyperkalemia. CT of the abdomen noted mural thickening of the distal colon with pneumatosis for which he was admitted to the Surgical Intensive Care Unit for further management. Renal replacement therapy was initiated. noted to be positive for clostridium difficile for which vancomycin/ flagyl  was initiated. downgraded to medicine 12/4    >isac / with hyperkalemia / now on hd   -monitor for renal recovery  - nephro following   - Serology results pending to be reviewed when available  - monitor i/os   - loera dcd 12/5 , passed tov   - rt erlin for hd , plan to chnage to permacth     > diarrhea / Clostridium difficile colitis   - seen by Colorectal Surgery. Unlikely ischemic colitis  -c/w po vancomycin   - dc  iv flagyl as discussed with id    - bcxs neg   - rectal tube dcd 12/5     > anemia / acute / possible acute blood loss   - marlon noted  - dc sq heparin  - ppi bid   - check fobt    - gi consult ,likely need egd/ colonoscopy after cdiff tx completed       >Bipolar disorder   -c/w  bupropion, fluoxetine, lamotrigine, quetiapine, and risperidone  -  consulted   - no psych contraindications to dc    >Substance abuse - On buprenorphine/naloxone.    >Hypertension  - c/w clonidine, labetalol, and losartan  - monitor     >Hypothyroidism  - c/w  levothyroxine    > dvt ppx: sq heparin     > dispo: remains acute , need to monitor renal function , may need LT hd to be arranged prior to dc

## 2022-12-08 NOTE — CHART NOTE - NSCHARTNOTEFT_GEN_A_CORE
Consulted for Tunneled dialysis catheter however patient currently has active C difficile infection.  Please re-consult when diarrhea and symptoms resolve.

## 2022-12-08 NOTE — PROGRESS NOTE ADULT - SUBJECTIVE AND OBJECTIVE BOX
cc: isac , cdiff colitis     interval hx: patient seen and eval. comfortable. in no acute distress. deneis any n/v/abd pain. melanotic stool noted in bedside commode        Vital Signs Last 24 Hrs  T(C): 36.9 (08 Dec 2022 11:06), Max: 37.1 (08 Dec 2022 04:49)  T(F): 98.4 (08 Dec 2022 11:06), Max: 98.7 (08 Dec 2022 04:49)  HR: 84 (08 Dec 2022 11:06) (81 - 95)  BP: 168/90 (08 Dec 2022 13:15) (130/76 - 168/90)  BP(mean): --  RR: 18 (08 Dec 2022 11:06) (18 - 19)  SpO2: 94% (08 Dec 2022 11:06) (93% - 97%)    Parameters below as of 08 Dec 2022 11:06  Patient On (Oxygen Delivery Method): room air        PHYSICAL EXAMINATION:  General appearance: No acute distress, Awake, Alert  HEENT: mm moist   Neck: Supple, rt shiley cath in place   Lungs: Breath sound equal bilaterally, No wheezes, No rales  Cardiovascular: S1S2, Regular rhythm  Abdomen: Soft, nondistended, mild tenderness on deep palpation in mid / lower abd ,  No guarding/rebound, Positive bowel sounds  Extremities: No clubbing, No cyanosis, No edema, No calf tenderness  Neuro: Strength equal bilaterally, No tremors  Psychiatric: Appropriate mood, Normal affect                                 8.3    9.08  )-----------( 140      ( 06 Dec 2022 19:00 )             25.5   12-06    137  |  100  |  64.8<H>  ----------------------------<  138<H>  4.2   |  23.0  |  9.50<H>    Ca    8.6      06 Dec 2022 19:00  Phos  5.0     12-06  Mg     2.5     12-06    TPro  5.7<L>  /  Alb  2.7<L>  /  TBili  0.3<L>  /  DBili  x   /  AST  18  /  ALT  14  /  AlkPhos  75  12-06      MEDICATIONS  (STANDING):  atorvastatin 20 milliGRAM(s) Oral at bedtime  buprenorphine 8 mG/naloxone 2 mG SL Film 1 Film(s) SubLingual daily  buPROPion XL (24-Hour) . 300 milliGRAM(s) Oral daily  chlorhexidine 2% Cloths 1 Application(s) Topical daily  chlorhexidine 4% Liquid 1 Application(s) Topical <User Schedule>  cloNIDine 0.3 milliGRAM(s) Oral three times a day  gabapentin 300 milliGRAM(s) Oral daily  labetalol 300 milliGRAM(s) Oral two times a day  lamoTRIgine 150 milliGRAM(s) Oral two times a day  levETIRAcetam  IVPB 750 milliGRAM(s) IV Intermittent every 12 hours  levothyroxine 50 MICROGram(s) Oral daily  QUEtiapine 400 milliGRAM(s) Oral two times a day  risperiDONE   Tablet 3 milliGRAM(s) Oral daily  sodium chloride 0.9%. 1000 milliLiter(s) (75 mL/Hr) IV Continuous <Continuous>  vancomycin    Solution 125 milliGRAM(s) Oral every 6 hours    MEDICATIONS  (PRN):  melatonin 3 milliGRAM(s) Oral at bedtime PRN Insomnia  ondansetron Injectable 4 milliGRAM(s) IV Push every 6 hours PRN Nausea  sodium chloride 0.9% lock flush 10 milliLiter(s) IV Push every 1 hour PRN Pre/post blood products, medications, blood draw, and to maintain line patency

## 2022-12-08 NOTE — CHART NOTE - NSCHARTNOTESELECT_GEN_ALL_CORE
CRS ACP/Off Service Note
Event note
Serial abdominal exams/Event Note
abdominal exam
SICU downgrade/Transfer Note
Vascular Surgery/Event Note
serial abdominal exam

## 2022-12-09 DIAGNOSIS — D64.9 ANEMIA, UNSPECIFIED: ICD-10-CM

## 2022-12-09 LAB
% ALBUMIN: 53.9 % — SIGNIFICANT CHANGE UP
% ALPHA 1: 11.5 % — SIGNIFICANT CHANGE UP
% ALPHA 2: 14.4 % — SIGNIFICANT CHANGE UP
% BETA: 10 % — SIGNIFICANT CHANGE UP
% GAMMA: 10.2 % — SIGNIFICANT CHANGE UP
ALBUMIN SERPL ELPH-MCNC: 2.9 G/DL — LOW (ref 3.6–5.5)
ALBUMIN/GLOB SERPL ELPH: 1.2 RATIO — SIGNIFICANT CHANGE UP
ALPHA1 GLOB SERPL ELPH-MCNC: 0.6 G/DL — HIGH (ref 0.1–0.4)
ALPHA2 GLOB SERPL ELPH-MCNC: 0.8 G/DL — SIGNIFICANT CHANGE UP (ref 0.5–1)
ANION GAP SERPL CALC-SCNC: 11 MMOL/L — SIGNIFICANT CHANGE UP (ref 5–17)
B-GLOBULIN SERPL ELPH-MCNC: 0.5 G/DL — SIGNIFICANT CHANGE UP (ref 0.5–1)
BLD GP AB SCN SERPL QL: SIGNIFICANT CHANGE UP
BUN SERPL-MCNC: 18.5 MG/DL — SIGNIFICANT CHANGE UP (ref 8–20)
CALCIUM SERPL-MCNC: 8.3 MG/DL — LOW (ref 8.4–10.5)
CHLORIDE SERPL-SCNC: 105 MMOL/L — SIGNIFICANT CHANGE UP (ref 96–108)
CO2 SERPL-SCNC: 27 MMOL/L — SIGNIFICANT CHANGE UP (ref 22–29)
CREAT SERPL-MCNC: 3.12 MG/DL — HIGH (ref 0.5–1.3)
EGFR: 24 ML/MIN/1.73M2 — LOW
GAMMA GLOBULIN: 0.5 G/DL — LOW (ref 0.6–1.6)
GLUCOSE SERPL-MCNC: 100 MG/DL — HIGH (ref 70–99)
HCT VFR BLD CALC: 25 % — LOW (ref 39–50)
HGB BLD-MCNC: 7.9 G/DL — LOW (ref 13–17)
INTERPRETATION SERPL IFE-IMP: SIGNIFICANT CHANGE UP
MAGNESIUM SERPL-MCNC: 1.7 MG/DL — LOW (ref 1.8–2.6)
MCHC RBC-ENTMCNC: 26.2 PG — LOW (ref 27–34)
MCHC RBC-ENTMCNC: 31.6 GM/DL — LOW (ref 32–36)
MCV RBC AUTO: 82.8 FL — SIGNIFICANT CHANGE UP (ref 80–100)
PLATELET # BLD AUTO: 219 K/UL — SIGNIFICANT CHANGE UP (ref 150–400)
POTASSIUM SERPL-MCNC: 3.8 MMOL/L — SIGNIFICANT CHANGE UP (ref 3.5–5.3)
POTASSIUM SERPL-SCNC: 3.8 MMOL/L — SIGNIFICANT CHANGE UP (ref 3.5–5.3)
PROT PATTERN SERPL ELPH-IMP: SIGNIFICANT CHANGE UP
RBC # BLD: 3.02 M/UL — LOW (ref 4.2–5.8)
RBC # FLD: 13.2 % — SIGNIFICANT CHANGE UP (ref 10.3–14.5)
SODIUM SERPL-SCNC: 142 MMOL/L — SIGNIFICANT CHANGE UP (ref 135–145)
WBC # BLD: 11.05 K/UL — HIGH (ref 3.8–10.5)
WBC # FLD AUTO: 11.05 K/UL — HIGH (ref 3.8–10.5)

## 2022-12-09 PROCEDURE — 99223 1ST HOSP IP/OBS HIGH 75: CPT

## 2022-12-09 PROCEDURE — 99233 SBSQ HOSP IP/OBS HIGH 50: CPT

## 2022-12-09 RX ORDER — MAGNESIUM SULFATE 500 MG/ML
2 VIAL (ML) INJECTION ONCE
Refills: 0 | Status: COMPLETED | OUTPATIENT
Start: 2022-12-09 | End: 2022-12-09

## 2022-12-09 RX ADMIN — TUBERCULIN PURIFIED PROTEIN DERIVATIVE 5 UNIT(S): 5 INJECTION, SOLUTION INTRADERMAL at 10:47

## 2022-12-09 RX ADMIN — Medication 0.3 MILLIGRAM(S): at 21:31

## 2022-12-09 RX ADMIN — Medication 0.3 MILLIGRAM(S): at 12:33

## 2022-12-09 RX ADMIN — Medication 125 MILLIGRAM(S): at 18:43

## 2022-12-09 RX ADMIN — Medication 125 MILLIGRAM(S): at 12:40

## 2022-12-09 RX ADMIN — QUETIAPINE FUMARATE 400 MILLIGRAM(S): 200 TABLET, FILM COATED ORAL at 06:01

## 2022-12-09 RX ADMIN — Medication 25 GRAM(S): at 09:25

## 2022-12-09 RX ADMIN — BUPROPION HYDROCHLORIDE 300 MILLIGRAM(S): 150 TABLET, EXTENDED RELEASE ORAL at 12:33

## 2022-12-09 RX ADMIN — Medication 125 MILLIGRAM(S): at 06:02

## 2022-12-09 RX ADMIN — LAMOTRIGINE 150 MILLIGRAM(S): 25 TABLET, ORALLY DISINTEGRATING ORAL at 06:02

## 2022-12-09 RX ADMIN — CHLORHEXIDINE GLUCONATE 1 APPLICATION(S): 213 SOLUTION TOPICAL at 12:40

## 2022-12-09 RX ADMIN — Medication 50 MICROGRAM(S): at 06:01

## 2022-12-09 RX ADMIN — PANTOPRAZOLE SODIUM 40 MILLIGRAM(S): 20 TABLET, DELAYED RELEASE ORAL at 18:44

## 2022-12-09 RX ADMIN — ATORVASTATIN CALCIUM 20 MILLIGRAM(S): 80 TABLET, FILM COATED ORAL at 21:31

## 2022-12-09 RX ADMIN — LEVETIRACETAM 400 MILLIGRAM(S): 250 TABLET, FILM COATED ORAL at 12:36

## 2022-12-09 RX ADMIN — RISPERIDONE 3 MILLIGRAM(S): 4 TABLET ORAL at 12:33

## 2022-12-09 RX ADMIN — Medication 125 MILLIGRAM(S): at 22:16

## 2022-12-09 RX ADMIN — Medication 0.3 MILLIGRAM(S): at 06:01

## 2022-12-09 RX ADMIN — QUETIAPINE FUMARATE 400 MILLIGRAM(S): 200 TABLET, FILM COATED ORAL at 18:43

## 2022-12-09 RX ADMIN — GABAPENTIN 300 MILLIGRAM(S): 400 CAPSULE ORAL at 12:32

## 2022-12-09 RX ADMIN — Medication 300 MILLIGRAM(S): at 06:01

## 2022-12-09 RX ADMIN — LAMOTRIGINE 150 MILLIGRAM(S): 25 TABLET, ORALLY DISINTEGRATING ORAL at 18:42

## 2022-12-09 RX ADMIN — SODIUM CHLORIDE 75 MILLILITER(S): 9 INJECTION INTRAMUSCULAR; INTRAVENOUS; SUBCUTANEOUS at 02:22

## 2022-12-09 RX ADMIN — LEVETIRACETAM 400 MILLIGRAM(S): 250 TABLET, FILM COATED ORAL at 22:16

## 2022-12-09 RX ADMIN — BUPRENORPHINE AND NALOXONE 1 FILM(S): 2; .5 TABLET SUBLINGUAL at 12:33

## 2022-12-09 RX ADMIN — Medication 300 MILLIGRAM(S): at 18:43

## 2022-12-09 RX ADMIN — PANTOPRAZOLE SODIUM 40 MILLIGRAM(S): 20 TABLET, DELAYED RELEASE ORAL at 06:02

## 2022-12-09 RX ADMIN — CHLORHEXIDINE GLUCONATE 1 APPLICATION(S): 213 SOLUTION TOPICAL at 06:00

## 2022-12-09 NOTE — PROGRESS NOTE ADULT - SUBJECTIVE AND OBJECTIVE BOX
cc: isac , cdiff colitis     interval hx: patient seen and eval. comfortable. in no acute distress. deneis any n/v/abd pain. continues to have 3-4 bms daily . mild marlon noted     Vital Signs Last 24 Hrs  T(C): 37.1 (09 Dec 2022 10:54), Max: 37.1 (08 Dec 2022 18:55)  T(F): 98.8 (09 Dec 2022 10:54), Max: 98.8 (09 Dec 2022 05:00)  HR: 80 (09 Dec 2022 10:54) (77 - 88)  BP: 135/91 (09 Dec 2022 10:54) (122/69 - 152/97)  BP(mean): --  RR: 18 (09 Dec 2022 10:54) (17 - 18)  SpO2: 98% (09 Dec 2022 10:54) (90% - 98%)    Parameters below as of 09 Dec 2022 10:54  Patient On (Oxygen Delivery Method): room air      PHYSICAL EXAMINATION:  General appearance: No acute distress, Awake, Alert  HEENT: mm moist   Neck: Supple, rt shiley cath in place   Lungs: Breath sound equal bilaterally, No wheezes, No rales  Cardiovascular: S1S2, Regular rhythm  Abdomen: Soft, nondistended, mild tenderness on deep palpation in mid / lower abd ,  No guarding/rebound, Positive bowel sounds  Extremities: No clubbing, No cyanosis, No edema, No calf tenderness  Neuro: Strength equal bilaterally, No tremors  Psychiatric: Appropriate mood, Normal affect                           7.9    11.05 )-----------( 219      ( 09 Dec 2022 05:45 )             25.0   12-09    142  |  105  |  18.5  ----------------------------<  100<H>  3.8   |  27.0  |  3.12<H>    Ca    8.3<L>      09 Dec 2022 05:45  Mg     1.7     12-09        MEDICATIONS  (STANDING):  atorvastatin 20 milliGRAM(s) Oral at bedtime  buprenorphine 8 mG/naloxone 2 mG SL Film 1 Film(s) SubLingual daily  buPROPion XL (24-Hour) . 300 milliGRAM(s) Oral daily  chlorhexidine 2% Cloths 1 Application(s) Topical daily  chlorhexidine 4% Liquid 1 Application(s) Topical <User Schedule>  cloNIDine 0.3 milliGRAM(s) Oral three times a day  gabapentin 300 milliGRAM(s) Oral daily  labetalol 300 milliGRAM(s) Oral two times a day  lamoTRIgine 150 milliGRAM(s) Oral two times a day  levETIRAcetam  IVPB 750 milliGRAM(s) IV Intermittent every 12 hours  levothyroxine 50 MICROGram(s) Oral daily  pantoprazole  Injectable 40 milliGRAM(s) IV Push every 12 hours  QUEtiapine 400 milliGRAM(s) Oral two times a day  risperiDONE   Tablet 3 milliGRAM(s) Oral daily  sodium chloride 0.9%. 1000 milliLiter(s) (75 mL/Hr) IV Continuous <Continuous>  vancomycin    Solution 125 milliGRAM(s) Oral every 6 hours    MEDICATIONS  (PRN):  melatonin 3 milliGRAM(s) Oral at bedtime PRN Insomnia  ondansetron Injectable 4 milliGRAM(s) IV Push every 6 hours PRN Nausea  sodium chloride 0.9% lock flush 10 milliLiter(s) IV Push every 1 hour PRN Pre/post blood products, medications, blood draw, and to maintain line patency

## 2022-12-09 NOTE — CONSULT NOTE ADULT - NS ATTEND AMEND GEN_ALL_CORE FT
41 y/o M with history of polysubstance abuse, bipolar disorder, HTN, GERD, presented with diarrhea x 2 days; found to have pneumatosis of sigmoid colon and LIV. Cardiology was consulted due to chest pain and reported VT. Telemetry reviewed; showed SR/ST with no VT. On exam, patient is AAO x 1 and unable to provide detailed history. He continually says "I have chest pain, I don't have chest pain" and is unable to respond to questioning. +TTP in epigastric region/abdomen on exam. Had CCTA 1 year ago with calcium score 0.8, patent coronaries (LCx not adequately visualized). At this point, would not pursue further cardiac workup unless patient's mental status improves and he complains of chest pain, or he has EKG changes or arrhythmia on telemetry monitoring.     Cardiology signing off for now  Please contact me with any further questions
42M with a history of bipolar disorder, substance abuse, hypertension, and prior stroke who [facility] with lethargy and abdominal pain. Admitted with acute kidney injury with metabolic acidosis and hyperkalemia. Found to have C diff colitis. GI consult for anemia.  CT 12/2 c/w ischemic colitis.   Hb now 7.9 from 13 on admission, though there likely was a component of dehydration with the initial Hb.   Red stool per pt - NP note reports yellow, but he reports 5-6 BM/d (up form prior constipation)   No EGD or colon ever  Sober x5-6 mo, at current sober house 2 mo  Abused heroin and BZD, usually PO and sniffing - no IVDU  Suspect Hb drop is from ischemic colitis, and since LLQ pain improving will continue to improve as well  Would continue regular diet, supportive care  If Hb less than 7 would transfuse  Would not plan on inpatient colonoscopy because ischemic colitis is actually dangerous to biopsy  WIll need outpatient EGD and colon  Would start PPI qD now since has this as outpatient  GI will also see tomorrow to make sure no procedure needed Mon

## 2022-12-09 NOTE — CONSULT NOTE ADULT - PROBLEM SELECTOR RECOMMENDATION 9
Normocytic. CT abd on admission showed ischemic colitis. +C diff. LIV on admission. His hemoglobin on admission was 11.5 gm, ranged between 8. 8 to 9.8 during the hospital course, dropped to 8.3 --> 7.5 -->7.9 gm.   No evidence of overt GI bleed on examination. DORETHA shows yellow liquid stool.   Protonix daily  Trend CBC, transfuse to Hg 7 or higher  On PO Vancomycin  for C diff, ID following  Eventual plan for EGD for further evaluation. Screening colonoscopy at the age of 44.
- Unlikely cardiac chest pain, as with reproducible epigastric and abdominal pain.   - Patient had CTA cardiac in 2021 with normal coronaries.   - Reviewed EKGs and telemetry strips, no evidence of ventricular tachycardia.   - Patient with LIV and hyperkalemia, correct metabolic derangement.   - No further inpatient cardiac workup at this time.   - Continue telemetry monitoring.

## 2022-12-09 NOTE — PROGRESS NOTE ADULT - NS ATTEST RISK PROBLEM GEN_ALL_CORE FT
isac   cdiff colitis  anemia
isac   cdiff colitis  anemia
isac   cdiff colitis

## 2022-12-09 NOTE — PROGRESS NOTE ADULT - ASSESSMENT
42 year old male with PMH of polysubstance abuse, bipolar disorder, HTN and GERD presents with severe diarrhea. Found to have pneumatosis in sigmoid colon. C diff positive. Hospital course is notable for LIV complicated by hyperK with EKG changes s/p emergent renal replacement therapy on 12/2. Remains dependent on hemodialysis at this time.     -LIV likely secondary to prerenal etiology with progression to ATN   -Baseline creatinine is unclear  -On admission, creatinine was 2.7mg/dL  -Creatinine peaked at 5.0mg/dL associated with hyperkalemia and EKG changes necessitating emergent CRRT on 12/2     protein, large blood, 25-50 RBC, 6-10 WBC, occasional bacteria     UPCR 56g/g; UACR 49g/g     Negative for HIV, Hep B, Hep C, RPR, ANCA, antiglomerular basement membrane antibody, immunofixation, dsDNA, [id number]; normal complements    CT a/p without IV contrast is negative for hydronephrosis   -No evidence of renal recovery at this time  -Last dialyzed on Thursday- next HD will be tomorrow  -Access: R IJ non tunneled dialysis catheter - [facility]DC placemen pending resolution of C diff colitis  -Will continue to monitor for renal recovery     Anemia: Hb low  Start KATERINA with HD  GI on board for concern of GIB      HTN_ Bp stable  continue current regimen

## 2022-12-09 NOTE — CONSULT NOTE ADULT - CONSULT REASON
Ventricular Tachycardia/Chest Pain
Anemia
Acute Renal failure and hyperkalemia
LIV, hyperkalemia
C. diff colitis

## 2022-12-09 NOTE — PROGRESS NOTE ADULT - SUBJECTIVE AND OBJECTIVE BOX
[facility] DIVISION OF KIDNEY DISEASES AND HYPERTENSION -- HEMODIALYSIS NOTE  --------------------------------------------------------------------------------  Chief Complaint: Ramon /Ongoing hemodialysis requirement    24 hour events/subjective:  s.p HD yesterday; tolerated well  Pt seen and examined;continue to have diarrhea      PAST HISTORY  --------------------------------------------------------------------------------  No significant changes to PMH, PSH, FHx, SHx, unless otherwise noted    ALLERGIES & MEDICATIONS  --------------------------------------------------------------------------------  Allergies    No Known Allergies      Standing Inpatient Medications  atorvastatin 20 milliGRAM(s) Oral at bedtime  buprenorphine 8 mG/naloxone 2 mG SL Film 1 Film(s) SubLingual daily  buPROPion XL (24-Hour) . 300 milliGRAM(s) Oral daily  chlorhexidine 2% Cloths 1 Application(s) Topical daily  chlorhexidine 4% Liquid 1 Application(s) Topical <User Schedule>  cloNIDine 0.3 milliGRAM(s) Oral three times a day  gabapentin 300 milliGRAM(s) Oral daily  labetalol 300 milliGRAM(s) Oral two times a day  lamoTRIgine 150 milliGRAM(s) Oral two times a day  levETIRAcetam  IVPB 750 milliGRAM(s) IV Intermittent every 12 hours  levothyroxine 50 MICROGram(s) Oral daily  pantoprazole  Injectable 40 milliGRAM(s) IV Push every 12 hours  QUEtiapine 400 milliGRAM(s) Oral two times a day  risperiDONE   Tablet 3 milliGRAM(s) Oral daily  sodium chloride 0.9%. 1000 milliLiter(s) IV Continuous <Continuous>  vancomycin    Solution 125 milliGRAM(s) Oral every 6 hours    PRN Inpatient Medications  melatonin 3 milliGRAM(s) Oral at bedtime PRN  ondansetron Injectable 4 milliGRAM(s) IV Push every 6 hours PRN  sodium chloride 0.9% lock flush 10 milliLiter(s) IV Push every 1 hour PRN      REVIEW OF SYSTEMS  --------------------------------------------------------------------------------  Gen: No weight changes, fatigue, fevers/chills, weakness  Skin: No rashes  Head/Eyes/Ears/Mouth: No headache  Respiratory: No dyspnea, cough,  CV: No chest pain, orthopnea  GI: No abdominal pain, diarrhea+  MSK: No joint pain  Neuro: No dizziness/lightheadedness, weakness  Heme: No bleeding  Psych: No significant nervousness, anxiety, stress, depression    All other systems were reviewed and are negative, except as noted.    VITALS/PHYSICAL EXAM  --------------------------------------------------------------------------------  T(C): 36.8 (12-09-22 @ 18:02), Max: 37.1 (12-08-22 @ 18:55)  HR: 89 (12-09-22 @ 18:02) (77 - 89)  BP: 134/84 (12-09-22 @ 18:02) (122/69 - 152/97)  RR: 18 (12-09-22 @ 18:02) (17 - 18)  SpO2: 94% (12-09-22 @ 18:02) (90% - 98%)  Wt(kg): --        12-08-22 @ 07:01  -  12-09-22 @ 07:00  --------------------------------------------------------  IN: 240 mL / OUT: 1550 mL / NET: -1310 mL      Physical Exam:  	Gen: NAD, well-appearing  	HEENT: PERRL, supple neck,  	Pulm: CTA B/L  	CV: RRR, S1S2; no rub  	Abd: +BS, soft, nontender  	UE: Warm  	LE: Warm,  edema  	Neuro: No focal deficits  	Psych: Normal affect and mood  	Skin: Warm, without rashes  	Vascular access: RIj non [facility]    LABS/STUDIES  --------------------------------------------------------------------------------              7.9    11.05 >-----------<  219      [12-09-22 @ 05:45]              25.0     142  |  105  |  18.5  ----------------------------<  100      [12-09-22 @ 05:45]  3.8   |  27.0  |  3.12        Ca     8.3     [12-09-22 @ 05:45]      Mg     1.7     [12-09-22 @ 05:45]            TSH 1.81      [05-19-22 @ 19:54]  Lipid: chol 172, , HDL 15, LDL --      [12-04-22 @ 03:50]    HBsAb 104.5      [12-03-22 @ 21:45]  HBsAb Reactive      [12-03-22 @ 21:45]  HBsAg Nonreact      [12-03-22 @ 21:45]  HBcAb Nonreact      [12-03-22 @ 21:45]  HCV 0.09, Nonreact      [12-06-22 @ 19:00]  HIV Nonreact      [12-04-22 @ 03:50]

## 2022-12-09 NOTE — CONSULT NOTE ADULT - ASSESSMENT
43 YO M w/ HTN, polysubstance abuse who was brought to the ER from sober house with a 2 day history of severe non bloody diarrhea. Found to have LIV (3.6 from 1.2-1.4 b/l)and hyperkalemia w/ acidosis. CT abdomen w/o IV demonstrated pneumatosis in sigmoid colon.    1. Acute kidney injury:  -prerenal azotemia in setting of watery diarrhea  -UA clear and bland  -CT abd w/ no HDN  -Agree w/ IVF, expecting SCr to improve w/ IVF unless ATN has set in from prolonged prerenal state.    2. Hyperkalemia:  -Improving acidosis and hyperkalemia  -Recommend NS or NaHCO3 if bicarb drops further as gtt as they have comparatively less concentration of potassium   -Avoid lokelma, Kayexalate given colon pathology  -Can consider lasix IV for kalliuresis if K starts to increase again    3. Diarrhea: Abx if needed per primary  4. Colonic pneumatosis: conservative Rx, colorectal Sx on board.    5. Metabolic acidosis: LIV + diarrheal loss, recs as above #1.  
42y Male with PMH of bipolar disorder, HTN, GERD, and polysubstance abuse who presents with abdominal pain and diarrhea, found to have acute renal failure and pneumatosis on CT concerning for ischemic colitis of sigmoid colon. Patient worsening anuric renal failure, metabolic acidosis and hyperkalemia with ecg changes. Admitted to SICU for emergent dialysis.            Neuro: Patient is confused in the setting of renal failure and metabolic encephelopathy- hold any medications that will worsen condition. Close monitoring on mental status. Hold all antipsychotics and suboxone for now given NPO. Start keppra for seizures IV (normal dosing given he is on CVVHD).     Card: Hyperkalemia and metabolic acidosis w/ ecg changes- continue to monitor ECG, serial ecgs. Medically managed until dialysis started. Monitor closely. Currently remains hemodynamically well and lactate has previously cleared however, will continue to monitor.  If patient becomes hypotensive or lactate increases will advocate for emergent OR.     Pulm: No respiratory issues, patient normalizing ph, high change of decline if ph worsens, will monitor closely. Currently on RA with no distress.     GI: Strict NPO. Low threshold for diagnostic laparotomy. Patient is poor historian and due to metabolic encephelopathy patient does NOT have a reliable exam to follow.  Patient is young with multisubstance abuse history, may have contributed to etiology. Monitor bowel movements and abdominal exam. Of note, CT abd/pelvis was performed without contrast.     : Acute renal failure now likely ATN. Patient requiring emergent dialysis (CVVHD performed due to NO access to dialysis overnight). Hyperkalemia - primasol for now, will monitor closely. Nephrology at bedside and closely monitoring patient with team.     Endo: BS q4 hrs.     Hem/DVT: SCDs, SQH    ID: Patient is afebrile without leukcoytosis    Lines: RIJ HD. May require additional lines     Myself and nephrology has made multiple attempts to reach out to patients mother without success.  Will proceed under emergent conditions.     Dispo: SICU, patient is at high risk of decline 
- HPI: 42M with a history of bipolar disorder, substance abuse, hypertension, and prior stroke who [facility] with lethargy, abdominal pain, and diarrhea. He was found to have acute kidney injury with metabolic acidosis and hyperkalemia. CT of the abdomen noted mural thickening of the distal colon with pneumatosis for which he was admitted to the Surgical Intensive Care Unit. Renal replacement therapy was initiated. Tested positive for C. diff treated with oral vancomycin and IV metronidazole. No surgical intervention performed. Transferred out of the SICU on 12/4.     ID consulted on hospital day 4 for management of severe C. diff colitis.     Impression:  Severe C. diff colitis  LIV requiring RRT  Polysubstance abuse    Plan:  - continue oral vanco 125 mg po q6h and IV metronidazole  - no surgical intervention needed despite CT findings and clinical presentation   - No GI PCR obtained at presentation   - HIV neg   - Followed by nephrology   - Monitor fever  - Monitor WBC    Will follow   
42M with a history of bipolar disorder, substance abuse, hypertension, and prior stroke who [facility] with lethargy and abdominal pain. Admitted with acute kidney injury with metabolic acidosis and hyperkalemia. Found to have C diff colitis. GI consult for anemia.     
A/P: Patient is a 43 y/o M with a PMHx of polysubstance abuse, Bipolar disorder, HTN, and GERD who presented to the ED from [facility] house with 2 days of severe diarrhea. Patient is currently A&O x 1, and unable to provide a history. Per the chart the patient was found to have pneumatosis of sigmoid colon as well as LIV. Patient was complaining of abdominal pain and chest pain. However while interviewing the patient he would state, "I don't have chest pain, I have chest pain" while pointing to his epigastrum. Patient also with reported sustained ventricular tachycardia. Unable to obtain full ROS at this time due to patient's mental status.
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
## 2022-12-09 NOTE — PROGRESS NOTE ADULT - ASSESSMENT
42M with a history of bipolar disorder, substance abuse, hypertension, and prior stroke who [facility] with lethargy and abdominal pain. He was found to have acute kidney injury with metabolic acidosis and hyperkalemia. CT of the abdomen noted mural thickening of the distal colon with pneumatosis for which he was admitted to the Surgical Intensive Care Unit for further management. Renal replacement therapy was initiated. noted to be positive for clostridium difficile for which vancomycin/ flagyl  was initiated. downgraded to medicine 12/4    >isac / with hyperkalemia / now on hd   -monitor for renal recovery  - nephro following   - Serology results pending to be reviewed when available  - monitor i/os   - loera dcd 12/5 , passed tov   - rt erlin for hd , plan to chnage to permacth once completed cdiff tx     > diarrhea / Clostridium difficile colitis   - seen by Colorectal Surgery. Unlikely ischemic colitis  - dc  iv flagyl as discussed with id    - bcxs neg   - rectal tube dcd 12/5   -c/w po vancomycin last dose 12/12       > anemia / acute / possible acute blood loss   - marlon noted  - dc sq heparin  - ppi bid    - gi consult ,likely need egd/ colonoscopy after cdiff tx completed       >Bipolar disorder   -c/w  bupropion, fluoxetine, lamotrigine, quetiapine, and risperidone  -  consulted   - no psych contraindications to dc    >Substance abuse - On buprenorphine/naloxone.    >Hypertension  - c/w clonidine, labetalol, and losartan  - monitor     >Hypothyroidism  - c/w  levothyroxine    > dvt ppx: sq heparin     > dispo: remains acute , monitor h/h , plan for permacath once cdiff tx completed, last dose 12/12  , will need op hd set up

## 2022-12-10 LAB
ANION GAP SERPL CALC-SCNC: 11 MMOL/L — SIGNIFICANT CHANGE UP (ref 5–17)
BUN SERPL-MCNC: 12.7 MG/DL — SIGNIFICANT CHANGE UP (ref 8–20)
CALCIUM SERPL-MCNC: 8.6 MG/DL — SIGNIFICANT CHANGE UP (ref 8.4–10.5)
CHLORIDE SERPL-SCNC: 104 MMOL/L — SIGNIFICANT CHANGE UP (ref 96–108)
CO2 SERPL-SCNC: 26 MMOL/L — SIGNIFICANT CHANGE UP (ref 22–29)
CREAT SERPL-MCNC: 2.16 MG/DL — HIGH (ref 0.5–1.3)
CULTURE RESULTS: SIGNIFICANT CHANGE UP
CULTURE RESULTS: SIGNIFICANT CHANGE UP
EGFR: 38 ML/MIN/1.73M2 — LOW
GLUCOSE SERPL-MCNC: 113 MG/DL — HIGH (ref 70–99)
HCT VFR BLD CALC: 27 % — LOW (ref 39–50)
HGB BLD-MCNC: 8.7 G/DL — LOW (ref 13–17)
MAGNESIUM SERPL-MCNC: 1.5 MG/DL — LOW (ref 1.6–2.6)
MCHC RBC-ENTMCNC: 27.2 PG — SIGNIFICANT CHANGE UP (ref 27–34)
MCHC RBC-ENTMCNC: 32.2 GM/DL — SIGNIFICANT CHANGE UP (ref 32–36)
MCV RBC AUTO: 84.4 FL — SIGNIFICANT CHANGE UP (ref 80–100)
PHOSPHATE SERPL-MCNC: 3.2 MG/DL — SIGNIFICANT CHANGE UP (ref 2.4–4.7)
PLATELET # BLD AUTO: 258 K/UL — SIGNIFICANT CHANGE UP (ref 150–400)
POTASSIUM SERPL-MCNC: 4 MMOL/L — SIGNIFICANT CHANGE UP (ref 3.5–5.3)
POTASSIUM SERPL-SCNC: 4 MMOL/L — SIGNIFICANT CHANGE UP (ref 3.5–5.3)
RBC # BLD: 3.2 M/UL — LOW (ref 4.2–5.8)
RBC # FLD: 13.9 % — SIGNIFICANT CHANGE UP (ref 10.3–14.5)
SODIUM SERPL-SCNC: 141 MMOL/L — SIGNIFICANT CHANGE UP (ref 135–145)
SPECIMEN SOURCE: SIGNIFICANT CHANGE UP
SPECIMEN SOURCE: SIGNIFICANT CHANGE UP
WBC # BLD: 13.48 K/UL — HIGH (ref 3.8–10.5)
WBC # FLD AUTO: 13.48 K/UL — HIGH (ref 3.8–10.5)

## 2022-12-10 PROCEDURE — 99233 SBSQ HOSP IP/OBS HIGH 50: CPT

## 2022-12-10 PROCEDURE — 99232 SBSQ HOSP IP/OBS MODERATE 35: CPT

## 2022-12-10 RX ORDER — MAGNESIUM SULFATE 500 MG/ML
2 VIAL (ML) INJECTION ONCE
Refills: 0 | Status: COMPLETED | OUTPATIENT
Start: 2022-12-10 | End: 2022-12-10

## 2022-12-10 RX ADMIN — CHLORHEXIDINE GLUCONATE 1 APPLICATION(S): 213 SOLUTION TOPICAL at 05:30

## 2022-12-10 RX ADMIN — SODIUM CHLORIDE 75 MILLILITER(S): 9 INJECTION INTRAMUSCULAR; INTRAVENOUS; SUBCUTANEOUS at 00:22

## 2022-12-10 RX ADMIN — SODIUM CHLORIDE 75 MILLILITER(S): 9 INJECTION INTRAMUSCULAR; INTRAVENOUS; SUBCUTANEOUS at 14:04

## 2022-12-10 RX ADMIN — Medication 25 GRAM(S): at 20:24

## 2022-12-10 RX ADMIN — PANTOPRAZOLE SODIUM 40 MILLIGRAM(S): 20 TABLET, DELAYED RELEASE ORAL at 05:33

## 2022-12-10 RX ADMIN — BUPROPION HYDROCHLORIDE 300 MILLIGRAM(S): 150 TABLET, EXTENDED RELEASE ORAL at 12:25

## 2022-12-10 RX ADMIN — Medication 50 MICROGRAM(S): at 05:33

## 2022-12-10 RX ADMIN — Medication 300 MILLIGRAM(S): at 05:33

## 2022-12-10 RX ADMIN — Medication 0.3 MILLIGRAM(S): at 14:04

## 2022-12-10 RX ADMIN — Medication 125 MILLIGRAM(S): at 18:36

## 2022-12-10 RX ADMIN — RISPERIDONE 3 MILLIGRAM(S): 4 TABLET ORAL at 12:25

## 2022-12-10 RX ADMIN — Medication 125 MILLIGRAM(S): at 12:29

## 2022-12-10 RX ADMIN — BUPRENORPHINE AND NALOXONE 1 FILM(S): 2; .5 TABLET SUBLINGUAL at 12:27

## 2022-12-10 RX ADMIN — QUETIAPINE FUMARATE 400 MILLIGRAM(S): 200 TABLET, FILM COATED ORAL at 05:33

## 2022-12-10 RX ADMIN — Medication 125 MILLIGRAM(S): at 05:34

## 2022-12-10 RX ADMIN — QUETIAPINE FUMARATE 400 MILLIGRAM(S): 200 TABLET, FILM COATED ORAL at 18:35

## 2022-12-10 RX ADMIN — LEVETIRACETAM 400 MILLIGRAM(S): 250 TABLET, FILM COATED ORAL at 14:04

## 2022-12-10 RX ADMIN — Medication 300 MILLIGRAM(S): at 18:35

## 2022-12-10 RX ADMIN — LAMOTRIGINE 150 MILLIGRAM(S): 25 TABLET, ORALLY DISINTEGRATING ORAL at 18:35

## 2022-12-10 RX ADMIN — LEVETIRACETAM 400 MILLIGRAM(S): 250 TABLET, FILM COATED ORAL at 23:03

## 2022-12-10 RX ADMIN — CHLORHEXIDINE GLUCONATE 1 APPLICATION(S): 213 SOLUTION TOPICAL at 12:31

## 2022-12-10 RX ADMIN — Medication 0.3 MILLIGRAM(S): at 23:02

## 2022-12-10 RX ADMIN — Medication 0.3 MILLIGRAM(S): at 05:34

## 2022-12-10 RX ADMIN — GABAPENTIN 300 MILLIGRAM(S): 400 CAPSULE ORAL at 12:26

## 2022-12-10 RX ADMIN — Medication 125 MILLIGRAM(S): at 23:02

## 2022-12-10 RX ADMIN — LAMOTRIGINE 150 MILLIGRAM(S): 25 TABLET, ORALLY DISINTEGRATING ORAL at 05:34

## 2022-12-10 RX ADMIN — ATORVASTATIN CALCIUM 20 MILLIGRAM(S): 80 TABLET, FILM COATED ORAL at 23:02

## 2022-12-10 RX ADMIN — PANTOPRAZOLE SODIUM 40 MILLIGRAM(S): 20 TABLET, DELAYED RELEASE ORAL at 18:35

## 2022-12-10 NOTE — PROGRESS NOTE ADULT - SUBJECTIVE AND OBJECTIVE BOX
Chief Complaint:  Patient is a 42y old  Male who presents with a chief complaint of Colon pneumatosis (09 Dec 2022 18:09)      HPI/ 24 hr events: Patient seen and examined at bedside, no overnight events. Pt feeling well this morning. C.diff +, on Oral vanco. Reports 2 nonbloody BMs this AM. Tolerating diet. Vitals are stable, Hemoglobin stable at 7.9gm. Denies nausea, vomiting, abdominal pain, hematemesis, hematochezia, melena.      REVIEW OF SYSTEMS:   General: Negative  HEENT: Negative  CV: Negative  Respiratory: Negative  GI: See HPI  : Negative  MSK: Negative  Hematologic: Negative  Skin: Negative    MEDICATIONS:   MEDICATIONS  (STANDING):  atorvastatin 20 milliGRAM(s) Oral at bedtime  buprenorphine 8 mG/naloxone 2 mG SL Film 1 Film(s) SubLingual daily  buPROPion XL (24-Hour) . 300 milliGRAM(s) Oral daily  chlorhexidine 2% Cloths 1 Application(s) Topical daily  chlorhexidine 4% Liquid 1 Application(s) Topical <User Schedule>  cloNIDine 0.3 milliGRAM(s) Oral three times a day  gabapentin 300 milliGRAM(s) Oral daily  labetalol 300 milliGRAM(s) Oral two times a day  lamoTRIgine 150 milliGRAM(s) Oral two times a day  levETIRAcetam  IVPB 750 milliGRAM(s) IV Intermittent every 12 hours  levothyroxine 50 MICROGram(s) Oral daily  pantoprazole  Injectable 40 milliGRAM(s) IV Push every 12 hours  QUEtiapine 400 milliGRAM(s) Oral two times a day  risperiDONE   Tablet 3 milliGRAM(s) Oral daily  sodium chloride 0.9%. 1000 milliLiter(s) (75 mL/Hr) IV Continuous <Continuous>  vancomycin    Solution 125 milliGRAM(s) Oral every 6 hours    MEDICATIONS  (PRN):  melatonin 3 milliGRAM(s) Oral at bedtime PRN Insomnia  ondansetron Injectable 4 milliGRAM(s) IV Push every 6 hours PRN Nausea  sodium chloride 0.9% lock flush 10 milliLiter(s) IV Push every 1 hour PRN Pre/post blood products, medications, blood draw, and to maintain line patency      ALLERGIES:   Allergies    No Known Allergies    Intolerances        VITAL SIGNS:   Vital Signs Last 24 Hrs  T(C): 36.8 (10 Dec 2022 11:10), Max: 37.1 (10 Dec 2022 05:28)  T(F): 98.3 (10 Dec 2022 11:10), Max: 98.8 (10 Dec 2022 05:28)  HR: 82 (10 Dec 2022 11:10) (73 - 89)  BP: 137/83 (10 Dec 2022 11:10) (124/83 - 156/88)  BP(mean): 110 (10 Dec 2022 00:18) (97 - 110)  RR: 18 (10 Dec 2022 11:10) (18 - 19)  SpO2: 96% (10 Dec 2022 11:10) (94% - 96%)    Parameters below as of 10 Dec 2022 05:28  Patient On (Oxygen Delivery Method): room air      I&O's Summary      PHYSICAL EXAM:   GENERAL:  No acute distress  HEENT:  NC/AT, conjunctiva clear, sclera anicteric  CHEST:  No increased effort, breath sounds clear  HEART:  Regular rhythm, S1, S2,   ABDOMEN:  Soft, non-tender, non-distended, normoactive bowel sounds  EXTREMITIES: No edema  SKIN:  Warm, dry  NEURO:  Calm, cooperative      LABS:  CBC Full  -  ( 09 Dec 2022 05:45 )  WBC Count : 11.05 K/uL  RBC Count : 3.02 M/uL  Hemoglobin : 7.9 g/dL  Hematocrit : 25.0 %  Platelet Count - Automated : 219 K/uL  Mean Cell Volume : 82.8 fl  Mean Cell Hemoglobin : 26.2 pg  Mean Cell Hemoglobin Concentration : 31.6 gm/dL  Auto Neutrophil # : x  Auto Lymphocyte # : x  Auto Monocyte # : x  Auto Eosinophil # : x  Auto Basophil # : x  Auto Neutrophil % : x  Auto Lymphocyte % : x  Auto Monocyte % : x  Auto Eosinophil % : x  Auto Basophil % : x    12-09    142  |  105  |  18.5  ----------------------------<  100<H>  3.8   |  27.0  |  3.12<H>    Ca    8.3<L>      09 Dec 2022 05:45  Mg     1.7     12-09              RADIOLOGY & ADDITIONAL STUDIES:        < from: CT Abdomen and Pelvis No Cont (12.02.22 @ 02:23) >    ACC: [id number] EXAM:  CT ABDOMEN AND PELVIS                          PROCEDURE DATE:  12/02/2022          INTERPRETATION:  CLINICAL INFORMATION: Acute kidney injury, history of   heroine abuse presenting with altered mental status and diarrhea    COMPARISON: CT abdomen dated 3/1/2019    CONTRAST/COMPLICATIONS:  IV Contrast: NONE  Oral Contrast: NONE  Complications: None reported at time of study completion    PROCEDURE:  CT of the Abdomen and Pelvis was performed.  Sagittal and coronal reformatswere performed.    FINDINGS:  LOWER CHEST: Within normal limits.    LIVER: Within normal limits.  BILE DUCTS: Normal caliber.  GALLBLADDER: Markedly distended up to 7 cm in transverse axis, without   stones or pericholecystic inflammation.  SPLEEN: Within normal limits.  PANCREAS: Within normal limits.  ADRENALS: Within normal limits.  KIDNEYS/URETERS: Within normal limits.    BLADDER: Within normal limits.  REPRODUCTIVE ORGANS: Prostate within normal limits.    BOWEL: Small hiatal hernia. No bowel obstruction. Appendix is not   visualized. No evidence of inflammation in the pericecal region. There is   diffuse mural thickening involving the distal transverse, descending, and   sigmoid colon as well as the rectum. There is also pneumatosis ofthe   colonic wall (series 3, image 142).  PERITONEUM: No ascites. No pneumoperitoneum.  VESSELS: Within normal limits.  RETROPERITONEUM/LYMPH NODES: No lymphadenopathy.  ABDOMINAL WALL: Within normal limits.  BONES: Left hip arthroplasty. Interval development of vertebral body   sclerosis at L1-L2 secondary to degenerative changes.      IMPRESSION:  Diffuse mural thickening of the distal colon with pneumatosis, concerning   for ischemic colitis. No evidence of free air. Correlation with serum   lactate is recommended.    Markedly distended gallbladder measuring up to 7 cm in transverse axis,   without stones or pericholecystic fluid.    Findings above and recommendations for further workup were discussed   directly by telephone by the ED radiologist on call, Pauline Bass MD,   with the emergency department attending physician, Rakan Blood M.D., at 3:15 AM on 12/2/2022.    --- End of Report ---            PAULINE BASS MD; Attending Radiologist  This document has been electronically signed. Dec  2 2022  3:27AM    < end of copied text >

## 2022-12-10 NOTE — PROGRESS NOTE ADULT - SUBJECTIVE AND OBJECTIVE BOX
Reason for visit: LIV    Subjective: No acute overnight event. Patient denied any cardiac or urinary complains. No fever/chills. Reports abdominal pain improving, accepting PO but continues to have diarrhea though it is slowing down.     ROS: All systems were reviewed in detail pertinent positive and negative mentioned above, rest are negative.    Physical Exam:  Gen: no acute distress  MS: alert, conversing normally  Eyes: EOMI, no icterus  HENT: NCAT, MMM  CV: rhythm reg reg, rate normal, no m/g/r  Chest: CTAB, no w/r/r,  Abd: soft, NT, ND  Extremities: No edema    =======================================================  Vital Signs Last 24 Hrs  T(C): 36.8 (10 Dec 2022 17:27), Max: 37.1 (10 Dec 2022 05:28)  T(F): 98.3 (10 Dec 2022 17:27), Max: 98.8 (10 Dec 2022 05:28)  HR: 77 (10 Dec 2022 17:27) (75 - 106)  BP: 149/86 (10 Dec 2022 17:27) (137/83 - 156/88)  BP(mean): 110 (10 Dec 2022 00:18) (110 - 110)  RR: 18 (10 Dec 2022 17:27) (18 - 19)  SpO2: 97% (10 Dec 2022 17:27) (94% - 97%)    Parameters below as of 10 Dec 2022 17:27  Patient On (Oxygen Delivery Method): room air      I&O's Summary    =======================================================  Current Antibiotics:  vancomycin    Solution 125 milliGRAM(s) Oral every 6 hours    Other medications:  atorvastatin 20 milliGRAM(s) Oral at bedtime  buPROPion XL (24-Hour) . 300 milliGRAM(s) Oral daily  chlorhexidine 2% Cloths 1 Application(s) Topical daily  chlorhexidine 4% Liquid 1 Application(s) Topical <User Schedule>  cloNIDine 0.3 milliGRAM(s) Oral three times a day  gabapentin 300 milliGRAM(s) Oral daily  labetalol 300 milliGRAM(s) Oral two times a day  lamoTRIgine 150 milliGRAM(s) Oral two times a day  levETIRAcetam  IVPB 750 milliGRAM(s) IV Intermittent every 12 hours  levothyroxine 50 MICROGram(s) Oral daily  pantoprazole  Injectable 40 milliGRAM(s) IV Push every 12 hours  QUEtiapine 400 milliGRAM(s) Oral two times a day  risperiDONE   Tablet 3 milliGRAM(s) Oral daily    =======================================================  12-10    141  |  104  |  12.7  ----------------------------<  113<H>  4.0   |  26.0  |  2.16<H>    Ca    8.6      10 Dec 2022 18:05  Phos  3.2     12-10  Mg     1.5     12-10      Creatinine, Serum: 2.16 mg/dL (12-10-22 @ 18:05)  Creatinine, Serum: 3.12 mg/dL (12-09-22 @ 05:45)  Creatinine, Serum: 4.47 mg/dL (12-08-22 @ 19:00)  Creatinine, Serum: 9.50 mg/dL (12-06-22 @ 19:00)      =======================================================

## 2022-12-10 NOTE — PROGRESS NOTE ADULT - ASSESSMENT
Patient is a 42 year old male with a history of bipolar disorder, substance abuse, HTN, and prior stroke who [facility] with lethargy and abdominal pain. He was found to have acute kidney injury with metabolic acidosis and hyperkalemia. CT of the abdomen noted mural thickening of the distal colon with pneumatosis for which he was admitted to the Surgical Intensive Care Unit for further management. Renal replacement therapy was initiated. noted to be positive for clostridium difficile for which vancomycin/ flagyl  was initiated. Patient was downgraded to medicine 12/4.    >isac / with hyperkalemia / now on hd   -monitor for renal recovery  - nephro following   - Serology results pending to be reviewed when available  - monitor i/os   - loera dcd 12/5 , passed tov   - rt erlin for hd , plan to chnage to permacth once completed cdiff tx     > diarrhea / Clostridium difficile colitis   - seen by Colorectal Surgery. Unlikely ischemic colitis  - dc  iv flagyl as discussed with id    - bcxs neg   - rectal tube dcd 12/5   -c/w po vancomycin last dose 12/12       > anemia / acute / possible acute blood loss   - marlon noted  - dc sq heparin  - ppi bid    - gi consult ,likely need egd/ colonoscopy after cdiff tx completed       >Bipolar disorder   -c/w  bupropion, fluoxetine, lamotrigine, quetiapine, and risperidone  -  consulted   - no psych contraindications to dc    >Substance abuse - On buprenorphine/naloxone.    >Hypertension  - c/w clonidine, labetalol, and losartan  - monitor     >Hypothyroidism  - c/w  levothyroxine    > dvt ppx: sq heparin     > dispo: remains acute , monitor h/h , plan for permacath once cdiff tx completed, last dose 12/12  , will need op hd set up  Patient is a 42 year old male with a history of bipolar disorder, substance abuse, HTN, and prior stroke who initially [facility] with lethargy and abdominal pain. He was found to have acute kidney injury with metabolic acidosis and hyperkalemia. CT of the abdomen noted mural thickening of the distal colon with pneumatosis for which he was admitted to the Surgical Intensive Care Unit for further management. Renal replacement therapy was initiated. noted to be positive for clostridium difficile for which vancomycin/ flagyl  was initiated. Patient was downgraded to medicine 12/4.    LIV due to ATN; requiring HD  -creatinine on admission 2.7 and peaked at 5.0  -dialyzed on 12/8; monitor interdialytic creatinine  -tentative plans for permacath once abx are completed  -negative serologies and normal complement levels  -imaging negative for hydro and loera was successfully removed  -maintain RIJ shiley until Permacath is placed  -daily labs  -strict I/os  -avoid nephrotoxic agents and renally dose medications  -renal recs appreciated    Clostridium difficile colitis   - persistent diarrhea but slowly improving  - continue PO vanco until 12/12  - negative blood cultures  - seen by Colorectal Surgery and was unlikely to be ischemic colitis  - outpatient EGD/colonoscopy in 6 weeks  - ID and GI recs appreciated    Anemia likely multifactorial   -Hb downtrending; repeat labs ordered stat but still not complete  -check iron studies  -FOBT positive; seen by GI  -Outpatient EGD/colonoscopy in 6 weeks as above  -Continue PPI BID  -No evidence of active bleeding    Bipolar disorder   -stable mood  -continue  bupropion, quetiapine, gabapentin and risperidone   - no psych contraindications to dc  - will clarify psych medications with  prior to discharge (risperidone is not a home medications)    Substance abuse   -continue buprenorphine/naloxone.    Hypertension  - BP stable  - c/w clonidine, labetalol, and losartan  - monitor     Hypothyroidism  -check TSH  - c/w  levothyroxine    ? Seizure Disorder  -continue keppra and lamictal     DVT ppx - SCDs, early ambulation    Dispo - Permacath once cdiff treatment is completed, last dose 12/12. Needs outpatient placement.

## 2022-12-10 NOTE — PROGRESS NOTE ADULT - ASSESSMENT
42M with a history of bipolar disorder, substance abuse, hypertension, and prior stroke who initially [facility] with lethargy and abdominal pain. Admitted with acute kidney injury with metabolic acidosis and hyperkalemia. Found to have C diff colitis. GI consult for anemia.    Normocytic anemia with CT A/P showing possible ischemic colitis likely secondary to +C.diff infection    Stable hgb of 7.9gm. No evidence of overt GI bleed on examination. Reports 2 nonbloody BMs today.   Continue Protonix daily  Diet as tolerated   Trend CBC, transfuse to Hg 7 or higher  On PO Vancomycin  for C diff, ID following  Eventual plan for EGD for further evaluation can be done as outpatient. Screening colonoscopy at the age of 44. 42M with a history of bipolar disorder, substance abuse, hypertension, and prior stroke who initially presented from [facility] with lethargy and abdominal pain. Admitted with acute kidney injury with metabolic acidosis and hyperkalemia. Found to have C diff colitis. GI consult for anemia.    Normocytic anemia with CT A/P showing possible ischemic colitis likely secondary to +C.diff infection    Stable hgb of 7.9gm. No evidence of overt GI bleed on examination. Reports 2 nonbloody BMs today.   Continue Protonix daily  Diet as tolerated   Trend CBC, transfuse to Hg 7 or higher  On PO Vancomycin  for C diff, ID following  Eventual plan for EGD/Colon for further evaluation can be done as outpatient.

## 2022-12-10 NOTE — PROGRESS NOTE ADULT - NS ATTEND AMEND GEN_ALL_CORE FT
42M with hx of HTN, CVA and IVDA p/w abdominal pain, diarrhea and lethargy. Labs were significant for metabolic acidosis, LIV and Hyperkalemia. CT abdomen showed colitis with pneumatosis. Stool positive for C diff. Started on HD. He has expected recovery. GI is following for anemia and colitis. Still has loose stool however without symptoms of hematochezia, fever, abdominal pain or vomiting. He is tolerating diet.   No labs from today  Renal is following for HD.   Continue vancomycin.   EGD/Colon as OP in 6-8 weeks time.    Monitor Hb and transfuse to keep it above 7mg/dl.  Call us with questions/concerns.

## 2022-12-10 NOTE — PROGRESS NOTE ADULT - SUBJECTIVE AND OBJECTIVE BOX
CHIEF COMPLAINT/INTERVAL HISTORY:    Patient is a 42y old  Male who presents with a chief complaint of Colon pneumatosis (10 Dec 2022 12:12)    SUBJECTIVE & OBJECTIVE: Pt seen and examined at bedside. No overnight events. Reports diarrhea is watery but improving. Repeat labs still not collected; discussed with RN to expedite. Patient denies any acute complaints.     ROS: No chest pain, palpitations, SOB, light headedness, dizziness, headache, nausea/vomiting, fevers/chills, abdominal pain, dysuria.    ICU Vital Signs Last 24 Hrs  T(C): 36.8 (10 Dec 2022 11:10), Max: 37.1 (10 Dec 2022 05:28)  T(F): 98.3 (10 Dec 2022 11:10), Max: 98.8 (10 Dec 2022 05:28)  HR: 106 (10 Dec 2022 14:17) (73 - 106)  BP: 153/100 (10 Dec 2022 14:17) (124/83 - 156/88)  BP(mean): 110 (10 Dec 2022 00:18) (97 - 110)  RR: 18 (10 Dec 2022 11:10) (18 - 19)  SpO2: 96% (10 Dec 2022 11:10) (94% - 96%)    O2 Parameters below as of 10 Dec 2022 05:28  Patient On (Oxygen Delivery Method): room air      MEDICATIONS  (STANDING):  atorvastatin 20 milliGRAM(s) Oral at bedtime  buPROPion XL (24-Hour) . 300 milliGRAM(s) Oral daily  chlorhexidine 2% Cloths 1 Application(s) Topical daily  chlorhexidine 4% Liquid 1 Application(s) Topical <User Schedule>  cloNIDine 0.3 milliGRAM(s) Oral three times a day  gabapentin 300 milliGRAM(s) Oral daily  labetalol 300 milliGRAM(s) Oral two times a day  lamoTRIgine 150 milliGRAM(s) Oral two times a day  levETIRAcetam  IVPB 750 milliGRAM(s) IV Intermittent every 12 hours  levothyroxine 50 MICROGram(s) Oral daily  pantoprazole  Injectable 40 milliGRAM(s) IV Push every 12 hours  QUEtiapine 400 milliGRAM(s) Oral two times a day  risperiDONE   Tablet 3 milliGRAM(s) Oral daily  vancomycin    Solution 125 milliGRAM(s) Oral every 6 hours    MEDICATIONS  (PRN):  melatonin 3 milliGRAM(s) Oral at bedtime PRN Insomnia  ondansetron Injectable 4 milliGRAM(s) IV Push every 6 hours PRN Nausea  sodium chloride 0.9% lock flush 10 milliLiter(s) IV Push every 1 hour PRN Pre/post blood products, medications, blood draw, and to maintain line patency      LABS:                        7.9    11.05 )-----------( 219      ( 09 Dec 2022 05:45 )             25.0     12-09    142  |  105  |  18.5  ----------------------------<  100<H>  3.8   |  27.0  |  3.12<H>    Ca    8.3<L>      09 Dec 2022 05:45  Mg     1.7     12-09      PHYSICAL EXAM:    GENERAL: middle aged male, sitting in bed, NAD  HEAD:  Atraumatic, Normocephalic  EYES: EOMI, PERRLA, conjunctiva and sclera clear  ENMT: Moist mucous membranes  NECK: Supple   NERVOUS SYSTEM:  Alert & Oriented X3, Motor Strength 5/5 B/L upper and lower extremities  CHEST/LUNG: Clear to auscultation bilaterally; No rales, rhonchi, wheezing, or rubs  HEART: Regular rate and rhythm; + S1/S2  ABDOMEN: Soft, Nontender, Nondistended; Bowel sounds present  EXTREMITIES:  no pedal edema  ACCESS: PIPO kern

## 2022-12-10 NOTE — PROGRESS NOTE ADULT - ASSESSMENT
43 YO M w/ HTN, polysubstance abuse who was brought to the ER from sober house with a 2 day history of severe non bloody diarrhea. Found to have LIV (3.6 from 1.2-1.4 b/l)and hyperkalemia w/ acidosis. CT abdomen w/o IV demonstrated pneumatosis in sigmoid colon. Patient found to be +ve for C.Diff. He required CRRT transiently for hyperkalemia    1. Acute kidney injury:  -most likely Non oliguric ATN from prolonged prerenal azotemia in setting of watery diarrhea  -serology/immunological w/u negative   -UA clear and bland  -CT abd w/ no HDN  -SCr improving, no need of HD today.   -If Scr stays same or drop further tomorrow, than recommend to d/c temp RIJ HD catheter      2. C. Diff Colitis/Diarrhea: On Abx vanc + metronidazole  3. Colonic pneumatosis: conservative Rx.

## 2022-12-11 LAB
ANION GAP SERPL CALC-SCNC: 9 MMOL/L — SIGNIFICANT CHANGE UP (ref 5–17)
ANISOCYTOSIS BLD QL: SLIGHT — SIGNIFICANT CHANGE UP
BASOPHILS # BLD AUTO: 0 K/UL — SIGNIFICANT CHANGE UP (ref 0–0.2)
BASOPHILS NFR BLD AUTO: 0 % — SIGNIFICANT CHANGE UP (ref 0–2)
BUN SERPL-MCNC: 9.8 MG/DL — SIGNIFICANT CHANGE UP (ref 8–20)
CALCIUM SERPL-MCNC: 8.3 MG/DL — LOW (ref 8.4–10.5)
CHLORIDE SERPL-SCNC: 104 MMOL/L — SIGNIFICANT CHANGE UP (ref 96–108)
CO2 SERPL-SCNC: 27 MMOL/L — SIGNIFICANT CHANGE UP (ref 22–29)
CREAT SERPL-MCNC: 1.98 MG/DL — HIGH (ref 0.5–1.3)
EGFR: 42 ML/MIN/1.73M2 — LOW
EOSINOPHIL # BLD AUTO: 0.1 K/UL — SIGNIFICANT CHANGE UP (ref 0–0.5)
EOSINOPHIL NFR BLD AUTO: 0.9 % — SIGNIFICANT CHANGE UP (ref 0–6)
FERRITIN SERPL-MCNC: 109 NG/ML — SIGNIFICANT CHANGE UP (ref 30–400)
GLUCOSE SERPL-MCNC: 110 MG/DL — HIGH (ref 70–99)
HCT VFR BLD CALC: 27 % — LOW (ref 39–50)
HCT VFR BLD CALC: 27.9 % — LOW (ref 39–50)
HGB BLD-MCNC: 8.6 G/DL — LOW (ref 13–17)
HGB BLD-MCNC: 8.9 G/DL — LOW (ref 13–17)
HYPOCHROMIA BLD QL: SLIGHT — SIGNIFICANT CHANGE UP
IRON SATN MFR SERPL: 16 % — SIGNIFICANT CHANGE UP (ref 16–55)
IRON SATN MFR SERPL: 34 UG/DL — LOW (ref 59–158)
LYMPHOCYTES # BLD AUTO: 0.99 K/UL — LOW (ref 1–3.3)
LYMPHOCYTES # BLD AUTO: 9 % — LOW (ref 13–44)
MAGNESIUM SERPL-MCNC: 1.4 MG/DL — LOW (ref 1.6–2.6)
MANUAL SMEAR VERIFICATION: SIGNIFICANT CHANGE UP
MCHC RBC-ENTMCNC: 26.9 PG — LOW (ref 27–34)
MCHC RBC-ENTMCNC: 31.9 GM/DL — LOW (ref 32–36)
MCV RBC AUTO: 84.4 FL — SIGNIFICANT CHANGE UP (ref 80–100)
MICROCYTES BLD QL: SLIGHT — SIGNIFICANT CHANGE UP
MONOCYTES # BLD AUTO: 0.5 K/UL — SIGNIFICANT CHANGE UP (ref 0–0.9)
MONOCYTES NFR BLD AUTO: 4.5 % — SIGNIFICANT CHANGE UP (ref 2–14)
MYELOCYTES NFR BLD: 0.9 % — HIGH (ref 0–0)
NEUTROPHILS # BLD AUTO: 9.33 K/UL — HIGH (ref 1.8–7.4)
NEUTROPHILS NFR BLD AUTO: 83.8 % — HIGH (ref 43–77)
NEUTS BAND # BLD: 0.9 % — SIGNIFICANT CHANGE UP (ref 0–8)
PHOSPHATE SERPL-MCNC: 3.4 MG/DL — SIGNIFICANT CHANGE UP (ref 2.4–4.7)
PLAT MORPH BLD: NORMAL — SIGNIFICANT CHANGE UP
PLATELET # BLD AUTO: 271 K/UL — SIGNIFICANT CHANGE UP (ref 150–400)
POLYCHROMASIA BLD QL SMEAR: SLIGHT — SIGNIFICANT CHANGE UP
POTASSIUM SERPL-MCNC: 3.8 MMOL/L — SIGNIFICANT CHANGE UP (ref 3.5–5.3)
POTASSIUM SERPL-SCNC: 3.8 MMOL/L — SIGNIFICANT CHANGE UP (ref 3.5–5.3)
RBC # BLD: 3.2 M/UL — LOW (ref 4.2–5.8)
RBC # FLD: 14 % — SIGNIFICANT CHANGE UP (ref 10.3–14.5)
RBC BLD AUTO: ABNORMAL
SMUDGE CELLS # BLD: PRESENT — SIGNIFICANT CHANGE UP
SODIUM SERPL-SCNC: 140 MMOL/L — SIGNIFICANT CHANGE UP (ref 135–145)
TIBC SERPL-MCNC: 212 UG/DL — LOW (ref 220–430)
TRANSFERRIN SERPL-MCNC: 148 MG/DL — LOW (ref 180–329)
TSH SERPL-MCNC: 2.37 UIU/ML — SIGNIFICANT CHANGE UP (ref 0.27–4.2)
WBC # BLD: 11.01 K/UL — HIGH (ref 3.8–10.5)
WBC # FLD AUTO: 11.01 K/UL — HIGH (ref 3.8–10.5)

## 2022-12-11 PROCEDURE — 99232 SBSQ HOSP IP/OBS MODERATE 35: CPT

## 2022-12-11 PROCEDURE — 99233 SBSQ HOSP IP/OBS HIGH 50: CPT

## 2022-12-11 RX ORDER — MAGNESIUM SULFATE 500 MG/ML
2 VIAL (ML) INJECTION ONCE
Refills: 0 | Status: COMPLETED | OUTPATIENT
Start: 2022-12-11 | End: 2022-12-11

## 2022-12-11 RX ORDER — BUPRENORPHINE AND NALOXONE 2; .5 MG/1; MG/1
1 TABLET SUBLINGUAL DAILY
Refills: 0 | Status: DISCONTINUED | OUTPATIENT
Start: 2022-12-11 | End: 2022-12-12

## 2022-12-11 RX ADMIN — LEVETIRACETAM 400 MILLIGRAM(S): 250 TABLET, FILM COATED ORAL at 13:17

## 2022-12-11 RX ADMIN — BUPROPION HYDROCHLORIDE 300 MILLIGRAM(S): 150 TABLET, EXTENDED RELEASE ORAL at 11:50

## 2022-12-11 RX ADMIN — Medication 0.3 MILLIGRAM(S): at 13:17

## 2022-12-11 RX ADMIN — Medication 125 MILLIGRAM(S): at 06:43

## 2022-12-11 RX ADMIN — CHLORHEXIDINE GLUCONATE 1 APPLICATION(S): 213 SOLUTION TOPICAL at 11:53

## 2022-12-11 RX ADMIN — BUPRENORPHINE AND NALOXONE 1 FILM(S): 2; .5 TABLET SUBLINGUAL at 13:17

## 2022-12-11 RX ADMIN — PANTOPRAZOLE SODIUM 40 MILLIGRAM(S): 20 TABLET, DELAYED RELEASE ORAL at 06:47

## 2022-12-11 RX ADMIN — ATORVASTATIN CALCIUM 20 MILLIGRAM(S): 80 TABLET, FILM COATED ORAL at 21:51

## 2022-12-11 RX ADMIN — Medication 300 MILLIGRAM(S): at 06:43

## 2022-12-11 RX ADMIN — Medication 125 MILLIGRAM(S): at 11:50

## 2022-12-11 RX ADMIN — Medication 300 MILLIGRAM(S): at 18:29

## 2022-12-11 RX ADMIN — QUETIAPINE FUMARATE 400 MILLIGRAM(S): 200 TABLET, FILM COATED ORAL at 06:43

## 2022-12-11 RX ADMIN — CHLORHEXIDINE GLUCONATE 1 APPLICATION(S): 213 SOLUTION TOPICAL at 06:21

## 2022-12-11 RX ADMIN — LAMOTRIGINE 150 MILLIGRAM(S): 25 TABLET, ORALLY DISINTEGRATING ORAL at 06:43

## 2022-12-11 RX ADMIN — Medication 125 MILLIGRAM(S): at 18:28

## 2022-12-11 RX ADMIN — Medication 25 GRAM(S): at 09:37

## 2022-12-11 RX ADMIN — GABAPENTIN 300 MILLIGRAM(S): 400 CAPSULE ORAL at 11:49

## 2022-12-11 RX ADMIN — RISPERIDONE 3 MILLIGRAM(S): 4 TABLET ORAL at 11:50

## 2022-12-11 RX ADMIN — Medication 0.3 MILLIGRAM(S): at 06:42

## 2022-12-11 RX ADMIN — LAMOTRIGINE 150 MILLIGRAM(S): 25 TABLET, ORALLY DISINTEGRATING ORAL at 18:29

## 2022-12-11 RX ADMIN — Medication 50 MICROGRAM(S): at 06:43

## 2022-12-11 RX ADMIN — PANTOPRAZOLE SODIUM 40 MILLIGRAM(S): 20 TABLET, DELAYED RELEASE ORAL at 18:29

## 2022-12-11 RX ADMIN — Medication 0.3 MILLIGRAM(S): at 21:51

## 2022-12-11 RX ADMIN — Medication 25 GRAM(S): at 18:29

## 2022-12-11 RX ADMIN — QUETIAPINE FUMARATE 400 MILLIGRAM(S): 200 TABLET, FILM COATED ORAL at 18:29

## 2022-12-11 NOTE — PROGRESS NOTE ADULT - SUBJECTIVE AND OBJECTIVE BOX
Reason for visit: LIV    Subjective: No acute overnight event. Patient denied any cardiac or urinary complains. No fever/chills. Reports abdominal pain improving, accepting PO.    ROS: All systems were reviewed in detail pertinent positive and negative mentioned above, rest are negative.    Physical Exam:  Gen: no acute distress  MS: alert, conversing normally  Eyes: EOMI, no icterus  HENT: NCAT, MMM  CV: rhythm reg reg, rate normal, no m/g/r  Chest: CTAB, no w/r/r,  Abd: soft, NT, ND  Extremities: No edema    =======================================================  Vital Signs Last 24 Hrs  T(C): 36.9 (11 Dec 2022 04:49), Max: 36.9 (11 Dec 2022 04:49)  T(F): 98.4 (11 Dec 2022 04:49), Max: 98.4 (11 Dec 2022 04:49)  HR: 82 (11 Dec 2022 04:49) (77 - 106)  BP: 150/86 (11 Dec 2022 04:49) (149/86 - 153/100)  BP(mean): --  RR: 18 (11 Dec 2022 04:49) (18 - 18)  SpO2: 98% (11 Dec 2022 04:49) (95% - 98%)    Parameters below as of 10 Dec 2022 22:57  Patient On (Oxygen Delivery Method): room air      I&O's Summary    10 Dec 2022 07:01  -  11 Dec 2022 07:00  --------------------------------------------------------  IN: 230 mL / OUT: 800 mL / NET: -570 mL      =======================================================  Current Antibiotics:  vancomycin    Solution 125 milliGRAM(s) Oral every 6 hours    Other medications:  atorvastatin 20 milliGRAM(s) Oral at bedtime  buprenorphine 8 mG/naloxone 2 mG SL Film 1 Film(s) SubLingual daily  buPROPion XL (24-Hour) . 300 milliGRAM(s) Oral daily  chlorhexidine 2% Cloths 1 Application(s) Topical daily  chlorhexidine 4% Liquid 1 Application(s) Topical <User Schedule>  cloNIDine 0.3 milliGRAM(s) Oral three times a day  gabapentin 300 milliGRAM(s) Oral daily  labetalol 300 milliGRAM(s) Oral two times a day  lamoTRIgine 150 milliGRAM(s) Oral two times a day  levETIRAcetam  IVPB 750 milliGRAM(s) IV Intermittent every 12 hours  levothyroxine 50 MICROGram(s) Oral daily  pantoprazole  Injectable 40 milliGRAM(s) IV Push every 12 hours  QUEtiapine 400 milliGRAM(s) Oral two times a day  risperiDONE   Tablet 3 milliGRAM(s) Oral daily    =======================================================  12-11    140  |  104  |  9.8  ----------------------------<  110<H>  3.8   |  27.0  |  1.98<H>    Ca    8.3<L>      11 Dec 2022 06:51  Phos  3.4     12-11  Mg     1.4     12-11      Creatinine, Serum: 1.98 mg/dL (12-11-22 @ 06:51)  Creatinine, Serum: 2.16 mg/dL (12-10-22 @ 18:05)  Creatinine, Serum: 3.12 mg/dL (12-09-22 @ 05:45)  Creatinine, Serum: 4.47 mg/dL (12-08-22 @ 19:00)  Creatinine, Serum: 9.50 mg/dL (12-06-22 @ 19:00)      =======================================================

## 2022-12-11 NOTE — PROGRESS NOTE ADULT - SUBJECTIVE AND OBJECTIVE BOX
CHIEF COMPLAINT/INTERVAL HISTORY:    Patient is a 42y old  Male who presents with a chief complaint of Colon pneumatosis (11 Dec 2022 09:54)    SUBJECTIVE & OBJECTIVE: Pt seen and examined at bedside. No overnight events. Reports feeling better. Four small BM x 24 hours; more formed. Mag repleted. Creatinine continues to downtrend; probable removal of shiley tomorrow.    ROS: No chest pain, palpitations, SOB, light headedness, dizziness, headache, nausea/vomiting, fevers/chills, abdominal pain, dysuria or increased urinary frequency.    ICU Vital Signs Last 24 Hrs  T(C): 36.8 (11 Dec 2022 14:00), Max: 36.9 (11 Dec 2022 04:49)  T(F): 98.3 (11 Dec 2022 14:00), Max: 98.4 (11 Dec 2022 04:49)  HR: 86 (11 Dec 2022 14:00) (77 - 86)  BP: 155/92 (11 Dec 2022 14:00) (149/86 - 155/100)  RR: 18 (11 Dec 2022 14:00) (18 - 18)  SpO2: 98% (11 Dec 2022 14:00) (95% - 98%)    O2 Parameters below as of 11 Dec 2022 14:00  Patient On (Oxygen Delivery Method): room air    MEDICATIONS  (STANDING):  atorvastatin 20 milliGRAM(s) Oral at bedtime  buprenorphine 8 mG/naloxone 2 mG SL Film 1 Film(s) SubLingual daily  buPROPion XL (24-Hour) . 300 milliGRAM(s) Oral daily  chlorhexidine 2% Cloths 1 Application(s) Topical daily  chlorhexidine 4% Liquid 1 Application(s) Topical <User Schedule>  cloNIDine 0.3 milliGRAM(s) Oral three times a day  gabapentin 300 milliGRAM(s) Oral daily  labetalol 300 milliGRAM(s) Oral two times a day  lamoTRIgine 150 milliGRAM(s) Oral two times a day  levETIRAcetam  IVPB 750 milliGRAM(s) IV Intermittent every 12 hours  levothyroxine 50 MICROGram(s) Oral daily  magnesium sulfate  IVPB 2 Gram(s) IV Intermittent once  pantoprazole  Injectable 40 milliGRAM(s) IV Push every 12 hours  QUEtiapine 400 milliGRAM(s) Oral two times a day  risperiDONE   Tablet 3 milliGRAM(s) Oral daily  vancomycin    Solution 125 milliGRAM(s) Oral every 6 hours    MEDICATIONS  (PRN):  melatonin 3 milliGRAM(s) Oral at bedtime PRN Insomnia  ondansetron Injectable 4 milliGRAM(s) IV Push every 6 hours PRN Nausea  sodium chloride 0.9% lock flush 10 milliLiter(s) IV Push every 1 hour PRN Pre/post blood products, medications, blood draw, and to maintain line patency      LABS:                        8.6    11.01 )-----------( 271      ( 11 Dec 2022 06:51 )             27.0     12-11    140  |  104  |  9.8  ----------------------------<  110<H>  3.8   |  27.0  |  1.98<H>    Ca    8.3<L>      11 Dec 2022 06:51  Phos  3.4     12-11  Mg     1.4     12-11    Physical Exam    GENERAL: middle aged male, sitting in bed, NAD  HEAD:  Atraumatic, Normocephalic  EYES: EOMI, PERRLA, conjunctiva and sclera clear  ENMT: Moist mucous membranes  NECK: Supple   NERVOUS SYSTEM:  Alert & Oriented X3, Motor Strength 5/5 B/L upper and lower extremities  CHEST/LUNG: Clear to auscultation bilaterally; No rales, rhonchi, wheezing, or rubs  HEART: Regular rate and rhythm; + S1/S2  ABDOMEN: Soft, Nontender, Nondistended; Bowel sounds present  EXTREMITIES:  no pedal edema  ACCESS: PIPO kern

## 2022-12-11 NOTE — PROGRESS NOTE ADULT - ASSESSMENT
Patient is a 42 year old male with a history of bipolar disorder, substance abuse, HTN, and prior stroke who initially presented from [facility] with lethargy and abdominal pain. He was found to have acute kidney injury with metabolic acidosis and hyperkalemia. CT of the abdomen noted mural thickening of the distal colon with pneumatosis for which he was admitted to the Surgical Intensive Care Unit for further management. Renal replacement therapy was initiated. noted to be positive for clostridium difficile for which vancomycin/ flagyl  was initiated. Patient was downgraded to medicine 12/4.    LIV due to ATN; requiring HD  -creatinine on admission 2.7 and peaked at 5.0  -dialyzed on 12/8; interdialytic creatinine continues to improve  -defer permacath if creatinine continues to downtrend tomorrow and remove shiley  -negative serologies and normal complement levels  -imaging negative for hydro and loera was successfully removed  -daily labs  -strict I/os  -avoid nephrotoxic agents and renally dose medications  -renal recs appreciated    Clostridium difficile colitis -slowly improving  - continue PO vanco until 12/12  - negative blood cultures  - seen by Colorectal Surgery and was unlikely to be ischemic colitis  - outpatient EGD/colonoscopy in 6 weeks  - ID and GI recs appreciated    Anemia likely multifactorial   -Hb stabilized  -iron studies reviewed  -FOBT positive; seen by GI  -No evidence of active bleeding  -Outpatient EGD/colonoscopy in 6 weeks as above  -Continue PPI BID    Bipolar disorder   - stable mood  - continue  bupropion, quetiapine, gabapentin and risperidone   - no psych contraindications to dc  - will clarify psych medications with BH prior to discharge (risperidone is not a home medications)    Substance abuse   -continue buprenorphine/naloxone.    Hypertension  - BP stable  - continue clonidine, labetalol, and losartan  - monitor     Hypothyroidism  - TSH WNL  - continue levothyroxine    Seizure Disorder  -continue keppra and lamictal     DVT ppx - SCDs, early ambulation    Dispo - Probable discharge home in 24 hours if creatinine continues to downtrend.

## 2022-12-11 NOTE — PROGRESS NOTE ADULT - ASSESSMENT
41 YO M w/ HTN, polysubstance abuse who was brought to the ER from sober house with a 2 day history of severe non bloody diarrhea. Found to have LIV (3.6 from 1.2-1.4 b/l)and hyperkalemia w/ acidosis. CT abdomen w/o IV demonstrated pneumatosis in sigmoid colon. Patient found to be +ve for C.Diff. He required CRRT transiently for hyperkalemia    1. Acute kidney injury:  -most likely Non oliguric ATN from prolonged prerenal azotemia in setting of watery diarrhea  -serology/immunological w/u negative   -UA clear and bland  -CT abd w/ no HDN  -SCr improving, do not anticipate any further needs of HD  -recommend to d/c temp RIJ HD catheter      2. C. Diff Colitis/Diarrhea: On Abx vanc + metronidazole  3. Colonic pneumatosis: conservative Rx.    Stable from neph standpoint, f/u as an outpatient in 2 weeks.  Nephrology will sign off, thanks for the consult.

## 2022-12-12 ENCOUNTER — TRANSCRIPTION ENCOUNTER (OUTPATIENT)
Age: 42
End: 2022-12-12

## 2022-12-12 VITALS
HEART RATE: 85 BPM | SYSTOLIC BLOOD PRESSURE: 149 MMHG | DIASTOLIC BLOOD PRESSURE: 90 MMHG | OXYGEN SATURATION: 95 % | RESPIRATION RATE: 19 BRPM | TEMPERATURE: 98 F

## 2022-12-12 LAB
ANION GAP SERPL CALC-SCNC: 8 MMOL/L — SIGNIFICANT CHANGE UP (ref 5–17)
BASOPHILS # BLD AUTO: 0.05 K/UL — SIGNIFICANT CHANGE UP (ref 0–0.2)
BASOPHILS NFR BLD AUTO: 0.5 % — SIGNIFICANT CHANGE UP (ref 0–2)
BUN SERPL-MCNC: 9.3 MG/DL — SIGNIFICANT CHANGE UP (ref 8–20)
CALCIUM SERPL-MCNC: 8.2 MG/DL — LOW (ref 8.4–10.5)
CHLORIDE SERPL-SCNC: 105 MMOL/L — SIGNIFICANT CHANGE UP (ref 96–108)
CO2 SERPL-SCNC: 28 MMOL/L — SIGNIFICANT CHANGE UP (ref 22–29)
CREAT SERPL-MCNC: 1.83 MG/DL — HIGH (ref 0.5–1.3)
EGFR: 46 ML/MIN/1.73M2 — LOW
EOSINOPHIL # BLD AUTO: 0.28 K/UL — SIGNIFICANT CHANGE UP (ref 0–0.5)
EOSINOPHIL NFR BLD AUTO: 2.8 % — SIGNIFICANT CHANGE UP (ref 0–6)
GLUCOSE SERPL-MCNC: 131 MG/DL — HIGH (ref 70–99)
HCT VFR BLD CALC: 25 % — LOW (ref 39–50)
HGB BLD-MCNC: 8.1 G/DL — LOW (ref 13–17)
IMM GRANULOCYTES NFR BLD AUTO: 2.4 % — HIGH (ref 0–0.9)
LYMPHOCYTES # BLD AUTO: 1.13 K/UL — SIGNIFICANT CHANGE UP (ref 1–3.3)
LYMPHOCYTES # BLD AUTO: 11.3 % — LOW (ref 13–44)
MAGNESIUM SERPL-MCNC: 1.4 MG/DL — LOW (ref 1.6–2.6)
MCHC RBC-ENTMCNC: 27.4 PG — SIGNIFICANT CHANGE UP (ref 27–34)
MCHC RBC-ENTMCNC: 32.4 GM/DL — SIGNIFICANT CHANGE UP (ref 32–36)
MCV RBC AUTO: 84.5 FL — SIGNIFICANT CHANGE UP (ref 80–100)
MONOCYTES # BLD AUTO: 0.65 K/UL — SIGNIFICANT CHANGE UP (ref 0–0.9)
MONOCYTES NFR BLD AUTO: 6.5 % — SIGNIFICANT CHANGE UP (ref 2–14)
NEUTROPHILS # BLD AUTO: 7.62 K/UL — HIGH (ref 1.8–7.4)
NEUTROPHILS NFR BLD AUTO: 76.5 % — SIGNIFICANT CHANGE UP (ref 43–77)
PHOSPHATE SERPL-MCNC: 3.4 MG/DL — SIGNIFICANT CHANGE UP (ref 2.4–4.7)
PLATELET # BLD AUTO: 299 K/UL — SIGNIFICANT CHANGE UP (ref 150–400)
POTASSIUM SERPL-MCNC: 4.2 MMOL/L — SIGNIFICANT CHANGE UP (ref 3.5–5.3)
POTASSIUM SERPL-SCNC: 4.2 MMOL/L — SIGNIFICANT CHANGE UP (ref 3.5–5.3)
RBC # BLD: 2.96 M/UL — LOW (ref 4.2–5.8)
RBC # FLD: 14.4 % — SIGNIFICANT CHANGE UP (ref 10.3–14.5)
SODIUM SERPL-SCNC: 141 MMOL/L — SIGNIFICANT CHANGE UP (ref 135–145)
WBC # BLD: 9.97 K/UL — SIGNIFICANT CHANGE UP (ref 3.8–10.5)
WBC # FLD AUTO: 9.97 K/UL — SIGNIFICANT CHANGE UP (ref 3.8–10.5)

## 2022-12-12 PROCEDURE — 99239 HOSP IP/OBS DSCHRG MGMT >30: CPT

## 2022-12-12 RX ORDER — VANCOMYCIN HCL 1 G
1 VIAL (EA) INTRAVENOUS
Qty: 1 | Refills: 0
Start: 2022-12-12 | End: 2022-12-12

## 2022-12-12 RX ORDER — GABAPENTIN 400 MG/1
1 CAPSULE ORAL
Qty: 15 | Refills: 0
Start: 2022-12-12 | End: 2022-12-26

## 2022-12-12 RX ORDER — MELOXICAM 15 MG/1
1 TABLET ORAL
Qty: 0 | Refills: 0 | DISCHARGE

## 2022-12-12 RX ORDER — ANASTROZOLE 1 MG/1
1 TABLET ORAL
Qty: 0 | Refills: 0 | DISCHARGE

## 2022-12-12 RX ORDER — LEVOTHYROXINE SODIUM 125 MCG
1 TABLET ORAL
Qty: 30 | Refills: 0
Start: 2022-12-12 | End: 2023-01-10

## 2022-12-12 RX ORDER — HYDRALAZINE HCL 50 MG
1 TABLET ORAL
Qty: 0 | Refills: 0 | DISCHARGE

## 2022-12-12 RX ORDER — MAGNESIUM SULFATE 500 MG/ML
2 VIAL (ML) INJECTION ONCE
Refills: 0 | Status: COMPLETED | OUTPATIENT
Start: 2022-12-12 | End: 2022-12-12

## 2022-12-12 RX ORDER — HYDROXYZINE HCL 10 MG
1 TABLET ORAL
Qty: 0 | Refills: 0 | DISCHARGE

## 2022-12-12 RX ORDER — AMLODIPINE BESYLATE 2.5 MG/1
1 TABLET ORAL
Qty: 0 | Refills: 0 | DISCHARGE

## 2022-12-12 RX ORDER — AMLODIPINE BESYLATE 2.5 MG/1
10 TABLET ORAL DAILY
Refills: 0 | Status: DISCONTINUED | OUTPATIENT
Start: 2022-12-12 | End: 2022-12-12

## 2022-12-12 RX ORDER — FLUOXETINE HCL 10 MG
1 CAPSULE ORAL
Qty: 0 | Refills: 0 | DISCHARGE

## 2022-12-12 RX ADMIN — LEVETIRACETAM 400 MILLIGRAM(S): 250 TABLET, FILM COATED ORAL at 13:40

## 2022-12-12 RX ADMIN — CHLORHEXIDINE GLUCONATE 1 APPLICATION(S): 213 SOLUTION TOPICAL at 06:27

## 2022-12-12 RX ADMIN — RISPERIDONE 3 MILLIGRAM(S): 4 TABLET ORAL at 12:31

## 2022-12-12 RX ADMIN — Medication 125 MILLIGRAM(S): at 06:31

## 2022-12-12 RX ADMIN — Medication 300 MILLIGRAM(S): at 06:30

## 2022-12-12 RX ADMIN — LAMOTRIGINE 150 MILLIGRAM(S): 25 TABLET, ORALLY DISINTEGRATING ORAL at 06:31

## 2022-12-12 RX ADMIN — Medication 125 MILLIGRAM(S): at 12:32

## 2022-12-12 RX ADMIN — Medication 25 GRAM(S): at 13:41

## 2022-12-12 RX ADMIN — Medication 0.3 MILLIGRAM(S): at 06:30

## 2022-12-12 RX ADMIN — CHLORHEXIDINE GLUCONATE 1 APPLICATION(S): 213 SOLUTION TOPICAL at 12:33

## 2022-12-12 RX ADMIN — AMLODIPINE BESYLATE 10 MILLIGRAM(S): 2.5 TABLET ORAL at 15:38

## 2022-12-12 RX ADMIN — QUETIAPINE FUMARATE 400 MILLIGRAM(S): 200 TABLET, FILM COATED ORAL at 06:31

## 2022-12-12 RX ADMIN — BUPRENORPHINE AND NALOXONE 1 FILM(S): 2; .5 TABLET SUBLINGUAL at 12:31

## 2022-12-12 RX ADMIN — Medication 0.3 MILLIGRAM(S): at 13:42

## 2022-12-12 RX ADMIN — LEVETIRACETAM 400 MILLIGRAM(S): 250 TABLET, FILM COATED ORAL at 00:20

## 2022-12-12 RX ADMIN — BUPROPION HYDROCHLORIDE 300 MILLIGRAM(S): 150 TABLET, EXTENDED RELEASE ORAL at 12:31

## 2022-12-12 RX ADMIN — Medication 125 MILLIGRAM(S): at 00:20

## 2022-12-12 RX ADMIN — PANTOPRAZOLE SODIUM 40 MILLIGRAM(S): 20 TABLET, DELAYED RELEASE ORAL at 06:31

## 2022-12-12 RX ADMIN — GABAPENTIN 300 MILLIGRAM(S): 400 CAPSULE ORAL at 12:31

## 2022-12-12 RX ADMIN — Medication 50 MICROGRAM(S): at 06:31

## 2022-12-12 NOTE — PROGRESS NOTE ADULT - SUBJECTIVE AND OBJECTIVE BOX
Vascular Surgery   Brief PA note    Request for removal of temporary HD catheter  Right IJ catheter reportedly in not indicated for treatment  Labs/vitals/anticoagulants reviewed    Right IJ catheter removed  manual pressure applied for 10 minutes  no hemorrhage     --bed rest for 2 hrs  -- monitor site for hemorrhage, please call us if needed

## 2022-12-12 NOTE — DISCHARGE NOTE PROVIDER - PROVIDER TOKENS
PROVIDER:[TOKEN:[[id number]],FOLLOWUP:[1 month]],PROVIDER:[TOKEN:[[id number]],FOLLOWUP:[1 month]],PROVIDER:[TOKEN:[[id number]],FOLLOWUP:[1 month]],FREE:[LAST:[PCP],PHONE:[(   )    -],FAX:[(   )    -],FOLLOWUP:[1 month]]

## 2022-12-12 NOTE — DISCHARGE NOTE PROVIDER - NSDCMRMEDTOKEN_GEN_ALL_CORE_FT
ALPRAZolam 1 mg oral tablet: 1 tab(s) orally 2 times a day, As Needed  amLODIPine 10 mg oral tablet: 1 tab(s) orally once a day  anastrozole 1 mg oral tablet: 1 tab(s) orally once a day  atorvastatin 20 mg oral tablet: 1 tab(s) orally once a day (at bedtime)  cloNIDine 0.3 mg oral tablet: 1 tab(s) orally 4 times a day  doxycycline hyclate 100 mg oral tablet: 1 tab(s) orally 2 times a day   FLUoxetine 40 mg oral capsule: 1 cap(s) orally once a day  gabapentin 800 mg oral tablet: 1 tab(s) orally every 6 hours  hydrALAZINE 50 mg oral tablet: 1 tab(s) orally 3 times a day  hydrOXYzine hydrochloride 50 mg oral tablet: 1 tab(s) orally 2 times a day, As Needed  labetalol 300 mg oral tablet: 1 tab(s) orally 2 times a day  LaMICtal 150 mg oral tablet: 1 tab(s) orally 2 times a day  levETIRAcetam 750 mg oral tablet: 1 tab(s) orally 2 times a day  losartan 100 mg oral tablet: 1 tab(s) orally once a day  meloxicam 15 mg oral tablet: 1 tab(s) orally once a day  pantoprazole 40 mg oral delayed release tablet: 1 tab(s) orally once a day (before a meal)  QUEtiapine 400 mg oral tablet, extended release: 1 tab(s) orally 2 times a day  risperiDONE 3 mg oral tablet: 1 tab(s) orally once a day  Suboxone 8 mg-2 mg sublingual film: 3 film(s) sublingual once a day  Testosterone Cypionate 200 mg/mL intramuscular solution: 1.5 milliliter(s) intramuscular once a week  Ventolin HFA 90 mcg/inh inhalation aerosol: 2 puff(s) inhaled every 6 hours   Wellbutrin  mg/24 hours oral tablet, extended release: 1 tab(s) orally every 24 hours   atorvastatin 20 mg oral tablet: 1 tab(s) orally once a day (at bedtime)  cloNIDine 0.3 mg oral tablet: 1 tab(s) orally 3 times a day  gabapentin 300 mg oral capsule: 1 cap(s) orally once a day   hydrALAZINE 50 mg oral tablet: 1 tab(s) orally 3 times a day  labetalol 300 mg oral tablet: 1 tab(s) orally 2 times a day  LaMICtal 150 mg oral tablet: 1 tab(s) orally 2 times a day  levETIRAcetam 750 mg oral tablet: 1 tab(s) orally 2 times a day  levothyroxine 50 mcg (0.05 mg) oral tablet: 1 tab(s) orally once a day  Norvasc 10 mg oral tablet: 1 tab(s) orally once a day  pantoprazole 40 mg oral delayed release tablet: 1 tab(s) orally once a day (before a meal)  QUEtiapine 400 mg oral tablet, extended release: 1 tab(s) orally 2 times a day  risperiDONE 3 mg oral tablet: 1 tab(s) orally once a day  Suboxone 8 mg-2 mg sublingual film: 3 film(s) sublingual once a day  vancomycin 125 mg oral capsule: 1 cap(s) orally once a day; please take at 8 PM tonight  Wellbutrin  mg/24 hours oral tablet, extended release: 1 tab(s) orally every 24 hours

## 2022-12-12 NOTE — DISCHARGE NOTE NURSING/CASE MANAGEMENT/SOCIAL WORK - NSDCPEEMAIL_GEN_ALL_CORE
[facility] for Tobacco Control email [email or web address]

## 2022-12-12 NOTE — DISCHARGE NOTE PROVIDER - CARE PROVIDER_API CALL
Higinio Arango)  Internal Medicine; Nephrology  [address] [place] [address]  Phone: [phone]  Fax: [phone]  Follow Up Time: 1 month    Sofiya Guadalupe)  Gastroenterology  [address] [place] [id number]  Phone: [phone]  Fax: [phone]  Follow Up Time: 1 month    Tory Ramirez)  Infectious Disease; Internal Medicine  [address] [place] [address]  Phone: [phone]  Fax: [phone]  Follow Up Time: 1 month    PCP,   Phone: (   )    -  Fax: (   )    -  Follow Up Time: 1 month

## 2022-12-12 NOTE — DISCHARGE NOTE NURSING/CASE MANAGEMENT/SOCIAL WORK - NSDCPEWEB_GEN_ALL_CORE
[facility] for Tobacco Control website --- [email or web address] --- [email or web address]

## 2022-12-12 NOTE — DISCHARGE NOTE PROVIDER - NSDCCPCAREPLAN_GEN_ALL_CORE_FT
PRINCIPAL DISCHARGE DIAGNOSIS  Diagnosis: C. difficile diarrhea  Assessment and Plan of Treatment: On admission a CT was performed which showed pneumatosis (gasin the bowel). There were concerns for possible ischemic colitis but was deemed to be unlikely by the colorectal surgery team. You were given oral Vancomycine to treat this infection. Blood cultures were drawn and were negative for a systemic infection. Please follow up in 6 weeks with outpatient GI for a EGD/colonoscopy        SECONDARY DISCHARGE DIAGNOSES  Diagnosis: Acute renal failure  Assessment and Plan of Treatment: On admission your creatinine was 2.7 and peaked at 5.0. This acute jump in your kidney functions required emergent dialylsis on 12/8. A shiley was placed by the vascular surgery team. A permacath was considered but defered due to your improving creatinine levels. Imaging of your kidneys were negative for acute conditions.    Diagnosis: Anemia of chronic disease  Assessment and Plan of Treatment: Your hemoglobin is stable. A fecal blood sample was collected and was positive. GI was consulted and did not find any evidence of an acute bleed. Please follow up for an outpatient EGD/colonoscopy in 6 weeks. Continue PPI.    Diagnosis: Bipolar disorder  Assessment and Plan of Treatment: Continue Bupropion, Quetiapine, Gabapentin and Risperidone.    Diagnosis: Substance abuse  Assessment and Plan of Treatment: Continue Buprenorphine/Naloxone.    Diagnosis: HTN (hypertension)  Assessment and Plan of Treatment: Continue Clonidine, Labetalol, and Losartan.    Diagnosis: Hypothyroidism  Assessment and Plan of Treatment: Continue levothyroxine    Diagnosis: Seizure  Assessment and Plan of Treatment: Continue Keppra and Lamictal     PRINCIPAL DISCHARGE DIAGNOSIS  Diagnosis: C. difficile diarrhea  Assessment and Plan of Treatment: On admission a CT was performed which showed pneumatosis (gasin the bowel). There were concerns for possible ischemic colitis but was deemed to be unlikely by the colorectal surgery team. You were given oral Vancomycine to treat this infection. Please take the last dose at 8 pm tonight. Blood cultures were drawn and were negative for a systemic infection. Please follow up in 6 weeks with outpatient GI for a EGD/colonoscopy        SECONDARY DISCHARGE DIAGNOSES  Diagnosis: Acute renal failure  Assessment and Plan of Treatment: On admission your creatinine was 2.7 and peaked at 5.0. This acute jump in your kidney functions required emergent dialylsis on 12/8. A shiley was placed by the vascular surgery team. A permacath was considered but defered due to your improving creatinine levels. Imaging of your kidneys were negative for acute conditions. Please follow up with your PCP within 5 days for repeat labs. Please do not take any NSAIDS or losartan.    Diagnosis: Anemia of chronic disease  Assessment and Plan of Treatment: Your hemoglobin is stable. A fecal blood sample was collected and was positive. GI was consulted and did not find any evidence of an acute bleed. Please follow up for an outpatient EGD/colonoscopy in 6 weeks. Continue PPI. Avoid all NSAIDS.    Diagnosis: Bipolar disorder  Assessment and Plan of Treatment: Continue Bupropion, Quetiapine, Gabapentin and Risperidone.  Follow up with your psychiatric as scheduled.    Diagnosis: Substance abuse  Assessment and Plan of Treatment: Continue Buprenorphine/Naloxone. Follow up at your clinic as scheduled.    Diagnosis: HTN (hypertension)  Assessment and Plan of Treatment: Continue Clonidine, Labetalol, Hydralazine and Norvasc.  Follow up with your PCP within 5 days.    Diagnosis: Hypothyroidism  Assessment and Plan of Treatment: Continue levothyroxine and repeat TFTs in 4-6 weeks.    Diagnosis: Seizure  Assessment and Plan of Treatment: Continue Keppra and Lamictal

## 2022-12-12 NOTE — PROGRESS NOTE ADULT - REASON FOR ADMISSION
Colon pneumatosis
r/u ischemic colitis

## 2022-12-12 NOTE — DISCHARGE NOTE PROVIDER - HOSPITAL COURSE
42 year old male with a past medical history of bipolar disorder, HTN, GERD, and polysubstance abuse who was brought to the ER from a sober house with a 2 day history of severe diarrhea. Patient arrived to the ER and had multiple episodes of non bloody diarrhea. Found to have LIV and given 1L of IV fluid. CT abdomen was ordered which demonstrated pneumatosis in the sigmoid colon. Colorectal surgery was consulted for the mentioned findings and patient was evaluated. He was found to be in no distress complaining of minimal lower abdominal pain and persistent diarrhea. Patient's renal failure worsened to a creatinine of 5 with hyperkalemic with associated chest pain and ECG changes (peak t waves). Patient was upgraded to SICU for emergent dialysis. Renal replacement therapy was initiated. He was also noted to be positive for clostridium difficile for which vancomycin/flagyl  was initiated. Patient was stabilized and downgraded to medicine 12/4. Renal function and Cdiff have been improving. Patient is medically stable for discharge.     Patient is a 42 year old male with a past medical history of bipolar disorder, HTN, GERD, and polysubstance abuse who was brought to the ER from a sober house with a 2 day history of severe diarrhea. Patient arrived to the ER and had multiple episodes of non bloody diarrhea. Found to have LIV and given 1L of IV fluid. CT abdomen was ordered which demonstrated pneumatosis in the sigmoid colon. Colorectal surgery was consulted for the mentioned findings and patient was evaluated. He was found to be in no distress complaining of minimal lower abdominal pain and persistent diarrhea. Patient's renal failure worsened to a creatinine of 5 with hyperkalemic with associated chest pain and ECG changes (peak t waves). Patient was upgraded to ICU for emergent dialysis. Renal replacement therapy was initiated. He was also noted to be positive for clostridium difficile for which vancomycin/flagyl  was initiated. Patient was stabilized and downgraded to medicine 12/4. Renal function and Cdiff have been improving. Shiley was removed today. Patient reports diarrhea is less frequent and more formed. Last dose of vanco tonight. Patient is medically stable for discharge.

## 2022-12-12 NOTE — DISCHARGE NOTE PROVIDER - ATTENDING DISCHARGE PHYSICAL EXAMINATION:
Physical Exam    GENERAL: middle aged male, sitting in bed, NAD  HEAD:  Atraumatic, Normocephalic  EYES: EOMI, PERRLA, conjunctiva and sclera clear  ENMT: Moist mucous membranes  NECK: Supple   NERVOUS SYSTEM:  Alert & Oriented X3, Motor Strength 5/5 B/L upper and lower extremities  CHEST/LUNG: Clear to auscultation bilaterally; No rales, rhonchi, wheezing, or rubs  HEART: Regular rate and rhythm; + S1/S2  ABDOMEN: Soft, Nontender, Nondistended; Bowel sounds present  EXTREMITIES:  no pedal edema

## 2022-12-12 NOTE — DISCHARGE NOTE NURSING/CASE MANAGEMENT/SOCIAL WORK - NSDCVIVACCINE_GEN_ALL_CORE_FT
Tdap; 02-Mar-2019 00:55; Juan Miguel Cortez); [employer]; [id number] (Exp. Date: 20-Nov-2020); IntraMuscular; Deltoid Left.; 0.5 milliLiter(s); VIS (VIS Published: 09-May-2013, VIS Presented: 02-Mar-2019);

## 2022-12-12 NOTE — DISCHARGE NOTE NURSING/CASE MANAGEMENT/SOCIAL WORK - NSDCPEFALRISK_GEN_ALL_CORE
For information on Fall & Injury Prevention, visit: [email or web address] OR  [email or web address] OR  https://www.cdc.gov/steadi/patient.html

## 2022-12-12 NOTE — PROGRESS NOTE ADULT - PROVIDER SPECIALTY LIST ADULT
Colorectal Surgery
Gastroenterology
Hospitalist
Nephrology
SICU
Vascular Surgery
Colorectal Surgery
Colorectal Surgery
Hospitalist
Hospitalist
Nephrology
SICU
Hospitalist
Hospitalist
Infectious Disease
Nephrology
Nephrology
Colorectal Surgery
Hospitalist
Hospitalist
Infectious Disease
Nephrology
Nephrology
Hospitalist

## 2022-12-12 NOTE — DISCHARGE NOTE NURSING/CASE MANAGEMENT/SOCIAL WORK - PATIENT PORTAL LINK FT
You can access the FollowMyHealth Patient Portal offered by [facility] by registering at the following website: [email or web address]. By joining [facility]’s FollowMyHealth portal, you will also be able to view your health information using other applications (apps) compatible with our system.

## 2022-12-22 ENCOUNTER — INPATIENT (INPATIENT)
Facility: HOSPITAL | Age: 42
LOS: 5 days | Discharge: HOME | DRG: 391 | End: 2022-12-28
Attending: STUDENT IN AN ORGANIZED HEALTH CARE EDUCATION/TRAINING PROGRAM | Admitting: INTERNAL MEDICINE
Payer: MEDICARE

## 2022-12-22 VITALS
DIASTOLIC BLOOD PRESSURE: 56 MMHG | RESPIRATION RATE: 18 BRPM | OXYGEN SATURATION: 93 % | SYSTOLIC BLOOD PRESSURE: 84 MMHG | HEIGHT: 70 IN | HEART RATE: 80 BPM | WEIGHT: 184.97 LBS | TEMPERATURE: 99 F

## 2022-12-22 DIAGNOSIS — K52.9 NONINFECTIVE GASTROENTERITIS AND COLITIS, UNSPECIFIED: ICD-10-CM

## 2022-12-22 DIAGNOSIS — Z96.653 PRESENCE OF ARTIFICIAL KNEE JOINT, BILATERAL: Chronic | ICD-10-CM

## 2022-12-22 DIAGNOSIS — Z96.649 PRESENCE OF UNSPECIFIED ARTIFICIAL HIP JOINT: Chronic | ICD-10-CM

## 2022-12-22 LAB
ALBUMIN SERPL ELPH-MCNC: 2.9 G/DL — LOW (ref 3.3–5.2)
ALLERGY+IMMUNOLOGY DIAG STUDY NOTE: SIGNIFICANT CHANGE UP
ALP SERPL-CCNC: 83 U/L — SIGNIFICANT CHANGE UP (ref 40–120)
ALT FLD-CCNC: 22 U/L — SIGNIFICANT CHANGE UP
ANION GAP SERPL CALC-SCNC: 8 MMOL/L — SIGNIFICANT CHANGE UP (ref 5–17)
ANISOCYTOSIS BLD QL: SLIGHT — SIGNIFICANT CHANGE UP
APTT BLD: 19.4 SEC — LOW (ref 27.5–35.5)
AST SERPL-CCNC: 25 U/L — SIGNIFICANT CHANGE UP
BASOPHILS # BLD AUTO: 0.25 K/UL — HIGH (ref 0–0.2)
BASOPHILS NFR BLD AUTO: 3.5 % — HIGH (ref 0–2)
BILIRUB SERPL-MCNC: 0.4 MG/DL — SIGNIFICANT CHANGE UP (ref 0.4–2)
BLD GP AB SCN SERPL QL: SIGNIFICANT CHANGE UP
BUN SERPL-MCNC: 12.8 MG/DL — SIGNIFICANT CHANGE UP (ref 8–20)
CALCIUM SERPL-MCNC: 8.7 MG/DL — SIGNIFICANT CHANGE UP (ref 8.4–10.5)
CHLORIDE SERPL-SCNC: 102 MMOL/L — SIGNIFICANT CHANGE UP (ref 96–108)
CO2 SERPL-SCNC: 29 MMOL/L — SIGNIFICANT CHANGE UP (ref 22–29)
CREAT SERPL-MCNC: 1.93 MG/DL — HIGH (ref 0.5–1.3)
DIR ANTIGLOB POLYSPECIFIC INTERPRETATION: SIGNIFICANT CHANGE UP
EGFR: 44 ML/MIN/1.73M2 — LOW
EOSINOPHIL # BLD AUTO: 0.31 K/UL — SIGNIFICANT CHANGE UP (ref 0–0.5)
EOSINOPHIL NFR BLD AUTO: 4.4 % — SIGNIFICANT CHANGE UP (ref 0–6)
GIANT PLATELETS BLD QL SMEAR: PRESENT — SIGNIFICANT CHANGE UP
GLUCOSE SERPL-MCNC: 116 MG/DL — HIGH (ref 70–99)
HCT VFR BLD CALC: 26 % — LOW (ref 39–50)
HGB BLD-MCNC: 8.2 G/DL — LOW (ref 13–17)
HYPOCHROMIA BLD QL: SLIGHT — SIGNIFICANT CHANGE UP
INR BLD: 1.2 RATIO — HIGH (ref 0.88–1.16)
LIDOCAIN IGE QN: 32 U/L — SIGNIFICANT CHANGE UP (ref 22–51)
LYMPHOCYTES # BLD AUTO: 1.01 K/UL — SIGNIFICANT CHANGE UP (ref 1–3.3)
LYMPHOCYTES # BLD AUTO: 14.2 % — SIGNIFICANT CHANGE UP (ref 13–44)
MANUAL SMEAR VERIFICATION: SIGNIFICANT CHANGE UP
MCHC RBC-ENTMCNC: 26.6 PG — LOW (ref 27–34)
MCHC RBC-ENTMCNC: 31.5 GM/DL — LOW (ref 32–36)
MCV RBC AUTO: 84.4 FL — SIGNIFICANT CHANGE UP (ref 80–100)
METAMYELOCYTES # FLD: 3.5 % — HIGH (ref 0–0)
MICROCYTES BLD QL: SLIGHT — SIGNIFICANT CHANGE UP
MONOCYTES # BLD AUTO: 0.75 K/UL — SIGNIFICANT CHANGE UP (ref 0–0.9)
MONOCYTES NFR BLD AUTO: 10.6 % — SIGNIFICANT CHANGE UP (ref 2–14)
MYELOCYTES NFR BLD: 2.7 % — HIGH (ref 0–0)
NEUTROPHILS # BLD AUTO: 4.2 K/UL — SIGNIFICANT CHANGE UP (ref 1.8–7.4)
NEUTROPHILS NFR BLD AUTO: 57.5 % — SIGNIFICANT CHANGE UP (ref 43–77)
NEUTS BAND # BLD: 1.8 % — SIGNIFICANT CHANGE UP (ref 0–8)
OVALOCYTES BLD QL SMEAR: SLIGHT — SIGNIFICANT CHANGE UP
PLAT MORPH BLD: NORMAL — SIGNIFICANT CHANGE UP
PLATELET # BLD AUTO: 210 K/UL — SIGNIFICANT CHANGE UP (ref 150–400)
POIKILOCYTOSIS BLD QL AUTO: SLIGHT — SIGNIFICANT CHANGE UP
POLYCHROMASIA BLD QL SMEAR: SLIGHT — SIGNIFICANT CHANGE UP
POTASSIUM SERPL-MCNC: 4.5 MMOL/L — SIGNIFICANT CHANGE UP (ref 3.5–5.3)
POTASSIUM SERPL-SCNC: 4.5 MMOL/L — SIGNIFICANT CHANGE UP (ref 3.5–5.3)
PROT SERPL-MCNC: 5.5 G/DL — LOW (ref 6.6–8.7)
PROTHROM AB SERPL-ACNC: 13.9 SEC — HIGH (ref 10.5–13.4)
RAPID RVP RESULT: SIGNIFICANT CHANGE UP
RBC # BLD: 3.08 M/UL — LOW (ref 4.2–5.8)
RBC # FLD: 15.2 % — HIGH (ref 10.3–14.5)
RBC BLD AUTO: ABNORMAL
SARS-COV-2 RNA SPEC QL NAA+PROBE: SIGNIFICANT CHANGE UP
SODIUM SERPL-SCNC: 139 MMOL/L — SIGNIFICANT CHANGE UP (ref 135–145)
STOMATOCYTES BLD QL SMEAR: SLIGHT — SIGNIFICANT CHANGE UP
VARIANT LYMPHS # BLD: 1.8 % — SIGNIFICANT CHANGE UP (ref 0–6)
WBC # BLD: 7.09 K/UL — SIGNIFICANT CHANGE UP (ref 3.8–10.5)
WBC # FLD AUTO: 7.09 K/UL — SIGNIFICANT CHANGE UP (ref 3.8–10.5)

## 2022-12-22 PROCEDURE — 82962 GLUCOSE BLOOD TEST: CPT

## 2022-12-22 PROCEDURE — 82553 CREATINE MB FRACTION: CPT

## 2022-12-22 PROCEDURE — 85610 PROTHROMBIN TIME: CPT

## 2022-12-22 PROCEDURE — 71045 X-RAY EXAM CHEST 1 VIEW: CPT

## 2022-12-22 PROCEDURE — 86803 HEPATITIS C AB TEST: CPT

## 2022-12-22 PROCEDURE — 82550 ASSAY OF CK (CPK): CPT

## 2022-12-22 PROCEDURE — 86900 BLOOD TYPING SEROLOGIC ABO: CPT

## 2022-12-22 PROCEDURE — 85730 THROMBOPLASTIN TIME PARTIAL: CPT

## 2022-12-22 PROCEDURE — 87086 URINE CULTURE/COLONY COUNT: CPT

## 2022-12-22 PROCEDURE — 84156 ASSAY OF PROTEIN URINE: CPT

## 2022-12-22 PROCEDURE — 86705 HEP B CORE ANTIBODY IGM: CPT

## 2022-12-22 PROCEDURE — 96375 TX/PRO/DX INJ NEW DRUG ADDON: CPT

## 2022-12-22 PROCEDURE — 86160 COMPLEMENT ANTIGEN: CPT

## 2022-12-22 PROCEDURE — 86901 BLOOD TYPING SEROLOGIC RH(D): CPT

## 2022-12-22 PROCEDURE — 99223 1ST HOSP IP/OBS HIGH 75: CPT

## 2022-12-22 PROCEDURE — 36600 WITHDRAWAL OF ARTERIAL BLOOD: CPT

## 2022-12-22 PROCEDURE — P9016: CPT

## 2022-12-22 PROCEDURE — 84295 ASSAY OF SERUM SODIUM: CPT

## 2022-12-22 PROCEDURE — 99285 EMERGENCY DEPT VISIT HI MDM: CPT | Mod: 25

## 2022-12-22 PROCEDURE — 83605 ASSAY OF LACTIC ACID: CPT

## 2022-12-22 PROCEDURE — 87045 FECES CULTURE AEROBIC BACT: CPT

## 2022-12-22 PROCEDURE — 87640 STAPH A DNA AMP PROBE: CPT

## 2022-12-22 PROCEDURE — 83036 HEMOGLOBIN GLYCOSYLATED A1C: CPT

## 2022-12-22 PROCEDURE — 82436 ASSAY OF URINE CHLORIDE: CPT

## 2022-12-22 PROCEDURE — 36430 TRANSFUSION BLD/BLD COMPNT: CPT

## 2022-12-22 PROCEDURE — 84132 ASSAY OF SERUM POTASSIUM: CPT

## 2022-12-22 PROCEDURE — 83521 IG LIGHT CHAINS FREE EACH: CPT

## 2022-12-22 PROCEDURE — 84155 ASSAY OF PROTEIN SERUM: CPT

## 2022-12-22 PROCEDURE — 86923 COMPATIBILITY TEST ELECTRIC: CPT

## 2022-12-22 PROCEDURE — 93005 ELECTROCARDIOGRAM TRACING: CPT

## 2022-12-22 PROCEDURE — 36000 PLACE NEEDLE IN VEIN: CPT | Mod: GC

## 2022-12-22 PROCEDURE — 87637 SARSCOV2&INF A&B&RSV AMP PRB: CPT

## 2022-12-22 PROCEDURE — 99261: CPT

## 2022-12-22 PROCEDURE — 70450 CT HEAD/BRAIN W/O DYE: CPT | Mod: MA

## 2022-12-22 PROCEDURE — 84100 ASSAY OF PHOSPHORUS: CPT

## 2022-12-22 PROCEDURE — 84165 PROTEIN E-PHORESIS SERUM: CPT

## 2022-12-22 PROCEDURE — 86704 HEP B CORE ANTIBODY TOTAL: CPT

## 2022-12-22 PROCEDURE — 86850 RBC ANTIBODY SCREEN: CPT

## 2022-12-22 PROCEDURE — 99285 EMERGENCY DEPT VISIT HI MDM: CPT

## 2022-12-22 PROCEDURE — 86902 BLOOD TYPE ANTIGEN DONOR EA: CPT

## 2022-12-22 PROCEDURE — 87340 HEPATITIS B SURFACE AG IA: CPT

## 2022-12-22 PROCEDURE — 85018 HEMOGLOBIN: CPT

## 2022-12-22 PROCEDURE — 82330 ASSAY OF CALCIUM: CPT

## 2022-12-22 PROCEDURE — 87641 MR-STAPH DNA AMP PROBE: CPT

## 2022-12-22 PROCEDURE — 86225 DNA ANTIBODY NATIVE: CPT

## 2022-12-22 PROCEDURE — 84300 ASSAY OF URINE SODIUM: CPT

## 2022-12-22 PROCEDURE — 83550 IRON BINDING TEST: CPT

## 2022-12-22 PROCEDURE — 83540 ASSAY OF IRON: CPT

## 2022-12-22 PROCEDURE — 87046 STOOL CULTR AEROBIC BACT EA: CPT

## 2022-12-22 PROCEDURE — 86905 BLOOD TYPING RBC ANTIGENS: CPT

## 2022-12-22 PROCEDURE — 84484 ASSAY OF TROPONIN QUANT: CPT

## 2022-12-22 PROCEDURE — 86780 TREPONEMA PALLIDUM: CPT

## 2022-12-22 PROCEDURE — 80048 BASIC METABOLIC PNL TOTAL CA: CPT

## 2022-12-22 PROCEDURE — 82043 UR ALBUMIN QUANTITATIVE: CPT

## 2022-12-22 PROCEDURE — 82435 ASSAY OF BLOOD CHLORIDE: CPT

## 2022-12-22 PROCEDURE — 74176 CT ABD & PELVIS W/O CONTRAST: CPT | Mod: MA

## 2022-12-22 PROCEDURE — 86706 HEP B SURFACE ANTIBODY: CPT

## 2022-12-22 PROCEDURE — 80061 LIPID PANEL: CPT

## 2022-12-22 PROCEDURE — 96374 THER/PROPH/DIAG INJ IV PUSH: CPT

## 2022-12-22 PROCEDURE — 80053 COMPREHEN METABOLIC PANEL: CPT

## 2022-12-22 PROCEDURE — 87040 BLOOD CULTURE FOR BACTERIA: CPT

## 2022-12-22 PROCEDURE — 87077 CULTURE AEROBIC IDENTIFY: CPT

## 2022-12-22 PROCEDURE — 80307 DRUG TEST PRSMV CHEM ANLYZR: CPT

## 2022-12-22 PROCEDURE — 82947 ASSAY GLUCOSE BLOOD QUANT: CPT

## 2022-12-22 PROCEDURE — 85014 HEMATOCRIT: CPT

## 2022-12-22 PROCEDURE — 86255 FLUORESCENT ANTIBODY SCREEN: CPT

## 2022-12-22 PROCEDURE — 82728 ASSAY OF FERRITIN: CPT

## 2022-12-22 PROCEDURE — 82272 OCCULT BLD FECES 1-3 TESTS: CPT

## 2022-12-22 PROCEDURE — 85025 COMPLETE CBC W/AUTO DIFF WBC: CPT

## 2022-12-22 PROCEDURE — 85027 COMPLETE CBC AUTOMATED: CPT

## 2022-12-22 PROCEDURE — 84466 ASSAY OF TRANSFERRIN: CPT

## 2022-12-22 PROCEDURE — 36415 COLL VENOUS BLD VENIPUNCTURE: CPT

## 2022-12-22 PROCEDURE — 87493 C DIFF AMPLIFIED PROBE: CPT

## 2022-12-22 PROCEDURE — 94640 AIRWAY INHALATION TREATMENT: CPT

## 2022-12-22 PROCEDURE — 83935 ASSAY OF URINE OSMOLALITY: CPT

## 2022-12-22 PROCEDURE — 86703 HIV-1/HIV-2 1 RESULT ANTBDY: CPT

## 2022-12-22 PROCEDURE — 86334 IMMUNOFIX E-PHORESIS SERUM: CPT

## 2022-12-22 PROCEDURE — 83735 ASSAY OF MAGNESIUM: CPT

## 2022-12-22 PROCEDURE — 80076 HEPATIC FUNCTION PANEL: CPT

## 2022-12-22 PROCEDURE — 86038 ANTINUCLEAR ANTIBODIES: CPT

## 2022-12-22 PROCEDURE — 87177 OVA AND PARASITES SMEARS: CPT

## 2022-12-22 PROCEDURE — 82570 ASSAY OF URINE CREATININE: CPT

## 2022-12-22 PROCEDURE — 83516 IMMUNOASSAY NONANTIBODY: CPT

## 2022-12-22 PROCEDURE — 82803 BLOOD GASES ANY COMBINATION: CPT

## 2022-12-22 PROCEDURE — 81001 URINALYSIS AUTO W/SCOPE: CPT

## 2022-12-22 PROCEDURE — 74176 CT ABD & PELVIS W/O CONTRAST: CPT | Mod: 26,MA

## 2022-12-22 PROCEDURE — 86036 ANCA SCREEN EACH ANTIBODY: CPT

## 2022-12-22 PROCEDURE — 84443 ASSAY THYROID STIM HORMONE: CPT

## 2022-12-22 RX ORDER — BUPRENORPHINE AND NALOXONE 2; .5 MG/1; MG/1
3 TABLET SUBLINGUAL DAILY
Refills: 0 | Status: DISCONTINUED | OUTPATIENT
Start: 2022-12-22 | End: 2022-12-23

## 2022-12-22 RX ORDER — ATORVASTATIN CALCIUM 80 MG/1
20 TABLET, FILM COATED ORAL AT BEDTIME
Refills: 0 | Status: DISCONTINUED | OUTPATIENT
Start: 2022-12-22 | End: 2022-12-28

## 2022-12-22 RX ORDER — LEVOTHYROXINE SODIUM 125 MCG
50 TABLET ORAL DAILY
Refills: 0 | Status: DISCONTINUED | OUTPATIENT
Start: 2022-12-22 | End: 2022-12-28

## 2022-12-22 RX ORDER — VANCOMYCIN HCL 1 G
125 VIAL (EA) INTRAVENOUS ONCE
Refills: 0 | Status: COMPLETED | OUTPATIENT
Start: 2022-12-22 | End: 2022-12-22

## 2022-12-22 RX ORDER — SODIUM CHLORIDE 9 MG/ML
1000 INJECTION INTRAMUSCULAR; INTRAVENOUS; SUBCUTANEOUS ONCE
Refills: 0 | Status: COMPLETED | OUTPATIENT
Start: 2022-12-22 | End: 2022-12-22

## 2022-12-22 RX ORDER — ACETAMINOPHEN 500 MG
1000 TABLET ORAL ONCE
Refills: 0 | Status: COMPLETED | OUTPATIENT
Start: 2022-12-22 | End: 2022-12-22

## 2022-12-22 RX ORDER — SODIUM CHLORIDE 9 MG/ML
1000 INJECTION INTRAMUSCULAR; INTRAVENOUS; SUBCUTANEOUS
Refills: 0 | Status: DISCONTINUED | OUTPATIENT
Start: 2022-12-22 | End: 2022-12-26

## 2022-12-22 RX ORDER — LEVETIRACETAM 250 MG/1
750 TABLET, FILM COATED ORAL
Refills: 0 | Status: DISCONTINUED | OUTPATIENT
Start: 2022-12-22 | End: 2022-12-28

## 2022-12-22 RX ORDER — SODIUM CHLORIDE 9 MG/ML
1000 INJECTION, SOLUTION INTRAVENOUS ONCE
Refills: 0 | Status: COMPLETED | OUTPATIENT
Start: 2022-12-22 | End: 2022-12-22

## 2022-12-22 RX ORDER — BUPROPION HYDROCHLORIDE 150 MG/1
300 TABLET, EXTENDED RELEASE ORAL DAILY
Refills: 0 | Status: DISCONTINUED | OUTPATIENT
Start: 2022-12-22 | End: 2022-12-28

## 2022-12-22 RX ORDER — QUETIAPINE FUMARATE 200 MG/1
400 TABLET, FILM COATED ORAL AT BEDTIME
Refills: 0 | Status: DISCONTINUED | OUTPATIENT
Start: 2022-12-22 | End: 2022-12-24

## 2022-12-22 RX ORDER — METRONIDAZOLE 500 MG
500 TABLET ORAL ONCE
Refills: 0 | Status: DISCONTINUED | OUTPATIENT
Start: 2022-12-22 | End: 2022-12-22

## 2022-12-22 RX ORDER — PANTOPRAZOLE SODIUM 20 MG/1
40 TABLET, DELAYED RELEASE ORAL
Refills: 0 | Status: DISCONTINUED | OUTPATIENT
Start: 2022-12-22 | End: 2022-12-22

## 2022-12-22 RX ORDER — RISPERIDONE 4 MG/1
3 TABLET ORAL DAILY
Refills: 0 | Status: DISCONTINUED | OUTPATIENT
Start: 2022-12-22 | End: 2022-12-28

## 2022-12-22 RX ORDER — PIPERACILLIN AND TAZOBACTAM 4; .5 G/20ML; G/20ML
3.38 INJECTION, POWDER, LYOPHILIZED, FOR SOLUTION INTRAVENOUS ONCE
Refills: 0 | Status: COMPLETED | OUTPATIENT
Start: 2022-12-22 | End: 2022-12-22

## 2022-12-22 RX ORDER — LAMOTRIGINE 25 MG/1
150 TABLET, ORALLY DISINTEGRATING ORAL
Refills: 0 | Status: DISCONTINUED | OUTPATIENT
Start: 2022-12-22 | End: 2022-12-28

## 2022-12-22 RX ORDER — MORPHINE SULFATE 50 MG/1
4 CAPSULE, EXTENDED RELEASE ORAL ONCE
Refills: 0 | Status: DISCONTINUED | OUTPATIENT
Start: 2022-12-22 | End: 2022-12-22

## 2022-12-22 RX ORDER — GABAPENTIN 400 MG/1
100 CAPSULE ORAL DAILY
Refills: 0 | Status: DISCONTINUED | OUTPATIENT
Start: 2022-12-22 | End: 2022-12-26

## 2022-12-22 RX ORDER — CIPROFLOXACIN LACTATE 400MG/40ML
400 VIAL (ML) INTRAVENOUS ONCE
Refills: 0 | Status: DISCONTINUED | OUTPATIENT
Start: 2022-12-22 | End: 2022-12-22

## 2022-12-22 RX ADMIN — Medication 125 MILLIGRAM(S): at 18:09

## 2022-12-22 RX ADMIN — Medication 400 MILLIGRAM(S): at 20:16

## 2022-12-22 RX ADMIN — SODIUM CHLORIDE 1000 MILLILITER(S): 9 INJECTION INTRAMUSCULAR; INTRAVENOUS; SUBCUTANEOUS at 18:11

## 2022-12-22 RX ADMIN — MORPHINE SULFATE 4 MILLIGRAM(S): 50 CAPSULE, EXTENDED RELEASE ORAL at 12:34

## 2022-12-22 RX ADMIN — SODIUM CHLORIDE 125 MILLILITER(S): 9 INJECTION INTRAMUSCULAR; INTRAVENOUS; SUBCUTANEOUS at 23:00

## 2022-12-22 RX ADMIN — LEVETIRACETAM 750 MILLIGRAM(S): 250 TABLET, FILM COATED ORAL at 18:10

## 2022-12-22 RX ADMIN — ATORVASTATIN CALCIUM 20 MILLIGRAM(S): 80 TABLET, FILM COATED ORAL at 23:00

## 2022-12-22 RX ADMIN — SODIUM CHLORIDE 1000 MILLILITER(S): 9 INJECTION, SOLUTION INTRAVENOUS at 12:33

## 2022-12-22 RX ADMIN — LAMOTRIGINE 150 MILLIGRAM(S): 25 TABLET, ORALLY DISINTEGRATING ORAL at 18:09

## 2022-12-22 RX ADMIN — PIPERACILLIN AND TAZOBACTAM 200 GRAM(S): 4; .5 INJECTION, POWDER, LYOPHILIZED, FOR SOLUTION INTRAVENOUS at 17:33

## 2022-12-22 RX ADMIN — SODIUM CHLORIDE 125 MILLILITER(S): 9 INJECTION INTRAMUSCULAR; INTRAVENOUS; SUBCUTANEOUS at 20:16

## 2022-12-22 RX ADMIN — QUETIAPINE FUMARATE 400 MILLIGRAM(S): 200 TABLET, FILM COATED ORAL at 23:01

## 2022-12-22 NOTE — H&P ADULT - NSHPLABSRESULTS_GEN_ALL_CORE
8.2    7.09  )-----------( 210      ( 22 Dec 2022 11:30 )             26.0   12-22    139  |  102  |  12.8  ----------------------------<  116<H>  4.5   |  29.0  |  1.93<H>    Ca    8.7      22 Dec 2022 11:30    TPro  5.5<L>  /  Alb  2.9<L>  /  TBili  0.4  /  DBili  x   /  AST  25  /  ALT  22  /  AlkPhos  83  12-22      < from: CT Abdomen and Pelvis No Cont (12.22.22 @ 14:19) >    Stranding again noted adjacent to the distal transverse colon   and the entire descending colon, suggestive of a persistent nonspecific   colitis or ischemic colitis. No evidence for pneumatosis intestinalis.   Follow-up colonoscopy after treatment/resolution of acute disease may be   pursued for additional evaluation.    Splenomegaly.    < end of copied text >

## 2022-12-22 NOTE — H&P ADULT - ASSESSMENT
42 year old male with a past medical history of bipolar disorder, HTN, GERD, and polysubstance abuse , recent admission for severe cdiff colitis / penumatosis / isac , requiring brief hd as inpatient , requiring brief icu admission for hyperkalemia / ekg changes  during previous hospitalization , was seen by  Colorectal surgery and gi during last admission.  s/p vancomycin/flagyl. hd was stopped and he was dcd home.  patient is admitted today with  4-5 episodes of watery diarrhea with minimal bright red blood in stool with LLQ abdominal pain and hypotension.  hypotensive to 80s systolic,       > persistent diarrhea   - likely cdiff recurrence or persistent cdiff   - resumed on po van   - iv zosyn   - c/w cxs, stool cdiff   - full liquid diet for now   - gi consult in am     > hypotension   - likely due to dehydration /meds   - doesnot seem to be septic , no fever , no wbcs , not tachy , lac nl   -Blood Gas Venous - Lactate: 0.90 mmol/L (12.22.22 @ 11:30)  - aggressive ivf   - hold bp meds   - monitor on tele     > anemia / marlon   - had mild marlon during previous hospitalization  - monitor h/h   - tx for hb <8.2   - hold ppis for now as can worsen cdiff   - gi consutl in am     > ckd   - cr seems to be better than previous dc   - ivf   - monitor     > hx of htn / now hypotenive   - hold meds   - ivf   - monitor       > polysubstance abuse   -  consult   - u tox   - c/w home dose suboxone     > bipolar d/o   - c/w home meds   -  consult     > dvt ppx: scds        42 year old male with a past medical history of bipolar disorder, HTN, GERD, and polysubstance abuse , recent admission for severe cdiff colitis / penumatosis / isac , requiring brief hd as inpatient , requiring brief icu admission for hyperkalemia / ekg changes  during previous hospitalization , was seen by  Colorectal surgery and gi during last admission.  s/p vancomycin/flagyl. hd was stopped and he was dcd home.  patient is admitted today with  4-5 episodes of watery diarrhea with minimal bright red blood in stool with LLQ abdominal pain and hypotension.  hypotensive to 80s systolic,       > persistent diarrhea   - likely cdiff recurrence or persistent cdiff   - resumed on po van   - iv zosyn   - c/w cxs, stool cdiff   - full liquid diet for now   - gi consult in am     > hypotension   - likely due to dehydration /meds   - doesnot seem to be septic , no fever , no wbcs , not tachy , lac nl   -Blood Gas Venous - Lactate: 0.90 mmol/L (12.22.22 @ 11:30)  - aggressive ivf   - hold bp meds   - monitor on tele     > anemia / marlon   - had mild marlon during previous hospitalization  - gib likely lower due to severe colitis   - monitor h/h   - tx for hb <8.2   - hold ppis for now as can worsen cdiff   - gi consutl in am     > ckd   - cr seems to be better than previous dc   - ivf   - monitor     > hx of htn / now hypotenive   - hold meds   - ivf   - monitor       > polysubstance abuse   -  consult   - u tox   - c/w home dose suboxone     > bipolar d/o   - c/w home meds   -  consult     > dvt ppx: scds

## 2022-12-22 NOTE — ED ADULT NURSE NOTE - OBJECTIVE STATEMENT
Pt was admitted to [facility] for GI bleed and C diff.  P/s he had blood transfusion while here and was dc'd to home with diarrhea but states it was neg for c diff at that time.  P/s he was seen at urgent care today for weakness and abd cramping  and was told to come to ED for hypotension.  Pt is a/ox4, skin W&D, color pale, resp even/unlabored.   Pt medicated for pain as ordered, IVF infusing through US guided IV inserted by Dr Lindsey.

## 2022-12-22 NOTE — ED PROVIDER NOTE - OBJECTIVE STATEMENT
43 yo M  bipolar disorder, HTN, GERD, and polysubstance abuse p/w 4-5 episodes of watery diarrhea with minimal bright red blood in stool with LLQ abdominal pain. Recent admission for cdiff, found to be in renal failure with hyper K, placed in icu for emergent dialysis, started on vanc/flagyl. hypotensive to 80s systolic, appears pale on arrival. Tolerating PO

## 2022-12-22 NOTE — ED PROVIDER NOTE - PROGRESS NOTE DETAILS
BREANNA Begum informed of critical nature of patient and need for iv access possible blood transfusion. Charge BREANNA Levy informed of concern for need for iso room given cdiff, will make space. will expedite ct given concern for pneumotosis on last scan, will start without PO contrast given prior renal failure. if Cr is ok will change to iv cntrast

## 2022-12-22 NOTE — ED PROCEDURE NOTE - ATTENDING CONTRIBUTION TO CARE
Shira Sweeney discharged to home accompanied by other Friend.   Patient provided with the following educational materials upon discharge:  Percocet, Preventing falls, and UTI information.   Valuables and belongings sent with patient.   discharge instructions, medications and follow up appointments reviewed with patient.  Patient  verbalized understanding  
Agree with above. I was supervising physician for this procedure.
Agree with above/ I was supervising physician for this procedure

## 2022-12-22 NOTE — H&P ADULT - NSHPPHYSICALEXAM_GEN_ALL_CORE
Vital Signs Last 24 Hrs  T(C): 37 (22 Dec 2022 10:20), Max: 37 (22 Dec 2022 10:20)  T(F): 98.6 (22 Dec 2022 10:20), Max: 98.6 (22 Dec 2022 10:20)  HR: 80 (22 Dec 2022 10:20) (80 - 80)  BP: 84/56 (22 Dec 2022 10:20) (84/56 - 84/56)  BP(mean): --  RR: 18 (22 Dec 2022 10:20) (18 - 18)  SpO2: 93% (22 Dec 2022 10:20) (93% - 93%)    Parameters below as of 22 Dec 2022 10:20  Patient On (Oxygen Delivery Method): room air    GENERAL: middle aged male, sitting in bed, NAD  HEAD:  Atraumatic, Normocephalic  EOMI, PERRLA, conjunctiva and sclera clear, mild pale   ENMT: dry mm   NECK: Supple   NERVOUS SYSTEM:  Alert & Oriented X3, Motor Strength 5/5 B/L upper and lower extremities  CHEST/LUNG: Clear to auscultation bilaterally; No rales, rhonchi, wheezing, or rubs  HEART: Regular rate and rhythm; + S1/S2  ABDOMEN: Soft, Nontender, Nondistended; Bowel sounds present, mild tenderness on left lower quadrant deep palpation , no rebound tenderness , no guarding   EXTREMITIES:  no pedal edema

## 2022-12-22 NOTE — ED PROVIDER NOTE - CLINICAL SUMMARY MEDICAL DECISION MAKING FREE TEXT BOX
concern for worsening colitis, possible ischemic, hypotensive, possibly hypovolemic related to diarrhea or 2/2 blood loss from stool. may need prbcs depending on h/h. will require admission. will expedite ct. see progress note for further mdm

## 2022-12-22 NOTE — ED PROVIDER NOTE - PHYSICAL EXAMINATION
PHYSICAL EXAM:   General: ill appearing, pale  HEENT: NC/AT, airway patent  Cardiovascular: regular rate and rhythm, + S1/S2, no murmurs, rubs, gallops appreciated  Respiratory: clear to auscultation bilaterally anteriorly, nonlabored respirations  Abdominal: soft, diffuse lower abd tenderness, especially LLQ, nondistended, no rebound, guarding or rigidity  Back: no costovertebral tenderness,  Neuro: awake and alert but tired appearing. appears nonfocal  Psychiatric: appropriate mood and affect.   Skin: pale  rectal: chaperoned by MD Lindsey: minimal red tinged stool in vault, no profuse bleeding, no external hemorrhoids  -Susanne Little MD Attending Physician

## 2022-12-22 NOTE — ED ADULT TRIAGE NOTE - CHIEF COMPLAINT QUOTE
patient c/o feeling weak, went to be Urgent Care and was found to be hypotensive. patient has hx of blood transfusions, denies blood thinners or bleeding. c/o abdominal cramping.

## 2022-12-22 NOTE — H&P ADULT - HISTORY OF PRESENT ILLNESS
42 year old male with a past medical history of bipolar disorder, HTN, GERD, and polysubstance abuse , recent admission for severe cdiff colitis / penumatosis / isac , requiring brief hd as inpatient , requiring brief icu admission for hyperkalemia / ekg changes  during previous hospitalization , was seen by  Colorectal surgery and gi during last admission.  s/p vancomycin/flagyl. hd was stopped and he was dcd home.    patient is admitted today with  4-5 episodes of watery diarrhea with minimal bright red blood in stool with LLQ abdominal pain and hypotension.  hypotensive to 80s systolic, appears pale on arrival. Tolerating PO. given ivf.   patient denies any dizziness but feels weak.  denies any substance abuse. states he completed his po vanc doses. no fever

## 2022-12-23 LAB
ALBUMIN SERPL ELPH-MCNC: 2.5 G/DL — LOW (ref 3.3–5.2)
ALP SERPL-CCNC: 75 U/L — SIGNIFICANT CHANGE UP (ref 40–120)
ALT FLD-CCNC: 19 U/L — SIGNIFICANT CHANGE UP
ANION GAP SERPL CALC-SCNC: 8 MMOL/L — SIGNIFICANT CHANGE UP (ref 5–17)
APPEARANCE UR: CLEAR — SIGNIFICANT CHANGE UP
AST SERPL-CCNC: 19 U/L — SIGNIFICANT CHANGE UP
BILIRUB SERPL-MCNC: 0.3 MG/DL — LOW (ref 0.4–2)
BILIRUB UR-MCNC: NEGATIVE — SIGNIFICANT CHANGE UP
BUN SERPL-MCNC: 15.1 MG/DL — SIGNIFICANT CHANGE UP (ref 8–20)
C DIFF BY PCR RESULT: SIGNIFICANT CHANGE UP
CALCIUM SERPL-MCNC: 8.2 MG/DL — LOW (ref 8.4–10.5)
CHLORIDE SERPL-SCNC: 103 MMOL/L — SIGNIFICANT CHANGE UP (ref 96–108)
CO2 SERPL-SCNC: 27 MMOL/L — SIGNIFICANT CHANGE UP (ref 22–29)
COLOR SPEC: YELLOW — SIGNIFICANT CHANGE UP
CREAT SERPL-MCNC: 1.69 MG/DL — HIGH (ref 0.5–1.3)
DIFF PNL FLD: NEGATIVE — SIGNIFICANT CHANGE UP
EGFR: 51 ML/MIN/1.73M2 — LOW
GLUCOSE SERPL-MCNC: 114 MG/DL — HIGH (ref 70–99)
GLUCOSE UR QL: NEGATIVE MG/DL — SIGNIFICANT CHANGE UP
HCT VFR BLD CALC: 21.6 % — LOW (ref 39–50)
HCT VFR BLD CALC: 27.2 % — LOW (ref 39–50)
HCT VFR BLD CALC: 28.9 % — LOW (ref 39–50)
HGB BLD-MCNC: 6.7 G/DL — CRITICAL LOW (ref 13–17)
HGB BLD-MCNC: 8.5 G/DL — LOW (ref 13–17)
HGB BLD-MCNC: 9 G/DL — LOW (ref 13–17)
KETONES UR-MCNC: NEGATIVE — SIGNIFICANT CHANGE UP
LEUKOCYTE ESTERASE UR-ACNC: NEGATIVE — SIGNIFICANT CHANGE UP
MAGNESIUM SERPL-MCNC: 1.6 MG/DL — SIGNIFICANT CHANGE UP (ref 1.6–2.6)
MCHC RBC-ENTMCNC: 26.5 PG — LOW (ref 27–34)
MCHC RBC-ENTMCNC: 26.9 PG — LOW (ref 27–34)
MCHC RBC-ENTMCNC: 27 PG — SIGNIFICANT CHANGE UP (ref 27–34)
MCHC RBC-ENTMCNC: 31 GM/DL — LOW (ref 32–36)
MCHC RBC-ENTMCNC: 31.1 GM/DL — LOW (ref 32–36)
MCHC RBC-ENTMCNC: 31.3 GM/DL — LOW (ref 32–36)
MCV RBC AUTO: 85.4 FL — SIGNIFICANT CHANGE UP (ref 80–100)
MCV RBC AUTO: 86.3 FL — SIGNIFICANT CHANGE UP (ref 80–100)
MCV RBC AUTO: 86.3 FL — SIGNIFICANT CHANGE UP (ref 80–100)
NITRITE UR-MCNC: NEGATIVE — SIGNIFICANT CHANGE UP
OB PNL STL: POSITIVE
PH UR: 6 — SIGNIFICANT CHANGE UP (ref 5–8)
PLATELET # BLD AUTO: 184 K/UL — SIGNIFICANT CHANGE UP (ref 150–400)
PLATELET # BLD AUTO: 198 K/UL — SIGNIFICANT CHANGE UP (ref 150–400)
PLATELET # BLD AUTO: 204 K/UL — SIGNIFICANT CHANGE UP (ref 150–400)
POTASSIUM SERPL-MCNC: 4.2 MMOL/L — SIGNIFICANT CHANGE UP (ref 3.5–5.3)
POTASSIUM SERPL-SCNC: 4.2 MMOL/L — SIGNIFICANT CHANGE UP (ref 3.5–5.3)
PROT SERPL-MCNC: 4.9 G/DL — LOW (ref 6.6–8.7)
PROT UR-MCNC: NEGATIVE — SIGNIFICANT CHANGE UP
RBC # BLD: 2.53 M/UL — LOW (ref 4.2–5.8)
RBC # BLD: 3.15 M/UL — LOW (ref 4.2–5.8)
RBC # BLD: 3.35 M/UL — LOW (ref 4.2–5.8)
RBC # FLD: 15.3 % — HIGH (ref 10.3–14.5)
RBC # FLD: 15.6 % — HIGH (ref 10.3–14.5)
RBC # FLD: 15.7 % — HIGH (ref 10.3–14.5)
SODIUM SERPL-SCNC: 138 MMOL/L — SIGNIFICANT CHANGE UP (ref 135–145)
SP GR SPEC: 1.01 — SIGNIFICANT CHANGE UP (ref 1.01–1.02)
UROBILINOGEN FLD QL: NEGATIVE MG/DL — SIGNIFICANT CHANGE UP
WBC # BLD: 5.53 K/UL — SIGNIFICANT CHANGE UP (ref 3.8–10.5)
WBC # BLD: 5.88 K/UL — SIGNIFICANT CHANGE UP (ref 3.8–10.5)
WBC # BLD: 6.46 K/UL — SIGNIFICANT CHANGE UP (ref 3.8–10.5)
WBC # FLD AUTO: 5.53 K/UL — SIGNIFICANT CHANGE UP (ref 3.8–10.5)
WBC # FLD AUTO: 5.88 K/UL — SIGNIFICANT CHANGE UP (ref 3.8–10.5)
WBC # FLD AUTO: 6.46 K/UL — SIGNIFICANT CHANGE UP (ref 3.8–10.5)

## 2022-12-23 PROCEDURE — 99233 SBSQ HOSP IP/OBS HIGH 50: CPT

## 2022-12-23 PROCEDURE — 99497 ADVNCD CARE PLAN 30 MIN: CPT

## 2022-12-23 PROCEDURE — 99223 1ST HOSP IP/OBS HIGH 75: CPT | Mod: FS

## 2022-12-23 RX ORDER — PANTOPRAZOLE SODIUM 20 MG/1
40 TABLET, DELAYED RELEASE ORAL
Refills: 0 | Status: DISCONTINUED | OUTPATIENT
Start: 2022-12-23 | End: 2022-12-25

## 2022-12-23 RX ORDER — VANCOMYCIN HCL 1 G
125 VIAL (EA) INTRAVENOUS EVERY 6 HOURS
Refills: 0 | Status: DISCONTINUED | OUTPATIENT
Start: 2022-12-23 | End: 2022-12-28

## 2022-12-23 RX ORDER — BUPRENORPHINE AND NALOXONE 2; .5 MG/1; MG/1
1 TABLET SUBLINGUAL THREE TIMES A DAY
Refills: 0 | Status: DISCONTINUED | OUTPATIENT
Start: 2022-12-23 | End: 2022-12-28

## 2022-12-23 RX ADMIN — GABAPENTIN 100 MILLIGRAM(S): 400 CAPSULE ORAL at 12:40

## 2022-12-23 RX ADMIN — QUETIAPINE FUMARATE 400 MILLIGRAM(S): 200 TABLET, FILM COATED ORAL at 21:19

## 2022-12-23 RX ADMIN — BUPRENORPHINE AND NALOXONE 1 FILM(S): 2; .5 TABLET SUBLINGUAL at 12:52

## 2022-12-23 RX ADMIN — Medication 125 MILLIGRAM(S): at 18:09

## 2022-12-23 RX ADMIN — PANTOPRAZOLE SODIUM 40 MILLIGRAM(S): 20 TABLET, DELAYED RELEASE ORAL at 18:10

## 2022-12-23 RX ADMIN — RISPERIDONE 3 MILLIGRAM(S): 4 TABLET ORAL at 12:30

## 2022-12-23 RX ADMIN — LEVETIRACETAM 750 MILLIGRAM(S): 250 TABLET, FILM COATED ORAL at 18:10

## 2022-12-23 RX ADMIN — ATORVASTATIN CALCIUM 20 MILLIGRAM(S): 80 TABLET, FILM COATED ORAL at 21:19

## 2022-12-23 RX ADMIN — BUPRENORPHINE AND NALOXONE 1 FILM(S): 2; .5 TABLET SUBLINGUAL at 21:19

## 2022-12-23 RX ADMIN — BUPROPION HYDROCHLORIDE 300 MILLIGRAM(S): 150 TABLET, EXTENDED RELEASE ORAL at 12:30

## 2022-12-23 RX ADMIN — LAMOTRIGINE 150 MILLIGRAM(S): 25 TABLET, ORALLY DISINTEGRATING ORAL at 18:09

## 2022-12-23 NOTE — BH CONSULTATION LIAISON ASSESSMENT NOTE - RISK ASSESSMENT
Risk Factors  - male gender  - polysubstance use  - mood disorder    Protective Factors  -denies current SI  -no hx of SA  -medication compliance  -stable housing

## 2022-12-23 NOTE — BH CONSULTATION LIAISON ASSESSMENT NOTE - HPI (INCLUDE ILLNESS QUALITY, SEVERITY, DURATION, TIMING, CONTEXT, MODIFYING FACTORS, ASSOCIATED SIGNS AND SYMPTOMS)
Carl Murray is a 43yo SCM, domiciled in a sober house in [place], employed in restaurant, PPH of Bipolar d/o (last IPP psych hospitalization 1 year ago in [facility]; current OPP Dr. Rene in [facility] clinic and has psychotherapist), PMH of C diff colitis/pneumotosis, LIV and hemodialysis past hospitalization, HTN, GERD, Polysubstance use hx (cannabis, cocaine, heroin, etoh, and prescription medication misuses), nicotine use d/o (1/2 ppd), NO hx of SA, admitted for persistent diarrhea. Psychiatry consulted for med management.     On encounter, patient in own attire, pacing the room, irritable, stating he is "not feeling well". He states that he came to the hospital because he has been having diarrhea and has not eaten food in 2 days. He is endorsing compliance on current psych medication regimen. He denies depression, insomnia or hypo/manic sx at this time. Pt denies SI, intent or plan and denies AVH/paranoid delusions. Patient is not responding to internal stimuli. Pt denies substance or ethanol use in past week.     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 Rene in [facility] clinic and has psychotherapist), PMH of C diff colitis/pneumotosis, LIV and hemodialysis past hospitalization, HTN, GERD, Polysubstance use hx (cannabis, cocaine, heroin, etoh, and prescription medication misuses), nicotine use d/o (1/2 ppd), NO hx of SA, admitted for persistent diarrhea. Psychiatry consulted for med management.     On encounter, patient in own attire, pacing the room, irritable, stating he is "not feeling well". He states that he came to the hospital because he has been having diarrhea and has not eaten food in 2 days. He is endorsing compliance on current psych medication regimen. He denies depression, insomnia or hypo/manic sx at this time. Pt denies s/H ideation, intent or plan and denies AVH/paranoid delusions. Patient is not responding to internal stimuli. Pt denies substance or ethanol use in past week.     Current psychiatric medications: Seroquel 800mg HS, Gabapentin 800mg QID, Lamictal 300mg QD, Wellbutrin ER 300mg QD, Suboxone 8-2mg sublingual film TID

## 2022-12-23 NOTE — BH CONSULTATION LIAISON ASSESSMENT NOTE - NSBHCHARTREVIEWINVESTIGATE_PSY_A_CORE FT
< from: 12 Lead ECG (12.02.22 @ 17:50) >      Ventricular Rate 108 BPM    Atrial Rate 108 BPM    P-R Interval 198 ms    QRS Duration 124 ms    Q-T Interval 332 ms    QTC Calculation(Bazett) 444 ms    P Axis 47 degrees    R Axis 40 degrees    T Axis 16 degrees    Diagnosis Line Sinus tachycardia  Left atrial enlargement  Non-specific intra-ventricular conduction delay  Abnormal ECG    Confirmed by ENOCH BEY ([id number]) on 12/5/2022 11:29:11 AM    < end of copied text >

## 2022-12-23 NOTE — CONSULT NOTE ADULT - NS ATTEND AMEND GEN_ALL_CORE FT
Agree with above. Patient presented with diarrhea and anemia requiring PRBC. No active bleeding on rectal exam. He has persistent colitis on imaging. Repeat stool studies pending. Will plan for diagnostic EGD on Tuesday if he remains hospitalized. He ate today. If he is discharged, he can follow up with GI within 1-2 weeks.   Will defer colonoscopy in the setting of active colitis. Check fecal calprotectin. PPI BID 40mg Avoid nsaids.

## 2022-12-23 NOTE — PATIENT PROFILE ADULT - NSPROMEDSBROUGHTTOHOSP_GEN_A_NUR
DISPLAY PLAN FREE TEXT DISPLAY PLAN FREE TEXT DISPLAY PLAN FREE TEXT DISPLAY PLAN FREE TEXT DISPLAY PLAN FREE TEXT DISPLAY PLAN FREE TEXT DISPLAY PLAN FREE TEXT DISPLAY PLAN FREE TEXT DISPLAY PLAN FREE TEXT DISPLAY PLAN FREE TEXT DISPLAY PLAN FREE TEXT DISPLAY PLAN FREE TEXT DISPLAY PLAN FREE TEXT DISPLAY PLAN FREE TEXT DISPLAY PLAN FREE TEXT DISPLAY PLAN FREE TEXT DISPLAY PLAN FREE TEXT DISPLAY PLAN FREE TEXT DISPLAY PLAN FREE TEXT DISPLAY PLAN FREE TEXT DISPLAY PLAN FREE TEXT DISPLAY PLAN FREE TEXT DISPLAY PLAN FREE TEXT DISPLAY PLAN FREE TEXT DISPLAY PLAN FREE TEXT DISPLAY PLAN FREE TEXT DISPLAY PLAN FREE TEXT DISPLAY PLAN FREE TEXT DISPLAY PLAN FREE TEXT DISPLAY PLAN FREE TEXT DISPLAY PLAN FREE TEXT DISPLAY PLAN FREE TEXT DISPLAY PLAN FREE TEXT DISPLAY PLAN FREE TEXT no

## 2022-12-23 NOTE — BH CONSULTATION LIAISON ASSESSMENT NOTE - SUMMARY
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 Rene in [place] [facility] and has psychotherapist), PMH of C diff colitis/pneumotosis, LIV and hemodialysis past hospitalization, HTN, GERD, Polysubstance use hx (cannabis, cocaine, heroin, etoh, and prescription medication misuses), nicotine use d/o (1/2 ppd), NO hx of SA, admitted for persistent diarrhea. Psychiatry consulted for med management.     Patient with hx of bipolar dx and polysubstance use d/o, without current manic or depressive sx elicited and stable on current regimen with established outpatient psychiatric care, subjectively stated and observed irritability/ psychomotor agitation, likely with underlying Cluster B traits, now admitted for further workup of persistent diarrhea. Denies SI or AVH. Patient can resume home psychiatric medications during hospitalization including Suboxone 8-2mg SL films TID.     Recs:  -Continue Seroquel 800mg HS for mood stabilization and insomnia   -Continue Lamictal 300mg PO QD for mood stabilization   -Continue Wellbutrin XL 300mg PO QD for depression  -Continue Gabapentin 800mg QID for anxiety   -Continue Suboxone 8-2mg sublingual films TID for opioid use d/o

## 2022-12-23 NOTE — BH CONSULTATION LIAISON ASSESSMENT NOTE - NSBHCHARTREVIEWVS_PSY_A_CORE FT
Vital Signs Last 24 Hrs  T(C): 37.1 (23 Dec 2022 11:54), Max: 37.3 (22 Dec 2022 19:44)  T(F): 98.7 (23 Dec 2022 11:54), Max: 99.2 (22 Dec 2022 19:44)  HR: 89 (23 Dec 2022 11:54) (69 - 90)  BP: 122/80 (23 Dec 2022 11:54) (107/69 - 140/86)  BP(mean): --  RR: 18 (23 Dec 2022 11:54) (16 - 18)  SpO2: 94% (23 Dec 2022 11:54) (92% - 97%)    Parameters below as of 23 Dec 2022 11:54  Patient On (Oxygen Delivery Method): room air

## 2022-12-23 NOTE — BH CONSULTATION LIAISON ASSESSMENT NOTE - CURRENT MEDICATION
MEDICATIONS  (STANDING):  atorvastatin 20 milliGRAM(s) Oral at bedtime  buprenorphine 8 mG/naloxone 2 mG SL Film 3 Film(s) SubLingual daily  buPROPion XL (24-Hour) . 300 milliGRAM(s) Oral daily  gabapentin 100 milliGRAM(s) Oral daily  lamoTRIgine 150 milliGRAM(s) Oral two times a day  levETIRAcetam 750 milliGRAM(s) Oral two times a day  levothyroxine 50 MICROGram(s) Oral daily  pantoprazole  Injectable 40 milliGRAM(s) IV Push two times a day  QUEtiapine 400 milliGRAM(s) Oral at bedtime  risperiDONE   Tablet 3 milliGRAM(s) Oral daily  sodium chloride 0.9%. 1000 milliLiter(s) (125 mL/Hr) IV Continuous <Continuous>  vancomycin    Solution 125 milliGRAM(s) Oral every 6 hours    MEDICATIONS  (PRN):

## 2022-12-23 NOTE — PROGRESS NOTE ADULT - ASSESSMENT
42 year old male with a past medical history of bipolar disorder, HTN, GERD, and polysubstance abuse , recent admission for severe cdiff colitis / penumatosis / isac , requiring brief hd as inpatient , requiring brief icu admission for hyperkalemia / ekg changes  during previous hospitalization , was seen by  Colorectal surgery and gi during last admission.  s/p vancomycin/flagyl. hd was stopped and he was dcd home.  patient is admitted today with  4-5 episodes of watery diarrhea with minimal bright red blood in stool with LLQ abdominal pain and hypotension.  hypotensive to 80s systolic,       > persistent diarrhea   - recent cdiff colitis   - resumed on po van   - f/u  stool cdiff , gi pcr   - patient tstates [place] [facility] [place] unable ot confirm at this time   - hold off any immodium for now       > anemia / marlon   - had mild marlon during previous hospitalization  - gib likely lower due to severe colitis   - monitor h/h   - tx for hb <8.0   - gi consulted   -patient tstates he [place] [facility] [place] repeat cdiff pending   - c/w protonix 40 mg iv bid     > hypotension / better now   - likely due to dehydration /meds   - doesnot seem to be septic , no fever , no wbcs , not tachy , lac nl   -Blood Gas Venous - Lactate: 0.90 mmol/L (12.22.22 @ 11:30)  - aggressive ivf   - hold bp meds   - monitor on tele     > ckd   - cr seems to be better than previous dc   - ivf   - monitor     > hx of htn / now hypotenive   - hold meds   - ivf   - monitor       > polysubstance abuse   -  consult   - u tox   - c/w home dose suboxone     > bipolar d/o   - c/w home meds   -  consult     > dvt ppx: scds     > dispo: montior h/h , gi consulted

## 2022-12-23 NOTE — CONSULT NOTE ADULT - ASSESSMENT
42 year old male with a PMHx of bipolar disorder, HTN, GERD, and polysubstance abuse, recent admission (12/1-12/12) for diarrhea complicated by severe cdiff colitis / pneumatosis / isac , requiring brief HD as inpatient , requiring brief icu admission for hyperkalemia / ekg changes. Was treated with vancomycin/flagyl and pt was dcd home.    Now patient presented to ED reporting 3-4 episodes of watery diarrhea with minimal bright red blood in stool with LLQ abdominal pain and hypotension. Seen at [facility] [place] 2 [place] ago for  similar symptoms and anemia requiring blood transfusion.     Anemia secondary to GIB  His hemoglobin on admission was 8.2gm with a drop to 6.7gm s/p 1uPRBC with repeat hgb of 8.5gm.   - Continue to trend CBC, transfuse as needed   - Patient will need EGD which could be done as outpatient, however patient requesting to stay for procedure. Will plan for tentative EGD Upcoming Tuesday if Hgb continues to trend down and patient is optimized.  - Will need eventual colonoscopy to complete anemia workup but should wait at least 4 weeks in the setting of colitis seen on CT-scan.   - Pantoprazole 40 mg BID       Persistent diarrhea   CT of A/P showed Stranding again noted adjacent to the distal transverse colon and the entire descending colon, suggestive of a persistent nonspecific colitis or ischemic colitis.   No BMs today, reports 3-4 loose BM yesterday, denies seeing any blood. DORETHA showed yellow liquid stool.    C.diff negative 12/16 on [facility]    - Pending C.diff, stool culture, GI PCR, Fecal calprotectin    - Trend Electrolytes, replete as needed    - IVF
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
## 2022-12-23 NOTE — PROGRESS NOTE ADULT - SUBJECTIVE AND OBJECTIVE BOX
cc: diarrhea , marlon , recent cdiff     interval hx: patient seen and eval. comfortable. in no acute distress. no fever . small amount of hematochezia. denies any n/v/ abd pain. states still have diarrhea. as per patient he tested neg for cdiff few days ago at [facility] [place].       Vital Signs Last 24 Hrs  T(C): 37.1 (23 Dec 2022 11:54), Max: 37.3 (22 Dec 2022 19:44)  T(F): 98.7 (23 Dec 2022 11:54), Max: 99.2 (22 Dec 2022 19:44)  HR: 89 (23 Dec 2022 11:54) (69 - 90)  BP: 122/80 (23 Dec 2022 11:54) (107/69 - 140/86)  BP(mean): --  RR: 18 (23 Dec 2022 11:54) (16 - 18)  SpO2: 94% (23 Dec 2022 11:54) (92% - 97%)    Parameters below as of 23 Dec 2022 11:54  Patient On (Oxygen Delivery Method): room air    GENERAL: middle aged male, sitting in bed, NAD  HEAD:  Atraumatic, Normocephalic  EOMI, PERRLA, conjunctiva and sclera clear, mild pale   ENMT: dry mm   NECK: Supple   NERVOUS SYSTEM:  Alert & Oriented X3, Motor Strength 5/5 B/L upper and lower extremities  CHEST/LUNG: Clear to auscultation bilaterally; No rales, rhonchi, wheezing, or rubs  HEART: Regular rate and rhythm; + S1/S2  ABDOMEN: Soft, Nontender, Nondistended; Bowel sounds present, mild tenderness on left lower quadrant deep palpation , no rebound tenderness , no guarding   EXTREMITIES:  no pedal edema                          8.5    5.88  )-----------( 204      ( 23 Dec 2022 08:02 )             27.2   12-23    138  |  103  |  15.1  ----------------------------<  114<H>  4.2   |  27.0  |  1.69<H>    Ca    8.2<L>      23 Dec 2022 01:20  Mg     1.6     12-23    TPro  4.9<L>  /  Alb  2.5<L>  /  TBili  0.3<L>  /  DBili  x   /  AST  19  /  ALT  19  /  AlkPhos  75  12-23      MEDICATIONS  (STANDING):  atorvastatin 20 milliGRAM(s) Oral at bedtime  buprenorphine 8 mG/naloxone 2 mG SL Film 3 Film(s) SubLingual daily  buPROPion XL (24-Hour) . 300 milliGRAM(s) Oral daily  gabapentin 100 milliGRAM(s) Oral daily  lamoTRIgine 150 milliGRAM(s) Oral two times a day  levETIRAcetam 750 milliGRAM(s) Oral two times a day  levothyroxine 50 MICROGram(s) Oral daily  pantoprazole  Injectable 40 milliGRAM(s) IV Push two times a day  QUEtiapine 400 milliGRAM(s) Oral at bedtime  risperiDONE   Tablet 3 milliGRAM(s) Oral daily  sodium chloride 0.9%. 1000 milliLiter(s) (125 mL/Hr) IV Continuous <Continuous>  vancomycin    Solution 125 milliGRAM(s) Oral every 6 hours    MEDICATIONS  (PRN):

## 2022-12-23 NOTE — BH CONSULTATION LIAISON ASSESSMENT NOTE - NSSUICPROTFACT_PSY_ALL_CORE
Identifies reasons for living/Fear of death or the actual act of killing self/Engaged in work or school/Positive therapeutic relationships

## 2022-12-23 NOTE — CHART NOTE - NSCHARTNOTEFT_GEN_A_CORE
Pt with hemoglobin 6.7  All vital signs stable  No obvious signs bleeding, reports scant amt of blood in stool on admission  Occult blood ordered  PPI  Serial CBCs  1U PRBCs ordered  Consent filled out with pt, all questions answered  GI consult pending in am

## 2022-12-23 NOTE — BH CONSULTATION LIAISON ASSESSMENT NOTE - NSBHCHARTREVIEWLAB_PSY_A_CORE FT
8.5    5.88  )-----------( 204      ( 23 Dec 2022 08:02 )             27.2   12-23    138  |  103  |  15.1  ----------------------------<  114<H>  4.2   |  27.0  |  1.69<H>    Ca    8.2<L>      23 Dec 2022 01:20  Mg     1.6     12-23    TPro  4.9<L>  /  Alb  2.5<L>  /  TBili  0.3<L>  /  DBili  x   /  AST  19  /  ALT  19  /  AlkPhos  75  12-23

## 2022-12-24 LAB
ALBUMIN SERPL ELPH-MCNC: 2.8 G/DL — LOW (ref 3.3–5.2)
ALP SERPL-CCNC: 77 U/L — SIGNIFICANT CHANGE UP (ref 40–120)
ALT FLD-CCNC: 14 U/L — SIGNIFICANT CHANGE UP
ANION GAP SERPL CALC-SCNC: 9 MMOL/L — SIGNIFICANT CHANGE UP (ref 5–17)
AST SERPL-CCNC: 12 U/L — SIGNIFICANT CHANGE UP
BILIRUB SERPL-MCNC: 0.3 MG/DL — LOW (ref 0.4–2)
BUN SERPL-MCNC: 12.7 MG/DL — SIGNIFICANT CHANGE UP (ref 8–20)
CALCIUM SERPL-MCNC: 8.3 MG/DL — LOW (ref 8.4–10.5)
CHLORIDE SERPL-SCNC: 102 MMOL/L — SIGNIFICANT CHANGE UP (ref 96–108)
CO2 SERPL-SCNC: 26 MMOL/L — SIGNIFICANT CHANGE UP (ref 22–29)
CREAT SERPL-MCNC: 1.39 MG/DL — HIGH (ref 0.5–1.3)
CULTURE RESULTS: SIGNIFICANT CHANGE UP
EGFR: 65 ML/MIN/1.73M2 — SIGNIFICANT CHANGE UP
GI PCR PANEL: SIGNIFICANT CHANGE UP
GLUCOSE SERPL-MCNC: 89 MG/DL — SIGNIFICANT CHANGE UP (ref 70–99)
HCT VFR BLD CALC: 25.2 % — LOW (ref 39–50)
HGB BLD-MCNC: 8 G/DL — LOW (ref 13–17)
INR BLD: 1.3 RATIO — HIGH (ref 0.88–1.16)
MCHC RBC-ENTMCNC: 26.7 PG — LOW (ref 27–34)
MCHC RBC-ENTMCNC: 31.7 GM/DL — LOW (ref 32–36)
MCV RBC AUTO: 84 FL — SIGNIFICANT CHANGE UP (ref 80–100)
PLATELET # BLD AUTO: 177 K/UL — SIGNIFICANT CHANGE UP (ref 150–400)
POTASSIUM SERPL-MCNC: 4.2 MMOL/L — SIGNIFICANT CHANGE UP (ref 3.5–5.3)
POTASSIUM SERPL-SCNC: 4.2 MMOL/L — SIGNIFICANT CHANGE UP (ref 3.5–5.3)
PROT SERPL-MCNC: 5.2 G/DL — LOW (ref 6.6–8.7)
PROTHROM AB SERPL-ACNC: 15.1 SEC — HIGH (ref 10.5–13.4)
RBC # BLD: 3 M/UL — LOW (ref 4.2–5.8)
RBC # FLD: 15.5 % — HIGH (ref 10.3–14.5)
SODIUM SERPL-SCNC: 137 MMOL/L — SIGNIFICANT CHANGE UP (ref 135–145)
SPECIMEN SOURCE: SIGNIFICANT CHANGE UP
WBC # BLD: 4.98 K/UL — SIGNIFICANT CHANGE UP (ref 3.8–10.5)
WBC # FLD AUTO: 4.98 K/UL — SIGNIFICANT CHANGE UP (ref 3.8–10.5)

## 2022-12-24 PROCEDURE — 99233 SBSQ HOSP IP/OBS HIGH 50: CPT

## 2022-12-24 RX ORDER — QUETIAPINE FUMARATE 200 MG/1
800 TABLET, FILM COATED ORAL AT BEDTIME
Refills: 0 | Status: DISCONTINUED | OUTPATIENT
Start: 2022-12-24 | End: 2022-12-28

## 2022-12-24 RX ORDER — QUETIAPINE FUMARATE 200 MG/1
800 TABLET, FILM COATED ORAL AT BEDTIME
Refills: 0 | Status: DISCONTINUED | OUTPATIENT
Start: 2022-12-24 | End: 2022-12-24

## 2022-12-24 RX ADMIN — BUPRENORPHINE AND NALOXONE 1 FILM(S): 2; .5 TABLET SUBLINGUAL at 21:34

## 2022-12-24 RX ADMIN — Medication 125 MILLIGRAM(S): at 00:17

## 2022-12-24 RX ADMIN — LAMOTRIGINE 150 MILLIGRAM(S): 25 TABLET, ORALLY DISINTEGRATING ORAL at 06:06

## 2022-12-24 RX ADMIN — Medication 125 MILLIGRAM(S): at 13:56

## 2022-12-24 RX ADMIN — RISPERIDONE 3 MILLIGRAM(S): 4 TABLET ORAL at 13:57

## 2022-12-24 RX ADMIN — SODIUM CHLORIDE 125 MILLILITER(S): 9 INJECTION INTRAMUSCULAR; INTRAVENOUS; SUBCUTANEOUS at 13:56

## 2022-12-24 RX ADMIN — Medication 125 MILLIGRAM(S): at 06:07

## 2022-12-24 RX ADMIN — BUPRENORPHINE AND NALOXONE 1 FILM(S): 2; .5 TABLET SUBLINGUAL at 06:07

## 2022-12-24 RX ADMIN — LAMOTRIGINE 150 MILLIGRAM(S): 25 TABLET, ORALLY DISINTEGRATING ORAL at 18:01

## 2022-12-24 RX ADMIN — Medication 125 MILLIGRAM(S): at 18:00

## 2022-12-24 RX ADMIN — BUPRENORPHINE AND NALOXONE 1 FILM(S): 2; .5 TABLET SUBLINGUAL at 13:58

## 2022-12-24 RX ADMIN — LEVETIRACETAM 750 MILLIGRAM(S): 250 TABLET, FILM COATED ORAL at 06:06

## 2022-12-24 RX ADMIN — ATORVASTATIN CALCIUM 20 MILLIGRAM(S): 80 TABLET, FILM COATED ORAL at 21:48

## 2022-12-24 RX ADMIN — BUPROPION HYDROCHLORIDE 300 MILLIGRAM(S): 150 TABLET, EXTENDED RELEASE ORAL at 13:57

## 2022-12-24 RX ADMIN — PANTOPRAZOLE SODIUM 40 MILLIGRAM(S): 20 TABLET, DELAYED RELEASE ORAL at 06:06

## 2022-12-24 RX ADMIN — Medication 125 MILLIGRAM(S): at 23:07

## 2022-12-24 RX ADMIN — Medication 50 MICROGRAM(S): at 06:06

## 2022-12-24 RX ADMIN — QUETIAPINE FUMARATE 800 MILLIGRAM(S): 200 TABLET, FILM COATED ORAL at 22:13

## 2022-12-24 RX ADMIN — GABAPENTIN 100 MILLIGRAM(S): 400 CAPSULE ORAL at 13:57

## 2022-12-24 RX ADMIN — LEVETIRACETAM 750 MILLIGRAM(S): 250 TABLET, FILM COATED ORAL at 18:02

## 2022-12-24 NOTE — PROGRESS NOTE ADULT - SUBJECTIVE AND OBJECTIVE BOX
INTERVAL HPI/OVERNIGHT EVENTS:Patient seen and examined today.  No complaints.  1 bowel movement this morning.  Hematocrit stable.  On p.o. vancomycin.    MEDICATIONS  (STANDING):  atorvastatin 20 milliGRAM(s) Oral at bedtime  buprenorphine 8 mG/naloxone 2 mG SL Film 1 Film(s) SubLingual three times a day  buPROPion XL (24-Hour) . 300 milliGRAM(s) Oral daily  gabapentin 100 milliGRAM(s) Oral daily  lamoTRIgine 150 milliGRAM(s) Oral two times a day  levETIRAcetam 750 milliGRAM(s) Oral two times a day  levothyroxine 50 MICROGram(s) Oral daily  pantoprazole  Injectable 40 milliGRAM(s) IV Push two times a day  QUEtiapine 400 milliGRAM(s) Oral at bedtime  risperiDONE   Tablet 3 milliGRAM(s) Oral daily  sodium chloride 0.9%. 1000 milliLiter(s) (125 mL/Hr) IV Continuous <Continuous>  vancomycin    Solution 125 milliGRAM(s) Oral every 6 hours    MEDICATIONS  (PRN):      Allergies    No Known Allergies    Intolerances        Vital Signs Last 24 Hrs  T(C): 36.7 (24 Dec 2022 11:25), Max: 37.4 (23 Dec 2022 21:43)  T(F): 98 (24 Dec 2022 11:25), Max: 99.3 (23 Dec 2022 21:43)  HR: 101 (24 Dec 2022 11:25) (93 - 118)  BP: 134/76 (24 Dec 2022 11:25) (112/67 - 135/83)  BP(mean): --  RR: 18 (24 Dec 2022 04:54) (18 - 18)  SpO2: 95% (24 Dec 2022 11:25) (94% - 97%)    Parameters below as of 24 Dec 2022 11:25  Patient On (Oxygen Delivery Method): room air        LABS:                        8.0    4.98  )-----------( 177      ( 24 Dec 2022 06:15 )             25.2     12-24    137  |  102  |  12.7  ----------------------------<  89  4.2   |  26.0  |  1.39<H>    Ca    8.3<L>      24 Dec 2022 06:15  Mg     1.6     12-23    TPro  5.2<L>  /  Alb  2.8<L>  /  TBili  0.3<L>  /  DBili  x   /  AST  12  /  ALT  14  /  AlkPhos  77  12-24    PT/INR - ( 24 Dec 2022 06:15 )   PT: 15.1 sec;   INR: 1.30 ratio               RADIOLOGY & ADDITIONAL TESTS:

## 2022-12-24 NOTE — PROGRESS NOTE ADULT - ASSESSMENT
43 y/o M w/ PMH of bipolar disorder, HTN, GERD, and polysubstance abuse, recently admitted for severe cdiff colitis / penumatosis / isac (requiring brief hd as inpt) w/ breif ICU upgrade for hyperkalemia / ekg changes on prior hospitalization came in for persistent large volume watery diarrhea w/ reports of intermittent blood in stool.  Pt is s/p vancomycin/flagyl  for recent C.diff.  Pt still reporting diarrhea.  also noted to have anemia w/ concern for possible GIB pending EGD.       Persistent diarrhea   - Hx of recent C.diff   - GI PCR and C.diff sent   - Will order fecal calprotectin   - CT a/p reviewed and noted above   - c/w stool count   - Avoid immodium until confirm not infectious  - Monitor CBC and temperature       Normocytic anemia  - H/H trending down but hemodynamically stable and no active bleeding reported at this time  - Reported to have melena on prior hospitalization and FOBT + on admission   - s/p 1 unit total since hospital course   - Pending EGD likely on tuesday  - Colonsocopy needs to be deffered for at least 4 weeks given colitis   - INR trending up, monitor levels   - Monitor CBC and transfuse as needed   - c/w protonix for gi ppx  - GI following and recs noted       Hypotension likley 2/2 hypovolemia in the setting of diarrhea  - Resolved w/ IVFs  - c/w gentle hydration as needed       CKD IIIb  - Cr improved from prior admissions   - Monitor renal fxn and lytes  - Avoid nephrotoxic meds or renally dose if needed      Polysubstance abuse   - u tox negative    - c/w home dose suboxone       Bipolar disorder  - c/w home meds   - bh following        Splenomegaly   - Incidentally noted on CT a/p 12/22/22  - Spleen reported to be normal on CT a/p 12/02/22 and in 2019   - Etiology unclear, plts nL, no evidence of cirrhosis  - No evidence of hemolysis   - Will refer to heme for further w/u         VTE ppx: SCDs     Dispo: Remains acute requiring inpt hospitalization

## 2022-12-24 NOTE — PROGRESS NOTE ADULT - SUBJECTIVE AND OBJECTIVE BOX
Chief Complaint:  diarrhea    SUBJECTIVE / OVERNIGHT EVENTS:  No acute events reported overnight. Pt reports having persistent mx large vol watery diarrhea but only documented to have 1bm today.  Patient denies chest pain, SOB, abd pain, N/V, chills.      I&O's Summary        PHYSICAL EXAM:  Vital Signs Last 24 Hrs  T(C): 36.7 (24 Dec 2022 11:25), Max: 37.4 (23 Dec 2022 21:43)  T(F): 98 (24 Dec 2022 11:25), Max: 99.3 (23 Dec 2022 21:43)  HR: 101 (24 Dec 2022 11:25) (93 - 118)  BP: 134/76 (24 Dec 2022 11:25) (112/67 - 135/83)  BP(mean): --  RR: 18 (24 Dec 2022 04:54) (18 - 18)  SpO2: 95% (24 Dec 2022 11:25) (94% - 95%)    Parameters below as of 24 Dec 2022 11:25  Patient On (Oxygen Delivery Method): room air        GENERAL: pt examined bedside, laying comfortably in bed in NAD  HEENT: NC/AT, moist oral mucosa, clear conjunctiva, sclera nonicteric  RESPIRATORY: Normal respiratory effort, no wheezing, rhonchi, rales  CARDIOVASCULAR: RRR, normal S1 and S2  ABDOMEN: soft, NT/ND, normoactive bowel sounds, no rebound/guarding  EXTREMITIES: No cynaosis, no clubbing, no lower extremity edema, pulses are 2+ bilaterally  NEUROLOGY: A+O to person, place, and time, no focal neurologic deficits appreciated   SKIN: No rashes or no palpable lesions        LABS:                        8.0    4.98  )-----------( 177      ( 24 Dec 2022 06:15 )             25.2     12-24    137  |  102  |  12.7  ----------------------------<  89  4.2   |  26.0  |  1.39<H>    Ca    8.3<L>      24 Dec 2022 06:15  Mg     1.6     12-23    TPro  5.2<L>  /  Alb  2.8<L>  /  TBili  0.3<L>  /  DBili  x   /  AST  12  /  ALT  14  /  AlkPhos  77  12-24    PT/INR - ( 24 Dec 2022 06:15 )   PT: 15.1 sec;   INR: 1.30 ratio                   Culture - Urine (collected 23 Dec 2022 03:30)  Source: Clean Catch Clean Catch (Midstream)  Final Report (24 Dec 2022 06:33):    <10,000 CFU/mL Normal Urogenital Damari    Culture - Blood (collected 22 Dec 2022 16:30)  Source: .Blood Blood-Peripheral  Preliminary Report (23 Dec 2022 23:02):    No growth to date.    Culture - Blood (collected 22 Dec 2022 16:25)  Source: .Blood Blood-Venous  Preliminary Report (23 Dec 2022 23:02):    No growth to date.      CAPILLARY BLOOD GLUCOSE            RADIOLOGY & ADDITIONAL TESTS:    < from: CT Abdomen and Pelvis No Cont (12.22.22 @ 14:19) >  IMPRESSION: Stranding again noted adjacent to the distal transverse colon   and the entire descending colon, suggestive of a persistent nonspecific   colitis or ischemic colitis. No evidence for pneumatosis intestinalis.   Follow-up colonoscopy after treatment/resolution of acute disease may be   pursued for additional evaluation.    Splenomegaly.    < end of copied text >        MEDICATIONS  (STANDING):  atorvastatin 20 milliGRAM(s) Oral at bedtime  buprenorphine 8 mG/naloxone 2 mG SL Film 1 Film(s) SubLingual three times a day  buPROPion XL (24-Hour) . 300 milliGRAM(s) Oral daily  gabapentin 100 milliGRAM(s) Oral daily  lamoTRIgine 150 milliGRAM(s) Oral two times a day  levETIRAcetam 750 milliGRAM(s) Oral two times a day  levothyroxine 50 MICROGram(s) Oral daily  pantoprazole  Injectable 40 milliGRAM(s) IV Push two times a day  QUEtiapine 400 milliGRAM(s) Oral at bedtime  risperiDONE   Tablet 3 milliGRAM(s) Oral daily  sodium chloride 0.9%. 1000 milliLiter(s) (125 mL/Hr) IV Continuous <Continuous>  vancomycin    Solution 125 milliGRAM(s) Oral every 6 hours    MEDICATIONS  (PRN):

## 2022-12-24 NOTE — PROGRESS NOTE ADULT - ASSESSMENT
Patient with anemia, C. difficile colitis: Patient has responded well to the vancomycin therapy for the colitis.  Continue low fiber diet and p.o. vancomycin.  Most likely will recommend to consider continuing PPI therapy but change to oral once daily therapy.  We will consider EGD on coming Tuesday.  Thank you very much

## 2022-12-25 LAB
ALBUMIN SERPL ELPH-MCNC: 2.8 G/DL — LOW (ref 3.3–5.2)
ALP SERPL-CCNC: 94 U/L — SIGNIFICANT CHANGE UP (ref 40–120)
ALT FLD-CCNC: 15 U/L — SIGNIFICANT CHANGE UP
ANION GAP SERPL CALC-SCNC: 10 MMOL/L — SIGNIFICANT CHANGE UP (ref 5–17)
AST SERPL-CCNC: 14 U/L — SIGNIFICANT CHANGE UP
BASOPHILS # BLD AUTO: 0.02 K/UL — SIGNIFICANT CHANGE UP (ref 0–0.2)
BASOPHILS NFR BLD AUTO: 0.4 % — SIGNIFICANT CHANGE UP (ref 0–2)
BILIRUB SERPL-MCNC: 0.3 MG/DL — LOW (ref 0.4–2)
BUN SERPL-MCNC: 7.9 MG/DL — LOW (ref 8–20)
CALCIUM SERPL-MCNC: 8.7 MG/DL — SIGNIFICANT CHANGE UP (ref 8.4–10.5)
CHLORIDE SERPL-SCNC: 100 MMOL/L — SIGNIFICANT CHANGE UP (ref 96–108)
CO2 SERPL-SCNC: 27 MMOL/L — SIGNIFICANT CHANGE UP (ref 22–29)
CREAT SERPL-MCNC: 1.31 MG/DL — HIGH (ref 0.5–1.3)
CULTURE RESULTS: SIGNIFICANT CHANGE UP
EGFR: 69 ML/MIN/1.73M2 — SIGNIFICANT CHANGE UP
EOSINOPHIL # BLD AUTO: 0.18 K/UL — SIGNIFICANT CHANGE UP (ref 0–0.5)
EOSINOPHIL NFR BLD AUTO: 3.3 % — SIGNIFICANT CHANGE UP (ref 0–6)
GLUCOSE SERPL-MCNC: 159 MG/DL — HIGH (ref 70–99)
HCT VFR BLD CALC: 28.4 % — LOW (ref 39–50)
HGB BLD-MCNC: 9 G/DL — LOW (ref 13–17)
IMM GRANULOCYTES NFR BLD AUTO: 1.7 % — HIGH (ref 0–0.9)
LYMPHOCYTES # BLD AUTO: 0.89 K/UL — LOW (ref 1–3.3)
LYMPHOCYTES # BLD AUTO: 16.4 % — SIGNIFICANT CHANGE UP (ref 13–44)
MAGNESIUM SERPL-MCNC: 1.6 MG/DL — SIGNIFICANT CHANGE UP (ref 1.6–2.6)
MCHC RBC-ENTMCNC: 26.9 PG — LOW (ref 27–34)
MCHC RBC-ENTMCNC: 31.7 GM/DL — LOW (ref 32–36)
MCV RBC AUTO: 85 FL — SIGNIFICANT CHANGE UP (ref 80–100)
MONOCYTES # BLD AUTO: 0.37 K/UL — SIGNIFICANT CHANGE UP (ref 0–0.9)
MONOCYTES NFR BLD AUTO: 6.8 % — SIGNIFICANT CHANGE UP (ref 2–14)
NEUTROPHILS # BLD AUTO: 3.88 K/UL — SIGNIFICANT CHANGE UP (ref 1.8–7.4)
NEUTROPHILS NFR BLD AUTO: 71.4 % — SIGNIFICANT CHANGE UP (ref 43–77)
PHOSPHATE SERPL-MCNC: 3.7 MG/DL — SIGNIFICANT CHANGE UP (ref 2.4–4.7)
PLATELET # BLD AUTO: 171 K/UL — SIGNIFICANT CHANGE UP (ref 150–400)
POTASSIUM SERPL-MCNC: 4 MMOL/L — SIGNIFICANT CHANGE UP (ref 3.5–5.3)
POTASSIUM SERPL-SCNC: 4 MMOL/L — SIGNIFICANT CHANGE UP (ref 3.5–5.3)
PROT SERPL-MCNC: 5.9 G/DL — LOW (ref 6.6–8.7)
RBC # BLD: 3.34 M/UL — LOW (ref 4.2–5.8)
RBC # FLD: 15.2 % — HIGH (ref 10.3–14.5)
SODIUM SERPL-SCNC: 137 MMOL/L — SIGNIFICANT CHANGE UP (ref 135–145)
SPECIMEN SOURCE: SIGNIFICANT CHANGE UP
TSH SERPL-MCNC: 1.96 UIU/ML — SIGNIFICANT CHANGE UP (ref 0.27–4.2)
WBC # BLD: 5.43 K/UL — SIGNIFICANT CHANGE UP (ref 3.8–10.5)
WBC # FLD AUTO: 5.43 K/UL — SIGNIFICANT CHANGE UP (ref 3.8–10.5)

## 2022-12-25 PROCEDURE — 99233 SBSQ HOSP IP/OBS HIGH 50: CPT

## 2022-12-25 RX ORDER — PANTOPRAZOLE SODIUM 20 MG/1
40 TABLET, DELAYED RELEASE ORAL
Refills: 0 | Status: DISCONTINUED | OUTPATIENT
Start: 2022-12-25 | End: 2022-12-28

## 2022-12-25 RX ORDER — MAGNESIUM SULFATE 500 MG/ML
1 VIAL (ML) INJECTION ONCE
Refills: 0 | Status: COMPLETED | OUTPATIENT
Start: 2022-12-25 | End: 2022-12-25

## 2022-12-25 RX ADMIN — Medication 125 MILLIGRAM(S): at 23:04

## 2022-12-25 RX ADMIN — ATORVASTATIN CALCIUM 20 MILLIGRAM(S): 80 TABLET, FILM COATED ORAL at 22:26

## 2022-12-25 RX ADMIN — Medication 50 MICROGRAM(S): at 05:28

## 2022-12-25 RX ADMIN — Medication 100 GRAM(S): at 17:57

## 2022-12-25 RX ADMIN — LEVETIRACETAM 750 MILLIGRAM(S): 250 TABLET, FILM COATED ORAL at 17:56

## 2022-12-25 RX ADMIN — BUPRENORPHINE AND NALOXONE 1 FILM(S): 2; .5 TABLET SUBLINGUAL at 05:28

## 2022-12-25 RX ADMIN — BUPRENORPHINE AND NALOXONE 1 FILM(S): 2; .5 TABLET SUBLINGUAL at 22:25

## 2022-12-25 RX ADMIN — LAMOTRIGINE 150 MILLIGRAM(S): 25 TABLET, ORALLY DISINTEGRATING ORAL at 17:56

## 2022-12-25 RX ADMIN — BUPRENORPHINE AND NALOXONE 1 FILM(S): 2; .5 TABLET SUBLINGUAL at 13:02

## 2022-12-25 RX ADMIN — RISPERIDONE 3 MILLIGRAM(S): 4 TABLET ORAL at 22:25

## 2022-12-25 RX ADMIN — GABAPENTIN 100 MILLIGRAM(S): 400 CAPSULE ORAL at 13:02

## 2022-12-25 RX ADMIN — LEVETIRACETAM 750 MILLIGRAM(S): 250 TABLET, FILM COATED ORAL at 05:28

## 2022-12-25 RX ADMIN — LAMOTRIGINE 150 MILLIGRAM(S): 25 TABLET, ORALLY DISINTEGRATING ORAL at 05:29

## 2022-12-25 RX ADMIN — BUPROPION HYDROCHLORIDE 300 MILLIGRAM(S): 150 TABLET, EXTENDED RELEASE ORAL at 13:02

## 2022-12-25 RX ADMIN — Medication 125 MILLIGRAM(S): at 13:02

## 2022-12-25 RX ADMIN — PANTOPRAZOLE SODIUM 40 MILLIGRAM(S): 20 TABLET, DELAYED RELEASE ORAL at 05:28

## 2022-12-25 RX ADMIN — QUETIAPINE FUMARATE 800 MILLIGRAM(S): 200 TABLET, FILM COATED ORAL at 22:25

## 2022-12-25 RX ADMIN — Medication 125 MILLIGRAM(S): at 17:57

## 2022-12-25 RX ADMIN — Medication 125 MILLIGRAM(S): at 05:28

## 2022-12-25 NOTE — PROGRESS NOTE ADULT - SUBJECTIVE AND OBJECTIVE BOX
Chief Complaint:  diarrhea    SUBJECTIVE / OVERNIGHT EVENTS:  No acute events reported overnight.  Pt still having diarrhea.  3 BMs yesterday and 1BM in AM.  No blood reported but still watery.  Patient denies chest pain, SOB, abd pain, N/V, chills.      I&O's Summary      PHYSICAL EXAM:  Vital Signs Last 24 Hrs  T(C): 37.1 (25 Dec 2022 12:14), Max: 37.1 (25 Dec 2022 12:14)  T(F): 98.7 (25 Dec 2022 12:14), Max: 98.7 (25 Dec 2022 12:14)  HR: 117 (25 Dec 2022 12:14) (94 - 117)  BP: 127/68 (25 Dec 2022 12:14) (127/68 - 136/78)  BP(mean): --  RR: 18 (25 Dec 2022 12:14) (18 - 19)  SpO2: 95% (25 Dec 2022 12:14) (93% - 96%)    Parameters below as of 25 Dec 2022 12:14  Patient On (Oxygen Delivery Method): room air        GENERAL: pt examined bedside, laying comfortably in bed in NAD  HEENT: NC/AT, moist oral mucosa, clear conjunctiva, sclera nonicteric  RESPIRATORY: Normal respiratory effort, no wheezing, rhonchi, rales  CARDIOVASCULAR: RRR, normal S1 and S2  ABDOMEN: soft, NT/ND, normoactive BS, no rebound/guarding  EXTREMITIES: No cynaosis, no clubbing, no lower extremity edema, pulses are 2+ bilaterally  NEUROLOGY: A+O to person, place, and time, no focal neurologic deficits appreciated   SKIN: No rashes or no palpable lesions        LABS:                                       9.0    5.43  )-----------( 171      ( 25 Dec 2022 10:40 )             28.4       12-25    137  |  100  |  7.9<L>  ----------------------------<  159<H>  4.0   |  27.0  |  1.31<H>    Ca    8.7      25 Dec 2022 10:40  Phos  3.7     12-25  Mg     1.6     12-25    TPro  5.9<L>  /  Alb  2.8<L>  /  TBili  0.3<L>  /  DBili  x   /  AST  14  /  ALT  15  /  AlkPhos  94  12-25      Thyroid Stimulating Hormone, Serum (12.25.22 @ 10:40)    Thyroid Stimulating Hormone, Serum: 1.96 uIU/mL      Culture - Urine (collected 23 Dec 2022 03:30)  Source: Clean Catch Clean Catch (Midstream)  Final Report (24 Dec 2022 06:33):    <10,000 CFU/mL Normal Urogenital Damari    Culture - Blood (collected 22 Dec 2022 16:30)  Source: .Blood Blood-Peripheral  Preliminary Report (23 Dec 2022 23:02):    No growth to date.    Culture - Blood (collected 22 Dec 2022 16:25)  Source: .Blood Blood-Venous  Preliminary Report (23 Dec 2022 23:02):    No growth to date.      CAPILLARY BLOOD GLUCOSE            RADIOLOGY & ADDITIONAL TESTS:    < from: CT Abdomen and Pelvis No Cont (12.22.22 @ 14:19) >  IMPRESSION: Stranding again noted adjacent to the distal transverse colon   and the entire descending colon, suggestive of a persistent nonspecific   colitis or ischemic colitis. No evidence for pneumatosis intestinalis.   Follow-up colonoscopy after treatment/resolution of acute disease may be   pursued for additional evaluation.    Splenomegaly.    < end of copied text >        MEDICATIONS  (STANDING):  atorvastatin 20 milliGRAM(s) Oral at bedtime  buprenorphine 8 mG/naloxone 2 mG SL Film 1 Film(s) SubLingual three times a day  buPROPion XL (24-Hour) . 300 milliGRAM(s) Oral daily  gabapentin 100 milliGRAM(s) Oral daily  lamoTRIgine 150 milliGRAM(s) Oral two times a day  levETIRAcetam 750 milliGRAM(s) Oral two times a day  levothyroxine 50 MICROGram(s) Oral daily  pantoprazole  Injectable 40 milliGRAM(s) IV Push two times a day  QUEtiapine 400 milliGRAM(s) Oral at bedtime  risperiDONE   Tablet 3 milliGRAM(s) Oral daily  sodium chloride 0.9%. 1000 milliLiter(s) (125 mL/Hr) IV Continuous <Continuous>  vancomycin    Solution 125 milliGRAM(s) Oral every 6 hours    MEDICATIONS  (PRN):

## 2022-12-25 NOTE — PROGRESS NOTE ADULT - ASSESSMENT
43 y/o M w/ PMH of bipolar disorder, HTN, GERD, and polysubstance abuse, recently admitted for severe cdiff colitis / penumatosis / isac (requiring brief hd as inpt) w/ breif ICU upgrade for hyperkalemia / ekg changes on prior hospitalization came in for persistent large volume watery diarrhea w/ reports of intermittent blood in stool.  Pt is s/p vancomycin/flagyl  for recent C.diff.  Pt still reporting diarrhea.  Also noted to have anemia w/ concern for possible GIB pending EGD.       Persistent diarrhea, etiology unclear   - Hx of recent C.diff   - GI PCR and C.diff negative   - Fecal calprotectin ordered to help differentiate inflammatory from noninflammatory diarrhea   - CT a/p reviewed and noted above   - c/w stool count   - Monitor CBC and temperature       Normocytic anemia  - H/H trending down but hemodynamically stable and no active bleeding reported at this time  - Reported to have melena on prior hospitalization and FOBT + on admission   - s/p 1 unit total since hospital course   - Pending EGD likely on tuesday  - Colonsocopy needs to be differed for at least 4 weeks given colitis   - INR mildly elevated   - Monitor CBC and transfuse as needed   - c/w protonix for gi ppx  - GI following and recs noted       Hypotension likley 2/2 hypovolemia in the setting of diarrhea  - Resolved w/ IVFs  - c/w gentle hydration as needed       CKD IIIb  - Cr improved from prior admissions   - Monitor renal fxn and lytes  - Avoid nephrotoxic meds or renally dose if needed      Polysubstance abuse   - u tox negative    - c/w home dose suboxone       Bipolar disorder  - c/w home meds   - bh following        Splenomegaly   - Incidentally noted on CT a/p 12/22/22  - Spleen normal on CT a/p 12/02/22   - Etiology unclear, plts nL, no evidence of cirrhosis  - No evidence of hemolysis   - Will refer to heme for further w/u         VTE ppx: SCDs     Dispo: Remains acute requiring inpt hospitalization

## 2022-12-26 DIAGNOSIS — D64.9 ANEMIA, UNSPECIFIED: ICD-10-CM

## 2022-12-26 LAB
ANION GAP SERPL CALC-SCNC: 9 MMOL/L — SIGNIFICANT CHANGE UP (ref 5–17)
BASOPHILS # BLD AUTO: 0.02 K/UL — SIGNIFICANT CHANGE UP (ref 0–0.2)
BASOPHILS NFR BLD AUTO: 0.4 % — SIGNIFICANT CHANGE UP (ref 0–2)
BUN SERPL-MCNC: 13.4 MG/DL — SIGNIFICANT CHANGE UP (ref 8–20)
CALCIUM SERPL-MCNC: 8.3 MG/DL — LOW (ref 8.4–10.5)
CHLORIDE SERPL-SCNC: 102 MMOL/L — SIGNIFICANT CHANGE UP (ref 96–108)
CO2 SERPL-SCNC: 28 MMOL/L — SIGNIFICANT CHANGE UP (ref 22–29)
CREAT SERPL-MCNC: 1.68 MG/DL — HIGH (ref 0.5–1.3)
EGFR: 51 ML/MIN/1.73M2 — LOW
EOSINOPHIL # BLD AUTO: 0.19 K/UL — SIGNIFICANT CHANGE UP (ref 0–0.5)
EOSINOPHIL NFR BLD AUTO: 3.5 % — SIGNIFICANT CHANGE UP (ref 0–6)
GLUCOSE SERPL-MCNC: 99 MG/DL — SIGNIFICANT CHANGE UP (ref 70–99)
HCT VFR BLD CALC: 25.2 % — LOW (ref 39–50)
HCT VFR BLD CALC: 29.2 % — LOW (ref 39–50)
HGB BLD-MCNC: 7.8 G/DL — LOW (ref 13–17)
HGB BLD-MCNC: 9.2 G/DL — LOW (ref 13–17)
IMM GRANULOCYTES NFR BLD AUTO: 2.2 % — HIGH (ref 0–0.9)
LYMPHOCYTES # BLD AUTO: 1.2 K/UL — SIGNIFICANT CHANGE UP (ref 1–3.3)
LYMPHOCYTES # BLD AUTO: 22.1 % — SIGNIFICANT CHANGE UP (ref 13–44)
MAGNESIUM SERPL-MCNC: 1.9 MG/DL — SIGNIFICANT CHANGE UP (ref 1.6–2.6)
MCHC RBC-ENTMCNC: 26.1 PG — LOW (ref 27–34)
MCHC RBC-ENTMCNC: 31 GM/DL — LOW (ref 32–36)
MCV RBC AUTO: 84.3 FL — SIGNIFICANT CHANGE UP (ref 80–100)
MONOCYTES # BLD AUTO: 0.4 K/UL — SIGNIFICANT CHANGE UP (ref 0–0.9)
MONOCYTES NFR BLD AUTO: 7.4 % — SIGNIFICANT CHANGE UP (ref 2–14)
NEUTROPHILS # BLD AUTO: 3.5 K/UL — SIGNIFICANT CHANGE UP (ref 1.8–7.4)
NEUTROPHILS NFR BLD AUTO: 64.4 % — SIGNIFICANT CHANGE UP (ref 43–77)
PLATELET # BLD AUTO: 197 K/UL — SIGNIFICANT CHANGE UP (ref 150–400)
POTASSIUM SERPL-MCNC: 4.4 MMOL/L — SIGNIFICANT CHANGE UP (ref 3.5–5.3)
POTASSIUM SERPL-SCNC: 4.4 MMOL/L — SIGNIFICANT CHANGE UP (ref 3.5–5.3)
RBC # BLD: 2.99 M/UL — LOW (ref 4.2–5.8)
RBC # FLD: 15.2 % — HIGH (ref 10.3–14.5)
SARS-COV-2 RNA SPEC QL NAA+PROBE: SIGNIFICANT CHANGE UP
SODIUM SERPL-SCNC: 138 MMOL/L — SIGNIFICANT CHANGE UP (ref 135–145)
WBC # BLD: 5.43 K/UL — SIGNIFICANT CHANGE UP (ref 3.8–10.5)
WBC # FLD AUTO: 5.43 K/UL — SIGNIFICANT CHANGE UP (ref 3.8–10.5)

## 2022-12-26 PROCEDURE — 99233 SBSQ HOSP IP/OBS HIGH 50: CPT

## 2022-12-26 PROCEDURE — 99233 SBSQ HOSP IP/OBS HIGH 50: CPT | Mod: FS

## 2022-12-26 RX ORDER — GABAPENTIN 400 MG/1
300 CAPSULE ORAL DAILY
Refills: 0 | Status: DISCONTINUED | OUTPATIENT
Start: 2022-12-26 | End: 2022-12-28

## 2022-12-26 RX ADMIN — LAMOTRIGINE 150 MILLIGRAM(S): 25 TABLET, ORALLY DISINTEGRATING ORAL at 05:42

## 2022-12-26 RX ADMIN — BUPRENORPHINE AND NALOXONE 1 FILM(S): 2; .5 TABLET SUBLINGUAL at 13:47

## 2022-12-26 RX ADMIN — Medication 50 MICROGRAM(S): at 05:43

## 2022-12-26 RX ADMIN — Medication 125 MILLIGRAM(S): at 12:00

## 2022-12-26 RX ADMIN — Medication 125 MILLIGRAM(S): at 17:19

## 2022-12-26 RX ADMIN — ATORVASTATIN CALCIUM 20 MILLIGRAM(S): 80 TABLET, FILM COATED ORAL at 22:44

## 2022-12-26 RX ADMIN — Medication 125 MILLIGRAM(S): at 23:38

## 2022-12-26 RX ADMIN — BUPROPION HYDROCHLORIDE 300 MILLIGRAM(S): 150 TABLET, EXTENDED RELEASE ORAL at 12:00

## 2022-12-26 RX ADMIN — LEVETIRACETAM 750 MILLIGRAM(S): 250 TABLET, FILM COATED ORAL at 05:43

## 2022-12-26 RX ADMIN — BUPRENORPHINE AND NALOXONE 1 FILM(S): 2; .5 TABLET SUBLINGUAL at 22:43

## 2022-12-26 RX ADMIN — BUPRENORPHINE AND NALOXONE 1 FILM(S): 2; .5 TABLET SUBLINGUAL at 05:43

## 2022-12-26 RX ADMIN — LAMOTRIGINE 150 MILLIGRAM(S): 25 TABLET, ORALLY DISINTEGRATING ORAL at 17:19

## 2022-12-26 RX ADMIN — PANTOPRAZOLE SODIUM 40 MILLIGRAM(S): 20 TABLET, DELAYED RELEASE ORAL at 06:49

## 2022-12-26 RX ADMIN — LEVETIRACETAM 750 MILLIGRAM(S): 250 TABLET, FILM COATED ORAL at 17:20

## 2022-12-26 RX ADMIN — QUETIAPINE FUMARATE 800 MILLIGRAM(S): 200 TABLET, FILM COATED ORAL at 22:43

## 2022-12-26 RX ADMIN — Medication 125 MILLIGRAM(S): at 06:49

## 2022-12-26 RX ADMIN — RISPERIDONE 3 MILLIGRAM(S): 4 TABLET ORAL at 12:00

## 2022-12-26 RX ADMIN — GABAPENTIN 300 MILLIGRAM(S): 400 CAPSULE ORAL at 12:00

## 2022-12-26 NOTE — PROGRESS NOTE ADULT - ASSESSMENT
42 year old male with a PMHx of bipolar disorder, HTN, GERD, and polysubstance abuse, recent admission (12/1-12/12) for diarrhea complicated by severe cdiff colitis / pneumatosis / isac , requiring brief HD as inpatient , requiring brief icu admission for hyperkalemia / ekg changes. Was treated with vancomycin/flagyl and pt was dcd home.    Now patient presented to ED reporting 3-4 episodes of watery diarrhea with minimal bright red blood in stool with LLQ abdominal pain and hypotension. Seen at [facility] [place] 2 days ago for  similar symptoms and anemia requiring blood transfusion.     Anemia secondary to GIB  His hemoglobin on admission was 8.2gm with a drop to 6.7gm s/p 1uPRBC with repeat hgb of 8.5gm.   - Continue to trend CBC, transfuse as needed   - Patient will need EGD which could be done as outpatient, however patient requesting to stay for procedure. Will plan for tentative EGD Upcoming Tuesday if Hgb continues to trend down and patient is optimized.  - Will need eventual colonoscopy to complete anemia workup but should wait at least 4 weeks in the setting of colitis seen on CT-scan.   - Pantoprazole 40 mg BID    42 year old male with a PMHx of bipolar disorder, HTN, GERD, and polysubstance abuse, recent admission (12/1-12/12) for diarrhea complicated by severe cdiff colitis / pneumatosis / isac , requiring brief HD as inpatient , requiring brief icu admission for hyperkalemia / ekg changes. Was treated with vancomycin/flagyl and pt was dcd home.    Now patient presented to ED reporting 3-4 episodes of watery diarrhea with minimal bright red blood in stool with LLQ abdominal pain and hypotension. Seen at [facility] [place] 2 days ago for  similar symptoms and anemia requiring blood transfusion.

## 2022-12-26 NOTE — PROGRESS NOTE ADULT - ASSESSMENT
41 y/o M w/ PMH of bipolar disorder, HTN, GERD, and polysubstance abuse, recently admitted for severe cdiff colitis / penumatosis / isac (requiring brief hd as inpt) w/ breif ICU upgrade for hyperkalemia / ekg changes on prior hospitalization came in for persistent large volume watery diarrhea w/ reports of intermittent blood in stool.  Pt is s/p vancomycin/flagyl  for recent C.diff.  Pt still reporting diarrhea.  Also noted to have anemia w/ concern for possible GIB pending EGD; H/H has been slowly down trending.        Persistent diarrhea, etiology unclear   - Still having diarrhea but w/out evidence of jennifer blood  - Hx of recent C.diff   - GI PCR and C.diff negative   - Fecal calprotectin ordered to help differentiate inflammatory from noninflammatory diarrhea   - CT a/p reviewed and noted above   - c/w stool count   - Monitor CBC and temperature       Normocytic anemia  - H/H down trending this monring  - Ordered repeat CBC and Hb improved   - Hemodynamically stable and no active bleeding reported at this time  - Reported to have melena on prior hospitalization and FOBT + on admission   - s/p 1 unit total since hospital course   - Pending EGD likely on tuesday; will make npo after midnight tonight   - Colonsocopy needs to be differed for at least 4 weeks given colitis   - INR mildly elevated   - Monitor CBC and transfuse as needed   - c/w protonix for gi ppx  - GI following and recs noted       Hypotension likely 2/2 hypovolemia in the setting of diarrhea  - Resolved w/ IVFs  - c/w gentle hydration as needed       CKD IIIb  - Cr trending up but overall improved   - Monitor renal fxn and lytes  - Avoid nephrotoxic meds or renally dose if needed      Polysubstance abuse   - u tox negative    - c/w home dose suboxone       Bipolar disorder  - c/w home meds   - bh following        Splenomegaly   - Incidentally noted on CT a/p 12/22/22  - Spleen normal on CT a/p 12/02/22   - Etiology unclear, plts nL, no evidence of cirrhosis  - No evidence of hemolysis   - Will refer to heme for further w/u         VTE ppx: SCDs     Dispo: Remains acute requiring inpt hospitalization

## 2022-12-26 NOTE — PROGRESS NOTE ADULT - SUBJECTIVE AND OBJECTIVE BOX
Chief Complaint:  diarrhea    SUBJECTIVE / OVERNIGHT EVENTS:  No acute events reported overnight.  Pt still having diarrhea.  Denies blood in stool.  Patient denies chest pain, SOB, abd pain, N/V, chills.      I&O's Summary      PHYSICAL EXAM:  Vital Signs Last 24 Hrs  T(C): 37.4 (26 Dec 2022 12:21), Max: 37.6 (25 Dec 2022 17:12)  T(F): 99.3 (26 Dec 2022 12:21), Max: 99.7 (25 Dec 2022 17:12)  HR: 104 (26 Dec 2022 12:21) (94 - 116)  BP: 103/66 (26 Dec 2022 12:21) (100/66 - 120/75)  BP(mean): --  RR: 18 (26 Dec 2022 12:21) (18 - 18)  SpO2: 92% (26 Dec 2022 12:21) (92% - 95%)    Parameters below as of 26 Dec 2022 12:21  Patient On (Oxygen Delivery Method): room air        GENERAL: pt examined bedside, laying comfortably in bed in NAD  HEENT: NC/AT, moist oral mucosa, clear conjunctiva, sclera nonicteric  RESPIRATORY: Normal respiratory effort, no wheezing, rhonchi, rales  CARDIOVASCULAR: RRR, normal S1 and S2  ABDOMEN: soft, NT/ND, +BS, no rebound/guarding  EXTREMITIES: No cynaosis, no clubbing, no lower extremity edema, pulses are 2+ bilaterally  NEUROLOGY: A+O to person, place, and time, no focal neurologic deficits appreciated   SKIN: No rashes or no palpable lesions        LABS:                                                          9.2    x     )-----------( x        ( 26 Dec 2022 09:03 )             29.2       12-26    138  |  102  |  13.4  ----------------------------<  99  4.4   |  28.0  |  1.68<H>    Ca    8.3<L>      26 Dec 2022 05:30  Phos  3.7     12-25  Mg     1.9     12-26    TPro  5.9<L>  /  Alb  2.8<L>  /  TBili  0.3<L>  /  DBili  x   /  AST  14  /  ALT  15  /  AlkPhos  94  12-25        Thyroid Stimulating Hormone, Serum (12.25.22 @ 10:40)    Thyroid Stimulating Hormone, Serum: 1.96 uIU/mL      Culture - Urine (collected 23 Dec 2022 03:30)  Source: Clean Catch Clean Catch (Midstream)  Final Report (24 Dec 2022 06:33):    <10,000 CFU/mL Normal Urogenital Damari    Culture - Blood (collected 22 Dec 2022 16:30)  Source: .Blood Blood-Peripheral  Preliminary Report (23 Dec 2022 23:02):    No growth to date.    Culture - Blood (collected 22 Dec 2022 16:25)  Source: .Blood Blood-Venous  Preliminary Report (23 Dec 2022 23:02):    No growth to date.      CAPILLARY BLOOD GLUCOSE            RADIOLOGY & ADDITIONAL TESTS:    < from: CT Abdomen and Pelvis No Cont (12.22.22 @ 14:19) >  IMPRESSION: Stranding again noted adjacent to the distal transverse colon   and the entire descending colon, suggestive of a persistent nonspecific   colitis or ischemic colitis. No evidence for pneumatosis intestinalis.   Follow-up colonoscopy after treatment/resolution of acute disease may be   pursued for additional evaluation.    Splenomegaly.    < end of copied text >        MEDICATIONS  (STANDING):  atorvastatin 20 milliGRAM(s) Oral at bedtime  buprenorphine 8 mG/naloxone 2 mG SL Film 1 Film(s) SubLingual three times a day  buPROPion XL (24-Hour) . 300 milliGRAM(s) Oral daily  gabapentin 100 milliGRAM(s) Oral daily  lamoTRIgine 150 milliGRAM(s) Oral two times a day  levETIRAcetam 750 milliGRAM(s) Oral two times a day  levothyroxine 50 MICROGram(s) Oral daily  pantoprazole  Injectable 40 milliGRAM(s) IV Push two times a day  QUEtiapine 400 milliGRAM(s) Oral at bedtime  risperiDONE   Tablet 3 milliGRAM(s) Oral daily  sodium chloride 0.9%. 1000 milliLiter(s) (125 mL/Hr) IV Continuous <Continuous>  vancomycin    Solution 125 milliGRAM(s) Oral every 6 hours    MEDICATIONS  (PRN):

## 2022-12-26 NOTE — PROGRESS NOTE ADULT - NS ATTEND AMEND GEN_ALL_CORE FT
42 year old male with a PMHx of bipolar disorder, HTN, GERD, and polysubstance abuse, recent admission (12/1-12/12) for diarrhea complicated by severe cdiff colitis / pneumatosis / isac , requiring brief HD as inpatient here with rectal bleeding, recent transfusion needed at [facility] [place] for same.   Colitis on CT, recommendation to scope after infectious resolution - C diff rx completed, pt reports diarrhea improved.   Will need an outpatient colonoscopy  Does need EGD tomorrow to assess for upper Gi causes of anemia prior to discharge. BUN did not rise, blood was red rectally, more likely cause of anemia was C diff related colitis + anemia of chronic disease in this pt recently on HD. h/o polysubstance abuse - this was explained to him  Min Hb 6.7 this admission, X fused 1 U PRBC 12/23

## 2022-12-26 NOTE — PROGRESS NOTE ADULT - PROBLEM SELECTOR PLAN 1
Drop in hemoglobin on admission from 8.2gm to 6.7gm s/p 1uPRBC In patient with recent cdiff infection with persistent diarrhea. Now Cdiff negative. Denies any further rectal bleeding. No evidence of overt GI bleeding. No further transfusion needed since admission. Hgb stable.     - Plan for possible EGD tomorrow  - Diet as ordered. NPO after midnight  - Maintain current COVID PCR, T&S, INR <1.5, Hgb >7.0, Plt >50 prior to procedure.  - Hold AC for procedure   - Pantoprazole 40 mg BID Drop in hemoglobin on admission from 8.2gm to 6.7gm s/p 1uPRBC In patient with recent cdiff infection with persistent diarrhea. Now Cdiff negative. Denies any further rectal bleeding. No evidence of overt GI bleeding. No further transfusion needed since admission. Hgb stable.     Plan:  - Plan for possible EGD tomorrow  - Diet as ordered. NPO after midnight  - Maintain current COVID PCR, T&S, INR <1.5, Hgb >7.0, Plt >50 prior to procedure.  - Hold AC for procedure   - Pantoprazole 40 mg BID

## 2022-12-26 NOTE — PROGRESS NOTE ADULT - SUBJECTIVE AND OBJECTIVE BOX
Chief Complaint:  Patient is a 42y old  Male who presents with a chief complaint of diarrhea, weakness (25 Dec 2022 14:07)      HPI/ 24 hr events: Patient seen and examined at bedside, no overnight events. Tolerating diet. Vitals are stable, Hemoglobin stable. Endorsing nonbloody diarrhea. Denies nausea, vomiting, abdominal pain.      REVIEW OF SYSTEMS:   General: Negative  HEENT: Negative  CV: Negative  Respiratory: Negative  GI: See HPI  : Negative  MSK: Negative  Hematologic: Negative  Skin: Negative    MEDICATIONS:   MEDICATIONS  (STANDING):  atorvastatin 20 milliGRAM(s) Oral at bedtime  buprenorphine 8 mG/naloxone 2 mG SL Film 1 Film(s) SubLingual three times a day  buPROPion XL (24-Hour) . 300 milliGRAM(s) Oral daily  gabapentin 300 milliGRAM(s) Oral daily  lamoTRIgine 150 milliGRAM(s) Oral two times a day  levETIRAcetam 750 milliGRAM(s) Oral two times a day  levothyroxine 50 MICROGram(s) Oral daily  pantoprazole    Tablet 40 milliGRAM(s) Oral before breakfast  QUEtiapine 800 milliGRAM(s) Oral at bedtime  risperiDONE   Tablet 3 milliGRAM(s) Oral daily  vancomycin    Solution 125 milliGRAM(s) Oral every 6 hours    MEDICATIONS  (PRN):      ALLERGIES:   Allergies    No Known Allergies    Intolerances        VITAL SIGNS:   Vital Signs Last 24 Hrs  T(C): 37.4 (26 Dec 2022 12:21), Max: 37.6 (25 Dec 2022 17:12)  T(F): 99.3 (26 Dec 2022 12:21), Max: 99.7 (25 Dec 2022 17:12)  HR: 104 (26 Dec 2022 12:21) (94 - 116)  BP: 103/66 (26 Dec 2022 12:21) (100/66 - 120/75)  BP(mean): --  RR: 18 (26 Dec 2022 12:21) (18 - 18)  SpO2: 92% (26 Dec 2022 12:21) (92% - 95%)    Parameters below as of 26 Dec 2022 12:21  Patient On (Oxygen Delivery Method): room air      I&O's Summary      PHYSICAL EXAM:   GENERAL:  No acute distress  HEENT:  NC/AT, conjunctiva clear, sclera anicteric  CHEST:  No increased effort, breath sounds clear  HEART:  Regular rhythm, S1, S2,   ABDOMEN:  Soft, non-tender, non-distended, normoactive bowel sounds  EXTREMITIES: No edema  SKIN:  Warm, dry  NEURO:  Calm, cooperative      LABS:  CBC Full  -  ( 26 Dec 2022 09:03 )  WBC Count : x  RBC Count : x  Hemoglobin : 9.2 g/dL  Hematocrit : 29.2 %  Platelet Count - Automated : x  Mean Cell Volume : x  Mean Cell Hemoglobin : x  Mean Cell Hemoglobin Concentration : x  Auto Neutrophil # : x  Auto Lymphocyte # : x  Auto Monocyte # : x  Auto Eosinophil # : x  Auto Basophil # : x  Auto Neutrophil % : x  Auto Lymphocyte % : x  Auto Monocyte % : x  Auto Eosinophil % : x  Auto Basophil % : x    12-26    138  |  102  |  13.4  ----------------------------<  99  4.4   |  28.0  |  1.68<H>    Ca    8.3<L>      26 Dec 2022 05:30  Phos  3.7     12-25  Mg     1.9     12-26    TPro  5.9<L>  /  Alb  2.8<L>  /  TBili  0.3<L>  /  DBili  x   /  AST  14  /  ALT  15  /  AlkPhos  94  12-25    LIVER FUNCTIONS - ( 25 Dec 2022 10:40 )  Alb: 2.8 g/dL / Pro: 5.9 g/dL / ALK PHOS: 94 U/L / ALT: 15 U/L / AST: 14 U/L / GGT: x                 Culture - Stool (collected 23 Dec 2022 15:56)  Source: .Stool Feces  Final Report (25 Dec 2022 18:13):    No enteric pathogens isolated.    (Stool culture examined for Salmonella,    Shigella, Campylobacter, Aeromonas, Plesiomonas,    Vibrio, E.coli O157 and Yersinia)      C Diff by PCR Result: NotDetec: The results of this test should be interpreted with consideration of  clinical context.  Not Detected result indicates the absence of C. difficile or that the  number of organisms is below the assay limit of detection.  Detected result indicates the presence of C. difficile nucleic acid.  Indeterminate result may indicate the presence of amplification  inhibitors in specimen; presence or absence of organisms cannot be  determined. Consider collecting new specimen if further testing is still  needed.  This qualitative PCR assay detects the toxin B gene; it is FDA-approved,  and its performance was established by [facility], [place], [place]. (12.23.22 @ 15:56)          RADIOLOGY & ADDITIONAL STUDIES:    < from: CT Abdomen and Pelvis No Cont (12.22.22 @ 14:19) >    ACC: [id number] EXAM:  CT ABDOMEN AND PELVIS                          PROCEDURE DATE:  12/22/2022          INTERPRETATION:  CLINICAL INFORMATION: Abdominal pain and cramping.   Evaluation for pneumatosis.    COMPARISON: 12/15/2022    CONTRAST/COMPLICATIONS:  IV Contrast: NONE  Oral Contrast: NONE  Complications: None reported at time of study completion    PROCEDURE:  CT of the Abdomen and Pelvis was performed.  Sagittal and coronal reformats were performed.    FINDINGS: Evaluation of the abdomenand pelvis is limited by lack of   IV/oral contrast.  LOWER CHEST: Small right atelectasis.    LIVER: Within normal limits.  BILE DUCTS: Normal caliber.  GALLBLADDER: Within normal limits.  SPLEEN: Splenomegaly measuring 15.7 cm, increased from priormeasurement   of 15.0 cm.  PANCREAS: Within normal limits.  ADRENALS: Nonspecific mild adrenal thickening bilaterally.  KIDNEYS/URETERS: Within normal limits.    BLADDER: Examination limited by underdistention and streaky artifact from   left hip replacement.  REPRODUCTIVE ORGANS: The prostate and seminal vesicles appear grossly   unremarkable.    BOWEL: No bowel obstruction. Appendix within normal limits. Stranding   again noted adjacent to the distal transverse colon and the entire   descendingcolon, suggestive of a persistent nonspecific colitis or   ischemic colitis. No evidence for pneumatosis intestinalis. Moderate   amount of stool in the ascending and transverse colon.  PERITONEUM: No free air or ascites.  VESSELS: Mild calcified atherosclerotic disease.  RETROPERITONEUM/LYMPH NODES: No lymphadenopathy.  ABDOMINAL WALL: Diastases recti. Tiny fat-containing umbilical hernia  BONES: Degenerative spondylosis.    IMPRESSION: Stranding again noted adjacent to the distal transverse colon   and the entire descending colon, suggestive of a persistent nonspecific   colitis or ischemic colitis. No evidence for pneumatosis intestinalis.   Follow-up colonoscopy after treatment/resolution of acute disease may be   pursued for additional evaluation.    Splenomegaly.    --- End of Report ---            PETER MEANS MD; Attending Radiologist  This document has been electronically signed. Dec 22 2022  2:53PM    < end of copied text >           Chief Complaint:  Patient is a 42y old  Male who presents with a chief complaint of diarrhea, weakness (25 Dec 2022 14:07)      HPI/ 24 hr events: Patient seen and examined at bedside, no overnight events. Tolerating diet. Vitals are stable, Hemoglobin stable. Endorsing nonbloody diarrhea. Denies nausea, vomiting, abdominal pain.      REVIEW OF SYSTEMS:   General: Negative  HEENT: Negative  CV: Negative  Respiratory: Negative  GI: See HPI  : Negative  MSK: Negative  Hematologic: Negative  Skin: Negative    MEDICATIONS:   MEDICATIONS  (STANDING):  atorvastatin 20 milliGRAM(s) Oral at bedtime  buprenorphine 8 mG/naloxone 2 mG SL Film 1 Film(s) SubLingual three times a day  buPROPion XL (24-Hour) . 300 milliGRAM(s) Oral daily  gabapentin 300 milliGRAM(s) Oral daily  lamoTRIgine 150 milliGRAM(s) Oral two times a day  levETIRAcetam 750 milliGRAM(s) Oral two times a day  levothyroxine 50 MICROGram(s) Oral daily  pantoprazole    Tablet 40 milliGRAM(s) Oral before breakfast  QUEtiapine 800 milliGRAM(s) Oral at bedtime  risperiDONE   Tablet 3 milliGRAM(s) Oral daily  vancomycin    Solution 125 milliGRAM(s) Oral every 6 hours    MEDICATIONS  (PRN):      ALLERGIES:   Allergies    No Known Allergies    Intolerances        VITAL SIGNS:   Vital Signs Last 24 Hrs  T(C): 37.4 (26 Dec 2022 12:21), Max: 37.6 (25 Dec 2022 17:12)  T(F): 99.3 (26 Dec 2022 12:21), Max: 99.7 (25 Dec 2022 17:12)  HR: 104 (26 Dec 2022 12:21) (94 - 116)  BP: 103/66 (26 Dec 2022 12:21) (100/66 - 120/75)  BP(mean): --  RR: 18 (26 Dec 2022 12:21) (18 - 18)  SpO2: 92% (26 Dec 2022 12:21) (92% - 95%)    Parameters below as of 26 Dec 2022 12:21  Patient On (Oxygen Delivery Method): room air      I&O's Summary      PHYSICAL EXAM:   GENERAL:  No acute distress  HEENT:  NC/AT, conjunctiva clear, sclera anicteric  CHEST:  No increased effort, breath sounds clear  HEART:  Regular rhythm, S1, S2,   ABDOMEN:  Soft, non-tender, non-distended, normoactive bowel sounds  EXTREMITIES: No edema  SKIN:  Warm, dry  NEURO:  Calm, cooperative      LABS:  CBC Full  -  ( 26 Dec 2022 09:03 )  WBC Count : x  RBC Count : x  Hemoglobin : 9.2 g/dL  Hematocrit : 29.2 %  Platelet Count - Automated : x  Mean Cell Volume : x  Mean Cell Hemoglobin : x  Mean Cell Hemoglobin Concentration : x  Auto Neutrophil # : x  Auto Lymphocyte # : x  Auto Monocyte # : x  Auto Eosinophil # : x  Auto Basophil # : x  Auto Neutrophil % : x  Auto Lymphocyte % : x  Auto Monocyte % : x  Auto Eosinophil % : x  Auto Basophil % : x    12-26    138  |  102  |  13.4  ----------------------------<  99  4.4   |  28.0  |  1.68<H>    Ca    8.3<L>      26 Dec 2022 05:30  Phos  3.7     12-25  Mg     1.9     12-26    TPro  5.9<L>  /  Alb  2.8<L>  /  TBili  0.3<L>  /  DBili  x   /  AST  14  /  ALT  15  /  AlkPhos  94  12-25    LIVER FUNCTIONS - ( 25 Dec 2022 10:40 )  Alb: 2.8 g/dL / Pro: 5.9 g/dL / ALK PHOS: 94 U/L / ALT: 15 U/L / AST: 14 U/L / GGT: x                 Culture - Stool (collected 23 Dec 2022 15:56)  Source: .Stool Feces  Final Report (25 Dec 2022 18:13):    No enteric pathogens isolated.    (Stool culture examined for Salmonella,    Shigella, Campylobacter, Aeromonas, Plesiomonas,    Vibrio, E.coli O157 and Yersinia)      C Diff by PCR Result: NotDetec: The results of this test should be interpreted with consideration of  clinical context.  Not Detected result indicates the absence of C. difficile or that the  number of organisms is below the assay limit of detection.  Detected result indicates the presence of C. difficile nucleic acid.  Indeterminate result may indicate the presence of amplification  inhibitors in specimen; presence or absence of organisms cannot be  determined. Consider collecting new specimen if further testing is still  needed.  This qualitative PCR assay detects the toxin B gene; it is FDA-approved,  and its performance was established by [facility], [place], [place]. (12.23.22 @ 15:56)          RADIOLOGY & ADDITIONAL STUDIES:    < from: CT Abdomen and Pelvis No Cont (12.22.22 @ 14:19) >    ACC: [id number] EXAM:  CT ABDOMEN AND PELVIS                          PROCEDURE DATE:  12/22/2022          INTERPRETATION:  CLINICAL INFORMATION: Abdominal pain and cramping.   Evaluation for pneumatosis.    COMPARISON: 12/15/2022    CONTRAST/COMPLICATIONS:  IV Contrast: NONE  Oral Contrast: NONE  Complications: None reported at time of study completion    PROCEDURE:  CT of the Abdomen and Pelvis was performed.  Sagittal and coronal reformats were performed.    FINDINGS: Evaluation of the abdomenand pelvis is limited by lack of   IV/oral contrast.  LOWER CHEST: Small right atelectasis.    LIVER: Within normal limits.  BILE DUCTS: Normal caliber.  GALLBLADDER: Within normal limits.  SPLEEN: Splenomegaly measuring 15.7 cm, increased from priormeasurement   of 15.0 cm.  PANCREAS: Within normal limits.  ADRENALS: Nonspecific mild adrenal thickening bilaterally.  KIDNEYS/URETERS: Within normal limits.    BLADDER: Examination limited by underdistention and streaky artifact from   left hip replacement.  REPRODUCTIVE ORGANS: The prostate and seminal vesicles appear grossly   unremarkable.    BOWEL: No bowel obstruction. Appendix within normal limits. Stranding   again noted adjacent to the distal transverse colon and the entire   descendingcolon, suggestive of a persistent nonspecific colitis or   ischemic colitis. No evidence for pneumatosis intestinalis. Moderate   amount of stool in the ascending and transverse colon.  PERITONEUM: No free air or ascites.  VESSELS: Mild calcified atherosclerotic disease.  RETROPERITONEUM/LYMPH NODES: No lymphadenopathy.  ABDOMINAL WALL: Diastases recti. Tiny fat-containing umbilical hernia  BONES: Degenerative spondylosis.    IMPRESSION: Stranding again noted adjacent to the distal transverse colon   and the entire descending colon, suggestive of a persistent nonspecific   colitis or ischemic colitis. No evidence for pneumatosis intestinalis.   Follow-up colonoscopy after treatment/resolution of acute disease may be   pursued for additional evaluation.    Splenomegaly.    --- End of Report ---            PETER MEANS MD; Attending Radiologist  This document has been electronically signed. Dec 22 2022  2:53PM    < end of copied text >

## 2022-12-27 ENCOUNTER — APPOINTMENT (OUTPATIENT)
Dept: NEPHROLOGY | Facility: CLINIC | Age: 42
End: 2022-12-27

## 2022-12-27 ENCOUNTER — TRANSCRIPTION ENCOUNTER (OUTPATIENT)
Age: 42
End: 2022-12-27

## 2022-12-27 ENCOUNTER — RESULT REVIEW (OUTPATIENT)
Age: 42
End: 2022-12-27

## 2022-12-27 LAB
ANION GAP SERPL CALC-SCNC: 8 MMOL/L — SIGNIFICANT CHANGE UP (ref 5–17)
BLD GP AB SCN SERPL QL: SIGNIFICANT CHANGE UP
BUN SERPL-MCNC: 13.3 MG/DL — SIGNIFICANT CHANGE UP (ref 8–20)
CALCIUM SERPL-MCNC: 8.8 MG/DL — SIGNIFICANT CHANGE UP (ref 8.4–10.5)
CHLORIDE SERPL-SCNC: 103 MMOL/L — SIGNIFICANT CHANGE UP (ref 96–108)
CO2 SERPL-SCNC: 29 MMOL/L — SIGNIFICANT CHANGE UP (ref 22–29)
CREAT SERPL-MCNC: 1.38 MG/DL — HIGH (ref 0.5–1.3)
CULTURE RESULTS: SIGNIFICANT CHANGE UP
CULTURE RESULTS: SIGNIFICANT CHANGE UP
DIR ANTIGLOB POLYSPECIFIC INTERPRETATION: SIGNIFICANT CHANGE UP
EGFR: 65 ML/MIN/1.73M2 — SIGNIFICANT CHANGE UP
GLUCOSE SERPL-MCNC: 96 MG/DL — SIGNIFICANT CHANGE UP (ref 70–99)
HCT VFR BLD CALC: 26.3 % — LOW (ref 39–50)
HCT VFR BLD CALC: 26.5 % — LOW (ref 39–50)
HGB BLD-MCNC: 8.1 G/DL — LOW (ref 13–17)
HGB BLD-MCNC: 8.3 G/DL — LOW (ref 13–17)
INR BLD: 1.29 RATIO — HIGH (ref 0.88–1.16)
MCHC RBC-ENTMCNC: 26.3 PG — LOW (ref 27–34)
MCHC RBC-ENTMCNC: 30.8 GM/DL — LOW (ref 32–36)
MCV RBC AUTO: 85.4 FL — SIGNIFICANT CHANGE UP (ref 80–100)
PLATELET # BLD AUTO: 207 K/UL — SIGNIFICANT CHANGE UP (ref 150–400)
POTASSIUM SERPL-MCNC: 4.4 MMOL/L — SIGNIFICANT CHANGE UP (ref 3.5–5.3)
POTASSIUM SERPL-SCNC: 4.4 MMOL/L — SIGNIFICANT CHANGE UP (ref 3.5–5.3)
PROTHROM AB SERPL-ACNC: 15 SEC — HIGH (ref 10.5–13.4)
RBC # BLD: 3.08 M/UL — LOW (ref 4.2–5.8)
RBC # FLD: 15.3 % — HIGH (ref 10.3–14.5)
SODIUM SERPL-SCNC: 140 MMOL/L — SIGNIFICANT CHANGE UP (ref 135–145)
SPECIMEN SOURCE: SIGNIFICANT CHANGE UP
SPECIMEN SOURCE: SIGNIFICANT CHANGE UP
WBC # BLD: 5.49 K/UL — SIGNIFICANT CHANGE UP (ref 3.8–10.5)
WBC # FLD AUTO: 5.49 K/UL — SIGNIFICANT CHANGE UP (ref 3.8–10.5)

## 2022-12-27 PROCEDURE — 43239 EGD BIOPSY SINGLE/MULTIPLE: CPT

## 2022-12-27 PROCEDURE — 93010 ELECTROCARDIOGRAM REPORT: CPT

## 2022-12-27 PROCEDURE — 88342 IMHCHEM/IMCYTCHM 1ST ANTB: CPT | Mod: 26

## 2022-12-27 PROCEDURE — 88305 TISSUE EXAM BY PATHOLOGIST: CPT | Mod: 26

## 2022-12-27 PROCEDURE — 99232 SBSQ HOSP IP/OBS MODERATE 35: CPT

## 2022-12-27 RX ORDER — AMLODIPINE BESYLATE 2.5 MG/1
1 TABLET ORAL
Qty: 0 | Refills: 0 | DISCHARGE

## 2022-12-27 RX ORDER — HYDRALAZINE HCL 50 MG
1 TABLET ORAL
Qty: 0 | Refills: 0 | DISCHARGE

## 2022-12-27 RX ORDER — LABETALOL HCL 100 MG
1 TABLET ORAL
Qty: 0 | Refills: 0 | DISCHARGE

## 2022-12-27 RX ADMIN — BUPRENORPHINE AND NALOXONE 1 FILM(S): 2; .5 TABLET SUBLINGUAL at 10:01

## 2022-12-27 RX ADMIN — QUETIAPINE FUMARATE 800 MILLIGRAM(S): 200 TABLET, FILM COATED ORAL at 22:04

## 2022-12-27 RX ADMIN — Medication 125 MILLIGRAM(S): at 11:33

## 2022-12-27 RX ADMIN — Medication 125 MILLIGRAM(S): at 17:16

## 2022-12-27 RX ADMIN — RISPERIDONE 3 MILLIGRAM(S): 4 TABLET ORAL at 11:33

## 2022-12-27 RX ADMIN — BUPROPION HYDROCHLORIDE 300 MILLIGRAM(S): 150 TABLET, EXTENDED RELEASE ORAL at 11:33

## 2022-12-27 RX ADMIN — BUPRENORPHINE AND NALOXONE 1 FILM(S): 2; .5 TABLET SUBLINGUAL at 22:04

## 2022-12-27 RX ADMIN — GABAPENTIN 300 MILLIGRAM(S): 400 CAPSULE ORAL at 11:33

## 2022-12-27 RX ADMIN — BUPRENORPHINE AND NALOXONE 1 FILM(S): 2; .5 TABLET SUBLINGUAL at 14:55

## 2022-12-27 RX ADMIN — LEVETIRACETAM 750 MILLIGRAM(S): 250 TABLET, FILM COATED ORAL at 17:16

## 2022-12-27 RX ADMIN — LAMOTRIGINE 150 MILLIGRAM(S): 25 TABLET, ORALLY DISINTEGRATING ORAL at 17:16

## 2022-12-27 RX ADMIN — ATORVASTATIN CALCIUM 20 MILLIGRAM(S): 80 TABLET, FILM COATED ORAL at 22:03

## 2022-12-27 NOTE — PROGRESS NOTE ADULT - REASON FOR ADMISSION
diarrhea ,   weakness

## 2022-12-27 NOTE — DISCHARGE NOTE PROVIDER - PROVIDER TOKENS
PROVIDER:[TOKEN:[[id number]],FOLLOWUP:[2 weeks]],PROVIDER:[TOKEN:[[id number]],FOLLOWUP:[2 weeks]]

## 2022-12-27 NOTE — PROGRESS NOTE ADULT - ASSESSMENT
43 y/o M w/ PMH of bipolar disorder, HTN, GERD, and polysubstance abuse, recently admitted for severe cdiff colitis / penumatosis / isac (requiring brief hd as inpt) w/ breif ICU upgrade for hyperkalemia / ekg changes on prior hospitalization came in for persistent large volume watery diarrhea w/ reports of intermittent blood in stool.  Pt is s/p vancomycin/flagyl  for recent C.diff.  Pt still reporting diarrhea.  Also noted to have anemia w/ concern for possible GIB s/p EGD today.        Persistent diarrhea, etiology unclear   - Diarrhea improving and no jennifer blood noted in stool   - Hx of recent C.diff   - GI PCR and C.diff negative   - Fecal calprotectin ordered to help differentiate inflammatory from noninflammatory diarrhea   - CT a/p reviewed and noted above   - c/w stool count   - Monitor CBC and temperature       Normocytic anemia w/ concern for GIB   - Baseline Hb 10-11.5 but over the past month has been 8-9  - H/h today remains low but stable, hemodynamically stable and no active bleeding reported  - s/p 1 unit total during hospital course   - s/p EGD today which was significant for small antral punctate ulcers w/out visible vessels or bleeding.  Biopsy taken to r/o H. pylori  - GI recommended low fiber diet and PPI BID for ppx   - Cleared for d/c from GI perspective and will f/u w/ Dr. Guadalupe in 2 weeks   - Will eventually need colonoscopy but likely in 4 weeks after colitis resolves  - GI following and recs noted       Hypotension likely 2/2 hypovolemia in the setting of diarrhea  - Resolved w/ IVFs      CKD IIIb  - Cr trending up but overall improved   - Monitor renal fxn and lytes  - Avoid nephrotoxic meds or renally dose if needed      Polysubstance abuse   - u tox negative    - c/w home dose suboxone       Bipolar disorder  - c/w home meds   - bh following        Splenomegaly   - Incidentally noted on CT a/p 12/22/22  - Spleen normal on CT a/p 12/02/22   - Etiology unclear, plts nL, no evidence of cirrhosis  - No evidence of hemolysis   - Will refer to heme for further w/u         VTE ppx: SCDs     Dispo: Remains acute requiring inpt hospitalization    43 y/o M w/ PMH of bipolar disorder, HTN, GERD, and polysubstance abuse, recently admitted for severe cdiff colitis / penumatosis / isca (requiring brief hd as inpt) w/ breif ICU upgrade for hyperkalemia / ekg changes on prior hospitalization came in for persistent large volume watery diarrhea w/ reports of intermittent blood in stool.  Pt is s/p vancomycin/flagyl  for recent C.diff.  Pt still reporting diarrhea.  Also noted to have anemia w/ concern for possible GIB s/p EGD today.        Persistent diarrhea, etiology unclear   - Diarrhea improving and no jennifer blood noted in stool   - Hx of recent C.diff   - GI PCR and C.diff negative   - Fecal calprotectin ordered to help differentiate inflammatory from noninflammatory diarrhea   - CT a/p reviewed and noted above   - c/w stool count   - Monitor CBC and temperature       Normocytic anemia w/ concern for GIB   - Baseline Hb 10-11.5 but over the past month has been 8-9  - H/H today remains low but stable overall, hemodynamically stable and no active bleeding reported  - Will do repeat H/H for this afternoon and if remains stable will d/c home but if trends down further may need transfusion  - s/p 1 unit total during hospital course   - s/p EGD today which was significant for small antral punctate ulcers w/out visible vessels or bleeding.  Biopsy taken to r/o H. pylori  - GI recommended low fiber diet and PPI BID for ppx   - Cleared for d/c from GI perspective and will f/u w/ Dr. Guadalupe in 2 weeks   - Will eventually need colonoscopy but likely in 4 weeks after colitis resolves  - GI following and recs noted       Hypotension likely 2/2 hypovolemia in the setting of diarrhea  - Resolved w/ IVFs      CKD IIIb  - Cr trending up but overall improved   - Monitor renal fxn and lytes  - Avoid nephrotoxic meds or renally dose if needed      Polysubstance abuse   - u tox negative    - c/w home dose suboxone       Bipolar disorder  - c/w home meds   - bh following        Splenomegaly   - Incidentally noted on CT a/p 12/22/22  - Spleen normal on CT a/p 12/02/22   - Etiology unclear, plts nL, no evidence of cirrhosis  - No evidence of hemolysis   - Will refer to heme for further w/u         VTE ppx: SCDs     Dispo: Remains acute requiring inpt hospitalization

## 2022-12-27 NOTE — DISCHARGE NOTE PROVIDER - CARE PROVIDER_API CALL
Sofiya Guadalupe)  Gastroenterology  [address], [place] [id number]  Phone: [phone]  Fax: [phone]  Follow Up Time: 2 weeks    Jodi Márquez)  Hematology; HospicePalliative Medicine; Internal Medicine; Medical Oncology  [address] [place] [address]  Phone: [phone]  Fax: [phone]  Follow Up Time: 2 weeks

## 2022-12-27 NOTE — CHART NOTE - NSCHARTNOTEFT_GEN_A_CORE
EDG- 4 ulcers    (  two 8 mm ulcer , One  in the antrum,  one a sweep).    two punctate ulcers in the antrum    PPI BID  check HP bx   low fiber diet  F/u with Dr Guadalupe in 2 weeks. Colon in 4 weeks  will sign off

## 2022-12-27 NOTE — DISCHARGE NOTE PROVIDER - HOSPITAL COURSE
41 y/o M w/ PMH of bipolar disorder, HTN, GERD, and polysubstance abuse, recently admitted for severe cdiff colitis / penumatosis / isac (requiring brief hd as inpt) w/ breif ICU upgrade for hyperkalemia / ekg changes on prior hospitalization came in for persistent large volume watery diarrhea w/ reports of intermittent blood in stool.  Pt is s/p vancomycin/flagyl  for recent C.diff.  Diarrhea improving.  GI PCR and repeat Cdiff negative.  Fecal calprotectin pending.  H/H low at baseline (Hb10-11.5) however over the past month has been down trending and Hb has ranged btw 8-9 overall.  Pt is hemodynamically stable and no active bleeding reported.  He received   1u PRBC during initial hospital course.  s/p EGD 12/27 which was significant for small antral punctate ulcers w/out visible vessels or bleeding.  Biopsy taken to r/o H. pylori.  GI recommended low fiber diet and PPI BID for ppx   and cleared for d/c from GI perspective and will f/u w/ Dr. Guadalupe in 2 weeks.  Will eventually need colonoscopy but likely in 4 weeks after colitis resolves.  Incidental CT finding of splenomegaly found on 12/22/22 that is new from CT a/p on 12/02/22; etiology unclear, plts nL, no evidence of cirrhosis or hemolysis.  Pt referred to hematology for continued monitoring and w/u.

## 2022-12-27 NOTE — DISCHARGE NOTE PROVIDER - ATTENDING DISCHARGE PHYSICAL EXAMINATION:
PHYSICAL EXAM:  Vital Signs Last 24 Hrs  T(C): 37.2 (27 Dec 2022 10:43), Max: 37.4 (26 Dec 2022 12:21)  T(F): 99 (27 Dec 2022 10:43), Max: 99.3 (26 Dec 2022 12:21)  HR: 99 (27 Dec 2022 10:43) (99 - 107)  BP: 112/71 (27 Dec 2022 10:43) (103/66 - 112/71)  BP(mean): --  RR: 19 (27 Dec 2022 10:43) (18 - 19)  SpO2: 92% (26 Dec 2022 17:09) (92% - 92%)    Parameters below as of 26 Dec 2022 12:21  Patient On (Oxygen Delivery Method): room air          GENERAL: pt examined bedside, laying comfortably in bed in NAD  HEENT: NC/AT, moist oral mucosa, clear conjunctiva, sclera nonicteric  RESPIRATORY: Normal respiratory effort, no wheezing, rhonchi, rales  CARDIOVASCULAR: RRR, normal S1 and S2  ABDOMEN: soft, NT/ND, +BS, no rebound/guarding  EXTREMITIES: No cynaosis, no clubbing, no lower extremity edema, pulses are 2+ bilaterally  NEUROLOGY: A+O to person, place, and time, no focal neurologic deficits appreciated   SKIN: No rashes or no palpable lesions PHYSICAL EXAM:  Vital Signs Last 24 Hrs  T(C): 37.2 (28 Dec 2022 10:50), Max: 37.7 (27 Dec 2022 23:11)  T(F): 99 (28 Dec 2022 10:50), Max: 99.8 (27 Dec 2022 23:11)  HR: 112 (28 Dec 2022 10:50) (77 - 120)  BP: 110/68 (28 Dec 2022 10:50) (110/68 - 144/87)  BP(mean): --  RR: 19 (28 Dec 2022 10:50) (18 - 19)  SpO2: 97% (28 Dec 2022 04:51) (94% - 97%)    Parameters below as of 28 Dec 2022 04:51  Patient On (Oxygen Delivery Method): room air            GENERAL: pt examined bedside, laying comfortably in bed in NAD  HEENT: NC/AT, moist oral mucosa, clear conjunctiva, sclera nonicteric  RESPIRATORY: Normal respiratory effort, no wheezing, rhonchi, rales  CARDIOVASCULAR: RRR, normal S1 and S2  ABDOMEN: soft, NT/ND, +BS, no rebound/guarding  EXTREMITIES: No cynaosis, no clubbing, no lower extremity edema, pulses are 2+ bilaterally  NEUROLOGY: A+O to person, place, and time, no focal neurologic deficits appreciated   SKIN: No rashes or no palpable lesions

## 2022-12-27 NOTE — PROGRESS NOTE ADULT - SUBJECTIVE AND OBJECTIVE BOX
Chief Complaint:  diarrhea    SUBJECTIVE / OVERNIGHT EVENTS:  No acute events reported overnight.  Denies blood in stool.  Diarrhea improving.  Patient denies chest pain, SOB, abd pain, N/V, chills.      I&O's Summary      PHYSICAL EXAM:  Vital Signs Last 24 Hrs  T(C): 37.2 (27 Dec 2022 10:43), Max: 37.4 (26 Dec 2022 12:21)  T(F): 99 (27 Dec 2022 10:43), Max: 99.3 (26 Dec 2022 12:21)  HR: 99 (27 Dec 2022 10:43) (99 - 107)  BP: 112/71 (27 Dec 2022 10:43) (103/66 - 112/71)  BP(mean): --  RR: 19 (27 Dec 2022 10:43) (18 - 19)  SpO2: 92% (26 Dec 2022 17:09) (92% - 92%)    Parameters below as of 26 Dec 2022 12:21  Patient On (Oxygen Delivery Method): room air          GENERAL: pt examined bedside, laying comfortably in bed in NAD  HEENT: NC/AT, moist oral mucosa, clear conjunctiva, sclera nonicteric  RESPIRATORY: Normal respiratory effort, no wheezing, rhonchi, rales  CARDIOVASCULAR: RRR, normal S1 and S2  ABDOMEN: soft, NT/ND, +BS, no rebound/guarding  EXTREMITIES: No cynaosis, no clubbing, no lower extremity edema, pulses are 2+ bilaterally  NEUROLOGY: A+O to person, place, and time, no focal neurologic deficits appreciated   SKIN: No rashes or no palpable lesions        LABS:                                          8.1    5.49  )-----------( 207      ( 27 Dec 2022 05:20 )             26.3     12-27    140  |  103  |  13.3  ----------------------------<  96  4.4   |  29.0  |  1.38<H>    Ca    8.8      27 Dec 2022 05:20  Mg     1.9     12-26        Thyroid Stimulating Hormone, Serum (12.25.22 @ 10:40)    Thyroid Stimulating Hormone, Serum: 1.96 uIU/mL      Culture - Urine (collected 23 Dec 2022 03:30)  Source: Clean Catch Clean Catch (Midstream)  Final Report (24 Dec 2022 06:33):    <10,000 CFU/mL Normal Urogenital Damari    Culture - Blood (collected 22 Dec 2022 16:30)  Source: .Blood Blood-Peripheral  Preliminary Report (23 Dec 2022 23:02):    No growth to date.    Culture - Blood (collected 22 Dec 2022 16:25)  Source: .Blood Blood-Venous  Preliminary Report (23 Dec 2022 23:02):    No growth to date.      CAPILLARY BLOOD GLUCOSE            RADIOLOGY & ADDITIONAL TESTS:    < from: CT Abdomen and Pelvis No Cont (12.22.22 @ 14:19) >  IMPRESSION: Stranding again noted adjacent to the distal transverse colon   and the entire descending colon, suggestive of a persistent nonspecific   colitis or ischemic colitis. No evidence for pneumatosis intestinalis.   Follow-up colonoscopy after treatment/resolution of acute disease may be   pursued for additional evaluation.    Splenomegaly.    < end of copied text >        EGD 12/27/22  Impressions:  Normal esophagus.  Ulcers in the antrum and duodenal sweep.  - Antrum- One 8mm crated ulcer. NO visible vessel. There were 2 punctate ulcerations in the antrum. There was one 8 mm ulcer at sweep with no vessel or bleeding.  - Bx of antrum and body to r/o HP.    Plan:  Await pathology results.  Two 8 mm ulcers, 2 punctate ulcers  PPI BID  low fiber diet  F/U with Dr Guadalupe in 2 week. Colon in 4-6 weeks  avoid NSAIDS          MEDICATIONS  (STANDING):  atorvastatin 20 milliGRAM(s) Oral at bedtime  buprenorphine 8 mG/naloxone 2 mG SL Film 1 Film(s) SubLingual three times a day  buPROPion XL (24-Hour) . 300 milliGRAM(s) Oral daily  gabapentin 100 milliGRAM(s) Oral daily  lamoTRIgine 150 milliGRAM(s) Oral two times a day  levETIRAcetam 750 milliGRAM(s) Oral two times a day  levothyroxine 50 MICROGram(s) Oral daily  pantoprazole  Injectable 40 milliGRAM(s) IV Push two times a day  QUEtiapine 400 milliGRAM(s) Oral at bedtime  risperiDONE   Tablet 3 milliGRAM(s) Oral daily  sodium chloride 0.9%. 1000 milliLiter(s) (125 mL/Hr) IV Continuous <Continuous>  vancomycin    Solution 125 milliGRAM(s) Oral every 6 hours    MEDICATIONS  (PRN):

## 2022-12-27 NOTE — DISCHARGE NOTE PROVIDER - NSDCCPCAREPLAN_GEN_ALL_CORE_FT
PRINCIPAL DISCHARGE DIAGNOSIS  Diagnosis: Colitis  Assessment and Plan of Treatment: Diarrhea due to inflammation of colon.  Infectious stool studies, including C.diff, all negative.  Suspect IBS vs IBD.  Will continue following with GI out patient for continued workup.  Will follow up with Dr. Guadalupe in 2 weeks.      SECONDARY DISCHARGE DIAGNOSES  Diagnosis: Acute blood loss anemia  Assessment and Plan of Treatment: Still anemic but stable overall with no further bleeding.  You received   1u PRBC transfusion during initial hospital course and have not required any further transfusions since.  EGD performed and was significant for small antral punctate ulcers without visible vessels or bleeding.  A biopsy was taken during EGD to r/o H. pylori infection.  GI recommended continuing with a low fiber diet and protonix (place take as prescribed).  GI cleared you for discharge.  You will need a colonoscopy in about 4 weeks, after colitis has completely resolved.  Follow up with Dr. Guadalupe in 2 weeks.        Diagnosis: Splenomegaly  Assessment and Plan of Treatment: Incidental CT finding of splenomegaly noted on CT of abdomen 12/22/22 which is new from CT of abdomen 12/02/22.  No clear etiology.  Could be due to onging inflammation from colitis.  Follow up with hematology within 1-2 weeks for monitoring and furthing work up if needed.

## 2022-12-27 NOTE — DISCHARGE NOTE PROVIDER - NSDCMRMEDTOKEN_GEN_ALL_CORE_FT
atorvastatin 20 mg oral tablet: 1 tab(s) orally once a day (at bedtime)  cloNIDine 0.3 mg oral tablet: 1 tab(s) orally 3 times a day  gabapentin 300 mg oral capsule: 1 cap(s) orally once a day   hydrALAZINE 50 mg oral tablet: 1 tab(s) orally 3 times a day  labetalol 300 mg oral tablet: 1 tab(s) orally 2 times a day  LaMICtal 150 mg oral tablet: 1 tab(s) orally 2 times a day  levETIRAcetam 750 mg oral tablet: 1 tab(s) orally 2 times a day  levothyroxine 50 mcg (0.05 mg) oral tablet: 1 tab(s) orally once a day  Norvasc 10 mg oral tablet: 1 tab(s) orally once a day  pantoprazole 40 mg oral delayed release tablet: 1 tab(s) orally once a day (before a meal)  QUEtiapine 400 mg oral tablet, extended release: 1 tab(s) orally 2 times a day  risperiDONE 3 mg oral tablet: 1 tab(s) orally once a day  Suboxone 8 mg-2 mg sublingual film: 3 film(s) sublingual once a day  vancomycin 125 mg oral capsule: 1 cap(s) orally once a day; please take at 8 PM tonight  Wellbutrin  mg/24 hours oral tablet, extended release: 1 tab(s) orally every 24 hours   atorvastatin 20 mg oral tablet: 1 tab(s) orally once a day (at bedtime)  gabapentin 300 mg oral capsule: 1 cap(s) orally once a day   LaMICtal 150 mg oral tablet: 1 tab(s) orally 2 times a day  levETIRAcetam 750 mg oral tablet: 1 tab(s) orally 2 times a day  levothyroxine 50 mcg (0.05 mg) oral tablet: 1 tab(s) orally once a day  pantoprazole 40 mg oral delayed release tablet: 1 tab(s) orally once a day (before a meal)  QUEtiapine 400 mg oral tablet, extended release: 1 tab(s) orally 2 times a day  risperiDONE 3 mg oral tablet: 1 tab(s) orally once a day  Suboxone 8 mg-2 mg sublingual film: 3 film(s) sublingual once a day  vancomycin 125 mg oral capsule: 1 cap(s) orally once a day; please take at 8 PM tonight  Wellbutrin  mg/24 hours oral tablet, extended release: 1 tab(s) orally every 24 hours

## 2022-12-28 ENCOUNTER — TRANSCRIPTION ENCOUNTER (OUTPATIENT)
Age: 42
End: 2022-12-28

## 2022-12-28 VITALS
DIASTOLIC BLOOD PRESSURE: 68 MMHG | TEMPERATURE: 99 F | SYSTOLIC BLOOD PRESSURE: 110 MMHG | HEART RATE: 112 BPM | RESPIRATION RATE: 19 BRPM

## 2022-12-28 LAB
ANION GAP SERPL CALC-SCNC: 13 MMOL/L — SIGNIFICANT CHANGE UP (ref 5–17)
BUN SERPL-MCNC: 14.2 MG/DL — SIGNIFICANT CHANGE UP (ref 8–20)
CALCIUM SERPL-MCNC: 8.8 MG/DL — SIGNIFICANT CHANGE UP (ref 8.4–10.5)
CHLORIDE SERPL-SCNC: 102 MMOL/L — SIGNIFICANT CHANGE UP (ref 96–108)
CO2 SERPL-SCNC: 24 MMOL/L — SIGNIFICANT CHANGE UP (ref 22–29)
CREAT SERPL-MCNC: 1.44 MG/DL — HIGH (ref 0.5–1.3)
EGFR: 62 ML/MIN/1.73M2 — SIGNIFICANT CHANGE UP
GLUCOSE SERPL-MCNC: 130 MG/DL — HIGH (ref 70–99)
HCT VFR BLD CALC: 26.4 % — LOW (ref 39–50)
HGB BLD-MCNC: 8.2 G/DL — LOW (ref 13–17)
MAGNESIUM SERPL-MCNC: 1.9 MG/DL — SIGNIFICANT CHANGE UP (ref 1.6–2.6)
MCHC RBC-ENTMCNC: 25.9 PG — LOW (ref 27–34)
MCHC RBC-ENTMCNC: 31.1 GM/DL — LOW (ref 32–36)
MCV RBC AUTO: 83.3 FL — SIGNIFICANT CHANGE UP (ref 80–100)
PLATELET # BLD AUTO: 210 K/UL — SIGNIFICANT CHANGE UP (ref 150–400)
POTASSIUM SERPL-MCNC: 4.3 MMOL/L — SIGNIFICANT CHANGE UP (ref 3.5–5.3)
POTASSIUM SERPL-SCNC: 4.3 MMOL/L — SIGNIFICANT CHANGE UP (ref 3.5–5.3)
RBC # BLD: 3.17 M/UL — LOW (ref 4.2–5.8)
RBC # FLD: 14.8 % — HIGH (ref 10.3–14.5)
SODIUM SERPL-SCNC: 138 MMOL/L — SIGNIFICANT CHANGE UP (ref 135–145)
TROPONIN T SERPL-MCNC: 0.04 NG/ML — SIGNIFICANT CHANGE UP (ref 0–0.06)
WBC # BLD: 4.76 K/UL — SIGNIFICANT CHANGE UP (ref 3.8–10.5)
WBC # FLD AUTO: 4.76 K/UL — SIGNIFICANT CHANGE UP (ref 3.8–10.5)

## 2022-12-28 PROCEDURE — 87493 C DIFF AMPLIFIED PROBE: CPT

## 2022-12-28 PROCEDURE — 86901 BLOOD TYPING SEROLOGIC RH(D): CPT

## 2022-12-28 PROCEDURE — 36430 TRANSFUSION BLD/BLD COMPNT: CPT

## 2022-12-28 PROCEDURE — 85027 COMPLETE CBC AUTOMATED: CPT

## 2022-12-28 PROCEDURE — 99285 EMERGENCY DEPT VISIT HI MDM: CPT | Mod: 25

## 2022-12-28 PROCEDURE — 85730 THROMBOPLASTIN TIME PARTIAL: CPT

## 2022-12-28 PROCEDURE — 87507 IADNA-DNA/RNA PROBE TQ 12-25: CPT

## 2022-12-28 PROCEDURE — 88342 IMHCHEM/IMCYTCHM 1ST ANTB: CPT

## 2022-12-28 PROCEDURE — 86900 BLOOD TYPING SEROLOGIC ABO: CPT

## 2022-12-28 PROCEDURE — 99239 HOSP IP/OBS DSCHRG MGMT >30: CPT

## 2022-12-28 PROCEDURE — 87046 STOOL CULTR AEROBIC BACT EA: CPT

## 2022-12-28 PROCEDURE — 86870 RBC ANTIBODY IDENTIFICATION: CPT

## 2022-12-28 PROCEDURE — U0003: CPT

## 2022-12-28 PROCEDURE — 87040 BLOOD CULTURE FOR BACTERIA: CPT

## 2022-12-28 PROCEDURE — 96374 THER/PROPH/DIAG INJ IV PUSH: CPT

## 2022-12-28 PROCEDURE — 85610 PROTHROMBIN TIME: CPT

## 2022-12-28 PROCEDURE — P9016: CPT

## 2022-12-28 PROCEDURE — 0225U NFCT DS DNA&RNA 21 SARSCOV2: CPT

## 2022-12-28 PROCEDURE — 83735 ASSAY OF MAGNESIUM: CPT

## 2022-12-28 PROCEDURE — 74176 CT ABD & PELVIS W/O CONTRAST: CPT | Mod: MA

## 2022-12-28 PROCEDURE — 85025 COMPLETE CBC W/AUTO DIFF WBC: CPT

## 2022-12-28 PROCEDURE — 86880 COOMBS TEST DIRECT: CPT

## 2022-12-28 PROCEDURE — 86922 COMPATIBILITY TEST ANTIGLOB: CPT

## 2022-12-28 PROCEDURE — 83690 ASSAY OF LIPASE: CPT

## 2022-12-28 PROCEDURE — 83993 ASSAY FOR CALPROTECTIN FECAL: CPT

## 2022-12-28 PROCEDURE — 93005 ELECTROCARDIOGRAM TRACING: CPT

## 2022-12-28 PROCEDURE — U0005: CPT

## 2022-12-28 PROCEDURE — 80053 COMPREHEN METABOLIC PANEL: CPT

## 2022-12-28 PROCEDURE — 87045 FECES CULTURE AEROBIC BACT: CPT

## 2022-12-28 PROCEDURE — 83605 ASSAY OF LACTIC ACID: CPT

## 2022-12-28 PROCEDURE — 81003 URINALYSIS AUTO W/O SCOPE: CPT

## 2022-12-28 PROCEDURE — 87086 URINE CULTURE/COLONY COUNT: CPT

## 2022-12-28 PROCEDURE — 88305 TISSUE EXAM BY PATHOLOGIST: CPT

## 2022-12-28 PROCEDURE — 82272 OCCULT BLD FECES 1-3 TESTS: CPT

## 2022-12-28 PROCEDURE — 36415 COLL VENOUS BLD VENIPUNCTURE: CPT

## 2022-12-28 PROCEDURE — 85018 HEMOGLOBIN: CPT

## 2022-12-28 PROCEDURE — 80048 BASIC METABOLIC PNL TOTAL CA: CPT

## 2022-12-28 PROCEDURE — 84443 ASSAY THYROID STIM HORMONE: CPT

## 2022-12-28 PROCEDURE — 85014 HEMATOCRIT: CPT

## 2022-12-28 PROCEDURE — 84100 ASSAY OF PHOSPHORUS: CPT

## 2022-12-28 PROCEDURE — 84484 ASSAY OF TROPONIN QUANT: CPT

## 2022-12-28 PROCEDURE — 86850 RBC ANTIBODY SCREEN: CPT

## 2022-12-28 RX ORDER — ACETAMINOPHEN 500 MG
1000 TABLET ORAL ONCE
Refills: 0 | Status: COMPLETED | OUTPATIENT
Start: 2022-12-28 | End: 2022-12-28

## 2022-12-28 RX ADMIN — Medication 125 MILLIGRAM(S): at 05:44

## 2022-12-28 RX ADMIN — BUPRENORPHINE AND NALOXONE 1 FILM(S): 2; .5 TABLET SUBLINGUAL at 05:44

## 2022-12-28 RX ADMIN — Medication 125 MILLIGRAM(S): at 00:45

## 2022-12-28 RX ADMIN — Medication 50 MICROGRAM(S): at 05:44

## 2022-12-28 RX ADMIN — LAMOTRIGINE 150 MILLIGRAM(S): 25 TABLET, ORALLY DISINTEGRATING ORAL at 05:44

## 2022-12-28 RX ADMIN — PANTOPRAZOLE SODIUM 40 MILLIGRAM(S): 20 TABLET, DELAYED RELEASE ORAL at 05:47

## 2022-12-28 RX ADMIN — LEVETIRACETAM 750 MILLIGRAM(S): 250 TABLET, FILM COATED ORAL at 05:44

## 2022-12-28 NOTE — CHART NOTE - NSCHARTNOTEFT_GEN_A_CORE
Medicine PA-  Cd @ 2310 for pt c/o CP, pain was sudden onset, L. sided aching and non-radiating. Denies SOB and palpitations, though pt admits feeling nervous/ anxious about going home.    Vital Signs Last 24 Hrs  T(C): 36.9 (28 Dec 2022 04:51), Max: 37.7 (27 Dec 2022 23:11)  T(F): 98.5 (28 Dec 2022 04:51), Max: 99.8 (27 Dec 2022 23:11)  HR: 90 (28 Dec 2022 04:51) (77 - 120)  BP: 131/81 (28 Dec 2022 04:51) (112/70 - 144/87)  BP(mean): --  RR: 18 (28 Dec 2022 04:51) (18 - 19)  SpO2: 97% (28 Dec 2022 04:51) (94% - 97%)    Parameters below as of 28 Dec 2022 04:51  Patient On (Oxygen Delivery Method): room air    PE:  Gen- Pt sitting up bedside, in NAD  S1S2 ausc  Chest- nontender  Lungs- clear b/l  Abd- soft, nontender  Ext- no edema    EKG- ST @ 113    >CP, anxiety  -Stat troponin, BMP, Mg  -reassurance given to pt  -continue to monitor

## 2022-12-28 NOTE — DISCHARGE NOTE NURSING/CASE MANAGEMENT/SOCIAL WORK - NSDCVIVACCINE_GEN_ALL_CORE_FT
Tdap; 02-Mar-2019 00:55; Juan Miguel Cortez); [employer]; [id number] (Exp. Date: 20-Nov-2020); IntraMuscular; Deltoid Left.; 0.5 milliLiter(s); VIS (VIS Published: 09-May-2013, VIS Presented: 02-Mar-2019);

## 2022-12-28 NOTE — ED BEHAVIORAL HEALTH ASSESSMENT NOTE - GROOMING
Biopsy    Date/Time: 12/28/2022 11:33 AM  Performed by: ASIA Kruger  Authorized by: ASIA Kruger   Universal Protocol:  Consent: Verbal consent obtained  Written consent obtained  Risks and benefits: risks, benefits and alternatives were discussed  Consent given by: patient  Time out: Immediately prior to procedure a "time out" was called to verify the correct patient, procedure, equipment, support staff and site/side marked as required  Timeout called at: 12/28/2022 11:33 AM   Patient understanding: patient states understanding of the procedure being performed  Patient consent: the patient's understanding of the procedure matches consent given  Procedure consent: procedure consent matches procedure scheduled  Patient identity confirmed: verbally with patient      Procedure Details - Lesion Biopsy: Body area: Anogenital    Anogenital location:  Vulva (right mid hearts line)    Vaginal Lesion: No      Biopsy method: punch biopsy      Biopsy tissue type: skin    Initial size (mm):  4    Final defect size (mm):  4     Hayden Bustillos presented for vulvar biopsy for labial agglutination and vulvar itching  Area was cleaned with betadine  Local anesthesia was achieved with injecting a small amount of 2% lidocaine with epinephrine  A Keye's type sterile punch biopsy was obtained and submitted to pathology 4 mm sized  Hemostasis was obtained with pressure and use of silver nitrate  Eryn tolerated procedure well  Fair

## 2022-12-28 NOTE — DISCHARGE NOTE NURSING/CASE MANAGEMENT/SOCIAL WORK - NSDCPEFALRISK_GEN_ALL_CORE
For information on Fall & Injury Prevention, visit: [email or web address] OR  [email or web address] OR  https://www.cdc.gov/steadi/patient.html

## 2022-12-28 NOTE — DISCHARGE NOTE NURSING/CASE MANAGEMENT/SOCIAL WORK - PATIENT PORTAL LINK FT
You can access the FollowMyHealth Patient Portal offered by [facility] by registering at the following website: [email or web address]. By joining [employer]’s FollowMyHealth portal, you will also be able to view your health information using other applications (apps) compatible with our system.

## 2022-12-29 LAB — CALPROTECTIN STL-MCNT: 441 UG/G — HIGH (ref 0–120)

## 2022-12-29 NOTE — CHART NOTE - NSCHARTNOTEFT_GEN_A_CORE
The patient is a 42 year-old male with HTN, GERD, polysubstance abuse, bipolar disorder who presented with diarrhea and bright red blood in stool.  The patient was last transfused with red cells on 12/9/22.  On 12/22/22, an antibody screen was positive, with alloantibodies to E newly identified.   Laboratory signs of hemolysis was absent.        Results of Investigation:  There was no hemolysis in the pre- or the post-transfusion plasma samples.  Direct antiglobulin test from the post-transfusion specimen was negative.  The one red cell unit transfused on 12/9/22 was E-antigen positive.        Conclusions/Recommendations:  The patient' s symptoms are consistent with a delayed serologic transfusion reaction.  Patient needs to be monitored for signs/symptoms of hemolysis and treat accordingly.  Transfuse as indicated.

## 2022-12-30 LAB — SURGICAL PATHOLOGY STUDY: SIGNIFICANT CHANGE UP

## 2023-01-09 ENCOUNTER — APPOINTMENT (OUTPATIENT)
Dept: GASTROENTEROLOGY | Facility: CLINIC | Age: 43
End: 2023-01-09

## 2023-02-22 ENCOUNTER — NON-APPOINTMENT (OUTPATIENT)
Age: 43
End: 2023-02-22

## 2023-03-29 ENCOUNTER — APPOINTMENT (OUTPATIENT)
Dept: NEPHROLOGY | Facility: CLINIC | Age: 43
End: 2023-03-29
Payer: MEDICARE

## 2023-03-29 VITALS
WEIGHT: 218 LBS | HEIGHT: 69 IN | OXYGEN SATURATION: 96 % | HEART RATE: 82 BPM | BODY MASS INDEX: 32.29 KG/M2 | DIASTOLIC BLOOD PRESSURE: 86 MMHG | SYSTOLIC BLOOD PRESSURE: 138 MMHG

## 2023-03-29 DIAGNOSIS — E66.01 MORBID (SEVERE) OBESITY DUE TO EXCESS CALORIES: ICD-10-CM

## 2023-03-29 DIAGNOSIS — I10 ESSENTIAL (PRIMARY) HYPERTENSION: ICD-10-CM

## 2023-03-29 DIAGNOSIS — E11.9 TYPE 2 DIABETES MELLITUS W/OUT COMPLICATIONS: ICD-10-CM

## 2023-03-29 DIAGNOSIS — E66.9 OBESITY, UNSPECIFIED: ICD-10-CM

## 2023-03-29 PROCEDURE — 99205 OFFICE O/P NEW HI 60 MIN: CPT

## 2023-03-29 PROCEDURE — 99215 OFFICE O/P EST HI 40 MIN: CPT

## 2023-03-29 RX ORDER — CLONIDINE HYDROCHLORIDE 0.2 MG/1
0.2 TABLET ORAL
Qty: 270 | Refills: 2 | Status: ACTIVE | COMMUNITY

## 2023-03-29 RX ORDER — LOSARTAN POTASSIUM 100 MG/1
100 TABLET, FILM COATED ORAL DAILY
Qty: 3 | Refills: 1 | Status: DISCONTINUED | COMMUNITY
Start: 1900-01-01 | End: 2023-03-29

## 2023-03-29 RX ORDER — LABETALOL HYDROCHLORIDE 300 MG/1
300 TABLET, FILM COATED ORAL EVERY 8 HOURS
Qty: 90 | Refills: 3 | Status: DISCONTINUED | COMMUNITY
Start: 1900-01-01 | End: 2023-03-29

## 2023-04-12 NOTE — HISTORY OF PRESENT ILLNESS
[FreeTextEntry1] : 43 y/o M w/ PMH of bipolar disorder, HTN, GERD, and polysubstance abuse , UC on Mesalamine- he was hospitalized at [facility] in Dec for  severe cdiff colitis / pneumatosis / isac (requiring briefhd as inpt) w/ breif ICU upgrade for hyperkalemia / ekg changes. Pt with renal recovery- TDC catheter was removed\par Pt now presents for evaluation of kidney disease\par pt seen and examined; feels well\par No acute complaint\par \par Takes Meloxicam for neck pain

## 2023-04-12 NOTE — ASSESSMENT
[FreeTextEntry1] : CKD stage III- with multiple Ramon episodes in the past\par Most recent Ramon in Dec 2022 -non oliguric ATN from pre renal azotemia from C diff colitis \par Pt required CRRT transiently for hyperkalemia and metabolic acidosis\par Scr now ~ 1.9-likely baseline\par CKD in setting of uncontrolled HTN\par UA clear and bland -CT abd w/ no hydronephrosis\par needs tight BP and glycemic control to halt progression of kidney disease\par \par \par HTN\par Bp stable; goal < 130/70\par Continue clonidine 0.2 mg tid; uptitrate as needed \par \par Avoid NSAIDs\par \par DM; diet controlled\par \par \par Obesity; weight loss encouraged\par \par RTC in 3 months

## 2023-04-12 NOTE — PHYSICAL EXAM
[General Appearance - In No Acute Distress] : in no acute distress [Strabismus] : no strabismus was seen [Hearing Threshold Finger Rub Not [place]] : hearing was normal [Jugular Venous Distention Increased] : there was no jugular-venous distention [Auscultation Breath Sounds / Voice Sounds] : lungs were clear to auscultation bilaterally [Heart Sounds] : normal S1 and S2 [Edema] : there was no peripheral edema [Abdomen Tenderness] : non-tender [No CVA Tenderness] : no ~M costovertebral angle tenderness [Abnormal Walk] : normal gait [] : no rash [Oriented To Time, Place, And Person] : oriented to person, place, and time

## 2023-04-13 PROBLEM — E66.01 CLASS 3 SEVERE OBESITY DUE TO EXCESS CALORIES WITH SERIOUS COMORBIDITY AND BODY MASS INDEX (BMI) OF 50.0 TO 59.9 IN ADULT: Status: ACTIVE | Noted: 2019-01-17

## 2023-04-13 PROBLEM — I10 ESSENTIAL HYPERTENSION: Status: ACTIVE | Noted: 2019-01-17

## 2023-04-13 PROBLEM — E11.9 CONTROLLED TYPE 2 DIABETES MELLITUS WITHOUT COMPLICATION, WITHOUT LONG-TERM CURRENT USE OF INSULIN: Status: ACTIVE | Noted: 2018-11-15

## 2023-04-13 PROBLEM — E66.9 OBESITY: Status: ACTIVE | Noted: 2019-01-17

## 2023-05-02 ENCOUNTER — EMERGENCY (EMERGENCY)
Facility: HOSPITAL | Age: 43
LOS: 1 days | Discharge: HOME | End: 2023-05-02
Attending: EMERGENCY MEDICINE
Payer: MEDICARE

## 2023-05-02 DIAGNOSIS — Z96.653 PRESENCE OF ARTIFICIAL KNEE JOINT, BILATERAL: Chronic | ICD-10-CM

## 2023-05-02 DIAGNOSIS — Z96.649 PRESENCE OF UNSPECIFIED ARTIFICIAL HIP JOINT: Chronic | ICD-10-CM

## 2023-05-02 PROCEDURE — 99283 EMERGENCY DEPT VISIT LOW MDM: CPT

## 2023-05-02 PROCEDURE — 85025 COMPLETE CBC W/AUTO DIFF WBC: CPT

## 2023-05-02 PROCEDURE — 80307 DRUG TEST PRSMV CHEM ANLYZR: CPT

## 2023-05-02 PROCEDURE — 36415 COLL VENOUS BLD VENIPUNCTURE: CPT

## 2023-05-02 PROCEDURE — 99284 EMERGENCY DEPT VISIT MOD MDM: CPT | Mod: GC

## 2023-05-02 PROCEDURE — 80053 COMPREHEN METABOLIC PANEL: CPT

## 2023-05-02 PROCEDURE — 82962 GLUCOSE BLOOD TEST: CPT

## 2023-05-02 NOTE — ED PROVIDER NOTE - NSFOLLOWUPINSTRUCTIONS_ED_ALL_ED_FT
- Follow up with your doctor within 2-3 days.   - Return to the ED for any new or worsening symptoms.     SEEK IMMEDIATE MEDICAL CARE IF YOU HAVE ANY OF THE FOLLOWING SYMPTOMS: chest pain, shortness of breath, change in mental status, thoughts about hurting killing yourself, thoughts about hurting or killing somebody else, hallucinations, or worsening depression.

## 2023-05-02 NOTE — ED PROVIDER NOTE - ATTENDING CONTRIBUTION TO CARE
43 yo M presents to ED via EMS for AMS after being found by his roommates.  Pt sleepy, but arousable to verbal/tactile stimuli and denies any illicit substance use or taking more of his prescribed Klonopin or Clonidine.  Pt observed in ED and monitored.  Pt denies any other c/o.  Pt awake and ambulatory with steady gait on discharge

## 2023-05-02 NOTE — ED ADULT TRIAGE NOTE - CHIEF COMPLAINT QUOTE
patient BIBA after roommates found him slumped over in bed minimally responsive. upon EMS arrival patient was awake but slow to respond and in and out of consciousness. arrives awake and alert, denies any drug or ETOH use. pinpoint pupils noted. dr. rodriguez called for eval.

## 2023-05-02 NOTE — ED ADULT NURSE NOTE - OBJECTIVE STATEMENT
PT is A&Ox4, though lethargic, PT BIBA when roommates claim he was passing out intermittently at sober house , PT is sleepy but arouseable, denies any substance use. PT pupils are reactive and brisk, no CMS deficit noted. PT has no medical complaints and no obvious trauma noted.

## 2023-05-02 NOTE — ED PROVIDER NOTE - NS ED ROS FT
Constitutional: no fever, no sweats, and no chills.  CV: no chest pain, no edema.  Resp: no cough, no dyspnea  GI: no abdominal pain, no nausea and no vomiting.  MSK: no msk pain, no weakness  Skin: no lesions, and no rashes.  Neuro: +LOC, no headache  ROS otherwise negative except as noted in HPI.
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
## 2023-05-02 NOTE — ED PROVIDER NOTE - PATIENT PORTAL LINK FT
You can access the FollowMyHealth Patient Portal offered by [facility] by registering at the following website: [email or web address]. By joining [employer]’s FollowMyHealth portal, you will also be able to view your health information using other applications (apps) compatible with our system.

## 2023-05-02 NOTE — ED PROVIDER NOTE - PROGRESS NOTE DETAILS
Resident Parisa Vick: patient now more awake, demanding to leave. Answering questions appropriately, awake, alert, clinically sober. Okay for discharge with outpatient follow-up and return precautions.

## 2023-05-02 NOTE — ED PROVIDER NOTE - PHYSICAL EXAMINATION
General: sleepy but arousable, well appearing, NAD  Head: NC/AT  Cardiac: RRR, no m/r/g  Respiratory: CTABL, equal chest wall expansions, no conversational dyspnea  Abdomen: soft, ND, NT  Neuro: sleepy but arousable, orientedx3, coordinated movement of all 4 extremities  Psych: calm, cooperative  Skin: warm and dry

## 2023-05-02 NOTE — ED PROVIDER NOTE - CLINICAL SUMMARY MEDICAL DECISION MAKING FREE TEXT BOX
42-year-old male with PMH substance abuse, at a sober house, presents BIBEMS for unresponsiveness. Sleepy but arousable on arrival. On reassessment, patient is awake, alert, demanding to go home.

## 2023-05-03 VITALS
SYSTOLIC BLOOD PRESSURE: 140 MMHG | DIASTOLIC BLOOD PRESSURE: 75 MMHG | OXYGEN SATURATION: 95 % | RESPIRATION RATE: 12 BRPM | HEART RATE: 75 BPM

## 2023-05-03 LAB
ALBUMIN SERPL ELPH-MCNC: 4.5 G/DL — SIGNIFICANT CHANGE UP (ref 3.3–5.2)
ALP SERPL-CCNC: 124 U/L — HIGH (ref 40–120)
ALT FLD-CCNC: 12 U/L — SIGNIFICANT CHANGE UP
ANION GAP SERPL CALC-SCNC: 16 MMOL/L — SIGNIFICANT CHANGE UP (ref 5–17)
APAP SERPL-MCNC: <3 UG/ML — LOW (ref 10–26)
AST SERPL-CCNC: 18 U/L — SIGNIFICANT CHANGE UP
BASOPHILS # BLD AUTO: 0.07 K/UL — SIGNIFICANT CHANGE UP (ref 0–0.2)
BASOPHILS NFR BLD AUTO: 0.9 % — SIGNIFICANT CHANGE UP (ref 0–2)
BILIRUB SERPL-MCNC: 0.2 MG/DL — LOW (ref 0.4–2)
BUN SERPL-MCNC: 32.2 MG/DL — HIGH (ref 8–20)
CALCIUM SERPL-MCNC: 9.3 MG/DL — SIGNIFICANT CHANGE UP (ref 8.4–10.5)
CHLORIDE SERPL-SCNC: 103 MMOL/L — SIGNIFICANT CHANGE UP (ref 96–108)
CO2 SERPL-SCNC: 24 MMOL/L — SIGNIFICANT CHANGE UP (ref 22–29)
CREAT SERPL-MCNC: 2.77 MG/DL — HIGH (ref 0.5–1.3)
EGFR: 28 ML/MIN/1.73M2 — LOW
EOSINOPHIL # BLD AUTO: 0.39 K/UL — SIGNIFICANT CHANGE UP (ref 0–0.5)
EOSINOPHIL NFR BLD AUTO: 5.2 % — SIGNIFICANT CHANGE UP (ref 0–6)
ETHANOL SERPL-MCNC: <10 MG/DL — SIGNIFICANT CHANGE UP (ref 0–9)
GLUCOSE SERPL-MCNC: 102 MG/DL — HIGH (ref 70–99)
HCT VFR BLD CALC: 32.2 % — LOW (ref 39–50)
HGB BLD-MCNC: 9.8 G/DL — LOW (ref 13–17)
IMM GRANULOCYTES NFR BLD AUTO: 0.4 % — SIGNIFICANT CHANGE UP (ref 0–0.9)
LYMPHOCYTES # BLD AUTO: 2.01 K/UL — SIGNIFICANT CHANGE UP (ref 1–3.3)
LYMPHOCYTES # BLD AUTO: 26.6 % — SIGNIFICANT CHANGE UP (ref 13–44)
MCHC RBC-ENTMCNC: 24 PG — LOW (ref 27–34)
MCHC RBC-ENTMCNC: 30.4 GM/DL — LOW (ref 32–36)
MCV RBC AUTO: 78.9 FL — LOW (ref 80–100)
MONOCYTES # BLD AUTO: 0.47 K/UL — SIGNIFICANT CHANGE UP (ref 0–0.9)
MONOCYTES NFR BLD AUTO: 6.2 % — SIGNIFICANT CHANGE UP (ref 2–14)
NEUTROPHILS # BLD AUTO: 4.59 K/UL — SIGNIFICANT CHANGE UP (ref 1.8–7.4)
NEUTROPHILS NFR BLD AUTO: 60.7 % — SIGNIFICANT CHANGE UP (ref 43–77)
PLATELET # BLD AUTO: 247 K/UL — SIGNIFICANT CHANGE UP (ref 150–400)
POTASSIUM SERPL-MCNC: 5.1 MMOL/L — SIGNIFICANT CHANGE UP (ref 3.5–5.3)
POTASSIUM SERPL-SCNC: 5.1 MMOL/L — SIGNIFICANT CHANGE UP (ref 3.5–5.3)
PROT SERPL-MCNC: 7.3 G/DL — SIGNIFICANT CHANGE UP (ref 6.6–8.7)
RBC # BLD: 4.08 M/UL — LOW (ref 4.2–5.8)
RBC # FLD: 15.7 % — HIGH (ref 10.3–14.5)
SALICYLATES SERPL-MCNC: <0.6 MG/DL — LOW (ref 10–20)
SODIUM SERPL-SCNC: 142 MMOL/L — SIGNIFICANT CHANGE UP (ref 135–145)
WBC # BLD: 7.56 K/UL — SIGNIFICANT CHANGE UP (ref 3.8–10.5)
WBC # FLD AUTO: 7.56 K/UL — SIGNIFICANT CHANGE UP (ref 3.8–10.5)

## 2023-05-17 NOTE — DISCHARGE NOTE PROVIDER - POSTFACE STATEMENT FOR MINUTES SPENT
PAST MEDICAL HISTORY:  Anemia     Endometrial polyp     Fibroid     Obese      minutes on the discharge service.

## 2023-06-02 ENCOUNTER — EMERGENCY (EMERGENCY)
Facility: HOSPITAL | Age: 43
LOS: 1 days | Discharge: HOME | End: 2023-06-02
Attending: EMERGENCY MEDICINE
Payer: MEDICARE

## 2023-06-02 VITALS
TEMPERATURE: 99 F | WEIGHT: 220.02 LBS | HEART RATE: 66 BPM | RESPIRATION RATE: 20 BRPM | HEIGHT: 68 IN | OXYGEN SATURATION: 99 % | SYSTOLIC BLOOD PRESSURE: 112 MMHG | DIASTOLIC BLOOD PRESSURE: 87 MMHG

## 2023-06-02 DIAGNOSIS — Z96.653 PRESENCE OF ARTIFICIAL KNEE JOINT, BILATERAL: Chronic | ICD-10-CM

## 2023-06-02 DIAGNOSIS — Z96.649 PRESENCE OF UNSPECIFIED ARTIFICIAL HIP JOINT: Chronic | ICD-10-CM

## 2023-06-02 LAB
ALBUMIN SERPL ELPH-MCNC: 4.2 G/DL — SIGNIFICANT CHANGE UP (ref 3.3–5.2)
ALP SERPL-CCNC: 115 U/L — SIGNIFICANT CHANGE UP (ref 40–120)
ALT FLD-CCNC: 21 U/L — SIGNIFICANT CHANGE UP
ANION GAP SERPL CALC-SCNC: 12 MMOL/L — SIGNIFICANT CHANGE UP (ref 5–17)
APAP SERPL-MCNC: <3 UG/ML — LOW (ref 10–26)
AST SERPL-CCNC: 28 U/L — SIGNIFICANT CHANGE UP
BASOPHILS # BLD AUTO: 0.05 K/UL — SIGNIFICANT CHANGE UP (ref 0–0.2)
BASOPHILS NFR BLD AUTO: 1.1 % — SIGNIFICANT CHANGE UP (ref 0–2)
BILIRUB SERPL-MCNC: 0.3 MG/DL — LOW (ref 0.4–2)
BUN SERPL-MCNC: 33.4 MG/DL — HIGH (ref 8–20)
CALCIUM SERPL-MCNC: 9.4 MG/DL — SIGNIFICANT CHANGE UP (ref 8.4–10.5)
CHLORIDE SERPL-SCNC: 102 MMOL/L — SIGNIFICANT CHANGE UP (ref 96–108)
CO2 SERPL-SCNC: 25 MMOL/L — SIGNIFICANT CHANGE UP (ref 22–29)
CREAT SERPL-MCNC: 2.53 MG/DL — HIGH (ref 0.5–1.3)
EGFR: 31 ML/MIN/1.73M2 — LOW
EOSINOPHIL # BLD AUTO: 0.32 K/UL — SIGNIFICANT CHANGE UP (ref 0–0.5)
EOSINOPHIL NFR BLD AUTO: 7 % — HIGH (ref 0–6)
ETHANOL SERPL-MCNC: <10 MG/DL — SIGNIFICANT CHANGE UP (ref 0–9)
GLUCOSE SERPL-MCNC: 102 MG/DL — HIGH (ref 70–99)
HCT VFR BLD CALC: 29.8 % — LOW (ref 39–50)
HGB BLD-MCNC: 9.3 G/DL — LOW (ref 13–17)
IMM GRANULOCYTES NFR BLD AUTO: 0.2 % — SIGNIFICANT CHANGE UP (ref 0–0.9)
LYMPHOCYTES # BLD AUTO: 1.72 K/UL — SIGNIFICANT CHANGE UP (ref 1–3.3)
LYMPHOCYTES # BLD AUTO: 37.6 % — SIGNIFICANT CHANGE UP (ref 13–44)
MAGNESIUM SERPL-MCNC: 2 MG/DL — SIGNIFICANT CHANGE UP (ref 1.6–2.6)
MCHC RBC-ENTMCNC: 24.8 PG — LOW (ref 27–34)
MCHC RBC-ENTMCNC: 31.2 GM/DL — LOW (ref 32–36)
MCV RBC AUTO: 79.5 FL — LOW (ref 80–100)
MONOCYTES # BLD AUTO: 0.28 K/UL — SIGNIFICANT CHANGE UP (ref 0–0.9)
MONOCYTES NFR BLD AUTO: 6.1 % — SIGNIFICANT CHANGE UP (ref 2–14)
NEUTROPHILS # BLD AUTO: 2.19 K/UL — SIGNIFICANT CHANGE UP (ref 1.8–7.4)
NEUTROPHILS NFR BLD AUTO: 48 % — SIGNIFICANT CHANGE UP (ref 43–77)
PLATELET # BLD AUTO: 218 K/UL — SIGNIFICANT CHANGE UP (ref 150–400)
POTASSIUM SERPL-MCNC: 5.5 MMOL/L — HIGH (ref 3.5–5.3)
POTASSIUM SERPL-SCNC: 5.5 MMOL/L — HIGH (ref 3.5–5.3)
PROT SERPL-MCNC: 7 G/DL — SIGNIFICANT CHANGE UP (ref 6.6–8.7)
RAPID RVP RESULT: SIGNIFICANT CHANGE UP
RBC # BLD: 3.75 M/UL — LOW (ref 4.2–5.8)
RBC # FLD: 16.4 % — HIGH (ref 10.3–14.5)
SALICYLATES SERPL-MCNC: <0.6 MG/DL — LOW (ref 10–20)
SARS-COV-2 RNA SPEC QL NAA+PROBE: SIGNIFICANT CHANGE UP
SODIUM SERPL-SCNC: 139 MMOL/L — SIGNIFICANT CHANGE UP (ref 135–145)
WBC # BLD: 4.57 K/UL — SIGNIFICANT CHANGE UP (ref 3.8–10.5)
WBC # FLD AUTO: 4.57 K/UL — SIGNIFICANT CHANGE UP (ref 3.8–10.5)

## 2023-06-02 PROCEDURE — 72125 CT NECK SPINE W/O DYE: CPT | Mod: 26,MA

## 2023-06-02 PROCEDURE — 70450 CT HEAD/BRAIN W/O DYE: CPT | Mod: 26,MA

## 2023-06-02 PROCEDURE — 71045 X-RAY EXAM CHEST 1 VIEW: CPT | Mod: 26

## 2023-06-02 PROCEDURE — 93010 ELECTROCARDIOGRAM REPORT: CPT

## 2023-06-02 PROCEDURE — 99285 EMERGENCY DEPT VISIT HI MDM: CPT | Mod: GC

## 2023-06-02 RX ORDER — SODIUM CHLORIDE 9 MG/ML
1000 INJECTION INTRAMUSCULAR; INTRAVENOUS; SUBCUTANEOUS ONCE
Refills: 0 | Status: COMPLETED | OUTPATIENT
Start: 2023-06-02 | End: 2023-06-02

## 2023-06-02 RX ORDER — NALOXONE HYDROCHLORIDE 4 MG/.1ML
0.4 SPRAY NASAL ONCE
Refills: 0 | Status: COMPLETED | OUTPATIENT
Start: 2023-06-02 | End: 2023-06-02

## 2023-06-02 RX ADMIN — SODIUM CHLORIDE 1000 MILLILITER(S): 9 INJECTION INTRAMUSCULAR; INTRAVENOUS; SUBCUTANEOUS at 17:44

## 2023-06-02 RX ADMIN — NALOXONE HYDROCHLORIDE 0.4 MILLIGRAM(S): 4 SPRAY NASAL at 17:44

## 2023-06-02 NOTE — ED ADULT NURSE NOTE - NSFALLUNIVINTERV_ED_ALL_ED
Bed/Stretcher in lowest position, wheels locked, appropriate side rails in place/Call bell, personal items and telephone in reach/Instruct patient to call for assistance before getting out of bed/chair/stretcher/Non-slip footwear applied when patient is off stretcher/[place] to call system/Physically safe environment - no spills, clutter or unnecessary equipment/Purposeful proactive rounding/Room/bathroom lighting operational, light cord in reach

## 2023-06-02 NOTE — ED ADULT NURSE NOTE - CHIEF COMPLAINT QUOTE
pt BIBA as per EMS pt walked to his mothers house where he was very lethargic and she thinks he took a lot more of his meds that he should have, pt responding to verbal stimuli, undressed and placed in yellow gown. pt AOX3 on Suboxone, denies SI/HI alcohol or drug abuse

## 2023-06-02 NOTE — ED ADULT NURSE NOTE - OBJECTIVE STATEMENT
biba ems ; per EMS "pt mother called because he went to her house and looked high, like he took too much of his meds. she said he abuses his scripts and gets like this". on arrival pt stuporous; responds to painful /deep stimuli. poor historian; pt does not answer full interview questions; frequently needs re-orientation and stimulation; pt does admit to taking suboxone (unk amount), denies HI/SI/ETOH/ OTHER DRUGS.  no collateral at bedside.

## 2023-06-02 NOTE — ED PROVIDER NOTE - PHYSICAL EXAMINATION
Gen: obese male, sleepy but arousable to voice.   Head: normocephalic, atraumatic  EENT: PERRLA, moist mucous membranes, no scleral icterus, no JVD  Lung: no increased work of breathing, clear to auscultation bilaterally, no wheezing, rales, rhonchi, speaking in full sentences  CV: regular rate, regular rhythm, normal s1/s2, 2+ radial pulses bilaterally  Abd: soft, non-tender, non-distended, no rebound tenderness or guarding; no CVA tenderness  MSK: No edema, no visible deformities, full range of motion in all 4 extremities. no midline spinal ttp  Neuro: sleepy, arousable to voice, oriented x3, moving all extremities spontaneously  Skin: No obvious rash, no jaundice

## 2023-06-02 NOTE — ED PROVIDER NOTE - PATIENT PORTAL LINK FT
You can access the FollowMyHealth Patient Portal offered by [facility] by registering at the following website: [email or web address]. By joining [employer]’s FollowMyHealth portal, you will also be able to view your health information using other applications (apps) compatible with our system.

## 2023-06-02 NOTE — ED PROVIDER NOTE - ATTENDING CONTRIBUTION TO CARE
I personally saw the patient with the resident, and completed the key components of the history and physical exam. I then discussed the management plan with the resident.      43-year-old male with past medical history of bipolar disease, diabetes, polysubstance abuse, CKD presents via ambulance for lethargy and difficult to arouse per parents.  Patient got kicked out of his sober home yesterday, slept at parents house, and mom notes 3 falls from standing.  Patient unable to provide any history.      Ill-appearing, minimally arousing to aggressive sternal rub, moves all extremities symmetrically, no respiratory distress, following some commands, garbled speech.    No outward signs of trauma, will CT head, labs.  As patient is difficult to arouse with history of polysubstance abuse will give IV Narcan, continue to monitor.  Patient denied suicide attempt or intentional overdose.

## 2023-06-02 NOTE — ED ADULT TRIAGE NOTE - CHIEF COMPLAINT QUOTE
pt BIBA as per EMS pt walked to his mothers house where he was very lethargic and she thinks he took a lot more of his meds that he should have, pt responding to verbal stimuli, undressed and placed in yellow gown pt BIBA as per EMS pt walked to his mothers house where he was very lethargic and she thinks he took a lot more of his meds that he should have, pt responding to verbal stimuli, undressed and placed in yellow gown. pt AOX3 on Suboxone, denies SI/HI alcohol or drug abuse

## 2023-06-02 NOTE — ED PROVIDER NOTE - CLINICAL SUMMARY MEDICAL DECISION MAKING FREE TEXT BOX
43 year old male with hx polysubstance abuse, CKD, DM, HTN BIBA from parents' home; parents called 911 as patient was lethargic, they suspect he took too much of his prescribed medication. Parents report patient with 3 episodes of syncope today, +ground level fall, unknown head trauma. Plan - IV hydrate, labs, CT head/neck, chest x-ray, reevaluate.

## 2023-06-02 NOTE — ED PROVIDER NOTE - OBJECTIVE STATEMENT
43 year old male with PMHx bipolar disorder, HTN, GERD, NIDDM, polysubstance abuse, CKD (hx brief HD inpatient) BIBA from parents' home for evaluation of lethargy. Patient sleepy, arousable to voice, poor historian. Called parents for collateral information- they state patient recently kicked out of rehab, came to parents' home, stayed there overnight, and state that today patient was awake but sleepy, passed out 3 times from standing height, down 10-15min each time, unknown head strike. They state they left him on the floor each time and waited for him to get up on his own. They state he is prescribed Clonopin, Suboxone, and xanax which he abuses. Patient admits to taking 2 of his suboxone today and 4 Klonipin. Denies any SI/HI. Unable to provide more meaningful history. 43 year old male with PMHx bipolar disorder, HTN, GERD, NIDDM, polysubstance abuse, CKD (hx brief HD inpatient) BIBA from parents' home for evaluation of lethargy. Patient sleepy, arouses to voice, poor historian. Called parents for collateral information- they state patient recently kicked out of rehab, came to parents' home, stayed there overnight, and state that today patient was awake but sleepy, passed out 3 times from standing height, down 10-15min each time, unknown head strike. They state they left him on the floor each time and waited for him to get up on his own. They state he is prescribed Klonopin, Suboxone, and xanax which he abuses. Patient admits to taking 2 of his Suboxone today and 4 Klonopin. Denies any SI/HI. Unable to provide more meaningful history.

## 2023-06-02 NOTE — ED PROVIDER NOTE - SHIFT CHANGE DETAILS
Patient signed out pending  CT, sobriety, all further work up and management at the discretion of receiving physician.

## 2023-06-03 VITALS
OXYGEN SATURATION: 100 % | RESPIRATION RATE: 18 BRPM | SYSTOLIC BLOOD PRESSURE: 122 MMHG | HEART RATE: 60 BPM | DIASTOLIC BLOOD PRESSURE: 75 MMHG | TEMPERATURE: 98 F

## 2023-06-03 PROCEDURE — 70450 CT HEAD/BRAIN W/O DYE: CPT | Mod: MA

## 2023-06-03 PROCEDURE — 80307 DRUG TEST PRSMV CHEM ANLYZR: CPT

## 2023-06-03 PROCEDURE — 93005 ELECTROCARDIOGRAM TRACING: CPT

## 2023-06-03 PROCEDURE — 36415 COLL VENOUS BLD VENIPUNCTURE: CPT

## 2023-06-03 PROCEDURE — 96374 THER/PROPH/DIAG INJ IV PUSH: CPT

## 2023-06-03 PROCEDURE — 72125 CT NECK SPINE W/O DYE: CPT | Mod: MA

## 2023-06-03 PROCEDURE — 0225U NFCT DS DNA&RNA 21 SARSCOV2: CPT

## 2023-06-03 PROCEDURE — 82962 GLUCOSE BLOOD TEST: CPT

## 2023-06-03 PROCEDURE — 85025 COMPLETE CBC W/AUTO DIFF WBC: CPT

## 2023-06-03 PROCEDURE — 83735 ASSAY OF MAGNESIUM: CPT

## 2023-06-03 PROCEDURE — 80053 COMPREHEN METABOLIC PANEL: CPT

## 2023-06-03 PROCEDURE — 99285 EMERGENCY DEPT VISIT HI MDM: CPT | Mod: 25

## 2023-06-03 PROCEDURE — 71045 X-RAY EXAM CHEST 1 VIEW: CPT

## 2023-07-03 NOTE — ED PROVIDER NOTE - CARDIAC, MLM
[4___] : hamstring 4[unfilled]/5 [Positive] : positive Mark [] : patient ambulates without assistive device [Lateral] : lateral [Bilateral] : knee bilaterally [[place]] : skyline [AP Standing] : anteroposterior standing [FreeTextEntry9] : lateral maltracking patella [TWNoteComboBox7] : flexion 130 degrees [de-identified] : extension 0 degrees Normal rate, regular rhythm.  Heart sounds S1, S2.  No murmurs, rubs or gallops.

## 2023-08-05 NOTE — ED BEHAVIORAL HEALTH ASSESSMENT NOTE - NS ED BHA AXIS I PRIMARY CODE FT
[facility] EMERGENCY DEPARTMENT  EMERGENCY DEPARTMENT HISTORY AND PHYSICAL EXAM      Date: 8/5/2023  Patient Name: Samia Oseguera  MRN: [id number]  [address] [facility], [place] 1999  Date of evaluation: 8/5/2023  Provider: Melody Ortez DO   Note Started: 10:16 AM EDT 8/5/23    HISTORY OF PRESENT ILLNESS     Chief Complaint   Patient presents with    Vaginal Discharge     History Provided By: Patient    HPI: Samia Oseguera is a 21 y.o. female with past medical history of anxiety who presents with vaginal discharge. Symptoms have been present for about 3 days. She has noticed itchiness and vaginal discharge that is thick. States it has been itchy and red as well. States that she did have diarrhea recently and thinks that she may have have some extra moisture in the region that could have caused it. Denies any new sexual partners. States it feels like a vaginal yeast infection and she has had this before. No dysuria or flank pain. No other symptoms. PAST MEDICAL HISTORY   Past Medical History:  Past Medical History:   Diagnosis Date    Anxiety     Bronchitis 11/01/2020    COVID-19 01/2021    COVID-19 vaccine series completed 05/2021    Moderna    Obesity     Salivary stone     right       Past Surgical History:  Past Surgical History:   Procedure Laterality Date    COLONOSCOPY  2013    also endoscopy    OTHER SURGICAL HISTORY  11/27/2020    brazilian butt lift       Family History:  No family history on file. Social History:  Social History     Tobacco Use    Smoking status: Never    Smokeless tobacco: Never   Substance Use Topics    Alcohol use:  Yes     Alcohol/week: 4.0 standard drinks    Drug use: Yes     Types: Marijuana Geovanny Picking)       Allergies:  No Known Allergies    PCP: Cat Holloway MD    Current Meds:   Current Facility-Administered Medications   Medication Dose Route Frequency Provider Last Rate Last Admin    fluconazole (DIFLUCAN) tablet 200 mg  200 mg Oral NOW Melody Ortez DO         Current Outpatient often unanticipated grammatical, syntax, homophones, and other interpretive errors are inadvertently transcribed by the computer software. Please disregards these errors.  Please excuse any errors that have escaped final proofreading.)       Yaneth Guerrier DO  08/06/23 1024 F32.9

## 2023-10-23 NOTE — ED CDU PROVIDER DISPOSITION NOTE - PATIENT PORTAL LINK FT
You can access the FollowMyHealth Patient Portal offered by [facility] by registering at the following website: [email or web address]. By joining [facility]’s FollowMyHealth portal, you will also be able to view your health information using other applications (apps) compatible with our system.
Chronic pain or other acute medical condition

## 2023-12-04 NOTE — ED PROVIDER NOTE - WR INTERPRETATION DATE TIME  1
[place] AMBULATORY ENCOUNTER  GI CONSULT NOTE    REQUESTING PROVIDER:  Orlando Linares MD    CHIEF COMPLAINT:  Follow-up       SUBJECTIVE:    Federico Butts is a 66 year old male seen today at the request of Orlando Linares MD for diarrhea.    The patient is a 65 yo male with a medical history significant for melanoma of the left chest who presents to GI clinic after being hospitalized for diarrhea.  In regards to his melanoma history, he underwent wide excision on 11/11/2022 which revealed invasive melanoma measuring 2.3 mm.  Axillary sentinel lymph node showed metastatic melanoma.  He has been on adjuvant Keytruda, 200 mg IV every 3 weeks for 12 months.  The patient was initially admitted from 09/19/2020 03/09/2020 4/23 with diarrhea and underwent infectious workup which was unremarkable.  He ultimately had a colonoscopy on 9/23/23 which revealed focal areas of FF like ulcers in the mid sigmoid colon.  Terminal ileal biopsies and random colon biopsies and sigmoid colon biopsies were obtained.  These revealed patchy mild active colitis on random colon biopsies in focal active colitis with focal surface erosions in the sigmoid colon. This was in the setting of a fecal calprotectin of 919.  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 He was discharged and then readmitted on 09/28 to 10/01/2023 for diarrhea, dehydration, acute kidney injury, and weakness.  During his hospitalization he was getting IV steroid.  Upon discharge on 10/01/2023, he was discharged with prednisone 40 mg daily for 1 week supply in anticipation of a steroid taper as outpatient.  He states his last Keytruda dose was approximately 3 weeks ago, around September 12th and does not plan to complete the remaining for infusions.    Update 12/7/23: Completed prednisone taper yesterday. No diarrhea. Having one bowel movement a day, sometimes 2-3. No blood or melena. No abdominal pain,  nausea or vomiting. Overall feeling well in regards to the GI symptoms. He is off Keytruda now per oncology.         MEDICATIONS:       Current Outpatient Medications   Medication Sig Dispense Refill    gabapentin (NEURONTIN) 300 MG capsule Take 1 capsule by mouth as directed. titrate Q 3 days PRN: begin HS, may add 1 cap Q AM then may add 1 cap Q afternoon. Max 3 cap QD 90 capsule 0    lisinopril (ZESTRIL) 40 MG tablet Take 1 tablet by mouth daily. 90 tablet 3    atorvastatin (LIPITOR) 80 MG tablet Take 1 tablet by mouth daily. 90 tablet 3    oxyCODONE-acetaminophen (Percocet) 5-325 MG per tablet Take 1 tablet by mouth every 4 hours as needed for Pain (Not to exceed 4000 mg acetaminophen per day). 10 tablet 0    omeprazole (PriLOSEC) 40 MG capsule TAKE 1 CAPSULE BY MOUTH DAILY 30 capsule 0    predniSONE (DELTASONE) 10 MG tablet Take 4 tablets by mouth daily for 14 days, THEN 3 tablets daily for 14 days, THEN 2 tablets daily for 14 days, THEN 1 tablet daily for 14 days. 140 tablet 0    loperamide (Imodium A-D) 2 MG capsule Take 1 capsule by mouth 4 times daily as needed for Diarrhea. 120 capsule 5    sodium chloride 0.9 % Solution 100 mL with pemBROlizumab 100 MG/4ML Solution 200 mg Inject 200 mg into the vein every 21 days. Given in office      potassium chloride (KLOR-CON M) 10 MEQ roseanne ER tablet Take 1 tablet by mouth in the morning and 1 tablet in the evening. 60 tablet 0    aspirin 81 MG tablet Take 81 mg by mouth daily.      MULTIPLE VITAMIN PO Take  by mouth daily.        Glucosamine-Chondroit-Vit C-Mn (GLUCOSAMINE CHONDR 1500 COMPLX PO) Take 1 tablet by mouth 2 times daily.        Omega-3 Fatty Acids (FISH OIL) 1200 MG capsule Take 1,200 mg by mouth 2 times daily.         No current facility-administered medications for this visit.       PROBLEM LIST:                  Patient Active Problem List   Diagnosis    Essential hypertension    Hypercholesterolemia    Urethral stricture unspecified    Actinic  keratosis    Other diseases of lung, not elsewhere classified    Hypersomnia with sleep apnea, unspecified    Enlarged thyroid    Thyroid nodule    Cervical spondylosis without myelopathy    Spinal stenosis in cervical region    Pain of both shoulder joints    Pain in both hands    Paresthesia of both hands    Bilateral carpal tunnel syndrome    Carpal tunnel syndrome on right    Complete tear of left rotator cuff    Fasting hyperglycemia    Taking medication for chronic disease    Changes in vision    Atherosclerosis of native coronary artery of native heart without angina pectoris    Ascending aorta dilation (CMD)    Heart murmur    Medicare welcome visit    Benign prostatic hyperplasia without lower urinary tract symptoms    RAMSEY (acute kidney injury) (CMD)    Malignant melanoma in situ of skin of anterior chest (CMD)    FOREST (obstructive sleep apnea)    Diarrhea, unspecified type       HISTORIES:    ALLERGIES:   Allergen Reactions    Tetracycline RASH     Past Medical History:   Diagnosis Date    Actinic keratosis     Carpal tunnel syndrome, bilateral     Cervical spondylosis without myelopathy     Chronic pain     back    CKD (chronic kidney disease)     Complete rotator cuff tear of left shoulder     Enlarged thyroid     H/O urethral stricture     Heart murmur     Hyperglycemia     Hyperlipidemia     Hypersomnia     Hypertension     Hypothyroidism     Malignant melanoma (CMD)     left chest    Microscopic hematuria     Pulmonary nodule     Sleep apnea     CPAP every night    Spinal stenosis of cervical region     Tailor's bunionette     Thyroid nodule     right    Urethral stricture     s/p dilation     Past Surgical History:   Procedure Laterality Date    Carpal tunnel release Right 07/12/2016    Dr. Rogers    Carpal tunnel release Left 12/12/2016    Dr Rogers    Chest wall biopsy Left 2022    Colonoscopy diagnostic  01/17/2008    Colonoscopy w/ polypectomy  07/12/2019    Severe left colonic diverticulosis with  sharp flexure, moderate nonbleeding internal hemorrhoids, polyps removed, recall in 5 years if adenomatous as noted, Dr. Kyle Loo    Insertion of access port  2022    Lymph node biopsy  2022    Skin cancer excision  2022    wide excision melanoma left chest & sentinel node biopsy    Urethra surgery  2013    Urethral dilation    Vasectomy       Social History     Tobacco Use    Smoking status: Former     Current packs/day: 0.00     Average packs/day: 1 pack/day for 17.0 years (17.0 ttl pk-yrs)     Types: Cigarettes     Start date: 1973     Quit date: 1990     Years since quittin.3    Smokeless tobacco: Never   Vaping Use    Vaping Use: never used   Substance Use Topics    Alcohol use: Not Currently     Comment: couple times a week    Drug use: Yes     Types: Other     Comment: THC gummies        Family History   Problem Relation Age of Onset    Cancer Mother         brain    Hypertension Father     Cancer, Pancreatic Brother     Cancer Maternal Uncle         prostate   :    REVIEW OF SYSTEMS:    All other systems are reviewed and are negative except as documented in the history of present illness.    PHYSICAL EXAM:    Vital Signs: Blood pressure (!) 148/72, weight 100.1 kg (220 lb 10.9 oz).  General: The patient is well developed, well nourished, in no acute distress, appears stated age.   Neurologic: Alert. Normal mood and affect, normal speech, no gross tremor.  Skin: Warm and dry, normal turgor.  Head: Normocephalic.  Eyes: Normal conjunctivae and sclerae.  Pupils equal, round, reactive to light.  Extraocular movements intact.  HEENT: Oropharynx clear, moist mucous membranes, external nose is normal to inspection.  Neck: Symmetric without swelling or tenderness.   Respiratory: Respiratory effort normal.   Cardiovascular: Regular rate and rhythm.  Extremities: No swelling or tenderness.  Gastrointestinal:  Soft and nontender.  Normal bowel sounds.  No hepatomegaly or splenomegaly.        LABORATORY DATA:    All pertinent laboratory results were reviewed.    IMAGING STUDIES:     nil    ENDOSCOPY:  Colonoscopy 9/23/23:  POSTOPERATIVE DIAGNOSIS:   1. Normal-appearing terminal ileum.  Biopsies were obtained  2. Focal area of aphthous like ulcers in the mid sigmoid colon.  The clinical significance is unknown.  This could represent chronic colitis but also could represent healing infectious colitis.  3. Sigmoid colon diverticulosis  4. Small internal hemorrhoids      Pathologic Diagnosis   A.   Ileum, biopsies:  -No significant histologic abnormality.     B.   Colon, random biopsies:  -Patchy mild active colitis with scattered surface erosions.     C.   Colon, sigmoid, biopsies:  -Focal active colitis with focal surface erosion (see microscopic description).     Microscopic Description    A.  Examination reveals fragments of small intestine compatible with ileum.  The villous architecture is intact.  Significant acute inflammation is not identified.  There are no granulomas or features of chronic mucosal injury.  There is no adenomatous change, dysplasia or malignancy.     B.  Examination reveals fragments of colonic mucosa.  There is patchy mild active colitis with scattered surface erosions.  There are no granulomas or features of chronic mucosal injury.  There is no adenomatous change, dysplasia or malignancy.     C.  Examination reveals fragments of colonic mucosa.  There is focal active colitis with focal surface erosion.  There are no granulomas or features of chronic mucosal injury.  There is no adenomatous change, dysplasia or malignancy.          ASSESSMENT:      # Check point inhibitor induced colitis  - 2/2 Keytruda for melanoma, now off  - diarrhea symptoms resolved-  - last prednisone dose was yesterday  - GI pathogen, C. Diff neg  - oncology planning to monitor with CT scans Q6 months  - done with Keytruda    #CRC Screening  - no polyps or mass lesions on colonoscopy done  9/23/23    PLAN:    - recommend repeat colonoscopy in 10 years pending clinical status and patient preference  - counseled on opioid induced constipation, can take senna (2 tablets BID if taking opioids)    follow up with MD as needed. Instructions provided as documented in the after visit summary. The patient indicated understanding of the diagnosis and agreed with the plan of care.  A copy of this note was sent to the referring provider. Thank you for involving me in the care of this patient.    Patient was seen and discussed with my attending, Dr. Britton Pereira MD, who agrees with the above plan.    Orlando Linares MD  Gastroenterology Fellow  Pager: [phone]    Please see Fellow's note for details.  I saw and evaluated the patient and agree with the Fellow's findings and plan as written.  Diarrhea has resolved. Senns 2 tabs daily prn for constipation if using opioids.    Britton Pereira MD    11-Apr-2022 07:37

## 2023-12-13 NOTE — BH CONSULTATION LIAISON ASSESSMENT NOTE - NSBHMSETHTPROC_PSY_A_CORE
Addended by: Stephen Rosa on: 12/13/2023 03:36 PM     Modules accepted: Orders Disorganized/Illogical/Impaired reasoning

## 2024-01-08 NOTE — ED PROVIDER NOTE - CCCP TRG CHIEF CMPLNT
Addendum  created 01/08/24 1511 by Victor M Jim MD    Attestation recorded in Intraprocedure, Intraprocedure Attestations filed       medical evaluation

## 2024-02-23 NOTE — PHYSICAL THERAPY INITIAL EVALUATION ADULT - LEVEL OF INDEPENDENCE: SIT/STAND, REHAB EVAL
Detail Level: Detailed Add 63619 Cpt? (Important Note: In 2017 The Use Of 64227 Is Being Tracked By Cms To Determine Future Global Period Reimbursement For Global Periods): yes Wound Evaluated By (Optional): Rakesh Weiss MD Wound Diameter In Cm(Optional): 0 Wound Crusting?: clean Wound Edema?: mild Wound Color?: pink Any New Or Residual Neoplasm?: No Patient To Follow-Up With?: our clinic Follow Up Units (Optional): 1 Follow Up Time Frame (Optional): months independent

## 2024-05-06 NOTE — ED STATDOCS - NS ED MD EM SELECTION
Population Health Chart Review & Patient Outreach Details      Additional [facility] Notes:    Non-compliant report chart audits for BREAST CANCER SCREENING. Chart review completed for HM test overdue (mammograms, Colonoscopies, pap smears, DM labs, and/or EYE EXAMs)      Care Everywhere and media, updates requested and reviewed.      [facility] and [facility] reviewed.        RE:  Pt Overdue for Mammogram as of:   6.21.23              Updates Requested / Reviewed:      Care Everywhere and          Health Maintenance Topics Overdue:      VB Score: 3     Osteoporosis Screening  Mammogram  Hemoglobin A1c    Tetanus Vaccine  Shingles/Zoster Vaccine  RSV Vaccine                  Health Maintenance Topic(s) Outreach Outcomes & Actions Taken:  Chart Review / Pt due for Mammogram & Annual Wellness Visit   [address] [facility]

## 2024-06-12 NOTE — PROGRESS NOTE ADULT - SUBJECTIVE AND OBJECTIVE BOX
Returned call to patient. Message on patient's home phone states \"please enter a remote access code\". LM on patient's mobile number requesting a call back to discuss.   Tolerated CRRT overnight. Required precedex due to restlessness. C diff positive.     Vital Signs Last 24 Hrs  T(C): 37.1 (03 Dec 2022 11:00), Max: 37.8 (03 Dec 2022 04:30)  T(F): 98.8 (03 Dec 2022 11:00), Max: 100 (03 Dec 2022 04:30)  HR: 101 (03 Dec 2022 11:00) (85 - 117)  BP: 126/75 (03 Dec 2022 04:00) (124/69 - 179/153)  BP(mean): 92 (03 Dec 2022 04:00) (83 - 163)  RR: 12 (03 Dec 2022 11:00) (8 - 22)  SpO2: 96% (03 Dec 2022 11:00) (95% - 99%)    Parameters below as of 03 Dec 2022 07:00  Patient On (Oxygen Delivery Method): room air    I&O's Summary    02 Dec 2022 07:01  -  03 Dec 2022 07:00  --------------------------------------------------------  IN: 567.6 mL / OUT: 956 mL / NET: -388.4 mL    03 Dec 2022 07:01  -  03 Dec 2022 11:08  --------------------------------------------------------  IN: 214.3 mL / OUT: 285 mL / NET: -70.7 mL        12-03    136  |  100  |  39.2<H>  ----------------------------<  132<H>  4.9   |  24.0  |  4.03<H>    Ca    8.6      03 Dec 2022 08:48  Phos  4.0     12-03  Mg     2.5     12-03    TPro  5.7<L>  /  Alb  3.0<L>  /  TBili  0.5  /  DBili  x   /  AST  20  /  ALT  14  /  AlkPhos  84  12-03               9.9    4.68  )-----------( 138      ( 03 Dec 2022 05:30 )             28.6     MEDICATIONS  (STANDING):  chlorhexidine 2% Cloths 1 Application(s) Topical daily  dexMEDEtomidine Infusion 0.2 MICROgram(s)/kG/Hr (3.5 mL/Hr) IV Continuous <Continuous>  heparin   Injectable 5000 Unit(s) SubCutaneous every 8 hours  levETIRAcetam  IVPB 750 milliGRAM(s) IV Intermittent every 12 hours  metroNIDAZOLE  IVPB 500 milliGRAM(s) IV Intermittent every 8 hours  vancomycin    Solution 125 milliGRAM(s) Oral every 6 hours    MEDICATIONS  (PRN):  haloperidol    Injectable 4 milliGRAM(s) IV Push every 6 hours PRN agitation  OLANZapine Injectable 5 milliGRAM(s) IntraMuscular every 6 hours PRN agitation  OLANZapine Injectable 10 milliGRAM(s) IntraMuscular every 6 hours PRN agitation  ondansetron Injectable 4 milliGRAM(s) IV Push every 6 hours PRN Nausea     Tolerated CRRT overnight. Required precedex due to restlessness. C diff positive.     Vital Signs Last 24 Hrs  T(C): 37.1 (03 Dec 2022 11:00), Max: 37.8 (03 Dec 2022 04:30)  T(F): 98.8 (03 Dec 2022 11:00), Max: 100 (03 Dec 2022 04:30)  HR: 101 (03 Dec 2022 11:00) (85 - 117)  BP: 126/75 (03 Dec 2022 04:00) (124/69 - 179/153)  BP(mean): 92 (03 Dec 2022 04:00) (83 - 163)  RR: 12 (03 Dec 2022 11:00) (8 - 22)  SpO2: 96% (03 Dec 2022 11:00) (95% - 99%)    Parameters below as of 03 Dec 2022 07:00  Patient On (Oxygen Delivery Method): room air    I&O's Summary    02 Dec 2022 07:01  -  03 Dec 2022 07:00  --------------------------------------------------------  IN: 567.6 mL / OUT: 956 mL / NET: -388.4 mL    03 Dec 2022 07:01  -  03 Dec 2022 11:08  --------------------------------------------------------  IN: 214.3 mL / OUT: 285 mL / NET: -70.7 mL    Physical Exam  General: WDWN male lying flat in bed in NAD  Cardiac: S1S2 RRR  Respiratory: CTAB  Abdomen: Soft, NT to light palpation  Extremities: No appreciable edema  Neuro: AAOx1 (self only), confused    12-03    136  |  100  |  39.2<H>  ----------------------------<  132<H>  4.9   |  24.0  |  4.03<H>    Ca    8.6      03 Dec 2022 08:48  Phos  4.0     12-03  Mg     2.5     12-03    TPro  5.7<L>  /  Alb  3.0<L>  /  TBili  0.5  /  DBili  x   /  AST  20  /  ALT  14  /  AlkPhos  84  12-03               9.9    4.68  )-----------( 138      ( 03 Dec 2022 05:30 )             28.6     MEDICATIONS  (STANDING):  chlorhexidine 2% Cloths 1 Application(s) Topical daily  dexMEDEtomidine Infusion 0.2 MICROgram(s)/kG/Hr (3.5 mL/Hr) IV Continuous <Continuous>  heparin   Injectable 5000 Unit(s) SubCutaneous every 8 hours  levETIRAcetam  IVPB 750 milliGRAM(s) IV Intermittent every 12 hours  metroNIDAZOLE  IVPB 500 milliGRAM(s) IV Intermittent every 8 hours  vancomycin    Solution 125 milliGRAM(s) Oral every 6 hours    MEDICATIONS  (PRN):  haloperidol    Injectable 4 milliGRAM(s) IV Push every 6 hours PRN agitation  OLANZapine Injectable 5 milliGRAM(s) IntraMuscular every 6 hours PRN agitation  OLANZapine Injectable 10 milliGRAM(s) IntraMuscular every 6 hours PRN agitation  ondansetron Injectable 4 milliGRAM(s) IV Push every 6 hours PRN Nausea     Tolerated CRRT overnight. Required precedex due to restlessness. C diff positive.     Vital Signs Last 24 Hrs  T(C): 37.1 (03 Dec 2022 11:00), Max: 37.8 (03 Dec 2022 04:30)  T(F): 98.8 (03 Dec 2022 11:00), Max: 100 (03 Dec 2022 04:30)  HR: 101 (03 Dec 2022 11:00) (85 - 117)  BP: 126/75 (03 Dec 2022 04:00) (124/69 - 179/153)  BP(mean): 92 (03 Dec 2022 04:00) (83 - 163)  RR: 12 (03 Dec 2022 11:00) (8 - 22)  SpO2: 96% (03 Dec 2022 11:00) (95% - 99%)    Parameters below as of 03 Dec 2022 07:00  Patient On (Oxygen Delivery Method): room air    I&O's Summary    02 Dec 2022 07:01  -  03 Dec 2022 07:00  --------------------------------------------------------  IN: 567.6 mL / OUT: 956 mL / NET: -388.4 mL    03 Dec 2022 07:01  -  03 Dec 2022 11:08  --------------------------------------------------------  IN: 214.3 mL / OUT: 285 mL / NET: -70.7 mL    Physical Exam  General: WDWN male lying flat in bed in NAD  Cardiac: S1S2 RRR  Respiratory: CTAB  Abdomen: Soft, NT to light palpation  Extremities: No appreciable edema  Neuro: Sleeping; currently on precedex     12-03    136  |  100  |  39.2<H>  ----------------------------<  132<H>  4.9   |  24.0  |  4.03<H>    Ca    8.6      03 Dec 2022 08:48  Phos  4.0     12-03  Mg     2.5     12-03    TPro  5.7<L>  /  Alb  3.0<L>  /  TBili  0.5  /  DBili  x   /  AST  20  /  ALT  14  /  AlkPhos  84  12-03               9.9    4.68  )-----------( 138      ( 03 Dec 2022 05:30 )             28.6     MEDICATIONS  (STANDING):  chlorhexidine 2% Cloths 1 Application(s) Topical daily  dexMEDEtomidine Infusion 0.2 MICROgram(s)/kG/Hr (3.5 mL/Hr) IV Continuous <Continuous>  heparin   Injectable 5000 Unit(s) SubCutaneous every 8 hours  levETIRAcetam  IVPB 750 milliGRAM(s) IV Intermittent every 12 hours  metroNIDAZOLE  IVPB 500 milliGRAM(s) IV Intermittent every 8 hours  vancomycin    Solution 125 milliGRAM(s) Oral every 6 hours    MEDICATIONS  (PRN):  haloperidol    Injectable 4 milliGRAM(s) IV Push every 6 hours PRN agitation  OLANZapine Injectable 5 milliGRAM(s) IntraMuscular every 6 hours PRN agitation  OLANZapine Injectable 10 milliGRAM(s) IntraMuscular every 6 hours PRN agitation  ondansetron Injectable 4 milliGRAM(s) IV Push every 6 hours PRN Nausea

## 2024-08-09 NOTE — ED ADULT NURSE NOTE - BREATHING, MLM
HCC coding opportunities       Chart reviewed, no opportunity found: CHART REVIEWED, NO OPPORTUNITY FOUND        Patients Insurance        Commercial Insurance: [employer]        Spontaneous, unlabored and symmetrical

## 2024-11-15 NOTE — ED PROVIDER NOTE - NS ED MD EM SELECTION
[facility] CARDIOLOGY  [address] [place] [address], [address]  [place] [address]  PHONE: [phone]      11/15/24    NAME:  Vandana Walton  : 2005  MRN: [id number]         SUBJECTIVE:   Vandana Walton Prenatal-FeFum-FA-DHA w/o A (PRENATAL + [phone] [facility]

## 2024-12-10 NOTE — ED PROVIDER NOTE - CARE PLAN
Principal Discharge DX:	Respiratory distress  Secondary Diagnosis:	Pneumonia   1 Principal Discharge DX:	Dyspnea  Secondary Diagnosis:	Pneumonia  Secondary Diagnosis:	Somnolence   good balance

## 2025-01-03 NOTE — PHYSICAL THERAPY INITIAL EVALUATION ADULT - LEVEL OF CONSCIOUSNESS, REHAB EVAL
- Rest.    - Drink plenty of fluids.    - Acetaminophen (tylenol) or Ibuprofen (advil,motrin) as directed as needed for fever/pain. Avoid tylenol if you have a history of liver disease. Do not take ibuprofen if you have a history of GI bleeding, kidney disease, or if you take blood thinners.   - Ibuprofen dosing for adults: 400 mg by mouth every 4-6 hours as needed. Max: 2400 mg/day; Info: use lowest effective dose, shortest effective treatment duration; give w/ food if GI upset occurs.  - Tylenol dosing for adults: [By mouth route, immediate-release form] Dose: 325-1000 mg by mouth every 4-6h as needed; Max: 1 g/4h and 4 g/day from all sources. [By mouth route, extended-release form] Dose: 650-1300 mg Extended Release by mouth every 8h as needed; Max: 4 g/day from all sources.     - You must understand that you have received an Urgent Care treatment only and that you may be released before all of your medical problems are known or treated.   - You, the patient, will arrange for follow up care as instructed.   - If your condition worsens or fails to improve we recommend that you receive another evaluation at the ER immediately or contact your PCP to discuss your concerns or return here.   - Follow up with your PCP or specialty clinic as directed in the next 1-2 weeks if not improved or as needed.  You can call [phone] to schedule an appointment with the appropriate provider.    If your symptoms do not improve or worsen, go to the emergency room immediately.     alert

## 2025-01-05 NOTE — ED PROVIDER NOTE - WR ORDER DATE AND TIME 1
Pt reports fevers 101.6   Took tylenol and ibuprofen this morning   Pt reports high anxiety when in medical facilities       
07-Mar-2022 10:21

## 2025-01-21 NOTE — DISCHARGE NOTE NURSING/CASE MANAGEMENT/SOCIAL WORK - NSTOBACCONEVERSMOKERY/N_GEN_A
Yes [[email or web address]_OBGYN_ALL_OB_FT:[id number]=],[NS_DeliveryRN_OBGYN_ALL_OB_FT:[id number]=]

## 2025-03-26 NOTE — ED ADULT NURSE NOTE - NS ED NURSE LEVEL OF CONSCIOUSNESS SPEECH
Veronica Reese was seen and treated in our emergency department on 3/26/2025.    No restrictions            Diagnosis:     Veronica  .    She may return on this date:          If you have any questions or concerns, please don't hesitate to call.      Leif Foreman, DO    ______________________________           _______________          _______________  Hospital Representative                              Date                                Time Speaking Coherently

## 2025-04-09 NOTE — ED ADULT NURSE NOTE - NS ED NURSE LEVEL OF CONSCIOUSNESS SPEECH
Mariama is admitted to [facility] # 1 for a scheduled repeat  and bilateral salpingectomy. EFM monitors explained and placed x's 2. Baseline , accelerations and FM are present. Procedure was explained and questions answered. Informed consent was signed and signature witnessed. Prepped for surgery.    Speaking Coherently

## 2025-04-10 NOTE — ED BEHAVIORAL HEALTH ASSESSMENT NOTE - CURRENT PASSIVE IDEATION:
Body Location Override (Optional - Billing Will Still Be Based On Selected Body Map Location If Applicable): left proximal lateral pretibial region Mohs Case Number: [id number] Date Of Previous Biopsy (Optional): 4/1/2025 Biopsy Photograph Reviewed: Yes Referring Physician (Optional): Dr. Darren Mcghee Consent Type: Consent 1 (Standard) Eye Shield Used: No Surgeon Performing Repair (Optional): Josue Casiano MD Initial Size Of Lesion: 2.2 X Size Of Lesion In Cm (Optional): 2 Number Of Stages: 1 Primary Defect Length In Cm (Final Defect Size - Required For Flaps/Grafts): 2.8 Primary Defect Width In Cm (Final Defect Size - Required For Flaps/Grafts): 2.4 Primary Defect Depth In Cm (Optional But Required For Some Insurers): 0 Repair Type: Complex Repair Which Instrument Did You Use For Dermabrasion?: Wire Brush Which Eyelid Repair Cpt Are You Using?: 58869 Oculoplastic Surgeon Procedure Text (A): After obtaining clear surgical margins the patient was sent to oculoplastics for surgical repair.  The patient understands they will receive post-surgical care and follow-up from the referring physician's office. Otolaryngologist Procedure Text (A): After obtaining clear surgical margins the patient was sent to otolaryngology for surgical repair.  The patient understands they will receive post-surgical care and follow-up from the referring physician's office. Plastic Surgeon Procedure Text (A): After obtaining clear surgical margins the patient was sent to plastics for surgical repair.  The patient understands they will receive post-surgical care and follow-up from the referring physician's office. Mid-Level Procedure Text (A): After obtaining clear surgical margins the patient was sent to a mid-level provider for surgical repair.  The patient understands they will receive post-surgical care and follow-up from the mid-level provider. Provider Procedure Text (A): After obtaining clear surgical margins the defect was repaired by another provider. Asc Procedure Text (A): After obtaining clear surgical margins the patient was sent to an ASC for surgical repair.  The patient understands they will receive post-surgical care and follow-up from the ASC physician. Simple / Intermediate / Complex Repair - Final Wound Length In Cm: 4.2 Deep Sutures: 3-0 Vicryl Suture Removal: 14 days Suturegard Retention Suture: 2-0 Nylon Retention Suture Bite Size: 3 mm Length To Time In Minutes Device Was In Place: 10 Undermining Type: Entire Wound Debridement Text: The wound edges were debrided prior to proceeding with the closure to facilitate wound healing. Helical Rim Text: The closure involved the helical rim. Vermilion Border Text: The closure involved the vermilion border. Nostril Rim Text: The closure involved the nostril rim. Retention Suture Text: Retention sutures were placed to support the closure and prevent dehiscence. Area H Indication Text: Tumors in this location are included in Area H (eyelids, eyebrows, nose, lips, chin, ear, pre-auricular, post-auricular, temple, genitalia, hands, feet, ankles and areola).  Tissue conservation is critical in these anatomic locations. Area M Indication Text: Tumors in this location are included in Area M (cheek, forehead, scalp, neck, jawline and pretibial skin).  Mohs surgery is indicated for tumors in these anatomic locations. Area L Indication Text: Tumors in this location are included in Area L (trunk and extremities).  Mohs surgery is indicated for larger tumors, or tumors with aggressive histologic features, in these anatomic locations. Tumor Debulked?: curette Depth Of Tumor Invasion (For Histology): tumor not visualized (deep and peripheral margins are clear of tumor) Perineural Invasion (For Histology - Be Specific If Possible): absent Special Stains Stage 1 - Results: Base On Clearance Noted Above Stage 2: Additional Anesthesia Type: 1% lidocaine with epinephrine Staging Info: By selecting yes to the question above you will include information on AJCC 8 tumor staging in your Mohs note. Information on tumor staging will be automatically added for SCCs on the head and neck. AJCC 8 includes tumor size, tumor depth, perineural involvement and bone invasion. Tumor Depth: Less than 6mm from granular layer and no invasion beyond the subcutaneous fat Why Was The Change Made?: Please Select the Appropriate Response Medical Necessity Statement: Based on my medical judgement, Mohs surgery is the most appropriate treatment for this cancer compared to other treatments. Alternatives Discussed Intro (Do Not Add Period): I discussed alternative treatments to Mohs surgery and specifically discussed the risks and benefits of Consent 1/Introductory Paragraph: The rationale for Mohs was explained to the patient and consent was obtained. The risks, benefits and alternatives to therapy were discussed in detail. Specifically, the risks of infection, scarring, bleeding, prolonged wound healing, incomplete removal, allergy to anesthesia, nerve injury and recurrence were addressed. Prior to the procedure, the treatment site was clearly identified and confirmed by the patient. All components of Universal Protocol/PAUSE Rule completed. Consent 2/Introductory Paragraph: Mohs surgery was explained to the patient and consent was obtained. The risks, benefits and alternatives to therapy were discussed in detail. Specifically, the risks of infection, scarring, bleeding, prolonged wound healing, incomplete removal, allergy to anesthesia, nerve injury and recurrence were addressed. Prior to the procedure, the treatment site was clearly identified and confirmed by the patient. All components of Universal Protocol/PAUSE Rule completed. Consent 3/Introductory Paragraph: I gave the patient a chance to ask questions they had about the procedure.  Following this I explained the Mohs procedure and consent was obtained. The risks, benefits and alternatives to therapy were discussed in detail. Specifically, the risks of infection, scarring, bleeding, prolonged wound healing, incomplete removal, allergy to anesthesia, nerve injury and recurrence were addressed. Prior to the procedure, the treatment site was clearly identified and confirmed by the patient. All components of Universal Protocol/PAUSE Rule completed. Consent (Temporal Branch)/Introductory Paragraph: The rationale for Mohs was explained to the patient and consent was obtained. The risks, benefits and alternatives to therapy were discussed in detail. Specifically, the risks of damage to the temporal branch of the facial nerve, infection, scarring, bleeding, prolonged wound healing, incomplete removal, allergy to anesthesia, and recurrence were addressed. Prior to the procedure, the treatment site was clearly identified and confirmed by the patient. All components of Universal Protocol/PAUSE Rule completed. Consent (Marginal Mandibular)/Introductory Paragraph: The rationale for Mohs was explained to the patient and consent was obtained. The risks, benefits and alternatives to therapy were discussed in detail. Specifically, the risks of damage to the marginal mandibular branch of the facial nerve, infection, scarring, bleeding, prolonged wound healing, incomplete removal, allergy to anesthesia, and recurrence were addressed. Prior to the procedure, the treatment site was clearly identified and confirmed by the patient. All components of Universal Protocol/PAUSE Rule completed. Consent (Spinal Accessory)/Introductory Paragraph: The rationale for Mohs was explained to the patient and consent was obtained. The risks, benefits and alternatives to therapy were discussed in detail. Specifically, the risks of damage to the spinal accessory nerve, infection, scarring, bleeding, prolonged wound healing, incomplete removal, allergy to anesthesia, and recurrence were addressed. Prior to the procedure, the treatment site was clearly identified and confirmed by the patient. All components of Universal Protocol/PAUSE Rule completed. Consent (Near Eyelid Margin)/Introductory Paragraph: The rationale for Mohs was explained to the patient and consent was obtained. The risks, benefits and alternatives to therapy were discussed in detail. Specifically, the risks of ectropion or eyelid deformity, infection, scarring, bleeding, prolonged wound healing, incomplete removal, allergy to anesthesia, nerve injury and recurrence were addressed. Prior to the procedure, the treatment site was clearly identified and confirmed by the patient. All components of Universal Protocol/PAUSE Rule completed. Consent (Ear)/Introductory Paragraph: The rationale for Mohs was explained to the patient and consent was obtained. The risks, benefits and alternatives to therapy were discussed in detail. Specifically, the risks of ear deformity, infection, scarring, bleeding, prolonged wound healing, incomplete removal, allergy to anesthesia, nerve injury and recurrence were addressed. Prior to the procedure, the treatment site was clearly identified and confirmed by the patient. All components of Universal Protocol/PAUSE Rule completed. Consent (Nose)/Introductory Paragraph: The rationale for Mohs was explained to the patient and consent was obtained. The risks, benefits and alternatives to therapy were discussed in detail. Specifically, the risks of nasal deformity, changes in the flow of air through the nose, infection, scarring, bleeding, prolonged wound healing, incomplete removal, allergy to anesthesia, nerve injury and recurrence were addressed. Prior to the procedure, the treatment site was clearly identified and confirmed by the patient. All components of Universal Protocol/PAUSE Rule completed. Consent (Lip)/Introductory Paragraph: The rationale for Mohs was explained to the patient and consent was obtained. The risks, benefits and alternatives to therapy were discussed in detail. Specifically, the risks of lip deformity, changes in the oral aperture, infection, scarring, bleeding, prolonged wound healing, incomplete removal, allergy to anesthesia, nerve injury and recurrence were addressed. Prior to the procedure, the treatment site was clearly identified and confirmed by the patient. All components of Universal Protocol/PAUSE Rule completed. Consent (Scalp)/Introductory Paragraph: The rationale for Mohs was explained to the patient and consent was obtained. The risks, benefits and alternatives to therapy were discussed in detail. Specifically, the risks of changes in hair growth pattern secondary to repair, infection, scarring, bleeding, prolonged wound healing, incomplete removal, allergy to anesthesia, nerve injury and recurrence were addressed. Prior to the procedure, the treatment site was clearly identified and confirmed by the patient. All components of Universal Protocol/PAUSE Rule completed. Detail Level: Detailed Postop Diagnosis: same Surgeon: Josue Casiano MD Anesthesia Volume In Cc: 5 Hemostasis: Electrocautery and electrocoagulation Estimated Blood Loss (Cc): minimal Repair Anesthesia Method: local infiltration Anesthesia Volume In Cc: 3 Brow Lift Text: A midfrontal incision was made medially to the defect to allow access to the tissues just superior to the left eyebrow. Following careful dissection inferiorly in a supraperiosteal plane to the level of the left eyebrow, several 3-0 monocryl sutures were used to resuspend the eyebrow orbicularis oculi muscular unit to the superior frontal bone periosteum. This resulted in an appropriate reapproximation of static eyebrow symmetry and correction of the left brow ptosis. Epidermal Closure: running Suturegard Intro: Intraoperative tissue expansion was performed, utilizing the SUTUREGARD device, in order to reduce wound tension. Suturegard Body: The suture ends were repeatedly re-tightened and re-clamped to achieve the desired tissue expansion. Hemigard Intro: Due to skin fragility and wound tension, it was decided to use HEMIGARD adhesive retention suture devices to permit a linear closure. The skin was cleaned and dried for a 6cm distance away from the wound. Excessive hair, if present, was removed to allow for adhesion. Hemigard Postcare Instructions: The HEMIGARD strips are to remain completely dry for at least 5-7 days. Donor Site Anesthesia Type: same as repair anesthesia Graft Donor Site Dermal Sutures (Optional): 5-0 Vicryl Epidermal Closure Graft Donor Site (Optional): simple interrupted Graft Donor Site Bandage (Optional-Leave Blank If You Don't Want In Note): Telfa and a pressure bandage were applied to the donor site. Closure 2 Information: This tab is for additional flaps and grafts, including complex repair and grafts and complex repair and flaps. You can also specify a different location for the additional defect, if the location is the same you do not need to select a new one. We will insert the automated text for the repair you select below just as we do for solitary flaps and grafts. Please note that at this time if you select a location with a different insurance zone you will need to override the ICD10 and CPT if appropriate. Closure 3 Information: This tab is for additional flaps and grafts above and beyond our usual structured repairs.  Please note if you enter information here it will not currently bill and you will need to add the billing information manually. Wound Care: Petrolatum Dressing: dry sterile dressing Unna Boot Text: An Unna boot was placed to help immobilize the limb and facilitate more rapid healing. Home Suture Removal Text: Patient was provided instructions on removing sutures and will remove their sutures at home.  If they have any questions or difficulties they will call the office. Post-Care Instructions: I reviewed with the patient in detail post-care instructions. Patient is not to engage in any heavy lifting, exercise, or swimming for the next 14 days. Should the patient develop any fevers, chills, bleeding, severe pain patient will contact the office immediately. Pain Refusal Text: I offered to prescribe pain medication but the patient refused to take this medication. Mauc Instructions: By selecting yes to the question below the MAUC number will be added into the note.  This will be calculated automatically based on the diagnosis chosen, the size entered, the body zone selected (H,M,L) and the specific indications you chose. You will also have the option to override the Mohs AUC if you disagree with the automatically calculated number and this option is found in the Case Summary tab. Where Do You Want The Question To Include Opioid Counseling Located?: Case Summary Tab Eye Protection Verbiage: Before proceeding with the stage, a plastic scleral shield was inserted. The globe was anesthetized with a few drops of 1% lidocaine with 1:100,000 epinephrine. Then, an appropriate sized scleral shield was chosen and coated with lacrilube ointment. The shield was gently inserted and left in place for the duration of each stage. After the stage was completed, the shield was gently removed. Mohs Method Verbiage: An incision at a 45 degree angle following the standard Mohs approach was done and the specimen was harvested as a microscopic controlled layer. Surgeon/Pathologist Verbiage (Will Incorporate Name Of Surgeon From Intro If Not Blank): operated in two distinct and integrated capacities as the surgeon and pathologist. Mohs Histo Method Verbiage: Each section was then chromacoded and processed in the Mohs lab using the Mohs protocol and submitted for frozen section, utilizing hematoxylin and eosin histochemical stains. Subsequent Stages Histo Method Verbiage: Using a similar technique to that described above, a thin layer of tissue was removed from all areas where tumor was visible on the previous stage.  The tissue was again oriented, mapped, dyed, and processed as above. Mohs Rapid Report Verbiage: The area of clinically evident tumor was marked with skin marking ink and appropriately hatched.  The initial incision was made following the Mohs approach through the skin.  The specimen was taken to the lab, divided into the necessary number of pieces, chromacoded and processed according to the Mohs protocol.  This was repeated in successive stages until a tumor free defect was achieved. Complex Repair Preamble Text (Leave Blank If You Do Not Want): Extensive wide undermining was performed. Intermediate Repair Preamble Text (Leave Blank If You Do Not Want): Undermining was performed with blunt dissection. Graft Cartilage Fenestration Text: The cartilage was fenestrated with a 2mm punch biopsy to help facilitate graft survival and healing. Non-Graft Cartilage Fenestration Text: The cartilage was fenestrated with a 2mm punch biopsy to help facilitate healing. Secondary Intention Text (Leave Blank If You Do Not Want): The defect will heal with secondary intention. No Repair - Repaired With Adjacent Surgical Defect Text (Leave Blank If You Do Not Want): After obtaining clear surgical margins the defect was repaired concurrently with another surgical defect which was in close approximation. Adjacent Tissue Transfer Text: The defect edges were debeveled with a #15 scalpel blade. Given the location of the defect and the proximity to free margins an adjacent tissue transfer was deemed most appropriate. Using a sterile surgical marker, an appropriate flap was drawn incorporating the defect and placing the expected incisions within the relaxed skin tension lines where possible. The area thus outlined was incised deep to adipose tissue with a #15 scalpel blade. The skin margins were undermined to an appropriate distance in all directions utilizing iris scissors and carried over to close the primary defect. Advancement Flap (Single) Text: The defect edges were debeveled with a #15 scalpel blade. Given the location of the defect and the proximity to free margins a single advancement flap was deemed most appropriate. Using a sterile surgical marker, an appropriate advancement flap was drawn incorporating the defect and placing the expected incisions within the relaxed skin tension lines where possible. The area thus outlined was incised deep to adipose tissue with a #15 scalpel blade. The skin margins were undermined to an appropriate distance in all directions utilizing iris scissors. Following this, the designed flap was advanced and carried over into the primary defect and sutured into place. Advancement Flap (Double) Text: The defect edges were debeveled with a #15 scalpel blade. Given the location of the defect and the proximity to free margins a double advancement flap was deemed most appropriate. Using a sterile surgical marker, the appropriate advancement flaps were drawn incorporating the defect and placing the expected incisions within the relaxed skin tension lines where possible. The area thus outlined was incised deep to adipose tissue with a #15 scalpel blade. The skin margins were undermined to an appropriate distance in all directions utilizing iris scissors. Following this, the designed flaps were advanced and carried over into the primary defect and sutured into place. Advancement-Rotation Flap Text: The defect edges were debeveled with a #15 scalpel blade. Given the location of the defect, shape of the defect and the proximity to free margins an advancement-rotation flap was deemed most appropriate. Using a sterile surgical marker, an appropriate flap was drawn incorporating the defect and placing the expected incisions within the relaxed skin tension lines where possible. The area thus outlined was incised deep to adipose tissue with a #15 scalpel blade. The skin margins were undermined to an appropriate distance in all directions utilizing iris scissors. Following this, the designed flap was carried over into the primary defect and sutured into place. Alar Island Pedicle Flap Text: The defect edges were debeveled with a #15 scalpel blade. Given the location of the defect, shape of the defect and the proximity to the alar rim an island pedicle advancement flap was deemed most appropriate. Using a sterile surgical marker, an appropriate advancement flap was drawn incorporating the defect, outlining the appropriate donor tissue and placing the expected incisions within the nasal ala running parallel to the alar rim. The area thus outlined was incised with a #15 scalpel blade. The skin margins were undermined minimally to an appropriate distance in all directions around the primary defect and laterally outward around the island pedicle utilizing iris scissors.  There was minimal undermining beneath the pedicle flap. Following this, the designed flap was carried over into the primary defect and sutured into place. A-T Advancement Flap Text: The defect edges were debeveled with a #15 scalpel blade. Given the location of the defect, shape of the defect and the proximity to free margins an A-T advancement flap was deemed most appropriate. Using a sterile surgical marker, an appropriate advancement flap was drawn incorporating the defect and placing the expected incisions within the relaxed skin tension lines where possible. The area thus outlined was incised deep to adipose tissue with a #15 scalpel blade. The skin margins were undermined to an appropriate distance in all directions utilizing iris scissors. Following this, the designed flap was advanced and carried over into the primary defect and sutured into place. Banner Transposition Flap Text: The defect edges were debeveled with a #15 scalpel blade. Given the location of the defect and the proximity to free margins a Banner transposition flap was deemed most appropriate. Using a sterile surgical marker, an appropriate flap was drawn around the defect. The area thus outlined was incised deep to adipose tissue with a #15 scalpel blade. The skin margins were undermined to an appropriate distance in all directions utilizing iris scissors. Following this, the designed flap was carried into the primary defect and sutured into place. Bilateral Helical Rim Advancement Flap Text: The defect edges were debeveled with a #15 blade scalpel.  Given the location of the defect and the proximity to free margins (helical rim) a bilateral helical rim advancement flap was deemed most appropriate. Using a sterile surgical marker, the appropriate advancement flaps were drawn incorporating the defect and placing the expected incisions between the helical rim and antihelix where possible.  The area thus outlined was incised through and through with a #15 scalpel blade.  With a skin hook and iris scissors, the flaps were gently and sharply undermined and freed up. Following this, the designed flaps were placed into the primary defect and sutured into place. Bilateral Rotation Flap Text: The defect edges were debeveled with a #15 scalpel blade. Given the location of the defect, shape of the defect and the proximity to free margins a bilateral rotation flap was deemed most appropriate. Using a sterile surgical marker, an appropriate rotation flap was drawn incorporating the defect and placing the expected incisions within the relaxed skin tension lines where possible. The area thus outlined was incised deep to adipose tissue with a #15 scalpel blade. The skin margins were undermined to an appropriate distance in all directions utilizing iris scissors. Following this, the designed flap was carried over into the primary defect and sutured into place. Bilobed Flap Text: The defect edges were debeveled with a #15 scalpel blade. Given the location of the defect and the proximity to free margins a bilobe flap was deemed most appropriate. Using a sterile surgical marker, an appropriate bilobe flap drawn around the defect. The area thus outlined was incised deep to adipose tissue with a #15 scalpel blade. The skin margins were undermined to an appropriate distance in all directions utilizing iris scissors. Following this, the designed flap was carried over into the primary defect and sutured into place. Bilobed Transposition Flap Text: The defect edges were debeveled with a #15 scalpel blade. Given the location of the defect and the proximity to free margins a bilobed transposition flap was deemed most appropriate. Using a sterile surgical marker, an appropriate bilobe flap drawn around the defect. The area thus outlined was incised deep to adipose tissue with a #15 scalpel blade. The skin margins were undermined to an appropriate distance in all directions utilizing iris scissors. Following this, the designed flap was carried over into the primary defect and sutured into place. Bi-Rhombic Flap Text: The defect edges were debeveled with a #15 scalpel blade. Given the location of the defect and the proximity to free margins a bi-rhombic flap was deemed most appropriate. Using a sterile surgical marker, an appropriate rhombic flap was drawn incorporating the defect. The area thus outlined was incised deep to adipose tissue with a #15 scalpel blade. The skin margins were undermined to an appropriate distance in all directions utilizing iris scissors. Following this, the designed flap was carried over into the primary defect and sutured into place. Burow's Advancement Flap Text: The defect edges were debeveled with a #15 scalpel blade. Given the location of the defect and the proximity to free margins a Burow's advancement flap was deemed most appropriate. Using a sterile surgical marker, the appropriate advancement flap was drawn incorporating the defect and placing the expected incisions within the relaxed skin tension lines where possible. The area thus outlined was incised deep to adipose tissue with a #15 scalpel blade. The skin margins were undermined to an appropriate distance in all directions utilizing iris scissors. Following this, the designed flap was advanced and carried over into the primary defect and sutured into place. Chonodrocutaneous Helical Advancement Flap Text: The defect edges were debeveled with a #15 scalpel blade. Given the location of the defect and the proximity to free margins a chondrocutaneous helical advancement flap was deemed most appropriate. Using a sterile surgical marker, the appropriate advancement flap was drawn incorporating the defect and placing the expected incisions within the relaxed skin tension lines where possible. The area thus outlined was incised deep to adipose tissue with a #15 scalpel blade. The skin margins were undermined to an appropriate distance in all directions utilizing iris scissors. Following this, the designed flap was advanced and carried over into the primary defect and sutured into place. Crescentic Advancement Flap Text: The defect edges were debeveled with a #15 scalpel blade. Given the location of the defect and the proximity to free margins a crescentic advancement flap was deemed most appropriate. Using a sterile surgical marker, the appropriate advancement flap was drawn incorporating the defect and placing the expected incisions within the relaxed skin tension lines where possible. The area thus outlined was incised deep to adipose tissue with a #15 scalpel blade. The skin margins were undermined to an appropriate distance in all directions utilizing iris scissors. Following this, the designed flap was advanced and carried over into the primary defect and sutured into place. Dorsal Nasal Flap Text: The defect edges were debeveled with a #15 scalpel blade. Given the location of the defect and the proximity to free margins a dorsal nasal flap was deemed most appropriate. Using a sterile surgical marker, an appropriate dorsal nasal flap was drawn around the defect. The area thus outlined was incised deep to adipose tissue with a #15 scalpel blade. The skin margins were undermined to an appropriate distance in all directions utilizing iris scissors. Following this, the designed flap was carried into the primary defect and sutured into place. Double Island Pedicle Flap Text: The defect edges were debeveled with a #15 scalpel blade. Given the location of the defect, shape of the defect and the proximity to free margins a double island pedicle advancement flap was deemed most appropriate. Using a sterile surgical marker, an appropriate advancement flap was drawn incorporating the defect, outlining the appropriate donor tissue and placing the expected incisions within the relaxed skin tension lines where possible. The area thus outlined was incised deep to adipose tissue with a #15 scalpel blade. The skin margins were undermined to an appropriate distance in all directions around the primary defect and laterally outward around the island pedicle utilizing iris scissors.  There was minimal undermining beneath the pedicle flap. Following this, the flap was carried over into the primary defect and sutured into place. None known Double O-Z Flap Text: The defect edges were debeveled with a #15 scalpel blade. Given the location of the defect, shape of the defect and the proximity to free margins a Double O-Z flap was deemed most appropriate. Using a sterile surgical marker, an appropriate transposition flap was drawn incorporating the defect and placing the expected incisions within the relaxed skin tension lines where possible. The area thus outlined was incised deep to adipose tissue with a #15 scalpel blade. The skin margins were undermined to an appropriate distance in all directions utilizing iris scissors. Following this, the designed flap was carried over into the primary defect and sutured into place. Double O-Z Plasty Text: The defect edges were debeveled with a #15 scalpel blade. Given the location of the defect, shape of the defect and the proximity to free margins a Double O-Z plasty (double transposition flap) was deemed most appropriate. Using a sterile surgical marker, the appropriate transposition flaps were drawn incorporating the defect and placing the expected incisions within the relaxed skin tension lines where possible. The area thus outlined was incised deep to adipose tissue with a #15 scalpel blade. The skin margins were undermined to an appropriate distance in all directions utilizing iris scissors. Hemostasis was achieved with electrocautery. The flaps were then transposed and carried over into place, one clockwise and the other counterclockwise, and anchored with interrupted buried subcutaneous sutures. Double Z Plasty Text: The lesion was extirpated to the level of the fat with a #15 scalpel blade. Given the location of the defect, shape of the defect and the proximity to free margins a double Z-plasty was deemed most appropriate for repair. Using a sterile surgical marker, the appropriate transposition arms of the double Z-plasty were drawn incorporating the defect and placing the expected incisions within the relaxed skin tension lines where possible. The area thus outlined was incised deep to adipose tissue with a #15 scalpel blade. The skin margins were undermined to an appropriate distance in all directions utilizing iris scissors. The opposing transposition arms were then transposed and carried over into place in opposite direction and anchored with interrupted buried subcutaneous sutures. Ear Star Wedge Flap Text: The defect edges were debeveled with a #15 blade scalpel.  Given the location of the defect and the proximity to free margins (helical rim) an ear star wedge flap was deemed most appropriate. Using a sterile surgical marker, the appropriate flap was drawn incorporating the defect and placing the expected incisions between the helical rim and antihelix where possible.  The area thus outlined was incised through and through with a #15 scalpel blade. Following this, the designed flap was carried over into the primary defect and sutured into place. Flip-Flop Flap Text: The defect edges were debeveled with a #15 blade scalpel.  Given the location of the defect and the proximity to free margins a flip-flop flap was deemed most appropriate. Using a sterile surgical marker, the appropriate flap was drawn incorporating the defect and placing the expected incisions between the helical rim and antihelix where possible.  The area thus outlined was incised through and through with a #15 scalpel blade. Following this, the designed flap was carried over into the primary defect and sutured into place. Hatchet Flap Text: The defect edges were debeveled with a #15 scalpel blade. Given the location of the defect, shape of the defect and the proximity to free margins a hatchet flap was deemed most appropriate. Using a sterile surgical marker, an appropriate hatchet flap was drawn incorporating the defect and placing the expected incisions within the relaxed skin tension lines where possible. The area thus outlined was incised deep to adipose tissue with a #15 scalpel blade. The skin margins were undermined to an appropriate distance in all directions utilizing iris scissors. Following this, the designed flap was carried over into the primary defect and sutured into place. Helical Rim Advancement Flap Text: The defect edges were debeveled with a #15 blade scalpel.  Given the location of the defect and the proximity to free margins (helical rim) a double helical rim advancement flap was deemed most appropriate. Using a sterile surgical marker, the appropriate advancement flaps were drawn incorporating the defect and placing the expected incisions between the helical rim and antihelix where possible.  The area thus outlined was incised through and through with a #15 scalpel blade.  With a skin hook and iris scissors, the flaps were gently and sharply undermined and freed up. Folllowing this, the designed flaps were carried over into the primary defect and sutured into place. H Plasty Text: Given the location of the defect, shape of the defect and the proximity to free margins a H-plasty was deemed most appropriate for repair. Using a sterile surgical marker, the appropriate advancement arms of the H-plasty were drawn incorporating the defect and placing the expected incisions within the relaxed skin tension lines where possible. The area thus outlined was incised deep to adipose tissue with a #15 scalpel blade. The skin margins were undermined to an appropriate distance in all directions utilizing iris scissors.  The opposing advancement arms were then advanced and carried over into place in opposite direction and anchored with interrupted buried subcutaneous sutures. Island Pedicle Flap Text: The defect edges were debeveled with a #15 scalpel blade. Given the location of the defect, shape of the defect and the proximity to free margins an island pedicle advancement flap was deemed most appropriate. Using a sterile surgical marker, an appropriate advancement flap was drawn incorporating the defect, outlining the appropriate donor tissue and placing the expected incisions within the relaxed skin tension lines where possible. The area thus outlined was incised deep to adipose tissue with a #15 scalpel blade. The skin margins were undermined to an appropriate distance in all directions around the primary defect and laterally outward around the island pedicle utilizing iris scissors.  There was minimal undermining beneath the pedicle flap. Following this, the flap was carried over into the primary defect and sutured into place. Island Pedicle Flap With Canthal Suspension Text: The defect edges were debeveled with a #15 scalpel blade. Given the location of the defect, shape of the defect and the proximity to free margins an island pedicle advancement flap was deemed most appropriate. Using a sterile surgical marker, an appropriate advancement flap was drawn incorporating the defect, outlining the appropriate donor tissue and placing the expected incisions within the relaxed skin tension lines where possible. The area thus outlined was incised deep to adipose tissue with a #15 scalpel blade. The skin margins were undermined to an appropriate distance in all directions around the primary defect and laterally outward around the island pedicle utilizing iris scissors.  There was minimal undermining beneath the pedicle flap. A suspension suture was placed in the canthal tendon to prevent tension and prevent ectropion. Following this, the designed flap was placed into the primary defect and sutured into place. Island Pedicle Flap-Requiring Vessel Identification Text: The defect edges were debeveled with a #15 scalpel blade. Given the location of the defect, shape of the defect and the proximity to free margins an island pedicle advancement flap was deemed most appropriate. Using a sterile surgical marker, an appropriate advancement flap was drawn, based on the axial vessel mentioned above, incorporating the defect, outlining the appropriate donor tissue and placing the expected incisions within the relaxed skin tension lines where possible. The area thus outlined was incised deep to adipose tissue with a #15 scalpel blade. The skin margins were undermined to an appropriate distance in all directions around the primary defect and laterally outward around the island pedicle utilizing iris scissors.  There was minimal undermining beneath the pedicle flap. Following this, the designed flap was carried over into the primary defect and sutured into place. Keystone Flap Text: The defect edges were debeveled with a #15 scalpel blade. Given the location of the defect, shape of the defect a keystone flap was deemed most appropriate. Using a sterile surgical marker, an appropriate keystone flap was drawn incorporating the defect, outlining the appropriate donor tissue and placing the expected incisions within the relaxed skin tension lines where possible. The area thus outlined was incised deep to adipose tissue with a #15 scalpel blade. The skin margins were undermined to an appropriate distance in all directions around the primary defect and laterally outward around the flap utilizing iris scissors. Following this, the designed flap was carried into the primary defect and sutured into place. Melolabial Transposition Flap Text: The defect edges were debeveled with a #15 scalpel blade. Given the location of the defect and the proximity to free margins a melolabial flap was deemed most appropriate. Using a sterile surgical marker, an appropriate melolabial transposition flap was drawn incorporating the defect. The area thus outlined was incised deep to adipose tissue with a #15 scalpel blade. The skin margins were undermined to an appropriate distance in all directions utilizing iris scissors. Following this, the designed flap was carried over into the primary defect and sutured into place. Mercedes Flap Text: The defect edges were debeveled with a #15 scalpel blade. Given the location of the defect, shape of the defect and the proximity to free margins a Mercedes flap was deemed most appropriate. Using a sterile surgical marker, an appropriate advancement flap was drawn incorporating the defect and placing the expected incisions within the relaxed skin tension lines where possible. The area thus outlined was incised deep to adipose tissue with a #15 scalpel blade. The skin margins were undermined to an appropriate distance in all directions utilizing iris scissors. Following this, the designed flap was advanced and carried over into the primary defect and sutured into place. Modified Advancement Flap Text: The defect edges were debeveled with a #15 scalpel blade. Given the location of the defect, shape of the defect and the proximity to free margins a modified advancement flap was deemed most appropriate. Using a sterile surgical marker, an appropriate advancement flap was drawn incorporating the defect and placing the expected incisions within the relaxed skin tension lines where possible. The area thus outlined was incised deep to adipose tissue with a #15 scalpel blade. The skin margins were undermined to an appropriate distance in all directions utilizing iris scissors. Following this, the designed flap was advanced and carried over into the primary defect and sutured into place. Mucosal Advancement Flap Text: Given the location of the defect, shape of the defect and the proximity to free margins a mucosal advancement flap was deemed most appropriate. Incisions were made with a 15 blade scalpel in the appropriate fashion along the cutaneous vermilion border and the mucosal lip. The remaining actinically damaged mucosal tissue was excised.  The mucosal advancement flap was then elevated to the gingival sulcus with care taken to preserve the neurovascular structures and advanced into the primary defect. Care was taken to ensure that precise realignment of the vermilion border was achieved. Muscle Hinge Flap Text: The defect edges were debeveled with a #15 scalpel blade.  Given the size, depth and location of the defect and the proximity to free margins a muscle hinge flap was deemed most appropriate. Using a sterile surgical marker, an appropriate hinge flap was drawn incorporating the defect. The area thus outlined was incised with a #15 scalpel blade. The skin margins were undermined to an appropriate distance in all directions utilizing iris scissors. Following this, the designed flap was carried into the primary defect and sutured into place. Mustarde Flap Text: The defect edges were debeveled with a #15 scalpel blade.  Given the size, depth and location of the defect and the proximity to free margins a Mustarde flap was deemed most appropriate. Using a sterile surgical marker, an appropriate flap was drawn incorporating the defect. The area thus outlined was incised with a #15 scalpel blade. The skin margins were undermined to an appropriate distance in all directions utilizing iris scissors. Following this, the designed flap was carried into the primary defect and sutured into place. Nasal Turnover Hinge Flap Text: The defect edges were debeveled with a #15 scalpel blade.  Given the size, depth, location of the defect and the defect being full thickness a nasal turnover hinge flap was deemed most appropriate. Using a sterile surgical marker, an appropriate hinge flap was drawn incorporating the defect. The area thus outlined was incised with a #15 scalpel blade. The flap was designed to recreate the nasal mucosal lining and the alar rim. The skin margins were undermined to an appropriate distance in all directions utilizing iris scissors. Following this, the designed flap was carried over into the primary defect and sutured into place Nasalis-Muscle-Based Myocutaneous Island Pedicle Flap Text: Using a #15 blade, an incision was made around the donor flap to the level of the nasalis muscle. Wide lateral undermining was then performed in both the subcutaneous plane above the nasalis muscle, and in a submuscular plane just above periosteum. This allowed the formation of a free nasalis muscle axial pedicle (based on the angular artery) which was still attached to the actual cutaneous flap, increasing its mobility and vascular viability. Hemostasis was obtained with pinpoint electrocoagulation. The flap was mobilized into position and the pivotal anchor points positioned and stabilized with buried interrupted sutures. Subcutaneous and dermal tissues were closed in a multilayered fashion with sutures. Tissue redundancies were excised, and the epidermal edges were apposed without significant tension and sutured with sutures. Nasalis Myocutaneous Flap Text: Using a #15 blade, an incision was made around the donor flap to the level of the nasalis muscle. Wide lateral undermining was then performed in both the subcutaneous plane above the nasalis muscle, and in a submuscular plane just above periosteum. This allowed the formation of a free nasalis muscle axial pedicle which was still attached to the actual cutaneous flap, increasing its mobility and vascular viability. Hemostasis was obtained with pinpoint electrocoagulation. The flap was mobilized into position and the pivotal anchor points positioned and stabilized with buried interrupted sutures. Subcutaneous and dermal tissues were closed in a multilayered fashion with sutures. Tissue redundancies were excised, and the epidermal edges were apposed without significant tension and sutured with sutures. Nasolabial Transposition Flap Text: The defect edges were debeveled with a #15 scalpel blade.  Given the size, depth and location of the defect and the proximity to free margins a nasolabial transposition flap was deemed most appropriate. Using a sterile surgical marker, an appropriate flap was drawn incorporating the defect. The area thus outlined was incised with a #15 scalpel blade. The skin margins were undermined to an appropriate distance in all directions utilizing iris scissors. Following this, the designed flap was carried into the primary defect and sutured into place. Orbicularis Oris Muscle Flap Text: The defect edges were debeveled with a #15 scalpel blade.  Given that the defect affected the competency of the oral sphincter an orbicularis oris muscle flap was deemed most appropriate to restore this competency and normal muscle function.  Using a sterile surgical marker, an appropriate flap was drawn incorporating the defect. The area thus outlined was incised with a #15 scalpel blade. Following this, the designed flap was carried over into the primary defect and sutured into place. O-T Advancement Flap Text: The defect edges were debeveled with a #15 scalpel blade. Given the location of the defect, shape of the defect and the proximity to free margins an O-T advancement flap was deemed most appropriate. Using a sterile surgical marker, an appropriate advancement flap was drawn incorporating the defect and placing the expected incisions within the relaxed skin tension lines where possible. The area thus outlined was incised deep to adipose tissue with a #15 scalpel blade. The skin margins were undermined to an appropriate distance in all directions utilizing iris scissors. Following this, the designed flap was advanced and carried over into the primary defect and sutured into place. O-T Plasty Text: The defect edges were debeveled with a #15 scalpel blade. Given the location of the defect, shape of the defect and the proximity to free margins an O-T plasty was deemed most appropriate. Using a sterile surgical marker, an appropriate O-T plasty was drawn incorporating the defect and placing the expected incisions within the relaxed skin tension lines where possible. The area thus outlined was incised deep to adipose tissue with a #15 scalpel blade. The skin margins were undermined to an appropriate distance in all directions utilizing iris scissors. Following this, the designed flap was carried over into the primary defect and sutured into place. O-L Flap Text: The defect edges were debeveled with a #15 scalpel blade. Given the location of the defect, shape of the defect and the proximity to free margins an O-L flap was deemed most appropriate. Using a sterile surgical marker, an appropriate advancement flap was drawn incorporating the defect and placing the expected incisions within the relaxed skin tension lines where possible. The area thus outlined was incised deep to adipose tissue with a #15 scalpel blade. The skin margins were undermined to an appropriate distance in all directions utilizing iris scissors. Following this, the designed flap was advanced and carried over into the primary defect and sutured into place. O-Z Flap Text: The defect edges were debeveled with a #15 scalpel blade. Given the location of the defect, shape of the defect and the proximity to free margins an O-Z flap was deemed most appropriate. Using a sterile surgical marker, an appropriate transposition flap was drawn incorporating the defect and placing the expected incisions within the relaxed skin tension lines where possible. The area thus outlined was incised deep to adipose tissue with a #15 scalpel blade. The skin margins were undermined to an appropriate distance in all directions utilizing iris scissors. Following this, the designed flap was carried over into the primary defect and sutured into place. O-Z Plasty Text: The defect edges were debeveled with a #15 scalpel blade. Given the location of the defect, shape of the defect and the proximity to free margins an O-Z plasty (double transposition flap) was deemed most appropriate. Using a sterile surgical marker, the appropriate transposition flaps were drawn incorporating the defect and placing the expected incisions within the relaxed skin tension lines where possible. The area thus outlined was incised deep to adipose tissue with a #15 scalpel blade. The skin margins were undermined to an appropriate distance in all directions utilizing iris scissors. Hemostasis was achieved with electrocautery. The flaps were then transposed and carried over into place, one clockwise and the other counterclockwise, and anchored with interrupted buried subcutaneous sutures. Peng Advancement Flap Text: The defect edges were debeveled with a #15 scalpel blade. Given the location of the defect, shape of the defect and the proximity to free margins a Peng advancement flap was deemed most appropriate. Using a sterile surgical marker, an appropriate advancement flap was drawn incorporating the defect and placing the expected incisions within the relaxed skin tension lines where possible. The area thus outlined was incised deep to adipose tissue with a #15 scalpel blade. The skin margins were undermined to an appropriate distance in all directions utilizing iris scissors. Following this, the designed flap was advanced and carried over into the primary defect and sutured into place. Rectangular Flap Text: The defect edges were debeveled with a #15 scalpel blade. Given the location of the defect and the proximity to free margins a rectangular flap was deemed most appropriate. Using a sterile surgical marker, an appropriate rectangular flap was drawn incorporating the defect. The area thus outlined was incised deep to adipose tissue with a #15 scalpel blade. The skin margins were undermined to an appropriate distance in all directions utilizing iris scissors. Following this, the designed flap was carried over into the primary defect and sutured into place. Rhombic Flap Text: The defect edges were debeveled with a #15 scalpel blade. Given the location of the defect and the proximity to free margins a rhombic flap was deemed most appropriate. Using a sterile surgical marker, an appropriate rhombic flap was drawn incorporating the defect. The area thus outlined was incised deep to adipose tissue with a #15 scalpel blade. The skin margins were undermined to an appropriate distance in all directions utilizing iris scissors. Following this, the designed flap was carried over into the primary defect and sutured into place. Rhomboid Transposition Flap Text: The defect edges were debeveled with a #15 scalpel blade. Given the location of the defect and the proximity to free margins a rhomboid transposition flap was deemed most appropriate. Using a sterile surgical marker, an appropriate rhomboid flap was drawn incorporating the defect. The area thus outlined was incised deep to adipose tissue with a #15 scalpel blade. The skin margins were undermined to an appropriate distance in all directions utilizing iris scissors. Following this, the designed flap was carried over into the primary defect and sutured into place. Rotation Flap Text: The defect edges were debeveled with a #15 scalpel blade. Given the location of the defect, shape of the defect and the proximity to free margins a rotation flap was deemed most appropriate. Using a sterile surgical marker, an appropriate rotation flap was drawn incorporating the defect and placing the expected incisions within the relaxed skin tension lines where possible. The area thus outlined was incised deep to adipose tissue with a #15 scalpel blade. The skin margins were undermined to an appropriate distance in all directions utilizing iris scissors. Following this, the designed flap was carried over into the primary defect and sutured into place. Spiral Flap Text: The defect edges were debeveled with a #15 scalpel blade. Given the location of the defect, shape of the defect and the proximity to free margins a spiral flap was deemed most appropriate. Using a sterile surgical marker, an appropriate rotation flap was drawn incorporating the defect and placing the expected incisions within the relaxed skin tension lines where possible. The area thus outlined was incised deep to adipose tissue with a #15 scalpel blade. The skin margins were undermined to an appropriate distance in all directions utilizing iris scissors. Following this, the designed flap was carried over into the primary defect and sutured into place. Staged Advancement Flap Text: The defect edges were debeveled with a #15 scalpel blade. Given the location of the defect, shape of the defect and the proximity to free margins a staged advancement flap was deemed most appropriate. Using a sterile surgical marker, an appropriate advancement flap was drawn incorporating the defect and placing the expected incisions within the relaxed skin tension lines where possible. The area thus outlined was incised deep to adipose tissue with a #15 scalpel blade. The skin margins were undermined to an appropriate distance in all directions utilizing iris scissors. Following this, the designed flap was carried over into the primary defect and sutured into place. Star Wedge Flap Text: The defect edges were debeveled with a #15 scalpel blade. Given the location of the defect, shape of the defect and the proximity to free margins a star wedge flap was deemed most appropriate. Using a sterile surgical marker, an appropriate rotation flap was drawn incorporating the defect and placing the expected incisions within the relaxed skin tension lines where possible. The area thus outlined was incised deep to adipose tissue with a #15 scalpel blade. The skin margins were undermined to an appropriate distance in all directions utilizing iris scissors. Following this, the designed flap was carried over into the primary defect and sutured into place. Transposition Flap Text: The defect edges were debeveled with a #15 scalpel blade. Given the location of the defect and the proximity to free margins a transposition flap was deemed most appropriate. Using a sterile surgical marker, an appropriate transposition flap was drawn incorporating the defect. The area thus outlined was incised deep to adipose tissue with a #15 scalpel blade. The skin margins were undermined to an appropriate distance in all directions utilizing iris scissors. Following this, the designed flap was carried over into the primary defect and sutured into place. Trilobed Flap Text: The defect edges were debeveled with a #15 scalpel blade. Given the location of the defect and the proximity to free margins a trilobed flap was deemed most appropriate. Using a sterile surgical marker, an appropriate trilobed flap was drawn around the defect. The area thus outlined was incised deep to adipose tissue with a #15 scalpel blade. The skin margins were undermined to an appropriate distance in all directions utilizing iris scissors. Following this, the designed flap was carried into the primary defect and sutured into place. V-Y Flap Text: The defect edges were debeveled with a #15 scalpel blade. Given the location of the defect, shape of the defect and the proximity to free margins a V-Y flap was deemed most appropriate. Using a sterile surgical marker, an appropriate advancement flap was drawn incorporating the defect and placing the expected incisions within the relaxed skin tension lines where possible. The area thus outlined was incised deep to adipose tissue with a #15 scalpel blade. The skin margins were undermined to an appropriate distance in all directions utilizing iris scissors. Following this, the designed flap was advanced and carried over into the primary defect and sutured into place. V-Y Plasty Text: The defect edges were debeveled with a #15 scalpel blade. Given the location of the defect, shape of the defect and the proximity to free margins an V-Y advancement flap was deemed most appropriate. Using a sterile surgical marker, an appropriate advancement flap was drawn incorporating the defect and placing the expected incisions within the relaxed skin tension lines where possible. The area thus outlined was incised deep to adipose tissue with a #15 scalpel blade. The skin margins were undermined to an appropriate distance in all directions utilizing iris scissors. Following this, the designed flap was advanced and carried over into the primary defect and sutured into place. W Plasty Text: The lesion was extirpated to the level of the fat with a #15 scalpel blade. Given the location of the defect, shape of the defect and the proximity to free margins a W-plasty was deemed most appropriate for repair. Using a sterile surgical marker, the appropriate transposition arms of the W-plasty were drawn incorporating the defect and placing the expected incisions within the relaxed skin tension lines where possible. The area thus outlined was incised deep to adipose tissue with a #15 scalpel blade. The skin margins were undermined to an appropriate distance in all directions utilizing iris scissors. The opposing transposition arms were then transposed and carried over into place in opposite direction and anchored with interrupted buried subcutaneous sutures. Z Plasty Text: The lesion was extirpated to the level of the fat with a #15 scalpel blade. Given the location of the defect, shape of the defect and the proximity to free margins a Z-plasty was deemed most appropriate for repair. Using a sterile surgical marker, the appropriate transposition arms of the Z-plasty were drawn incorporating the defect and placing the expected incisions within the relaxed skin tension lines where possible. The area thus outlined was incised deep to adipose tissue with a #15 scalpel blade. The skin margins were undermined to an appropriate distance in all directions utilizing iris scissors. The opposing transposition arms were then transposed and carried over into place in opposite direction and anchored with interrupted buried subcutaneous sutures. Zygomaticofacial Flap Text: Given the location of the defect, shape of the defect and the proximity to free margins a zygomaticofacial flap was deemed most appropriate for repair. Using a sterile surgical marker, the appropriate flap was drawn incorporating the defect and placing the expected incisions within the relaxed skin tension lines where possible. The area thus outlined was incised deep to adipose tissue with a #15 scalpel blade with preservation of a vascular pedicle.  The skin margins were undermined to an appropriate distance in all directions utilizing iris scissors. The flap was then carried over into the defect and anchored with interrupted buried subcutaneous sutures. Abbe Flap (Lower To Upper Lip) Text: The defect of the upper lip was assessed and measured.  Given the location and size of the defect, an Abbe flap was deemed most appropriate. Using a sterile surgical marker, an appropriate Abbe flap was measured and drawn on the lower lip. Local anesthesia was then infiltrated. A scalpel was then used to incise the upper lip through and through the skin, vermilion, muscle and mucosa, leaving the flap pedicled on the opposite side.  The flap was then rotated and transferred to the lower lip defect.  The flap was then sutured into place with a three layer technique, closing the orbicularis oris muscle layer with subcutaneous buried sutures, followed by a mucosal layer and an epidermal layer. Abbe Flap (Upper To Lower Lip) Text: The defect of the lower lip was assessed and measured.  Given the location and size of the defect, an Abbe flap was deemed most appropriate. Using a sterile surgical marker, an appropriate Abbe flap was measured and drawn on the upper lip. Local anesthesia was then infiltrated.  A scalpel was then used to incise the upper lip through and through the skin, vermilion, muscle and mucosa, leaving the flap pedicled on the opposite side.  The flap was then rotated and transferred to the lower lip defect.  The flap was then sutured into place with a three layer technique, closing the orbicularis oris muscle layer with subcutaneous buried sutures, followed by a mucosal layer and an epidermal layer. Cheek Interpolation Flap Text: A decision was made to reconstruct the defect utilizing an interpolation axial flap and a staged reconstruction.  A telfa template was made of the defect.  This telfa template was then used to outline the Cheek Interpolation flap.  The donor area for the pedicle flap was then injected with anesthesia.  The flap was excised through the skin and subcutaneous tissue down to the layer of the underlying musculature.  The interpolation flap was carefully excised within this deep plane to maintain its blood supply.  The edges of the donor site were undermined.   The donor site was closed in a primary fashion.  The pedicle was then rotated into position and sutured.  Once the tube was sutured into place, adequate blood supply was confirmed with blanching and refill.  The pedicle was then wrapped with xeroform gauze and dressed appropriately with a telfa and gauze bandage to ensure continued blood supply and protect the attached pedicle. Cheek-To-Nose Interpolation Flap Text: A decision was made to reconstruct the defect utilizing an interpolation axial flap and a staged reconstruction.  A telfa template was made of the defect.  This telfa template was then used to outline the Cheek-To-Nose Interpolation flap.  The donor area for the pedicle flap was then injected with anesthesia.  The flap was excised through the skin and subcutaneous tissue down to the layer of the underlying musculature.  The interpolation flap was carefully excised within this deep plane to maintain its blood supply.  The edges of the donor site were undermined.   The donor site was closed in a primary fashion.  The pedicle was then rotated into position and sutured.  Once the tube was sutured into place, adequate blood supply was confirmed with blanching and refill.  The pedicle was then wrapped with xeroform gauze and dressed appropriately with a telfa and gauze bandage to ensure continued blood supply and protect the attached pedicle. Estlander Flap (Lower To Upper Lip) Text: The defect of the lower lip was assessed and measured.  Given the location and size of the defect, an Estlander flap was deemed most appropriate. Using a sterile surgical marker, an appropriate Estlander flap was measured and drawn on the upper lip. Local anesthesia was then infiltrated. A scalpel was then used to incise the lateral aspect of the flap, through skin, muscle and mucosa, leaving the flap pedicled medially.  The flap was then rotated and positioned to fill the lower lip defect.  The flap was then sutured into place with a three layer technique, closing the orbicularis oris muscle layer with subcutaneous buried sutures, followed by a mucosal layer and an epidermal layer. Interpolation Flap Text: A decision was made to reconstruct the defect utilizing an interpolation axial flap and a staged reconstruction.  A telfa template was made of the defect.  This telfa template was then used to outline the interpolation flap.  The donor area for the pedicle flap was then injected with anesthesia.  The flap was excised through the skin and subcutaneous tissue down to the layer of the underlying musculature.  The interpolation flap was carefully excised within this deep plane to maintain its blood supply.  The edges of the donor site were undermined.   The donor site was closed in a primary fashion.  The pedicle was then rotated into position and sutured.  Once the tube was sutured into place, adequate blood supply was confirmed with blanching and refill.  The pedicle was then wrapped with xeroform gauze and dressed appropriately with a telfa and gauze bandage to ensure continued blood supply and protect the attached pedicle. Melolabial Interpolation Flap Text: A decision was made to reconstruct the defect utilizing an interpolation axial flap and a staged reconstruction.  A telfa template was made of the defect.  This telfa template was then used to outline the melolabial interpolation flap.  The donor area for the pedicle flap was then injected with anesthesia.  The flap was excised through the skin and subcutaneous tissue down to the layer of the underlying musculature.  The pedicle flap was carefully excised within this deep plane to maintain its blood supply.  The edges of the donor site were undermined.   The donor site was closed in a primary fashion.  The pedicle was then rotated into position and sutured.  Once the tube was sutured into place, adequate blood supply was confirmed with blanching and refill.  The pedicle was then wrapped with xeroform gauze and dressed appropriately with a telfa and gauze bandage to ensure continued blood supply and protect the attached pedicle. Mastoid Interpolation Flap Text: A decision was made to reconstruct the defect utilizing an interpolation axial flap and a staged reconstruction.  A telfa template was made of the defect.  This telfa template was then used to outline the mastoid interpolation flap.  The donor area for the pedicle flap was then injected with anesthesia.  The flap was excised through the skin and subcutaneous tissue down to the layer of the underlying musculature.  The pedicle flap was carefully excised within this deep plane to maintain its blood supply.  The edges of the donor site were undermined.   The donor site was closed in a primary fashion.  The pedicle was then rotated into position and sutured.  Once the tube was sutured into place, adequate blood supply was confirmed with blanching and refill.  The pedicle was then wrapped with xeroform gauze and dressed appropriately with a telfa and gauze bandage to ensure continued blood supply and protect the attached pedicle. Paramedian Forehead Flap Text: A decision was made to reconstruct the defect utilizing an interpolation axial flap and a staged reconstruction.  A telfa template was made of the defect.  This telfa template was then used to outline the paramedian forehead pedicle flap.  The donor area for the pedicle flap was then injected with anesthesia.  The flap was excised through the skin and subcutaneous tissue down to the layer of the underlying musculature.  The pedicle flap was carefully excised within this deep plane to maintain its blood supply.  The edges of the donor site were undermined.   The donor site was closed in a primary fashion.  The pedicle was then rotated into position and sutured.  Once the tube was sutured into place, adequate blood supply was confirmed with blanching and refill.  The pedicle was then wrapped with xeroform gauze and dressed appropriately with a telfa and gauze bandage to ensure continued blood supply and protect the attached pedicle. Posterior Auricular Interpolation Flap Text: A decision was made to reconstruct the defect utilizing an interpolation axial flap and a staged reconstruction.  A telfa template was made of the defect.  This telfa template was then used to outline the posterior auricular interpolation flap.  The donor area for the pedicle flap was then injected with anesthesia.  The flap was excised through the skin and subcutaneous tissue down to the layer of the underlying musculature.  The pedicle flap was carefully excised within this deep plane to maintain its blood supply.  The edges of the donor site were undermined.   The donor site was closed in a primary fashion.  The pedicle was then rotated into position and sutured.  Once the tube was sutured into place, adequate blood supply was confirmed with blanching and refill.  The pedicle was then wrapped with xeroform gauze and dressed appropriately with a telfa and gauze bandage to ensure continued blood supply and protect the attached pedicle. Cheiloplasty (Complex) Text: A decision was made to reconstruct the defect with a  cheiloplasty.  The defect was undermined extensively.  Additional orbicularis oris muscle was excised with a 15 blade scalpel.  The defect was converted into a full thickness wedge to facilite a better cosmetic result.  Small vessels were then tied off with 5-0 monocyrl. The orbicularis oris, superficial fascia, adipose and dermis were then reapproximated.  After the deeper layers were approximated the epidermis was reapproximated with particular care given to realign the vermilion border. Cheiloplasty (Less Than 50%) Text: A decision was made to reconstruct the defect with a  cheiloplasty.  The defect was undermined extensively.  Additional orbicularis oris muscle was excised with a 15 blade scalpel.  The defect was converted into a full thickness wedge, of less than 50% of the vertical height of the lip, to facilite a better cosmetic result.  Small vessels were then tied off with 5-0 monocyrl. The orbicularis oris, superficial fascia, adipose and dermis were then reapproximated.  After the deeper layers were approximated the epidermis was reapproximated with particular care given to realign the vermilion border. Ear Wedge Repair Text: A wedge excision was completed by carrying down an excision through the full thickness of the ear and cartilage with an inward facing Burow's triangle. The wound was then closed in a layered fashion. Full Thickness Lip Wedge Repair (Flap) Text: Given the location of the defect and the proximity to free margins a full thickness wedge repair was deemed most appropriate. Using a sterile surgical marker, the appropriate repair was drawn incorporating the defect and placing the expected incisions perpendicular to the vermilion border.  The vermilion border was also meticulously outlined to ensure appropriate reapproximation during the repair.  The area thus outlined was incised through and through with a #15 scalpel blade.  The muscularis and dermis were reaproximated with deep sutures following hemostasis. Care was taken to realign the vermilion border before proceeding with the superficial closure.  Once the vermilion was realigned the superfical and mucosal closure was finished. Burow's Graft Text: The defect edges were debeveled with a #15 scalpel blade. Given the location of the defect, shape of the defect, the proximity to free margins and the presence of a standing cone deformity a Burow's skin graft was deemed most appropriate. The standing cone was removed and this tissue was then trimmed to the shape of the primary defect. The adipose tissue was also removed until only dermis and epidermis were left.  The skin margins of the secondary defect were undermined to an appropriate distance in all directions utilizing iris scissors.  The secondary defect was closed with interrupted buried subcutaneous sutures.  The skin edges were then re-apposed with running  sutures.  The skin graft was then placed in the primary defect and oriented appropriately. Cartilage Graft Text: The defect edges were debeveled with a #15 scalpel blade. Given the location of the defect, shape of the defect, the fact the defect involved a full thickness cartilage defect a cartilage graft was deemed most appropriate.  An appropriate donor site was identified, cleansed, and anesthetized. The cartilage graft was then harvested and transferred to the recipient site, oriented appropriately and then sutured into place.  The secondary defect was then repaired using a primary closure. Composite Graft Text: The defect edges were debeveled with a #15 scalpel blade. Given the location of the defect, shape of the defect, the proximity to free margins and the fact the defect was full thickness a composite graft was deemed most appropriate.  The defect was outline and then transferred to the donor site.  A full thickness graft was then excised from the donor site. The graft was then placed in the primary defect, oriented appropriately and then sutured into place.  The secondary defect was then repaired using a primary closure. Epidermal Autograft Text: The defect edges were debeveled with a #15 scalpel blade. Given the location of the defect, shape of the defect and the proximity to free margins an epidermal autograft was deemed most appropriate. Using a sterile surgical marker, the primary defect shape was transferred to the donor site. The epidermal graft was then harvested.  The skin graft was then placed in the primary defect and oriented appropriately. Dermal Autograft Text: The defect edges were debeveled with a #15 scalpel blade. Given the location of the defect, shape of the defect and the proximity to free margins a dermal autograft was deemed most appropriate. Using a sterile surgical marker, the primary defect shape was transferred to the donor site. The area thus outlined was incised deep to adipose tissue with a #15 scalpel blade.  The harvested graft was then trimmed of adipose and epidermal tissue until only dermis was left.  The skin graft was then placed in the primary defect and oriented appropriately. Ftsg Text: The defect edges were debeveled with a #15 scalpel blade. Given the location of the defect, shape of the defect and the proximity to free margins a full thickness skin graft was deemed most appropriate. Using a sterile surgical marker, the primary defect shape was transferred to the donor site. The area thus outlined was incised deep to adipose tissue with a #15 scalpel blade.  The harvested graft was then trimmed of adipose tissue until only dermis and epidermis was left.  The skin margins of the secondary defect were undermined to an appropriate distance in all directions utilizing iris scissors.  The secondary defect was closed with interrupted buried subcutaneous sutures.  The skin edges were then re-apposed with running  sutures.  The skin graft was then placed in the primary defect and oriented appropriately. Pinch Graft Text: The defect edges were debeveled with a #15 scalpel blade. Given the location of the defect, shape of the defect and the proximity to free margins a pinch graft was deemed most appropriate. Using a sterile surgical marker, the primary defect shape was transferred to the donor site. The area thus outlined was incised deep to adipose tissue with a #15 scalpel blade.  The harvested graft was then trimmed of adipose tissue until only dermis and epidermis was left. The skin margins of the secondary defect were undermined to an appropriate distance in all directions utilizing iris scissors.  The secondary defect was closed with interrupted buried subcutaneous sutures.  The skin edges were then re-apposed with running  sutures.  The skin graft was then placed in the primary defect and oriented appropriately. Skin Substitute Text: The defect edges were debeveled with a #15 scalpel blade. Given the location of the defect, shape of the defect and the proximity to free margins a skin substitute graft was deemed most appropriate.  The graft material was trimmed to fit the size of the defect. The graft was then placed in the primary defect and oriented appropriately. Split-Thickness Skin Graft Text: The defect edges were debeveled with a #15 scalpel blade. Given the location of the defect, shape of the defect and the proximity to free margins a split thickness skin graft was deemed most appropriate. Using a sterile surgical marker, the primary defect shape was transferred to the donor site. The split thickness graft was then harvested.  The skin graft was then placed in the primary defect and oriented appropriately. Tissue Cultured Epidermal Autograft Text: The defect edges were debeveled with a #15 scalpel blade. Given the location of the defect, shape of the defect and the proximity to free margins a tissue cultured epidermal autograft was deemed most appropriate.  The graft was then trimmed to fit the size of the defect.  The graft was then placed in the primary defect and oriented appropriately. Xenograft Text: The defect edges were debeveled with a #15 scalpel blade. Given the location of the defect, shape of the defect and the proximity to free margins a xenograft was deemed most appropriate.  The graft was then trimmed to fit the size of the defect.  The graft was then placed in the primary defect and oriented appropriately. Complex Repair And Flap Additional Text (Will Appearing After The Standard Complex Repair Text): The complex repair was not sufficient to completely close the primary defect. The remaining additional defect was repaired with the flap mentioned below. Complex Repair And Graft Additional Text (Will Appearing After The Standard Complex Repair Text): The complex repair was not sufficient to completely close the primary defect. The remaining additional defect was repaired with the graft mentioned below. Eyelid Full Thickness Repair - [id number]: The eyelid defect was full thickness which required a wedge repair of the eyelid. Special care was taken to ensure that the eyelid margin was realligned when placing sutures. Eyelid Partial Thickness Repair - [id number]: The eyelid defect was partial thickness which required a wedge repair of the eyelid. Special care was taken to ensure that the eyelid margin was realligned when placing sutures. Intermediate Repair And Flap Additional Text (Will Appearing After The Standard Complex Repair Text): The intermediate repair was not sufficient to completely close the primary defect. The remaining additional defect was repaired with the flap mentioned below. Intermediate Repair And Graft Additional Text (Will Appearing After The Standard Complex Repair Text): The intermediate repair was not sufficient to completely close the primary defect. The remaining additional defect was repaired with the graft mentioned below. Localized Dermabrasion With 15 Blade Text: The patient was draped in routine manner.  Localized dermabrasion using a 15 blade was performed in routine manner to papillary dermis. This spot dermabrasion is being performed to complete skin cancer reconstruction. It also will eliminate the other sun damaged precancerous cells that are known to be part of the regional effect of a lifetime's worth of sun exposure. This localized dermabrasion is therapeutic and should not be considered cosmetic in any regard. Localized Dermabrasion With Sand Papertext: The patient was draped in routine manner.  Localized dermabrasion using sterile sand paper was performed in routine manner to papillary dermis. This spot dermabrasion is being performed to complete skin cancer reconstruction. It also will eliminate the other sun damaged precancerous cells that are known to be part of the regional effect of a lifetime's worth of sun exposure. This localized dermabrasion is therapeutic and should not be considered cosmetic in any regard. Localized Dermabrasion With Wire Brush Text: The patient was draped in routine manner.  Localized dermabrasion using 3 x 17 mm wire brush was performed in routine manner to papillary dermis. This spot dermabrasion is being performed to complete skin cancer reconstruction. It also will eliminate the other sun damaged precancerous cells that are known to be part of the regional effect of a lifetime's worth of sun exposure. This localized dermabrasion is therapeutic and should not be considered cosmetic in any regard. Purse String (Simple) Text: Given the location of the defect and the characteristics of the surrounding skin a purse string closure was deemed most appropriate.  Undermining was performed circumferentially around the surgical defect.  A purse string suture was then placed and tightened. Purse String (Intermediate) Text: Given the location of the defect and the characteristics of the surrounding skin a purse string intermediate closure was deemed most appropriate.  Undermining was performed circumferentially around the surgical defect.  A purse string suture was then placed and tightened. Partial Purse String (Simple) Text: Given the location of the defect and the characteristics of the surrounding skin a simple purse string closure was deemed most appropriate.  Undermining was performed circumferentially around the surgical defect.  A purse string suture was then placed and tightened. Wound tension only allowed a partial closure of the circular defect. Partial Purse String (Intermediate) Text: Given the location of the defect and the characteristics of the surrounding skin an intermediate purse string closure was deemed most appropriate.  Undermining was performed circumferentially around the surgical defect.  A purse string suture was then placed and tightened. Wound tension only allowed a partial closure of the circular defect. Tarsorrhaphy Text: A tarsorrhaphy was performed using Frost sutures. Manual Repair Warning Statement: We plan on removing the manually selected variable below in favor of our much easier automatic structured text blocks found in the previous tab. We decided to do this to help make the flow better and give you the full power of structured data. Manual selection is never going to be ideal in our platform and I would encourage you to avoid using manual selection from this point on, especially since I will be sunsetting this feature. It is important that you do one of two things with the customized text below. First, you can save all of the text in a word file so you can have it for future reference. Second, transfer the text to the appropriate area in the Library tab. Lastly, if there is a flap or graft type which we do not have you need to let us know right away so I can add it in before the variable is hidden. No need to panic, we plan to give you roughly 6 months to make the change. Same Histology In Subsequent Stages Text: The pattern and morphology of the tumor is as described in the first stage. No Residual Tumor Seen Histology Text: No persisting tumor was seen upon examination. Since the skin cancer was completely excised in Mohs surgery, there was no malignancy seen at the margin for which the histology can be described; only normal skin was seen, including the normal components of hair follicles, sebaceous glands, epidermis, dermis, and subcutaneous tissue. Inflammation Suggestive Of Cancer Camouflage Histology Text: There was a dense lymphocytic infiltrate which prevented adequate histologic evaluation of adjacent structures. Bcc Histology Text: There were numerous aggregates of basaloid cells. Bcc Infiltrative Histology Text: Full thickness epidermis is visualized around the specimen. Atypical basaloid cells with hyper-chromatic nuclei and mitotic figures are present in small infiltrative nests, extending into or beyond the papillary-reticular dermal interface, as indicated on the mohs map, consistent with aggressive basal cell carcinoma invasive to the deep dermis. Bcc Infundibulocystic Histology Text: : Bcc  Nodular Histology Text: Full thickness epidermis is visualized around the entire specimen. Normal hair follicles and sebaceous glands are present. In the dermis there are atypical basaloid cells in nests invading the papillary dermis, extending into or beyond the papillary- reticular dermal interface, consistent with basal cell carcinoma, as indicated on the mohs map. Bcc Superficial Histology Text: Full thickness epidermis is visualized around the entire specimen. Atypical basaloid nests are present emanating from the follicular epithelium, as indicated on the Mohs map, invasive to the mid dermis consistent with superficial follicular basal cell carcinoma. Scc Histology Text: Full thickness epidermis is visualized around the specimen. Neoplasticism epithelium with prominent squamatization extending into the dermis and into or beyond the [papillary- reticular dermal interface is present, as indicated in the Mohs map, consistent with squamous cell carcinoma. Scc In Situ Histology Text: Full thickness epidermis is visualized around the entire specimen. Normal hair follicles ad sebaceous glands are present. Full thickness keratinocyte atypia is seen, as indicated on the Mohs map consistent with squamous cell carcinoma in situ. Melanoma Histology Text: Atypical melanocytes are present within the epidermis at increased density. [place] of atypical melanocytes with focal pagetoid and follicular extension are seen, invasive melanoma was absent from the dermis in the sections examined Merkel Cell Carcinoma Histology Text: Sheets and small aggregates of cells with an infiltrative patter. The cells have scant cytoplasm and contain nuclei with a “salt and pepper” chromatin pattern. Afx Histology Text: Atypical spindle cells with irregular hyper-chromatic nuclei infiltrate the papillary dermis; deep invasion is not seen, consistent with atypical fibroxanthoma. Dfsp Histology Text: Monomorphic spindle cell proliferation infiltrating the dermis Mart-1 - Positive Histology Text: MART-1 staining demonstrates areas of higher density and clustering of melanocytes with Pagetoid spread upwards within the epidermis. The surgical margins are positive for tumor cells. Mart-1 - Negative Histology Text: MART-1 staining demonstrates a normal density and pattern of melanocytes along the dermal-epidermal junction. The surgical margins are negative for tumor cells. Information: Selecting Yes will display possible errors in your note based on the variables you have selected. This validation is only offered as a suggestion for you. PLEASE NOTE THAT THE VALIDATION TEXT WILL BE REMOVED WHEN YOU FINALIZE YOUR NOTE. IF YOU WANT TO FAX A PRELIMINARY NOTE YOU WILL NEED TO TOGGLE THIS TO 'NO' IF YOU DO NOT WANT IT IN YOUR FAXED NOTE. Bill 59 Modifier?: No - Continue to Bill 79 Modifier

## 2025-05-15 NOTE — PROGRESS NOTE ADULT - NS_MD_PANP_GEN_ALL_CORE
Attending and PA/NP shared services statement (NON-critical care): Photo Preface (Leave Blank If You Do Not Want): Photographs were obtained today Detail Level: Zone

## 2025-07-14 NOTE — HISTORY OF PRESENT ILLNESS
Take oral antibiotics twice daily as prescribed  Apply topical mupirocin 3 times daily for 7 days   [FreeTextEntry1] : dm2 since Dec 2016 \par Bgs excellent \par he takes Mf 1000 mg BID
